# Patient Record
Sex: MALE | Race: BLACK OR AFRICAN AMERICAN | HISPANIC OR LATINO | URBAN - METROPOLITAN AREA
[De-identification: names, ages, dates, MRNs, and addresses within clinical notes are randomized per-mention and may not be internally consistent; named-entity substitution may affect disease eponyms.]

---

## 2017-01-17 ENCOUNTER — OUTPATIENT (OUTPATIENT)
Dept: OUTPATIENT SERVICES | Facility: HOSPITAL | Age: 62
LOS: 1 days | Discharge: HOME | End: 2017-01-17

## 2017-03-03 ENCOUNTER — RECORD ABSTRACTING (OUTPATIENT)
Age: 62
End: 2017-03-03

## 2017-03-03 DIAGNOSIS — Z78.9 OTHER SPECIFIED HEALTH STATUS: ICD-10-CM

## 2017-03-03 DIAGNOSIS — K57.90 DIVERTICULOSIS OF INTESTINE, PART UNSPECIFIED, W/OUT PERFORATION OR ABSCESS W/OUT BLEEDING: ICD-10-CM

## 2017-03-03 DIAGNOSIS — B19.20 UNSPECIFIED VIRAL HEPATITIS C W/OUT HEPATIC COMA: ICD-10-CM

## 2017-03-03 DIAGNOSIS — K64.4 RESIDUAL HEMORRHOIDAL SKIN TAGS: ICD-10-CM

## 2017-03-03 DIAGNOSIS — F19.10 OTHER PSYCHOACTIVE SUBSTANCE ABUSE, UNCOMPLICATED: ICD-10-CM

## 2017-03-03 DIAGNOSIS — K64.8 OTHER HEMORRHOIDS: ICD-10-CM

## 2017-04-20 ENCOUNTER — APPOINTMENT (OUTPATIENT)
Dept: PODIATRY | Facility: CLINIC | Age: 62
End: 2017-04-20

## 2017-04-20 VITALS — HEIGHT: 72 IN | BODY MASS INDEX: 26.68 KG/M2 | WEIGHT: 197 LBS

## 2017-04-20 DIAGNOSIS — M71.571 OTHER BURSITIS, NOT ELSEWHERE CLASSIFIED, RIGHT ANKLE AND FOOT: ICD-10-CM

## 2017-04-20 DIAGNOSIS — M79.671 PAIN IN RIGHT FOOT: ICD-10-CM

## 2017-04-20 DIAGNOSIS — M20.5X9 OTHER DEFORMITIES OF TOE(S) (ACQUIRED), UNSPECIFIED FOOT: ICD-10-CM

## 2017-04-20 DIAGNOSIS — G89.29 PAIN IN RIGHT FOOT: ICD-10-CM

## 2017-04-24 ENCOUNTER — APPOINTMENT (OUTPATIENT)
Dept: PODIATRY | Facility: CLINIC | Age: 62
End: 2017-04-24

## 2017-05-08 ENCOUNTER — OUTPATIENT (OUTPATIENT)
Dept: OUTPATIENT SERVICES | Facility: HOSPITAL | Age: 62
LOS: 1 days | Discharge: HOME | End: 2017-05-08

## 2017-05-09 ENCOUNTER — OUTPATIENT (OUTPATIENT)
Dept: OUTPATIENT SERVICES | Facility: HOSPITAL | Age: 62
LOS: 1 days | Discharge: HOME | End: 2017-05-09

## 2017-06-05 ENCOUNTER — OUTPATIENT (OUTPATIENT)
Dept: OUTPATIENT SERVICES | Facility: HOSPITAL | Age: 62
LOS: 1 days | Discharge: HOME | End: 2017-06-05

## 2017-06-06 ENCOUNTER — OUTPATIENT (OUTPATIENT)
Dept: OUTPATIENT SERVICES | Facility: HOSPITAL | Age: 62
LOS: 1 days | Discharge: HOME | End: 2017-06-06

## 2017-06-26 ENCOUNTER — OUTPATIENT (OUTPATIENT)
Dept: OUTPATIENT SERVICES | Facility: HOSPITAL | Age: 62
LOS: 1 days | Discharge: HOME | End: 2017-06-26

## 2017-06-27 ENCOUNTER — OUTPATIENT (OUTPATIENT)
Dept: OUTPATIENT SERVICES | Facility: HOSPITAL | Age: 62
LOS: 1 days | Discharge: HOME | End: 2017-06-27

## 2017-06-28 DIAGNOSIS — B20 HUMAN IMMUNODEFICIENCY VIRUS [HIV] DISEASE: ICD-10-CM

## 2017-06-28 DIAGNOSIS — G99.0 AUTONOMIC NEUROPATHY IN DISEASES CLASSIFIED ELSEWHERE: ICD-10-CM

## 2017-06-28 DIAGNOSIS — M87.851 OTHER OSTEONECROSIS, RIGHT FEMUR: ICD-10-CM

## 2017-06-28 DIAGNOSIS — E78.5 HYPERLIPIDEMIA, UNSPECIFIED: ICD-10-CM

## 2017-06-28 DIAGNOSIS — I10 ESSENTIAL (PRIMARY) HYPERTENSION: ICD-10-CM

## 2017-06-28 DIAGNOSIS — E23.6 OTHER DISORDERS OF PITUITARY GLAND: ICD-10-CM

## 2017-07-11 DIAGNOSIS — B20 HUMAN IMMUNODEFICIENCY VIRUS [HIV] DISEASE: ICD-10-CM

## 2017-07-14 DIAGNOSIS — B20 HUMAN IMMUNODEFICIENCY VIRUS [HIV] DISEASE: ICD-10-CM

## 2017-07-17 DIAGNOSIS — B20 HUMAN IMMUNODEFICIENCY VIRUS [HIV] DISEASE: ICD-10-CM

## 2017-07-31 ENCOUNTER — OUTPATIENT (OUTPATIENT)
Dept: OUTPATIENT SERVICES | Facility: HOSPITAL | Age: 62
LOS: 1 days | Discharge: HOME | End: 2017-07-31

## 2017-08-01 ENCOUNTER — OUTPATIENT (OUTPATIENT)
Dept: OUTPATIENT SERVICES | Facility: HOSPITAL | Age: 62
LOS: 1 days | Discharge: HOME | End: 2017-08-01

## 2017-08-01 DIAGNOSIS — B20 HUMAN IMMUNODEFICIENCY VIRUS [HIV] DISEASE: ICD-10-CM

## 2017-08-01 DIAGNOSIS — K21.9 GASTRO-ESOPHAGEAL REFLUX DISEASE WITHOUT ESOPHAGITIS: ICD-10-CM

## 2017-08-01 DIAGNOSIS — E78.5 HYPERLIPIDEMIA, UNSPECIFIED: ICD-10-CM

## 2017-08-01 DIAGNOSIS — G99.0 AUTONOMIC NEUROPATHY IN DISEASES CLASSIFIED ELSEWHERE: ICD-10-CM

## 2017-08-01 DIAGNOSIS — I10 ESSENTIAL (PRIMARY) HYPERTENSION: ICD-10-CM

## 2017-08-01 DIAGNOSIS — M87.851 OTHER OSTEONECROSIS, RIGHT FEMUR: ICD-10-CM

## 2017-08-08 ENCOUNTER — OUTPATIENT (OUTPATIENT)
Dept: OUTPATIENT SERVICES | Facility: HOSPITAL | Age: 62
LOS: 1 days | Discharge: HOME | End: 2017-08-08

## 2017-08-08 DIAGNOSIS — B20 HUMAN IMMUNODEFICIENCY VIRUS [HIV] DISEASE: ICD-10-CM

## 2017-08-11 DIAGNOSIS — H25.13 AGE-RELATED NUCLEAR CATARACT, BILATERAL: ICD-10-CM

## 2017-08-11 DIAGNOSIS — H35.033 HYPERTENSIVE RETINOPATHY, BILATERAL: ICD-10-CM

## 2017-08-11 DIAGNOSIS — B20 HUMAN IMMUNODEFICIENCY VIRUS [HIV] DISEASE: ICD-10-CM

## 2017-08-28 ENCOUNTER — OUTPATIENT (OUTPATIENT)
Dept: OUTPATIENT SERVICES | Facility: HOSPITAL | Age: 62
LOS: 1 days | Discharge: HOME | End: 2017-08-28

## 2017-08-29 ENCOUNTER — OUTPATIENT (OUTPATIENT)
Dept: OUTPATIENT SERVICES | Facility: HOSPITAL | Age: 62
LOS: 1 days | Discharge: HOME | End: 2017-08-29

## 2017-08-29 DIAGNOSIS — B20 HUMAN IMMUNODEFICIENCY VIRUS [HIV] DISEASE: ICD-10-CM

## 2017-08-29 DIAGNOSIS — I10 ESSENTIAL (PRIMARY) HYPERTENSION: ICD-10-CM

## 2017-08-29 DIAGNOSIS — G99.0 AUTONOMIC NEUROPATHY IN DISEASES CLASSIFIED ELSEWHERE: ICD-10-CM

## 2017-08-29 DIAGNOSIS — E03.9 HYPOTHYROIDISM, UNSPECIFIED: ICD-10-CM

## 2017-08-29 DIAGNOSIS — G56.00 CARPAL TUNNEL SYNDROME, UNSPECIFIED UPPER LIMB: ICD-10-CM

## 2017-08-29 DIAGNOSIS — Z87.891 PERSONAL HISTORY OF NICOTINE DEPENDENCE: ICD-10-CM

## 2017-08-29 DIAGNOSIS — E78.5 HYPERLIPIDEMIA, UNSPECIFIED: ICD-10-CM

## 2017-08-29 DIAGNOSIS — M87.851 OTHER OSTEONECROSIS, RIGHT FEMUR: ICD-10-CM

## 2017-09-01 DIAGNOSIS — B20 HUMAN IMMUNODEFICIENCY VIRUS [HIV] DISEASE: ICD-10-CM

## 2017-09-11 DIAGNOSIS — B20 HUMAN IMMUNODEFICIENCY VIRUS [HIV] DISEASE: ICD-10-CM

## 2017-09-26 ENCOUNTER — OUTPATIENT (OUTPATIENT)
Dept: OUTPATIENT SERVICES | Facility: HOSPITAL | Age: 62
LOS: 1 days | Discharge: HOME | End: 2017-09-26

## 2017-09-26 DIAGNOSIS — B20 HUMAN IMMUNODEFICIENCY VIRUS [HIV] DISEASE: ICD-10-CM

## 2017-09-26 DIAGNOSIS — I10 ESSENTIAL (PRIMARY) HYPERTENSION: ICD-10-CM

## 2017-09-26 DIAGNOSIS — G99.0 AUTONOMIC NEUROPATHY IN DISEASES CLASSIFIED ELSEWHERE: ICD-10-CM

## 2017-09-26 DIAGNOSIS — E23.6 OTHER DISORDERS OF PITUITARY GLAND: ICD-10-CM

## 2017-09-26 DIAGNOSIS — E78.5 HYPERLIPIDEMIA, UNSPECIFIED: ICD-10-CM

## 2017-10-24 ENCOUNTER — OUTPATIENT (OUTPATIENT)
Dept: OUTPATIENT SERVICES | Facility: HOSPITAL | Age: 62
LOS: 1 days | Discharge: HOME | End: 2017-10-24

## 2017-10-24 DIAGNOSIS — I10 ESSENTIAL (PRIMARY) HYPERTENSION: ICD-10-CM

## 2017-10-24 DIAGNOSIS — E23.6 OTHER DISORDERS OF PITUITARY GLAND: ICD-10-CM

## 2017-10-24 DIAGNOSIS — G99.0 AUTONOMIC NEUROPATHY IN DISEASES CLASSIFIED ELSEWHERE: ICD-10-CM

## 2017-10-24 DIAGNOSIS — E78.5 HYPERLIPIDEMIA, UNSPECIFIED: ICD-10-CM

## 2017-10-24 DIAGNOSIS — B20 HUMAN IMMUNODEFICIENCY VIRUS [HIV] DISEASE: ICD-10-CM

## 2017-11-07 ENCOUNTER — APPOINTMENT (OUTPATIENT)
Dept: PLASTIC SURGERY | Facility: CLINIC | Age: 62
End: 2017-11-07
Payer: COMMERCIAL

## 2017-11-07 PROCEDURE — 99203 OFFICE O/P NEW LOW 30 MIN: CPT

## 2017-11-13 ENCOUNTER — OUTPATIENT (OUTPATIENT)
Dept: OUTPATIENT SERVICES | Facility: HOSPITAL | Age: 62
LOS: 1 days | Discharge: HOME | End: 2017-11-13

## 2017-11-13 DIAGNOSIS — Z01.818 ENCOUNTER FOR OTHER PREPROCEDURAL EXAMINATION: ICD-10-CM

## 2017-11-13 DIAGNOSIS — G56.01 CARPAL TUNNEL SYNDROME, RIGHT UPPER LIMB: ICD-10-CM

## 2017-11-13 DIAGNOSIS — K76.9 LIVER DISEASE, UNSPECIFIED: ICD-10-CM

## 2017-11-13 DIAGNOSIS — I10 ESSENTIAL (PRIMARY) HYPERTENSION: ICD-10-CM

## 2017-11-13 DIAGNOSIS — E78.00 PURE HYPERCHOLESTEROLEMIA, UNSPECIFIED: ICD-10-CM

## 2017-11-13 DIAGNOSIS — Z21 ASYMPTOMATIC HUMAN IMMUNODEFICIENCY VIRUS [HIV] INFECTION STATUS: ICD-10-CM

## 2017-11-20 ENCOUNTER — OUTPATIENT (OUTPATIENT)
Dept: OUTPATIENT SERVICES | Facility: HOSPITAL | Age: 62
LOS: 1 days | Discharge: HOME | End: 2017-11-20

## 2017-11-21 ENCOUNTER — OUTPATIENT (OUTPATIENT)
Dept: OUTPATIENT SERVICES | Facility: HOSPITAL | Age: 62
LOS: 1 days | Discharge: HOME | End: 2017-11-21

## 2017-11-21 DIAGNOSIS — M19.90 UNSPECIFIED OSTEOARTHRITIS, UNSPECIFIED SITE: ICD-10-CM

## 2017-11-21 DIAGNOSIS — B18.2 CHRONIC VIRAL HEPATITIS C: ICD-10-CM

## 2017-11-21 DIAGNOSIS — B20 HUMAN IMMUNODEFICIENCY VIRUS [HIV] DISEASE: ICD-10-CM

## 2017-11-21 DIAGNOSIS — M25.552 PAIN IN LEFT HIP: ICD-10-CM

## 2017-11-21 DIAGNOSIS — M25.551 PAIN IN RIGHT HIP: ICD-10-CM

## 2017-11-21 DIAGNOSIS — E78.2 MIXED HYPERLIPIDEMIA: ICD-10-CM

## 2017-11-27 ENCOUNTER — OUTPATIENT (OUTPATIENT)
Dept: OUTPATIENT SERVICES | Facility: HOSPITAL | Age: 62
LOS: 1 days | Discharge: HOME | End: 2017-11-27

## 2017-11-27 ENCOUNTER — APPOINTMENT (OUTPATIENT)
Dept: PLASTIC SURGERY | Facility: AMBULATORY SURGERY CENTER | Age: 62
End: 2017-11-27
Payer: COMMERCIAL

## 2017-11-27 PROCEDURE — 64721 CARPAL TUNNEL SURGERY: CPT

## 2017-11-30 DIAGNOSIS — G56.01 CARPAL TUNNEL SYNDROME, RIGHT UPPER LIMB: ICD-10-CM

## 2017-11-30 DIAGNOSIS — B19.20 UNSPECIFIED VIRAL HEPATITIS C WITHOUT HEPATIC COMA: ICD-10-CM

## 2017-11-30 DIAGNOSIS — B19.10 UNSPECIFIED VIRAL HEPATITIS B WITHOUT HEPATIC COMA: ICD-10-CM

## 2017-11-30 DIAGNOSIS — I10 ESSENTIAL (PRIMARY) HYPERTENSION: ICD-10-CM

## 2017-11-30 DIAGNOSIS — Z21 ASYMPTOMATIC HUMAN IMMUNODEFICIENCY VIRUS [HIV] INFECTION STATUS: ICD-10-CM

## 2017-12-01 DIAGNOSIS — B20 HUMAN IMMUNODEFICIENCY VIRUS [HIV] DISEASE: ICD-10-CM

## 2017-12-06 ENCOUNTER — APPOINTMENT (OUTPATIENT)
Dept: PLASTIC SURGERY | Facility: CLINIC | Age: 62
End: 2017-12-06
Payer: COMMERCIAL

## 2017-12-06 PROCEDURE — 99024 POSTOP FOLLOW-UP VISIT: CPT

## 2017-12-08 ENCOUNTER — APPOINTMENT (OUTPATIENT)
Dept: GASTROENTEROLOGY | Facility: CLINIC | Age: 62
End: 2017-12-08

## 2017-12-08 ENCOUNTER — OUTPATIENT (OUTPATIENT)
Dept: OUTPATIENT SERVICES | Facility: HOSPITAL | Age: 62
LOS: 1 days | Discharge: HOME | End: 2017-12-08

## 2017-12-08 VITALS
DIASTOLIC BLOOD PRESSURE: 72 MMHG | BODY MASS INDEX: 28.31 KG/M2 | WEIGHT: 209 LBS | SYSTOLIC BLOOD PRESSURE: 135 MMHG | HEART RATE: 68 BPM | HEIGHT: 72 IN

## 2017-12-11 ENCOUNTER — APPOINTMENT (OUTPATIENT)
Dept: PLASTIC SURGERY | Facility: CLINIC | Age: 62
End: 2017-12-11

## 2017-12-11 ENCOUNTER — APPOINTMENT (OUTPATIENT)
Dept: PLASTIC SURGERY | Facility: AMBULATORY SURGERY CENTER | Age: 62
End: 2017-12-11
Payer: COMMERCIAL

## 2017-12-11 PROCEDURE — 99024 POSTOP FOLLOW-UP VISIT: CPT

## 2017-12-15 ENCOUNTER — OUTPATIENT (OUTPATIENT)
Dept: OUTPATIENT SERVICES | Facility: HOSPITAL | Age: 62
LOS: 1 days | Discharge: HOME | End: 2017-12-15

## 2017-12-18 ENCOUNTER — OUTPATIENT (OUTPATIENT)
Dept: OUTPATIENT SERVICES | Facility: HOSPITAL | Age: 62
LOS: 1 days | Discharge: HOME | End: 2017-12-18

## 2017-12-19 ENCOUNTER — OUTPATIENT (OUTPATIENT)
Dept: OUTPATIENT SERVICES | Facility: HOSPITAL | Age: 62
LOS: 1 days | Discharge: HOME | End: 2017-12-19

## 2017-12-19 DIAGNOSIS — I10 ESSENTIAL (PRIMARY) HYPERTENSION: ICD-10-CM

## 2017-12-19 DIAGNOSIS — Z86.010 PERSONAL HISTORY OF COLONIC POLYPS: ICD-10-CM

## 2017-12-19 DIAGNOSIS — G99.0 AUTONOMIC NEUROPATHY IN DISEASES CLASSIFIED ELSEWHERE: ICD-10-CM

## 2017-12-19 DIAGNOSIS — Z12.11 ENCOUNTER FOR SCREENING FOR MALIGNANT NEOPLASM OF COLON: ICD-10-CM

## 2017-12-19 DIAGNOSIS — E23.6 OTHER DISORDERS OF PITUITARY GLAND: ICD-10-CM

## 2017-12-19 DIAGNOSIS — M87.851 OTHER OSTEONECROSIS, RIGHT FEMUR: ICD-10-CM

## 2017-12-19 DIAGNOSIS — B20 HUMAN IMMUNODEFICIENCY VIRUS [HIV] DISEASE: ICD-10-CM

## 2017-12-19 DIAGNOSIS — K64.8 OTHER HEMORRHOIDS: ICD-10-CM

## 2017-12-19 DIAGNOSIS — Z21 ASYMPTOMATIC HUMAN IMMUNODEFICIENCY VIRUS [HIV] INFECTION STATUS: ICD-10-CM

## 2017-12-19 DIAGNOSIS — B19.20 UNSPECIFIED VIRAL HEPATITIS C WITHOUT HEPATIC COMA: ICD-10-CM

## 2017-12-19 DIAGNOSIS — K21.9 GASTRO-ESOPHAGEAL REFLUX DISEASE WITHOUT ESOPHAGITIS: ICD-10-CM

## 2017-12-19 DIAGNOSIS — E78.5 HYPERLIPIDEMIA, UNSPECIFIED: ICD-10-CM

## 2017-12-19 DIAGNOSIS — K57.30 DIVERTICULOSIS OF LARGE INTESTINE WITHOUT PERFORATION OR ABSCESS WITHOUT BLEEDING: ICD-10-CM

## 2017-12-22 ENCOUNTER — APPOINTMENT (OUTPATIENT)
Dept: PLASTIC SURGERY | Facility: CLINIC | Age: 62
End: 2017-12-22
Payer: COMMERCIAL

## 2017-12-22 DIAGNOSIS — G56.03 CARPAL TUNNEL SYNDROM,BILATERAL UPPER LIMBS: ICD-10-CM

## 2017-12-22 PROCEDURE — 99024 POSTOP FOLLOW-UP VISIT: CPT

## 2017-12-28 DIAGNOSIS — B20 HUMAN IMMUNODEFICIENCY VIRUS [HIV] DISEASE: ICD-10-CM

## 2018-01-03 ENCOUNTER — OUTPATIENT (OUTPATIENT)
Dept: OUTPATIENT SERVICES | Facility: HOSPITAL | Age: 63
LOS: 1 days | Discharge: HOME | End: 2018-01-03

## 2018-01-03 ENCOUNTER — APPOINTMENT (OUTPATIENT)
Dept: ORTHOPEDIC SURGERY | Facility: CLINIC | Age: 63
End: 2018-01-03

## 2018-01-05 ENCOUNTER — APPOINTMENT (OUTPATIENT)
Dept: GASTROENTEROLOGY | Facility: CLINIC | Age: 63
End: 2018-01-05

## 2018-01-12 ENCOUNTER — OUTPATIENT (OUTPATIENT)
Dept: OUTPATIENT SERVICES | Facility: HOSPITAL | Age: 63
LOS: 1 days | Discharge: HOME | End: 2018-01-12

## 2018-01-16 ENCOUNTER — OUTPATIENT (OUTPATIENT)
Dept: OUTPATIENT SERVICES | Facility: HOSPITAL | Age: 63
LOS: 1 days | Discharge: HOME | End: 2018-01-16

## 2018-01-16 ENCOUNTER — APPOINTMENT (OUTPATIENT)
Dept: PLASTIC SURGERY | Facility: CLINIC | Age: 63
End: 2018-01-16

## 2018-01-16 DIAGNOSIS — B20 HUMAN IMMUNODEFICIENCY VIRUS [HIV] DISEASE: ICD-10-CM

## 2018-01-16 DIAGNOSIS — I10 ESSENTIAL (PRIMARY) HYPERTENSION: ICD-10-CM

## 2018-01-16 DIAGNOSIS — M19.90 UNSPECIFIED OSTEOARTHRITIS, UNSPECIFIED SITE: ICD-10-CM

## 2018-01-16 DIAGNOSIS — E78.2 MIXED HYPERLIPIDEMIA: ICD-10-CM

## 2018-01-16 DIAGNOSIS — B18.2 CHRONIC VIRAL HEPATITIS C: ICD-10-CM

## 2018-01-17 DIAGNOSIS — B20 HUMAN IMMUNODEFICIENCY VIRUS [HIV] DISEASE: ICD-10-CM

## 2018-02-12 ENCOUNTER — OUTPATIENT (OUTPATIENT)
Dept: OUTPATIENT SERVICES | Facility: HOSPITAL | Age: 63
LOS: 1 days | Discharge: HOME | End: 2018-02-12

## 2018-02-13 ENCOUNTER — OUTPATIENT (OUTPATIENT)
Dept: OUTPATIENT SERVICES | Facility: HOSPITAL | Age: 63
LOS: 1 days | Discharge: HOME | End: 2018-02-13

## 2018-02-13 DIAGNOSIS — E78.5 HYPERLIPIDEMIA, UNSPECIFIED: ICD-10-CM

## 2018-02-13 DIAGNOSIS — B20 HUMAN IMMUNODEFICIENCY VIRUS [HIV] DISEASE: ICD-10-CM

## 2018-02-13 DIAGNOSIS — E23.6 OTHER DISORDERS OF PITUITARY GLAND: ICD-10-CM

## 2018-02-13 DIAGNOSIS — I10 ESSENTIAL (PRIMARY) HYPERTENSION: ICD-10-CM

## 2018-02-13 DIAGNOSIS — G99.0 AUTONOMIC NEUROPATHY IN DISEASES CLASSIFIED ELSEWHERE: ICD-10-CM

## 2018-02-13 DIAGNOSIS — K21.9 GASTRO-ESOPHAGEAL REFLUX DISEASE WITHOUT ESOPHAGITIS: ICD-10-CM

## 2018-02-13 DIAGNOSIS — M87.851 OTHER OSTEONECROSIS, RIGHT FEMUR: ICD-10-CM

## 2018-02-14 LAB
AMPHET UR-MCNC: NEGATIVE — SIGNIFICANT CHANGE UP
BARBITURATES UR SCN-MCNC: NEGATIVE — SIGNIFICANT CHANGE UP
BENZODIAZ UR-MCNC: NEGATIVE — SIGNIFICANT CHANGE UP
COCAINE METAB.OTHER UR-MCNC: POSITIVE
DRUG SCREEN 1, URINE RESULT: SIGNIFICANT CHANGE UP
METHADONE UR-MCNC: POSITIVE
OPIATES UR-MCNC: NEGATIVE — SIGNIFICANT CHANGE UP
PCP UR-MCNC: NEGATIVE — SIGNIFICANT CHANGE UP
PROPOXYPHENE QUALITATIVE URINE RESULT: NEGATIVE — SIGNIFICANT CHANGE UP
THC UR QL: NEGATIVE — SIGNIFICANT CHANGE UP

## 2018-02-20 DIAGNOSIS — B20 HUMAN IMMUNODEFICIENCY VIRUS [HIV] DISEASE: ICD-10-CM

## 2018-03-06 ENCOUNTER — OUTPATIENT (OUTPATIENT)
Dept: OUTPATIENT SERVICES | Facility: HOSPITAL | Age: 63
LOS: 1 days | Discharge: HOME | End: 2018-03-06

## 2018-03-09 ENCOUNTER — APPOINTMENT (OUTPATIENT)
Dept: GASTROENTEROLOGY | Facility: CLINIC | Age: 63
End: 2018-03-09

## 2018-03-09 DIAGNOSIS — B20 HUMAN IMMUNODEFICIENCY VIRUS [HIV] DISEASE: ICD-10-CM

## 2018-03-09 DIAGNOSIS — H35.033 HYPERTENSIVE RETINOPATHY, BILATERAL: ICD-10-CM

## 2018-03-09 DIAGNOSIS — H25.13 AGE-RELATED NUCLEAR CATARACT, BILATERAL: ICD-10-CM

## 2018-03-09 DIAGNOSIS — H01.021 SQUAMOUS BLEPHARITIS RIGHT UPPER EYELID: ICD-10-CM

## 2018-03-09 DIAGNOSIS — H01.024 SQUAMOUS BLEPHARITIS LEFT UPPER EYELID: ICD-10-CM

## 2018-03-09 DIAGNOSIS — H53.19 OTHER SUBJECTIVE VISUAL DISTURBANCES: ICD-10-CM

## 2018-03-12 ENCOUNTER — OUTPATIENT (OUTPATIENT)
Dept: OUTPATIENT SERVICES | Facility: HOSPITAL | Age: 63
LOS: 1 days | Discharge: HOME | End: 2018-03-12

## 2018-03-13 ENCOUNTER — OUTPATIENT (OUTPATIENT)
Dept: OUTPATIENT SERVICES | Facility: HOSPITAL | Age: 63
LOS: 1 days | Discharge: HOME | End: 2018-03-13

## 2018-03-13 DIAGNOSIS — E78.5 HYPERLIPIDEMIA, UNSPECIFIED: ICD-10-CM

## 2018-03-13 DIAGNOSIS — E23.6 OTHER DISORDERS OF PITUITARY GLAND: ICD-10-CM

## 2018-03-13 DIAGNOSIS — G99.0 AUTONOMIC NEUROPATHY IN DISEASES CLASSIFIED ELSEWHERE: ICD-10-CM

## 2018-03-13 DIAGNOSIS — B20 HUMAN IMMUNODEFICIENCY VIRUS [HIV] DISEASE: ICD-10-CM

## 2018-03-13 DIAGNOSIS — I10 ESSENTIAL (PRIMARY) HYPERTENSION: ICD-10-CM

## 2018-03-13 LAB
AMPHET UR-MCNC: NEGATIVE — SIGNIFICANT CHANGE UP
BARBITURATES UR SCN-MCNC: NEGATIVE — SIGNIFICANT CHANGE UP
BENZODIAZ UR-MCNC: NEGATIVE — SIGNIFICANT CHANGE UP
COCAINE METAB.OTHER UR-MCNC: NEGATIVE — SIGNIFICANT CHANGE UP
DRUG SCREEN 1, URINE RESULT: SIGNIFICANT CHANGE UP
METHADONE UR-MCNC: POSITIVE
OPIATES UR-MCNC: NEGATIVE — SIGNIFICANT CHANGE UP
PCP UR-MCNC: NEGATIVE — SIGNIFICANT CHANGE UP
PROPOXYPHENE QUALITATIVE URINE RESULT: NEGATIVE — SIGNIFICANT CHANGE UP
THC UR QL: NEGATIVE — SIGNIFICANT CHANGE UP

## 2018-03-14 ENCOUNTER — APPOINTMENT (OUTPATIENT)
Dept: ORTHOPEDIC SURGERY | Facility: CLINIC | Age: 63
End: 2018-03-14

## 2018-03-18 LAB
EDDP UR CFM-MCNC: SIGNIFICANT CHANGE UP CD:170654348
METHADONE UR CFM-MCNC: (no result)
METHADONE UR CFM-MCNC: SIGNIFICANT CHANGE UP CD:170654348

## 2018-03-26 DIAGNOSIS — B20 HUMAN IMMUNODEFICIENCY VIRUS [HIV] DISEASE: ICD-10-CM

## 2018-04-02 ENCOUNTER — OUTPATIENT (OUTPATIENT)
Dept: OUTPATIENT SERVICES | Facility: HOSPITAL | Age: 63
LOS: 1 days | Discharge: HOME | End: 2018-04-02

## 2018-04-03 ENCOUNTER — OUTPATIENT (OUTPATIENT)
Dept: OUTPATIENT SERVICES | Facility: HOSPITAL | Age: 63
LOS: 1 days | Discharge: HOME | End: 2018-04-03

## 2018-04-03 DIAGNOSIS — G99.0 AUTONOMIC NEUROPATHY IN DISEASES CLASSIFIED ELSEWHERE: ICD-10-CM

## 2018-04-03 DIAGNOSIS — E23.6 OTHER DISORDERS OF PITUITARY GLAND: ICD-10-CM

## 2018-04-03 DIAGNOSIS — R94.31 ABNORMAL ELECTROCARDIOGRAM [ECG] [EKG]: ICD-10-CM

## 2018-04-03 DIAGNOSIS — I10 ESSENTIAL (PRIMARY) HYPERTENSION: ICD-10-CM

## 2018-04-03 DIAGNOSIS — K21.9 GASTRO-ESOPHAGEAL REFLUX DISEASE WITHOUT ESOPHAGITIS: ICD-10-CM

## 2018-04-03 DIAGNOSIS — E78.5 HYPERLIPIDEMIA, UNSPECIFIED: ICD-10-CM

## 2018-04-03 DIAGNOSIS — B20 HUMAN IMMUNODEFICIENCY VIRUS [HIV] DISEASE: ICD-10-CM

## 2018-04-03 LAB
AMPHET UR-MCNC: NEGATIVE — SIGNIFICANT CHANGE UP
BARBITURATES UR SCN-MCNC: NEGATIVE — SIGNIFICANT CHANGE UP
BENZODIAZ UR-MCNC: NEGATIVE — SIGNIFICANT CHANGE UP
COCAINE METAB.OTHER UR-MCNC: POSITIVE
DRUG SCREEN 1, URINE RESULT: SIGNIFICANT CHANGE UP
METHADONE UR-MCNC: POSITIVE
OPIATES UR-MCNC: POSITIVE
PCP UR-MCNC: NEGATIVE — SIGNIFICANT CHANGE UP
PROPOXYPHENE QUALITATIVE URINE RESULT: NEGATIVE — SIGNIFICANT CHANGE UP
THC UR QL: NEGATIVE — SIGNIFICANT CHANGE UP

## 2018-04-07 LAB
ALFENTANIL UR QUANT: SIGNIFICANT CHANGE UP CD:170654348
BUPRENORPHINE UR CONFIRMATION: NEGATIVE — SIGNIFICANT CHANGE UP
BUPRENORPHINE UR QUANT: SIGNIFICANT CHANGE UP CD:170654348
CODEINE UR CFM-MCNC: SIGNIFICANT CHANGE UP CD:170654348
DHC UR-MCNC: SIGNIFICANT CHANGE UP CD:170654348
EDDP UR CFM-MCNC: >8475 CD:170654348 — SIGNIFICANT CHANGE UP
FENTANYL UR CFM-MCNC: (no result)
FENTANYL UR CFM-MCNC: 1 CD:170654348 — SIGNIFICANT CHANGE UP
HYDROCODONE UR CFM-MCNC: SIGNIFICANT CHANGE UP CD:170654348
HYDROMORPHONE UR CFM-MCNC: SIGNIFICANT CHANGE UP CD:170654348
METHADONE UR CFM-MCNC: (no result)
METHADONE UR CFM-MCNC: >8475 CD:170654348 — SIGNIFICANT CHANGE UP
MORPHINE UR CFM-MCNC: 1247 CD:170654348 — SIGNIFICANT CHANGE UP
NORBUPRENORPHINE QUANT UR: SIGNIFICANT CHANGE UP CD:170654348
NORCODEINE UR-MCNC: SIGNIFICANT CHANGE UP CD:170654348
NORFENTANYL UR-MCNC: SIGNIFICANT CHANGE UP CD:170654348
NORHYDROCODONE UR CFM-MCNC: SIGNIFICANT CHANGE UP CD:170654348
NOROXYCODONE UR CFM-MCNC: 1815 CD:170654348 — SIGNIFICANT CHANGE UP
NOROXYMORPHONE UR CFM-MCNC: SIGNIFICANT CHANGE UP CD:170654348
OPIATES UR CFM-MCNC: (no result)
OXYCODONE UR-MCNC: (no result)
OXYCODONE UR-MCNC: 3528 CD:170654348 — SIGNIFICANT CHANGE UP
OXYMORPHONE UR CFM-MCNC: 132 CD:170654348 — SIGNIFICANT CHANGE UP
SUFENTANIL UR QUANT: SIGNIFICANT CHANGE UP CD:170654348
TAPENTADOL UR CONFIRMATION: NEGATIVE — SIGNIFICANT CHANGE UP
TAPENTADOL UR QUANT: SIGNIFICANT CHANGE UP CD:170654348
TESTOST FLD-SCNC: 467.1 NG/DL — SIGNIFICANT CHANGE UP (ref 264–916)
TESTOSTERONE.FREE+WB SERPL-MCNC: 6.3 PG/ML — LOW (ref 6.6–18.1)

## 2018-04-09 DIAGNOSIS — B20 HUMAN IMMUNODEFICIENCY VIRUS [HIV] DISEASE: ICD-10-CM

## 2018-05-04 ENCOUNTER — OUTPATIENT (OUTPATIENT)
Dept: OUTPATIENT SERVICES | Facility: HOSPITAL | Age: 63
LOS: 1 days | Discharge: HOME | End: 2018-05-04

## 2018-05-08 ENCOUNTER — OUTPATIENT (OUTPATIENT)
Dept: OUTPATIENT SERVICES | Facility: HOSPITAL | Age: 63
LOS: 1 days | Discharge: HOME | End: 2018-05-08

## 2018-05-08 DIAGNOSIS — I10 ESSENTIAL (PRIMARY) HYPERTENSION: ICD-10-CM

## 2018-05-08 DIAGNOSIS — K21.9 GASTRO-ESOPHAGEAL REFLUX DISEASE WITHOUT ESOPHAGITIS: ICD-10-CM

## 2018-05-08 DIAGNOSIS — G99.0 AUTONOMIC NEUROPATHY IN DISEASES CLASSIFIED ELSEWHERE: ICD-10-CM

## 2018-05-08 DIAGNOSIS — E23.6 OTHER DISORDERS OF PITUITARY GLAND: ICD-10-CM

## 2018-05-08 DIAGNOSIS — B20 HUMAN IMMUNODEFICIENCY VIRUS [HIV] DISEASE: ICD-10-CM

## 2018-05-08 DIAGNOSIS — E78.5 HYPERLIPIDEMIA, UNSPECIFIED: ICD-10-CM

## 2018-05-08 DIAGNOSIS — M87.851 OTHER OSTEONECROSIS, RIGHT FEMUR: ICD-10-CM

## 2018-05-08 LAB
ALBUMIN SERPL ELPH-MCNC: 4.5 G/DL — SIGNIFICANT CHANGE UP (ref 3.5–5.2)
ALP SERPL-CCNC: 72 U/L — SIGNIFICANT CHANGE UP (ref 30–115)
ALT FLD-CCNC: 21 U/L — SIGNIFICANT CHANGE UP (ref 0–41)
AMPHET UR-MCNC: NEGATIVE — SIGNIFICANT CHANGE UP
ANION GAP SERPL CALC-SCNC: 11 MMOL/L — SIGNIFICANT CHANGE UP (ref 7–14)
AST SERPL-CCNC: 27 U/L — SIGNIFICANT CHANGE UP (ref 0–41)
BARBITURATES UR SCN-MCNC: NEGATIVE — SIGNIFICANT CHANGE UP
BENZODIAZ UR-MCNC: NEGATIVE — SIGNIFICANT CHANGE UP
BILIRUB SERPL-MCNC: 0.3 MG/DL — SIGNIFICANT CHANGE UP (ref 0.2–1.2)
BUN SERPL-MCNC: 13 MG/DL — SIGNIFICANT CHANGE UP (ref 10–20)
CALCIUM SERPL-MCNC: 9.7 MG/DL — SIGNIFICANT CHANGE UP (ref 8.5–10.1)
CHLORIDE SERPL-SCNC: 101 MMOL/L — SIGNIFICANT CHANGE UP (ref 98–110)
CO2 SERPL-SCNC: 27 MMOL/L — SIGNIFICANT CHANGE UP (ref 17–32)
COCAINE METAB.OTHER UR-MCNC: NEGATIVE — SIGNIFICANT CHANGE UP
CREAT SERPL-MCNC: 0.8 MG/DL — SIGNIFICANT CHANGE UP (ref 0.7–1.5)
DRUG SCREEN 1, URINE RESULT: SIGNIFICANT CHANGE UP
GGT SERPL-CCNC: 44 U/L — HIGH (ref 1–40)
GLUCOSE SERPL-MCNC: 103 MG/DL — HIGH (ref 70–99)
HCT VFR BLD CALC: 38.3 % — LOW (ref 42–52)
HGB BLD-MCNC: 13.4 G/DL — LOW (ref 14–18)
LDH SERPL L TO P-CCNC: 177 U/L — SIGNIFICANT CHANGE UP (ref 50–242)
MCHC RBC-ENTMCNC: 32.6 PG — HIGH (ref 27–31)
MCHC RBC-ENTMCNC: 35 G/DL — SIGNIFICANT CHANGE UP (ref 32–37)
MCV RBC AUTO: 93.2 FL — SIGNIFICANT CHANGE UP (ref 80–94)
METHADONE UR-MCNC: NEGATIVE — SIGNIFICANT CHANGE UP
NRBC # BLD: 0 /100 WBCS — SIGNIFICANT CHANGE UP (ref 0–0)
OPIATES UR-MCNC: NEGATIVE — SIGNIFICANT CHANGE UP
PCP UR-MCNC: NEGATIVE — SIGNIFICANT CHANGE UP
PLATELET # BLD AUTO: 250 K/UL — SIGNIFICANT CHANGE UP (ref 130–400)
POTASSIUM SERPL-MCNC: 4.3 MMOL/L — SIGNIFICANT CHANGE UP (ref 3.5–5)
POTASSIUM SERPL-SCNC: 4.3 MMOL/L — SIGNIFICANT CHANGE UP (ref 3.5–5)
PROPOXYPHENE QUALITATIVE URINE RESULT: NEGATIVE — SIGNIFICANT CHANGE UP
PROT SERPL-MCNC: 7.1 G/DL — SIGNIFICANT CHANGE UP (ref 6–8)
RBC # BLD: 4.11 M/UL — LOW (ref 4.7–6.1)
RBC # FLD: 14.2 % — SIGNIFICANT CHANGE UP (ref 11.5–14.5)
SODIUM SERPL-SCNC: 139 MMOL/L — SIGNIFICANT CHANGE UP (ref 135–146)
THC UR QL: NEGATIVE — SIGNIFICANT CHANGE UP
WBC # BLD: 3.91 K/UL — LOW (ref 4.8–10.8)
WBC # FLD AUTO: 3.91 K/UL — LOW (ref 4.8–10.8)

## 2018-05-09 LAB
4/8 RATIO: 0.3 RATIO — LOW (ref 1.2–3.4)
ABS CD8: 1026 /UL — HIGH (ref 206–494)
AFP-TM SERPL-MCNC: 1.5 NG/ML — SIGNIFICANT CHANGE UP
CD16+CD56+ CELLS NFR BLD: 8 % — SIGNIFICANT CHANGE UP (ref 4–20)
CD16+CD56+ CELLS NFR SPEC: 118 /UL — SIGNIFICANT CHANGE UP (ref 89–385)
CD19 BLASTS SPEC-ACNC: 3 % — LOW (ref 10–28)
CD19 BLASTS SPEC-ACNC: 51 /UL — SIGNIFICANT CHANGE UP (ref 72–502)
CD3 BLASTS SPEC-ACNC: 1358 /UL — SIGNIFICANT CHANGE UP (ref 800–2012)
CD3 BLASTS SPEC-ACNC: 88 % — HIGH (ref 59–81)
CD4 %: 22 % — SIGNIFICANT CHANGE UP (ref 42–58)
CD8 %: 67 % — SIGNIFICANT CHANGE UP (ref 14–30)
T-CELL CD4 SUBSET PNL BLD: 331 /UL — LOW (ref 495–1312)

## 2018-05-11 LAB
HIV-1 VIRAL LOAD RESULT: SIGNIFICANT CHANGE UP
HIV1 RNA # SERPL NAA+PROBE: SIGNIFICANT CHANGE UP
HIV1 RNA SERPL NAA+PROBE-ACNC: SIGNIFICANT CHANGE UP
HIV1 RNA SERPL NAA+PROBE-LOG#: SIGNIFICANT CHANGE UP LG COP/ML

## 2018-05-21 DIAGNOSIS — B20 HUMAN IMMUNODEFICIENCY VIRUS [HIV] DISEASE: ICD-10-CM

## 2018-06-01 ENCOUNTER — OUTPATIENT (OUTPATIENT)
Dept: OUTPATIENT SERVICES | Facility: HOSPITAL | Age: 63
LOS: 1 days | Discharge: HOME | End: 2018-06-01

## 2018-06-05 ENCOUNTER — OUTPATIENT (OUTPATIENT)
Dept: OUTPATIENT SERVICES | Facility: HOSPITAL | Age: 63
LOS: 1 days | Discharge: HOME | End: 2018-06-05

## 2018-06-05 DIAGNOSIS — I10 ESSENTIAL (PRIMARY) HYPERTENSION: ICD-10-CM

## 2018-06-05 DIAGNOSIS — B20 HUMAN IMMUNODEFICIENCY VIRUS [HIV] DISEASE: ICD-10-CM

## 2018-06-05 DIAGNOSIS — E78.5 HYPERLIPIDEMIA, UNSPECIFIED: ICD-10-CM

## 2018-06-05 DIAGNOSIS — E23.6 OTHER DISORDERS OF PITUITARY GLAND: ICD-10-CM

## 2018-06-09 LAB
EDDP UR CFM-MCNC: SIGNIFICANT CHANGE UP NG/MG CREAT
METHADONE UR CFM-MCNC: (no result)
METHADONE UR CFM-MCNC: 8916 NG/MG CREAT — SIGNIFICANT CHANGE UP

## 2018-06-18 DIAGNOSIS — B20 HUMAN IMMUNODEFICIENCY VIRUS [HIV] DISEASE: ICD-10-CM

## 2018-06-22 ENCOUNTER — OUTPATIENT (OUTPATIENT)
Dept: OUTPATIENT SERVICES | Facility: HOSPITAL | Age: 63
LOS: 1 days | Discharge: HOME | End: 2018-06-22

## 2018-06-26 ENCOUNTER — OUTPATIENT (OUTPATIENT)
Dept: OUTPATIENT SERVICES | Facility: HOSPITAL | Age: 63
LOS: 1 days | Discharge: HOME | End: 2018-06-26

## 2018-06-26 DIAGNOSIS — E23.6 OTHER DISORDERS OF PITUITARY GLAND: ICD-10-CM

## 2018-06-26 DIAGNOSIS — R94.31 ABNORMAL ELECTROCARDIOGRAM [ECG] [EKG]: ICD-10-CM

## 2018-06-26 DIAGNOSIS — G99.0 AUTONOMIC NEUROPATHY IN DISEASES CLASSIFIED ELSEWHERE: ICD-10-CM

## 2018-06-26 DIAGNOSIS — B20 HUMAN IMMUNODEFICIENCY VIRUS [HIV] DISEASE: ICD-10-CM

## 2018-06-26 DIAGNOSIS — E78.5 HYPERLIPIDEMIA, UNSPECIFIED: ICD-10-CM

## 2018-06-26 DIAGNOSIS — I10 ESSENTIAL (PRIMARY) HYPERTENSION: ICD-10-CM

## 2018-06-27 LAB — PSA FREE MFR FLD: 0.87 NG/ML — SIGNIFICANT CHANGE UP (ref 0–4)

## 2018-07-01 LAB
EDDP UR CFM-MCNC: >6098 NG/MG CREAT — SIGNIFICANT CHANGE UP
METHADONE UR CFM-MCNC: (no result)
METHADONE UR CFM-MCNC: >6098 NG/MG CREAT — SIGNIFICANT CHANGE UP

## 2018-07-05 DIAGNOSIS — B20 HUMAN IMMUNODEFICIENCY VIRUS [HIV] DISEASE: ICD-10-CM

## 2018-07-23 PROBLEM — Z78.9 ALCOHOL USE: Status: ACTIVE | Noted: 2017-03-03

## 2018-07-27 ENCOUNTER — OUTPATIENT (OUTPATIENT)
Dept: OUTPATIENT SERVICES | Facility: HOSPITAL | Age: 63
LOS: 1 days | Discharge: HOME | End: 2018-07-27

## 2018-07-30 DIAGNOSIS — B20 HUMAN IMMUNODEFICIENCY VIRUS [HIV] DISEASE: ICD-10-CM

## 2018-07-31 ENCOUNTER — OUTPATIENT (OUTPATIENT)
Dept: OUTPATIENT SERVICES | Facility: HOSPITAL | Age: 63
LOS: 1 days | Discharge: HOME | End: 2018-07-31

## 2018-07-31 DIAGNOSIS — G99.0 AUTONOMIC NEUROPATHY IN DISEASES CLASSIFIED ELSEWHERE: ICD-10-CM

## 2018-07-31 DIAGNOSIS — B20 HUMAN IMMUNODEFICIENCY VIRUS [HIV] DISEASE: ICD-10-CM

## 2018-07-31 DIAGNOSIS — E78.5 HYPERLIPIDEMIA, UNSPECIFIED: ICD-10-CM

## 2018-07-31 DIAGNOSIS — E23.6 OTHER DISORDERS OF PITUITARY GLAND: ICD-10-CM

## 2018-07-31 DIAGNOSIS — M87.851 OTHER OSTEONECROSIS, RIGHT FEMUR: ICD-10-CM

## 2018-07-31 DIAGNOSIS — I10 ESSENTIAL (PRIMARY) HYPERTENSION: ICD-10-CM

## 2018-08-01 ENCOUNTER — OUTPATIENT (OUTPATIENT)
Dept: OUTPATIENT SERVICES | Facility: HOSPITAL | Age: 63
LOS: 1 days | Discharge: HOME | End: 2018-08-01

## 2018-08-01 DIAGNOSIS — R94.31 ABNORMAL ELECTROCARDIOGRAM [ECG] [EKG]: ICD-10-CM

## 2018-08-01 LAB — PSA FLD-MCNC: 1.66 NG/ML — SIGNIFICANT CHANGE UP (ref 0–4)

## 2018-08-04 LAB
EDDP UR CFM-MCNC: 5557 NG/MG CREAT — SIGNIFICANT CHANGE UP
METHADONE UR CFM-MCNC: 8505 NG/MG CREAT — SIGNIFICANT CHANGE UP
METHADONE UR CFM-MCNC: ABNORMAL

## 2018-08-06 DIAGNOSIS — B20 HUMAN IMMUNODEFICIENCY VIRUS [HIV] DISEASE: ICD-10-CM

## 2018-08-27 ENCOUNTER — OUTPATIENT (OUTPATIENT)
Dept: OUTPATIENT SERVICES | Facility: HOSPITAL | Age: 63
LOS: 1 days | Discharge: HOME | End: 2018-08-27

## 2018-08-28 ENCOUNTER — OUTPATIENT (OUTPATIENT)
Dept: OUTPATIENT SERVICES | Facility: HOSPITAL | Age: 63
LOS: 1 days | Discharge: HOME | End: 2018-08-28

## 2018-08-28 DIAGNOSIS — E78.5 HYPERLIPIDEMIA, UNSPECIFIED: ICD-10-CM

## 2018-08-28 DIAGNOSIS — E23.6 OTHER DISORDERS OF PITUITARY GLAND: ICD-10-CM

## 2018-08-28 DIAGNOSIS — M87.851 OTHER OSTEONECROSIS, RIGHT FEMUR: ICD-10-CM

## 2018-08-28 DIAGNOSIS — B20 HUMAN IMMUNODEFICIENCY VIRUS [HIV] DISEASE: ICD-10-CM

## 2018-08-28 DIAGNOSIS — G99.0 AUTONOMIC NEUROPATHY IN DISEASES CLASSIFIED ELSEWHERE: ICD-10-CM

## 2018-08-28 DIAGNOSIS — I10 ESSENTIAL (PRIMARY) HYPERTENSION: ICD-10-CM

## 2018-09-03 LAB
EDDP UR CFM-MCNC: >9901 NG/MG CREAT — SIGNIFICANT CHANGE UP
METHADONE UR CFM-MCNC: >9901 NG/MG CREAT — SIGNIFICANT CHANGE UP
METHADONE UR CFM-MCNC: ABNORMAL

## 2018-09-07 DIAGNOSIS — B20 HUMAN IMMUNODEFICIENCY VIRUS [HIV] DISEASE: ICD-10-CM

## 2018-09-24 ENCOUNTER — OUTPATIENT (OUTPATIENT)
Dept: OUTPATIENT SERVICES | Facility: HOSPITAL | Age: 63
LOS: 1 days | Discharge: HOME | End: 2018-09-24

## 2018-09-25 ENCOUNTER — OUTPATIENT (OUTPATIENT)
Dept: OUTPATIENT SERVICES | Facility: HOSPITAL | Age: 63
LOS: 1 days | Discharge: HOME | End: 2018-09-25

## 2018-09-25 DIAGNOSIS — M87.851 OTHER OSTEONECROSIS, RIGHT FEMUR: ICD-10-CM

## 2018-09-25 DIAGNOSIS — E78.2 MIXED HYPERLIPIDEMIA: ICD-10-CM

## 2018-09-25 DIAGNOSIS — E23.6 OTHER DISORDERS OF PITUITARY GLAND: ICD-10-CM

## 2018-09-25 DIAGNOSIS — I10 ESSENTIAL (PRIMARY) HYPERTENSION: ICD-10-CM

## 2018-09-25 DIAGNOSIS — G99.0 AUTONOMIC NEUROPATHY IN DISEASES CLASSIFIED ELSEWHERE: ICD-10-CM

## 2018-09-25 DIAGNOSIS — B20 HUMAN IMMUNODEFICIENCY VIRUS [HIV] DISEASE: ICD-10-CM

## 2018-09-25 LAB
ALBUMIN SERPL ELPH-MCNC: 5 G/DL — SIGNIFICANT CHANGE UP (ref 3.5–5.2)
ALP SERPL-CCNC: 81 U/L — SIGNIFICANT CHANGE UP (ref 30–115)
ALT FLD-CCNC: 25 U/L — SIGNIFICANT CHANGE UP (ref 0–41)
AMPHET UR-MCNC: NEGATIVE — SIGNIFICANT CHANGE UP
ANION GAP SERPL CALC-SCNC: 19 MMOL/L — HIGH (ref 7–14)
AST SERPL-CCNC: 32 U/L — SIGNIFICANT CHANGE UP (ref 0–41)
BARBITURATES UR SCN-MCNC: NEGATIVE — SIGNIFICANT CHANGE UP
BENZODIAZ UR-MCNC: NEGATIVE — SIGNIFICANT CHANGE UP
BILIRUB SERPL-MCNC: 0.4 MG/DL — SIGNIFICANT CHANGE UP (ref 0.2–1.2)
BUN SERPL-MCNC: 17 MG/DL — SIGNIFICANT CHANGE UP (ref 10–20)
CALCIUM SERPL-MCNC: 9.8 MG/DL — SIGNIFICANT CHANGE UP (ref 8.5–10.1)
CHLORIDE SERPL-SCNC: 98 MMOL/L — SIGNIFICANT CHANGE UP (ref 98–110)
CO2 SERPL-SCNC: 25 MMOL/L — SIGNIFICANT CHANGE UP (ref 17–32)
COCAINE METAB.OTHER UR-MCNC: NEGATIVE — SIGNIFICANT CHANGE UP
CREAT SERPL-MCNC: 1.3 MG/DL — SIGNIFICANT CHANGE UP (ref 0.7–1.5)
DRUG SCREEN 1, URINE RESULT: SIGNIFICANT CHANGE UP
ESTIMATED AVERAGE GLUCOSE: 114 MG/DL — SIGNIFICANT CHANGE UP (ref 68–114)
GGT SERPL-CCNC: 49 U/L — HIGH (ref 1–40)
GLUCOSE SERPL-MCNC: 109 MG/DL — HIGH (ref 70–99)
HBA1C BLD-MCNC: 5.6 % — SIGNIFICANT CHANGE UP (ref 4–5.6)
HCT VFR BLD CALC: 43.3 % — SIGNIFICANT CHANGE UP (ref 42–52)
HGB BLD-MCNC: 15.1 G/DL — SIGNIFICANT CHANGE UP (ref 14–18)
LDH SERPL L TO P-CCNC: 203 U/L — SIGNIFICANT CHANGE UP (ref 50–242)
MCHC RBC-ENTMCNC: 33.6 PG — HIGH (ref 27–31)
MCHC RBC-ENTMCNC: 34.9 G/DL — SIGNIFICANT CHANGE UP (ref 32–37)
MCV RBC AUTO: 96.4 FL — HIGH (ref 80–94)
METHADONE UR-MCNC: POSITIVE
NRBC # BLD: 0 /100 WBCS — SIGNIFICANT CHANGE UP (ref 0–0)
OPIATES UR-MCNC: POSITIVE
PCP UR-MCNC: POSITIVE
PLATELET # BLD AUTO: 234 K/UL — SIGNIFICANT CHANGE UP (ref 130–400)
POTASSIUM SERPL-MCNC: 4.3 MMOL/L — SIGNIFICANT CHANGE UP (ref 3.5–5)
POTASSIUM SERPL-SCNC: 4.3 MMOL/L — SIGNIFICANT CHANGE UP (ref 3.5–5)
PROPOXYPHENE QUALITATIVE URINE RESULT: NEGATIVE — SIGNIFICANT CHANGE UP
PROT SERPL-MCNC: 8.1 G/DL — HIGH (ref 6–8)
RBC # BLD: 4.49 M/UL — LOW (ref 4.7–6.1)
RBC # FLD: 12.6 % — SIGNIFICANT CHANGE UP (ref 11.5–14.5)
SODIUM SERPL-SCNC: 142 MMOL/L — SIGNIFICANT CHANGE UP (ref 135–146)
THC UR QL: NEGATIVE — SIGNIFICANT CHANGE UP
WBC # BLD: 4.4 K/UL — LOW (ref 4.8–10.8)
WBC # FLD AUTO: 4.4 K/UL — LOW (ref 4.8–10.8)

## 2018-09-26 LAB
4/8 RATIO: 0.2 RATIO — LOW (ref 1.2–3.4)
ABS CD8: 1042 /UL — HIGH (ref 206–494)
CD16+CD56+ CELLS NFR BLD: 9 % — SIGNIFICANT CHANGE UP (ref 4–20)
CD16+CD56+ CELLS NFR SPEC: 133 /UL — SIGNIFICANT CHANGE UP (ref 89–385)
CD19 BLASTS SPEC-ACNC: 2 % — LOW (ref 10–28)
CD19 BLASTS SPEC-ACNC: 28 /UL — LOW (ref 72–502)
CD3 BLASTS SPEC-ACNC: 1290 /UL — SIGNIFICANT CHANGE UP (ref 800–2012)
CD3 BLASTS SPEC-ACNC: 88 % — HIGH (ref 59–81)
CD4 %: 17 % — LOW (ref 42–58)
CD8 %: 71 % — HIGH (ref 14–30)
T-CELL CD4 SUBSET PNL BLD: 253 /UL — LOW (ref 495–1312)

## 2018-10-08 DIAGNOSIS — B20 HUMAN IMMUNODEFICIENCY VIRUS [HIV] DISEASE: ICD-10-CM

## 2018-10-23 ENCOUNTER — OUTPATIENT (OUTPATIENT)
Dept: OUTPATIENT SERVICES | Facility: HOSPITAL | Age: 63
LOS: 1 days | Discharge: HOME | End: 2018-10-23

## 2018-10-23 DIAGNOSIS — I10 ESSENTIAL (PRIMARY) HYPERTENSION: ICD-10-CM

## 2018-10-23 DIAGNOSIS — E23.6 OTHER DISORDERS OF PITUITARY GLAND: ICD-10-CM

## 2018-10-23 DIAGNOSIS — M87.851 OTHER OSTEONECROSIS, RIGHT FEMUR: ICD-10-CM

## 2018-10-23 DIAGNOSIS — G99.0 AUTONOMIC NEUROPATHY IN DISEASES CLASSIFIED ELSEWHERE: ICD-10-CM

## 2018-10-23 DIAGNOSIS — E78.5 HYPERLIPIDEMIA, UNSPECIFIED: ICD-10-CM

## 2018-10-23 DIAGNOSIS — B20 HUMAN IMMUNODEFICIENCY VIRUS [HIV] DISEASE: ICD-10-CM

## 2018-10-24 ENCOUNTER — OUTPATIENT (OUTPATIENT)
Dept: OUTPATIENT SERVICES | Facility: HOSPITAL | Age: 63
LOS: 1 days | Discharge: HOME | End: 2018-10-24

## 2018-10-24 LAB
ALFENTANIL UR QUANT: SIGNIFICANT CHANGE UP
BUPRENORPHINE UR CONFIRMATION: NEGATIVE — SIGNIFICANT CHANGE UP
BUPRENORPHINE UR QUANT: SIGNIFICANT CHANGE UP
CODEINE UR CFM-MCNC: SIGNIFICANT CHANGE UP
DHC UR-MCNC: SIGNIFICANT CHANGE UP
EDDP UR CFM-MCNC: >2358 — SIGNIFICANT CHANGE UP
FENTANYL UR CFM-MCNC: NEGATIVE — SIGNIFICANT CHANGE UP
FENTANYL UR CFM-MCNC: SIGNIFICANT CHANGE UP
HYDROCODONE UR CFM-MCNC: SIGNIFICANT CHANGE UP
HYDROMORPHONE UR CFM-MCNC: SIGNIFICANT CHANGE UP
METHADONE UR CFM-MCNC: >2358 — SIGNIFICANT CHANGE UP
METHADONE UR CFM-MCNC: POSITIVE — SIGNIFICANT CHANGE UP
MORPHINE UR CFM-MCNC: SIGNIFICANT CHANGE UP
NORBUPRENORPHINE QUANT UR: SIGNIFICANT CHANGE UP
NORCODEINE UR-MCNC: SIGNIFICANT CHANGE UP
NORFENTANYL UR-MCNC: SIGNIFICANT CHANGE UP
NORHYDROCODONE UR CFM-MCNC: SIGNIFICANT CHANGE UP
NOROXYCODONE UR CFM-MCNC: >2358 — SIGNIFICANT CHANGE UP
NOROXYMORPHONE UR CFM-MCNC: 183 — SIGNIFICANT CHANGE UP
OPIATES UR CFM-MCNC: NEGATIVE — SIGNIFICANT CHANGE UP
OXYCODONE UR-MCNC: >2358 — SIGNIFICANT CHANGE UP
OXYCODONE UR-MCNC: POSITIVE — SIGNIFICANT CHANGE UP
OXYMORPHONE UR CFM-MCNC: 398 — SIGNIFICANT CHANGE UP
SUFENTANIL UR QUANT: SIGNIFICANT CHANGE UP
TAPENTADOL UR CONFIRMATION: NEGATIVE — SIGNIFICANT CHANGE UP
TAPENTADOL UR QUANT: SIGNIFICANT CHANGE UP

## 2018-11-01 LAB
ALFENTANIL UR QUANT: SIGNIFICANT CHANGE UP NG/MG CREAT
BUPRENORPHINE UR CONFIRMATION: NEGATIVE — SIGNIFICANT CHANGE UP
BUPRENORPHINE UR QUANT: SIGNIFICANT CHANGE UP NG/MG CREAT
CODEINE UR CFM-MCNC: SIGNIFICANT CHANGE UP NG/MG CREAT
DHC UR-MCNC: SIGNIFICANT CHANGE UP NG/MG CREAT
EDDP UR CFM-MCNC: >5051 NG/MG CREAT — SIGNIFICANT CHANGE UP
FENTANYL UR CFM-MCNC: 33 NG/MG CREAT — SIGNIFICANT CHANGE UP
FENTANYL UR CFM-MCNC: ABNORMAL
HYDROCODONE UR CFM-MCNC: SIGNIFICANT CHANGE UP NG/MG CREAT
HYDROMORPHONE UR CFM-MCNC: SIGNIFICANT CHANGE UP NG/MG CREAT
METHADONE UR CFM-MCNC: >5051 NG/MG CREAT — SIGNIFICANT CHANGE UP
METHADONE UR CFM-MCNC: ABNORMAL
MORPHINE UR CFM-MCNC: 169 NG/MG CREAT — SIGNIFICANT CHANGE UP
NORBUPRENORPHINE QUANT UR: SIGNIFICANT CHANGE UP NG/MG CREAT
NORCODEINE UR-MCNC: SIGNIFICANT CHANGE UP NG/MG CREAT
NORFENTANYL UR-MCNC: 176 NG/MG CREAT — SIGNIFICANT CHANGE UP
NORHYDROCODONE UR CFM-MCNC: SIGNIFICANT CHANGE UP NG/MG CREAT
NOROXYCODONE UR CFM-MCNC: SIGNIFICANT CHANGE UP NG/MG CREAT
NOROXYMORPHONE UR CFM-MCNC: SIGNIFICANT CHANGE UP NG/MG CREAT
OPIATES UR CFM-MCNC: ABNORMAL
OXYCODONE UR-MCNC: NEGATIVE — SIGNIFICANT CHANGE UP
OXYCODONE UR-MCNC: SIGNIFICANT CHANGE UP NG/MG CREAT
OXYMORPHONE UR CFM-MCNC: SIGNIFICANT CHANGE UP NG/MG CREAT
SUFENTANIL UR QUANT: SIGNIFICANT CHANGE UP NG/MG CREAT
TAPENTADOL UR CONFIRMATION: NEGATIVE — SIGNIFICANT CHANGE UP
TAPENTADOL UR QUANT: SIGNIFICANT CHANGE UP NG/MG CREAT

## 2018-11-07 DIAGNOSIS — B20 HUMAN IMMUNODEFICIENCY VIRUS [HIV] DISEASE: ICD-10-CM

## 2018-11-19 ENCOUNTER — OUTPATIENT (OUTPATIENT)
Dept: OUTPATIENT SERVICES | Facility: HOSPITAL | Age: 63
LOS: 1 days | Discharge: HOME | End: 2018-11-19

## 2018-11-20 ENCOUNTER — OUTPATIENT (OUTPATIENT)
Dept: OUTPATIENT SERVICES | Facility: HOSPITAL | Age: 63
LOS: 1 days | Discharge: HOME | End: 2018-11-20

## 2018-11-20 DIAGNOSIS — G99.0 AUTONOMIC NEUROPATHY IN DISEASES CLASSIFIED ELSEWHERE: ICD-10-CM

## 2018-11-20 DIAGNOSIS — E23.6 OTHER DISORDERS OF PITUITARY GLAND: ICD-10-CM

## 2018-11-20 DIAGNOSIS — E75.5 OTHER LIPID STORAGE DISORDERS: ICD-10-CM

## 2018-11-20 DIAGNOSIS — I10 ESSENTIAL (PRIMARY) HYPERTENSION: ICD-10-CM

## 2018-11-20 DIAGNOSIS — B20 HUMAN IMMUNODEFICIENCY VIRUS [HIV] DISEASE: ICD-10-CM

## 2018-11-23 ENCOUNTER — OUTPATIENT (OUTPATIENT)
Dept: OUTPATIENT SERVICES | Facility: HOSPITAL | Age: 63
LOS: 1 days | Discharge: HOME | End: 2018-11-23

## 2018-11-23 DIAGNOSIS — R94.31 ABNORMAL ELECTROCARDIOGRAM [ECG] [EKG]: ICD-10-CM

## 2018-11-23 DIAGNOSIS — I10 ESSENTIAL (PRIMARY) HYPERTENSION: ICD-10-CM

## 2018-11-23 DIAGNOSIS — R07.9 CHEST PAIN, UNSPECIFIED: ICD-10-CM

## 2018-11-23 RX ORDER — DOBUTAMINE HCL 250MG/20ML
5 VIAL (ML) INTRAVENOUS
Qty: 1000 | Refills: 0 | Status: DISCONTINUED | OUTPATIENT
Start: 2018-11-23 | End: 2018-12-08

## 2018-11-26 LAB
EDDP UR CFM-MCNC: >6494 NG/MG CREAT — SIGNIFICANT CHANGE UP
METHADONE UR CFM-MCNC: >6494 NG/MG CREAT — SIGNIFICANT CHANGE UP
METHADONE UR CFM-MCNC: ABNORMAL

## 2018-12-04 DIAGNOSIS — B20 HUMAN IMMUNODEFICIENCY VIRUS [HIV] DISEASE: ICD-10-CM

## 2018-12-17 ENCOUNTER — OUTPATIENT (OUTPATIENT)
Dept: OUTPATIENT SERVICES | Facility: HOSPITAL | Age: 63
LOS: 1 days | Discharge: HOME | End: 2018-12-17

## 2018-12-18 ENCOUNTER — OUTPATIENT (OUTPATIENT)
Dept: OUTPATIENT SERVICES | Facility: HOSPITAL | Age: 63
LOS: 1 days | Discharge: HOME | End: 2018-12-18

## 2018-12-18 DIAGNOSIS — I10 ESSENTIAL (PRIMARY) HYPERTENSION: ICD-10-CM

## 2018-12-18 DIAGNOSIS — J44.9 CHRONIC OBSTRUCTIVE PULMONARY DISEASE, UNSPECIFIED: ICD-10-CM

## 2018-12-18 DIAGNOSIS — E66.3 OVERWEIGHT: ICD-10-CM

## 2018-12-18 DIAGNOSIS — M19.90 UNSPECIFIED OSTEOARTHRITIS, UNSPECIFIED SITE: ICD-10-CM

## 2018-12-18 DIAGNOSIS — B18.2 CHRONIC VIRAL HEPATITIS C: ICD-10-CM

## 2018-12-18 DIAGNOSIS — E78.2 MIXED HYPERLIPIDEMIA: ICD-10-CM

## 2018-12-18 DIAGNOSIS — B20 HUMAN IMMUNODEFICIENCY VIRUS [HIV] DISEASE: ICD-10-CM

## 2019-01-02 DIAGNOSIS — B20 HUMAN IMMUNODEFICIENCY VIRUS [HIV] DISEASE: ICD-10-CM

## 2019-01-14 ENCOUNTER — OUTPATIENT (OUTPATIENT)
Dept: OUTPATIENT SERVICES | Facility: HOSPITAL | Age: 64
LOS: 1 days | Discharge: HOME | End: 2019-01-14

## 2019-01-15 ENCOUNTER — OUTPATIENT (OUTPATIENT)
Dept: OUTPATIENT SERVICES | Facility: HOSPITAL | Age: 64
LOS: 1 days | Discharge: HOME | End: 2019-01-15

## 2019-01-15 DIAGNOSIS — E23.6 OTHER DISORDERS OF PITUITARY GLAND: ICD-10-CM

## 2019-01-15 DIAGNOSIS — B20 HUMAN IMMUNODEFICIENCY VIRUS [HIV] DISEASE: ICD-10-CM

## 2019-01-15 DIAGNOSIS — M87.851 OTHER OSTEONECROSIS, RIGHT FEMUR: ICD-10-CM

## 2019-01-15 DIAGNOSIS — G99.0 AUTONOMIC NEUROPATHY IN DISEASES CLASSIFIED ELSEWHERE: ICD-10-CM

## 2019-01-15 DIAGNOSIS — I10 ESSENTIAL (PRIMARY) HYPERTENSION: ICD-10-CM

## 2019-01-15 DIAGNOSIS — K21.9 GASTRO-ESOPHAGEAL REFLUX DISEASE WITHOUT ESOPHAGITIS: ICD-10-CM

## 2019-01-15 DIAGNOSIS — E78.5 HYPERLIPIDEMIA, UNSPECIFIED: ICD-10-CM

## 2019-01-15 LAB
24R-OH-CALCIDIOL SERPL-MCNC: 36 NG/ML — SIGNIFICANT CHANGE UP (ref 30–80)
ALBUMIN SERPL ELPH-MCNC: 5.1 G/DL — SIGNIFICANT CHANGE UP (ref 3.5–5.2)
ALP SERPL-CCNC: 80 U/L — SIGNIFICANT CHANGE UP (ref 30–115)
ALT FLD-CCNC: 28 U/L — SIGNIFICANT CHANGE UP (ref 0–41)
AMPHET UR-MCNC: NEGATIVE — SIGNIFICANT CHANGE UP
ANION GAP SERPL CALC-SCNC: 15 MMOL/L — HIGH (ref 7–14)
APPEARANCE UR: CLEAR — SIGNIFICANT CHANGE UP
AST SERPL-CCNC: 31 U/L — SIGNIFICANT CHANGE UP (ref 0–41)
BACTERIA # UR AUTO: ABNORMAL
BARBITURATES UR SCN-MCNC: NEGATIVE — SIGNIFICANT CHANGE UP
BENZODIAZ UR-MCNC: NEGATIVE — SIGNIFICANT CHANGE UP
BILIRUB SERPL-MCNC: 0.6 MG/DL — SIGNIFICANT CHANGE UP (ref 0.2–1.2)
BILIRUB UR-MCNC: NEGATIVE — SIGNIFICANT CHANGE UP
BUN SERPL-MCNC: 21 MG/DL — HIGH (ref 10–20)
BUPRENORPHINE SCREEN, URINE RESULT: NEGATIVE — SIGNIFICANT CHANGE UP
CALCIUM SERPL-MCNC: 9.6 MG/DL — SIGNIFICANT CHANGE UP (ref 8.5–10.1)
CHLORIDE SERPL-SCNC: 101 MMOL/L — SIGNIFICANT CHANGE UP (ref 98–110)
CHOLEST SERPL-MCNC: 171 MG/DL — SIGNIFICANT CHANGE UP (ref 100–200)
CO2 SERPL-SCNC: 24 MMOL/L — SIGNIFICANT CHANGE UP (ref 17–32)
COCAINE METAB.OTHER UR-MCNC: NEGATIVE — SIGNIFICANT CHANGE UP
COLOR SPEC: YELLOW — SIGNIFICANT CHANGE UP
CREAT SERPL-MCNC: 1.2 MG/DL — SIGNIFICANT CHANGE UP (ref 0.7–1.5)
DIFF PNL FLD: NEGATIVE — SIGNIFICANT CHANGE UP
DRUG SCREEN 1, URINE RESULT: SIGNIFICANT CHANGE UP
ESTIMATED AVERAGE GLUCOSE: 111 MG/DL — SIGNIFICANT CHANGE UP (ref 68–114)
GGT SERPL-CCNC: 69 U/L — HIGH (ref 1–40)
GLUCOSE SERPL-MCNC: 70 MG/DL — SIGNIFICANT CHANGE UP (ref 70–99)
GLUCOSE UR QL: NEGATIVE MG/DL — SIGNIFICANT CHANGE UP
HBA1C BLD-MCNC: 5.5 % — SIGNIFICANT CHANGE UP (ref 4–5.6)
HCT VFR BLD CALC: 41.6 % — LOW (ref 42–52)
HDLC SERPL-MCNC: 60 MG/DL — SIGNIFICANT CHANGE UP
HGB BLD-MCNC: 14.2 G/DL — SIGNIFICANT CHANGE UP (ref 14–18)
KETONES UR-MCNC: NEGATIVE — SIGNIFICANT CHANGE UP
LDH SERPL L TO P-CCNC: 180 U/L — SIGNIFICANT CHANGE UP (ref 50–242)
LEUKOCYTE ESTERASE UR-ACNC: NEGATIVE — SIGNIFICANT CHANGE UP
LIPID PNL WITH DIRECT LDL SERPL: 92 MG/DL — SIGNIFICANT CHANGE UP (ref 4–129)
MCHC RBC-ENTMCNC: 34.1 G/DL — SIGNIFICANT CHANGE UP (ref 32–37)
MCHC RBC-ENTMCNC: 34.1 PG — HIGH (ref 27–31)
MCV RBC AUTO: 100 FL — HIGH (ref 80–94)
METHADONE UR-MCNC: POSITIVE
NITRITE UR-MCNC: NEGATIVE — SIGNIFICANT CHANGE UP
NRBC # BLD: 0 /100 WBCS — SIGNIFICANT CHANGE UP (ref 0–0)
OPIATES UR-MCNC: NEGATIVE — SIGNIFICANT CHANGE UP
PCP UR-MCNC: NEGATIVE — SIGNIFICANT CHANGE UP
PH UR: 5.5 — SIGNIFICANT CHANGE UP (ref 5–8)
PLATELET # BLD AUTO: 265 K/UL — SIGNIFICANT CHANGE UP (ref 130–400)
POTASSIUM SERPL-MCNC: 4.4 MMOL/L — SIGNIFICANT CHANGE UP (ref 3.5–5)
POTASSIUM SERPL-SCNC: 4.4 MMOL/L — SIGNIFICANT CHANGE UP (ref 3.5–5)
PROPOXYPHENE QUALITATIVE URINE RESULT: NEGATIVE — SIGNIFICANT CHANGE UP
PROT SERPL-MCNC: 7.9 G/DL — SIGNIFICANT CHANGE UP (ref 6–8)
PROT UR-MCNC: 30 MG/DL
RBC # BLD: 4.16 M/UL — LOW (ref 4.7–6.1)
RBC # FLD: 16 % — HIGH (ref 11.5–14.5)
SODIUM SERPL-SCNC: 140 MMOL/L — SIGNIFICANT CHANGE UP (ref 135–146)
SP GR SPEC: 1.02 — SIGNIFICANT CHANGE UP (ref 1.01–1.03)
THC UR QL: NEGATIVE — SIGNIFICANT CHANGE UP
TOTAL CHOLESTEROL/HDL RATIO MEASUREMENT: 2.8 RATIO — LOW (ref 4–5.5)
TRIGL SERPL-MCNC: 125 MG/DL — SIGNIFICANT CHANGE UP (ref 10–149)
UROBILINOGEN FLD QL: 0.2 MG/DL — SIGNIFICANT CHANGE UP (ref 0.2–0.2)
WBC # BLD: 5.42 K/UL — SIGNIFICANT CHANGE UP (ref 4.8–10.8)
WBC # FLD AUTO: 5.42 K/UL — SIGNIFICANT CHANGE UP (ref 4.8–10.8)
WBC UR QL: SIGNIFICANT CHANGE UP /HPF

## 2019-01-16 LAB
4/8 RATIO: 0.2 RATIO — LOW (ref 1.2–3.4)
ABS CD8: 1636 /UL — HIGH (ref 206–494)
AFP-TM SERPL-MCNC: 3.4 NG/ML — SIGNIFICANT CHANGE UP
C TRACH RRNA SPEC QL NAA+PROBE: SIGNIFICANT CHANGE UP
CD16+CD56+ CELLS NFR BLD: 12 % — SIGNIFICANT CHANGE UP (ref 4–20)
CD16+CD56+ CELLS NFR SPEC: 269 /UL — SIGNIFICANT CHANGE UP (ref 89–385)
CD19 BLASTS SPEC-ACNC: 1 % — LOW (ref 10–28)
CD19 BLASTS SPEC-ACNC: 31 /UL — LOW (ref 72–502)
CD3 BLASTS SPEC-ACNC: 1983 /UL — SIGNIFICANT CHANGE UP (ref 800–2012)
CD3 BLASTS SPEC-ACNC: 86 % — HIGH (ref 59–81)
CD4 %: 14 % — LOW (ref 42–58)
CD8 %: 71 % — HIGH (ref 14–30)
N GONORRHOEA RRNA SPEC QL NAA+PROBE: SIGNIFICANT CHANGE UP
PSA FREE MFR FLD: 1.04 NG/ML — SIGNIFICANT CHANGE UP (ref 0–4)
SPECIMEN SOURCE: SIGNIFICANT CHANGE UP
T PALLIDUM AB TITR SER: NEGATIVE — SIGNIFICANT CHANGE UP
T-CELL CD4 SUBSET PNL BLD: 333 /UL — LOW (ref 495–1312)
TSH SERPL-MCNC: 1.32 UIU/ML — SIGNIFICANT CHANGE UP (ref 0.27–4.2)

## 2019-01-17 LAB
GAMMA INTERFERON BACKGROUND BLD IA-ACNC: 0.02 IU/ML — SIGNIFICANT CHANGE UP
HCV RNA SERPL NAA DL=5-ACNC: SIGNIFICANT CHANGE UP IU/ML
HCV RNA SPEC NAA+PROBE-LOG IU: SIGNIFICANT CHANGE UP LOGIU/ML
M TB IFN-G BLD-IMP: NEGATIVE — SIGNIFICANT CHANGE UP
M TB IFN-G CD4+ BCKGRND COR BLD-ACNC: 0.03 IU/ML — SIGNIFICANT CHANGE UP
M TB IFN-G CD4+CD8+ BCKGRND COR BLD-ACNC: 0.01 IU/ML — SIGNIFICANT CHANGE UP
QUANT TB PLUS MITOGEN MINUS NIL: 5.99 IU/ML — SIGNIFICANT CHANGE UP

## 2019-01-21 LAB
EDDP UR CFM-MCNC: >5988 NG/MG CREAT — SIGNIFICANT CHANGE UP
METHADONE UR CFM-MCNC: >5988 NG/MG CREAT — SIGNIFICANT CHANGE UP
METHADONE UR CFM-MCNC: ABNORMAL

## 2019-02-06 ENCOUNTER — OUTPATIENT (OUTPATIENT)
Dept: OUTPATIENT SERVICES | Facility: HOSPITAL | Age: 64
LOS: 1 days | Discharge: HOME | End: 2019-02-06

## 2019-02-11 ENCOUNTER — OUTPATIENT (OUTPATIENT)
Dept: OUTPATIENT SERVICES | Facility: HOSPITAL | Age: 64
LOS: 1 days | Discharge: HOME | End: 2019-02-11

## 2019-02-12 ENCOUNTER — OUTPATIENT (OUTPATIENT)
Dept: OUTPATIENT SERVICES | Facility: HOSPITAL | Age: 64
LOS: 1 days | Discharge: HOME | End: 2019-02-12

## 2019-02-12 DIAGNOSIS — E23.6 OTHER DISORDERS OF PITUITARY GLAND: ICD-10-CM

## 2019-02-12 DIAGNOSIS — M87.851 OTHER OSTEONECROSIS, RIGHT FEMUR: ICD-10-CM

## 2019-02-12 DIAGNOSIS — I10 ESSENTIAL (PRIMARY) HYPERTENSION: ICD-10-CM

## 2019-02-12 DIAGNOSIS — K21.9 GASTRO-ESOPHAGEAL REFLUX DISEASE WITHOUT ESOPHAGITIS: ICD-10-CM

## 2019-02-12 DIAGNOSIS — B20 HUMAN IMMUNODEFICIENCY VIRUS [HIV] DISEASE: ICD-10-CM

## 2019-02-12 DIAGNOSIS — G99.0 AUTONOMIC NEUROPATHY IN DISEASES CLASSIFIED ELSEWHERE: ICD-10-CM

## 2019-02-12 DIAGNOSIS — E78.5 HYPERLIPIDEMIA, UNSPECIFIED: ICD-10-CM

## 2019-02-12 LAB
AMPHET UR-MCNC: NEGATIVE — SIGNIFICANT CHANGE UP
BARBITURATES UR SCN-MCNC: NEGATIVE — SIGNIFICANT CHANGE UP
BENZODIAZ UR-MCNC: NEGATIVE — SIGNIFICANT CHANGE UP
BUPRENORPHINE SCREEN, URINE RESULT: NEGATIVE — SIGNIFICANT CHANGE UP
COCAINE METAB.OTHER UR-MCNC: POSITIVE
DRUG SCREEN 1, URINE RESULT: SIGNIFICANT CHANGE UP
METHADONE UR-MCNC: POSITIVE
OPIATES UR-MCNC: POSITIVE
PCP UR-MCNC: NEGATIVE — SIGNIFICANT CHANGE UP
PROPOXYPHENE QUALITATIVE URINE RESULT: NEGATIVE — SIGNIFICANT CHANGE UP
THC UR QL: NEGATIVE — SIGNIFICANT CHANGE UP

## 2019-02-16 LAB
ALFENTANIL UR QUANT: SIGNIFICANT CHANGE UP NG/MG CREAT
BUPRENORPHINE UR CONFIRMATION: NEGATIVE — SIGNIFICANT CHANGE UP
BUPRENORPHINE UR QUANT: SIGNIFICANT CHANGE UP NG/MG CREAT
CODEINE UR CFM-MCNC: SIGNIFICANT CHANGE UP NG/MG CREAT
DHC UR-MCNC: SIGNIFICANT CHANGE UP NG/MG CREAT
EDDP UR CFM-MCNC: >4065 NG/MG CREAT — SIGNIFICANT CHANGE UP
FENTANYL UR CFM-MCNC: 11 NG/MG CREAT — SIGNIFICANT CHANGE UP
FENTANYL UR CFM-MCNC: ABNORMAL
HYDROCODONE UR CFM-MCNC: SIGNIFICANT CHANGE UP NG/MG CREAT
HYDROMORPHONE UR CFM-MCNC: SIGNIFICANT CHANGE UP NG/MG CREAT
METHADONE UR CFM-MCNC: >4065 NG/MG CREAT — SIGNIFICANT CHANGE UP
METHADONE UR CFM-MCNC: ABNORMAL
MORPHINE UR CFM-MCNC: 87 NG/MG CREAT — SIGNIFICANT CHANGE UP
NORBUPRENORPHINE QUANT UR: SIGNIFICANT CHANGE UP NG/MG CREAT
NORCODEINE UR-MCNC: SIGNIFICANT CHANGE UP NG/MG CREAT
NORFENTANYL UR-MCNC: 91 NG/MG CREAT — SIGNIFICANT CHANGE UP
NORHYDROCODONE UR CFM-MCNC: SIGNIFICANT CHANGE UP NG/MG CREAT
NORMORPHINE UR QUANT: SIGNIFICANT CHANGE UP NG/MG CREAT
NORMORPHINE UR-MCNC: SIGNIFICANT CHANGE UP NG/MG CREAT
NOROXYCODONE UR CFM-MCNC: 1102 NG/MG CREAT — SIGNIFICANT CHANGE UP
NOROXYMORPHONE UR CFM-MCNC: 100 NG/MG CREAT — SIGNIFICANT CHANGE UP
OPIATES UR CFM-MCNC: ABNORMAL
OXYCODONE UR-MCNC: 1272 NG/MG CREAT — SIGNIFICANT CHANGE UP
OXYCODONE UR-MCNC: ABNORMAL
OXYMORPHONE UR CFM-MCNC: 371 NG/MG CREAT — SIGNIFICANT CHANGE UP
SUFENTANIL UR QUANT: SIGNIFICANT CHANGE UP NG/MG CREAT
TAPENTADOL UR CONFIRMATION: NEGATIVE — SIGNIFICANT CHANGE UP
TAPENTADOL UR QUANT: SIGNIFICANT CHANGE UP NG/MG CREAT

## 2019-02-25 DIAGNOSIS — B20 HUMAN IMMUNODEFICIENCY VIRUS [HIV] DISEASE: ICD-10-CM

## 2019-02-26 DIAGNOSIS — B20 HUMAN IMMUNODEFICIENCY VIRUS [HIV] DISEASE: ICD-10-CM

## 2019-02-27 DIAGNOSIS — B20 HUMAN IMMUNODEFICIENCY VIRUS [HIV] DISEASE: ICD-10-CM

## 2019-03-08 DIAGNOSIS — B20 HUMAN IMMUNODEFICIENCY VIRUS [HIV] DISEASE: ICD-10-CM

## 2019-03-11 ENCOUNTER — OUTPATIENT (OUTPATIENT)
Dept: OUTPATIENT SERVICES | Facility: HOSPITAL | Age: 64
LOS: 1 days | Discharge: HOME | End: 2019-03-11

## 2019-03-12 ENCOUNTER — OUTPATIENT (OUTPATIENT)
Dept: OUTPATIENT SERVICES | Facility: HOSPITAL | Age: 64
LOS: 1 days | Discharge: HOME | End: 2019-03-12

## 2019-03-12 DIAGNOSIS — B20 HUMAN IMMUNODEFICIENCY VIRUS [HIV] DISEASE: ICD-10-CM

## 2019-03-12 DIAGNOSIS — G99.0 AUTONOMIC NEUROPATHY IN DISEASES CLASSIFIED ELSEWHERE: ICD-10-CM

## 2019-03-12 DIAGNOSIS — I10 ESSENTIAL (PRIMARY) HYPERTENSION: ICD-10-CM

## 2019-03-12 DIAGNOSIS — E78.5 HYPERLIPIDEMIA, UNSPECIFIED: ICD-10-CM

## 2019-03-21 LAB
EDDP UR CFM-MCNC: 5170 NG/MG CREAT — SIGNIFICANT CHANGE UP
METHADONE UR CFM-MCNC: 1776 NG/MG CREAT — SIGNIFICANT CHANGE UP
METHADONE UR CFM-MCNC: ABNORMAL

## 2019-03-25 DIAGNOSIS — B20 HUMAN IMMUNODEFICIENCY VIRUS [HIV] DISEASE: ICD-10-CM

## 2019-04-08 ENCOUNTER — OUTPATIENT (OUTPATIENT)
Dept: OUTPATIENT SERVICES | Facility: HOSPITAL | Age: 64
LOS: 1 days | Discharge: HOME | End: 2019-04-08

## 2019-04-09 ENCOUNTER — OUTPATIENT (OUTPATIENT)
Dept: OUTPATIENT SERVICES | Facility: HOSPITAL | Age: 64
LOS: 1 days | Discharge: HOME | End: 2019-04-09

## 2019-04-09 DIAGNOSIS — E23.6 OTHER DISORDERS OF PITUITARY GLAND: ICD-10-CM

## 2019-04-09 DIAGNOSIS — B20 HUMAN IMMUNODEFICIENCY VIRUS [HIV] DISEASE: ICD-10-CM

## 2019-04-09 DIAGNOSIS — M87.851 OTHER OSTEONECROSIS, RIGHT FEMUR: ICD-10-CM

## 2019-04-09 DIAGNOSIS — I10 ESSENTIAL (PRIMARY) HYPERTENSION: ICD-10-CM

## 2019-04-09 DIAGNOSIS — E78.5 HYPERLIPIDEMIA, UNSPECIFIED: ICD-10-CM

## 2019-04-09 DIAGNOSIS — G99.0 AUTONOMIC NEUROPATHY IN DISEASES CLASSIFIED ELSEWHERE: ICD-10-CM

## 2019-04-23 DIAGNOSIS — B20 HUMAN IMMUNODEFICIENCY VIRUS [HIV] DISEASE: ICD-10-CM

## 2019-05-06 ENCOUNTER — OUTPATIENT (OUTPATIENT)
Dept: OUTPATIENT SERVICES | Facility: HOSPITAL | Age: 64
LOS: 1 days | Discharge: HOME | End: 2019-05-06

## 2019-05-07 ENCOUNTER — OUTPATIENT (OUTPATIENT)
Dept: OUTPATIENT SERVICES | Facility: HOSPITAL | Age: 64
LOS: 1 days | Discharge: HOME | End: 2019-05-07

## 2019-05-07 DIAGNOSIS — G99.0 AUTONOMIC NEUROPATHY IN DISEASES CLASSIFIED ELSEWHERE: ICD-10-CM

## 2019-05-07 DIAGNOSIS — E23.6 OTHER DISORDERS OF PITUITARY GLAND: ICD-10-CM

## 2019-05-07 DIAGNOSIS — I10 ESSENTIAL (PRIMARY) HYPERTENSION: ICD-10-CM

## 2019-05-07 DIAGNOSIS — B20 HUMAN IMMUNODEFICIENCY VIRUS [HIV] DISEASE: ICD-10-CM

## 2019-05-07 DIAGNOSIS — E78.5 HYPERLIPIDEMIA, UNSPECIFIED: ICD-10-CM

## 2019-05-07 LAB
ALBUMIN SERPL ELPH-MCNC: 4.7 G/DL — SIGNIFICANT CHANGE UP (ref 3.5–5.2)
ALP SERPL-CCNC: 95 U/L — SIGNIFICANT CHANGE UP (ref 30–115)
ALT FLD-CCNC: 45 U/L — HIGH (ref 0–41)
AMPHET UR-MCNC: NEGATIVE — SIGNIFICANT CHANGE UP
ANION GAP SERPL CALC-SCNC: 12 MMOL/L — SIGNIFICANT CHANGE UP (ref 7–14)
AST SERPL-CCNC: 47 U/L — HIGH (ref 0–41)
BARBITURATES UR SCN-MCNC: NEGATIVE — SIGNIFICANT CHANGE UP
BENZODIAZ UR-MCNC: NEGATIVE — SIGNIFICANT CHANGE UP
BILIRUB SERPL-MCNC: 0.2 MG/DL — SIGNIFICANT CHANGE UP (ref 0.2–1.2)
BUN SERPL-MCNC: 20 MG/DL — SIGNIFICANT CHANGE UP (ref 10–20)
CALCIUM SERPL-MCNC: 10.2 MG/DL — HIGH (ref 8.5–10.1)
CHLORIDE SERPL-SCNC: 103 MMOL/L — SIGNIFICANT CHANGE UP (ref 98–110)
CHOLEST SERPL-MCNC: 185 MG/DL — SIGNIFICANT CHANGE UP (ref 100–200)
CO2 SERPL-SCNC: 27 MMOL/L — SIGNIFICANT CHANGE UP (ref 17–32)
COCAINE METAB.OTHER UR-MCNC: POSITIVE
CREAT SERPL-MCNC: 1.3 MG/DL — SIGNIFICANT CHANGE UP (ref 0.7–1.5)
DRUG SCREEN 1, URINE RESULT: SIGNIFICANT CHANGE UP
ESTIMATED AVERAGE GLUCOSE: 128 MG/DL — HIGH (ref 68–114)
GGT SERPL-CCNC: 119 U/L — HIGH (ref 1–40)
GLUCOSE SERPL-MCNC: 114 MG/DL — HIGH (ref 70–99)
HBA1C BLD-MCNC: 6.1 % — HIGH (ref 4–5.6)
HCT VFR BLD CALC: 42.6 % — SIGNIFICANT CHANGE UP (ref 42–52)
HDLC SERPL-MCNC: 65 MG/DL — SIGNIFICANT CHANGE UP
HGB BLD-MCNC: 14.6 G/DL — SIGNIFICANT CHANGE UP (ref 14–18)
LDH SERPL L TO P-CCNC: 214 U/L — SIGNIFICANT CHANGE UP (ref 50–242)
LIPID PNL WITH DIRECT LDL SERPL: 101 MG/DL — SIGNIFICANT CHANGE UP (ref 4–129)
MCHC RBC-ENTMCNC: 32.4 PG — HIGH (ref 27–31)
MCHC RBC-ENTMCNC: 34.3 G/DL — SIGNIFICANT CHANGE UP (ref 32–37)
MCV RBC AUTO: 94.7 FL — HIGH (ref 80–94)
METHADONE UR-MCNC: POSITIVE
NRBC # BLD: 0 /100 WBCS — SIGNIFICANT CHANGE UP (ref 0–0)
OPIATES UR-MCNC: NEGATIVE — SIGNIFICANT CHANGE UP
PCP UR-MCNC: NEGATIVE — SIGNIFICANT CHANGE UP
PLATELET # BLD AUTO: 262 K/UL — SIGNIFICANT CHANGE UP (ref 130–400)
POTASSIUM SERPL-MCNC: 5 MMOL/L — SIGNIFICANT CHANGE UP (ref 3.5–5)
POTASSIUM SERPL-SCNC: 5 MMOL/L — SIGNIFICANT CHANGE UP (ref 3.5–5)
PROPOXYPHENE QUALITATIVE URINE RESULT: NEGATIVE — SIGNIFICANT CHANGE UP
PROT SERPL-MCNC: 7.7 G/DL — SIGNIFICANT CHANGE UP (ref 6–8)
RBC # BLD: 4.5 M/UL — LOW (ref 4.7–6.1)
RBC # FLD: 13.2 % — SIGNIFICANT CHANGE UP (ref 11.5–14.5)
SODIUM SERPL-SCNC: 142 MMOL/L — SIGNIFICANT CHANGE UP (ref 135–146)
THC UR QL: NEGATIVE — SIGNIFICANT CHANGE UP
TOTAL CHOLESTEROL/HDL RATIO MEASUREMENT: 2.8 RATIO — LOW (ref 4–5.5)
TRIGL SERPL-MCNC: 127 MG/DL — SIGNIFICANT CHANGE UP (ref 10–149)
WBC # BLD: 3.43 K/UL — LOW (ref 4.8–10.8)
WBC # FLD AUTO: 3.43 K/UL — LOW (ref 4.8–10.8)

## 2019-05-08 LAB
4/8 RATIO: 0.3 RATIO — LOW (ref 1.2–3.4)
ABS CD8: 950 /UL — HIGH (ref 206–494)
AFP-TM SERPL-MCNC: 2.5 NG/ML — SIGNIFICANT CHANGE UP
CD16+CD56+ CELLS NFR BLD: 14 % — SIGNIFICANT CHANGE UP (ref 4–20)
CD16+CD56+ CELLS NFR SPEC: 200 /UL — SIGNIFICANT CHANGE UP (ref 89–385)
CD19 BLASTS SPEC-ACNC: 2 % — LOW (ref 10–28)
CD19 BLASTS SPEC-ACNC: 23 /UL — LOW (ref 72–502)
CD3 BLASTS SPEC-ACNC: 1221 /UL — SIGNIFICANT CHANGE UP (ref 800–2012)
CD3 BLASTS SPEC-ACNC: 84 % — HIGH (ref 59–81)
CD4 %: 19 % — LOW (ref 42–58)
CD8 %: 65 % — HIGH (ref 14–30)
PSA SERPL-MCNC: 0.53 NG/ML — SIGNIFICANT CHANGE UP (ref 0–4)
T-CELL CD4 SUBSET PNL BLD: 274 /UL — LOW (ref 495–1312)
TSH SERPL-MCNC: 1.65 UIU/ML — SIGNIFICANT CHANGE UP (ref 0.27–4.2)

## 2019-05-13 DIAGNOSIS — B20 HUMAN IMMUNODEFICIENCY VIRUS [HIV] DISEASE: ICD-10-CM

## 2019-05-24 ENCOUNTER — OUTPATIENT (OUTPATIENT)
Dept: OUTPATIENT SERVICES | Facility: HOSPITAL | Age: 64
LOS: 1 days | Discharge: HOME | End: 2019-05-24

## 2019-06-03 ENCOUNTER — OUTPATIENT (OUTPATIENT)
Dept: OUTPATIENT SERVICES | Facility: HOSPITAL | Age: 64
LOS: 1 days | Discharge: HOME | End: 2019-06-03

## 2019-06-03 DIAGNOSIS — B20 HUMAN IMMUNODEFICIENCY VIRUS [HIV] DISEASE: ICD-10-CM

## 2019-06-04 ENCOUNTER — OUTPATIENT (OUTPATIENT)
Dept: OUTPATIENT SERVICES | Facility: HOSPITAL | Age: 64
LOS: 1 days | Discharge: HOME | End: 2019-06-04

## 2019-06-04 DIAGNOSIS — I10 ESSENTIAL (PRIMARY) HYPERTENSION: ICD-10-CM

## 2019-06-04 DIAGNOSIS — E23.6 OTHER DISORDERS OF PITUITARY GLAND: ICD-10-CM

## 2019-06-04 DIAGNOSIS — E78.5 HYPERLIPIDEMIA, UNSPECIFIED: ICD-10-CM

## 2019-06-04 DIAGNOSIS — Z23 ENCOUNTER FOR IMMUNIZATION: ICD-10-CM

## 2019-06-04 DIAGNOSIS — B20 HUMAN IMMUNODEFICIENCY VIRUS [HIV] DISEASE: ICD-10-CM

## 2019-06-04 DIAGNOSIS — M87.851 OTHER OSTEONECROSIS, RIGHT FEMUR: ICD-10-CM

## 2019-06-04 DIAGNOSIS — G99.0 AUTONOMIC NEUROPATHY IN DISEASES CLASSIFIED ELSEWHERE: ICD-10-CM

## 2019-06-06 DIAGNOSIS — B20 HUMAN IMMUNODEFICIENCY VIRUS [HIV] DISEASE: ICD-10-CM

## 2019-06-07 ENCOUNTER — OUTPATIENT (OUTPATIENT)
Dept: OUTPATIENT SERVICES | Facility: HOSPITAL | Age: 64
LOS: 1 days | Discharge: HOME | End: 2019-06-07

## 2019-06-09 LAB
EDDP UR CFM-MCNC: SIGNIFICANT CHANGE UP NG/MG CREAT
METHADONE UR CFM-MCNC: 8347 NG/MG CREAT — SIGNIFICANT CHANGE UP
METHADONE UR CFM-MCNC: ABNORMAL

## 2019-06-10 ENCOUNTER — OUTPATIENT (OUTPATIENT)
Dept: OUTPATIENT SERVICES | Facility: HOSPITAL | Age: 64
LOS: 1 days | Discharge: HOME | End: 2019-06-10

## 2019-06-14 ENCOUNTER — OUTPATIENT (OUTPATIENT)
Dept: OUTPATIENT SERVICES | Facility: HOSPITAL | Age: 64
LOS: 1 days | Discharge: HOME | End: 2019-06-14

## 2019-06-28 ENCOUNTER — OUTPATIENT (OUTPATIENT)
Dept: OUTPATIENT SERVICES | Facility: HOSPITAL | Age: 64
LOS: 1 days | Discharge: HOME | End: 2019-06-28

## 2019-07-02 ENCOUNTER — OUTPATIENT (OUTPATIENT)
Dept: OUTPATIENT SERVICES | Facility: HOSPITAL | Age: 64
LOS: 1 days | Discharge: HOME | End: 2019-07-02

## 2019-07-02 ENCOUNTER — OUTPATIENT (OUTPATIENT)
Dept: OUTPATIENT SERVICES | Facility: HOSPITAL | Age: 64
LOS: 1 days | Discharge: HOME | End: 2019-07-02
Payer: MEDICARE

## 2019-07-02 DIAGNOSIS — M87.851 OTHER OSTEONECROSIS, RIGHT FEMUR: ICD-10-CM

## 2019-07-02 DIAGNOSIS — G99.0 AUTONOMIC NEUROPATHY IN DISEASES CLASSIFIED ELSEWHERE: ICD-10-CM

## 2019-07-02 DIAGNOSIS — B20 HUMAN IMMUNODEFICIENCY VIRUS [HIV] DISEASE: ICD-10-CM

## 2019-07-02 DIAGNOSIS — I10 ESSENTIAL (PRIMARY) HYPERTENSION: ICD-10-CM

## 2019-07-02 DIAGNOSIS — E23.6 OTHER DISORDERS OF PITUITARY GLAND: ICD-10-CM

## 2019-07-02 DIAGNOSIS — E78.5 HYPERLIPIDEMIA, UNSPECIFIED: ICD-10-CM

## 2019-07-02 PROCEDURE — 73502 X-RAY EXAM HIP UNI 2-3 VIEWS: CPT | Mod: 26,LT

## 2019-07-08 LAB
EDDP UR CFM-MCNC: >6329 NG/MG CREAT — SIGNIFICANT CHANGE UP
METHADONE UR CFM-MCNC: >6329 NG/MG CREAT — SIGNIFICANT CHANGE UP
METHADONE UR CFM-MCNC: ABNORMAL

## 2019-07-09 LAB
ALFENTANIL UR QUANT: SIGNIFICANT CHANGE UP NG/MG CREAT
BUPRENORPHINE UR CONFIRMATION: NEGATIVE — SIGNIFICANT CHANGE UP
BUPRENORPHINE UR QUANT: SIGNIFICANT CHANGE UP NG/MG CREAT
CODEINE UR CFM-MCNC: SIGNIFICANT CHANGE UP NG/MG CREAT
DHC UR-MCNC: SIGNIFICANT CHANGE UP NG/MG CREAT
EDDP UR CFM-MCNC: 5962 NG/MG CREAT — SIGNIFICANT CHANGE UP
FENTANYL UR CFM-MCNC: 3 NG/MG CREAT — SIGNIFICANT CHANGE UP
FENTANYL UR CFM-MCNC: ABNORMAL
HYDROCODONE UR CFM-MCNC: SIGNIFICANT CHANGE UP NG/MG CREAT
HYDROMORPHONE UR CFM-MCNC: SIGNIFICANT CHANGE UP NG/MG CREAT
METHADONE UR CFM-MCNC: 4192 NG/MG CREAT — SIGNIFICANT CHANGE UP
METHADONE UR CFM-MCNC: ABNORMAL
MORPHINE UR CFM-MCNC: 67 NG/MG CREAT — SIGNIFICANT CHANGE UP
NORBUPRENORPHINE QUANT UR: SIGNIFICANT CHANGE UP NG/MG CREAT
NORCODEINE UR-MCNC: SIGNIFICANT CHANGE UP NG/MG CREAT
NORFENTANYL UR-MCNC: 22 NG/MG CREAT — SIGNIFICANT CHANGE UP
NORHYDROCODONE UR CFM-MCNC: SIGNIFICANT CHANGE UP NG/MG CREAT
NORMORPHINE UR QUANT: SIGNIFICANT CHANGE UP NG/MG CREAT
NORMORPHINE UR-MCNC: SIGNIFICANT CHANGE UP NG/MG CREAT
NOROXYCODONE UR CFM-MCNC: 1554 NG/MG CREAT — SIGNIFICANT CHANGE UP
NOROXYMORPHONE UR CFM-MCNC: 68 NG/MG CREAT — SIGNIFICANT CHANGE UP
OPIATES UR CFM-MCNC: ABNORMAL
OXYCODONE UR-MCNC: 339 NG/MG CREAT — SIGNIFICANT CHANGE UP
OXYCODONE UR-MCNC: ABNORMAL
OXYMORPHONE UR CFM-MCNC: 117 NG/MG CREAT — SIGNIFICANT CHANGE UP
SUFENTANIL UR QUANT: SIGNIFICANT CHANGE UP NG/MG CREAT
TAPENTADOL UR CONFIRMATION: NEGATIVE — SIGNIFICANT CHANGE UP
TAPENTADOL UR QUANT: SIGNIFICANT CHANGE UP NG/MG CREAT

## 2019-07-23 ENCOUNTER — OUTPATIENT (OUTPATIENT)
Dept: OUTPATIENT SERVICES | Facility: HOSPITAL | Age: 64
LOS: 1 days | Discharge: HOME | End: 2019-07-23

## 2019-07-26 ENCOUNTER — OUTPATIENT (OUTPATIENT)
Dept: OUTPATIENT SERVICES | Facility: HOSPITAL | Age: 64
LOS: 1 days | Discharge: HOME | End: 2019-07-26

## 2019-07-30 ENCOUNTER — OUTPATIENT (OUTPATIENT)
Dept: OUTPATIENT SERVICES | Facility: HOSPITAL | Age: 64
LOS: 1 days | Discharge: HOME | End: 2019-07-30

## 2019-07-30 DIAGNOSIS — B20 HUMAN IMMUNODEFICIENCY VIRUS [HIV] DISEASE: ICD-10-CM

## 2019-07-30 DIAGNOSIS — G99.0 AUTONOMIC NEUROPATHY IN DISEASES CLASSIFIED ELSEWHERE: ICD-10-CM

## 2019-07-30 DIAGNOSIS — E78.5 HYPERLIPIDEMIA, UNSPECIFIED: ICD-10-CM

## 2019-07-30 DIAGNOSIS — I10 ESSENTIAL (PRIMARY) HYPERTENSION: ICD-10-CM

## 2019-07-30 DIAGNOSIS — E23.6 OTHER DISORDERS OF PITUITARY GLAND: ICD-10-CM

## 2019-08-03 LAB
ALFENTANIL UR QUANT: SIGNIFICANT CHANGE UP NG/MG CREAT
BUPRENORPHINE UR CONFIRMATION: NEGATIVE — SIGNIFICANT CHANGE UP
BUPRENORPHINE UR QUANT: SIGNIFICANT CHANGE UP NG/MG CREAT
CODEINE UR CFM-MCNC: SIGNIFICANT CHANGE UP NG/MG CREAT
DHC UR-MCNC: SIGNIFICANT CHANGE UP NG/MG CREAT
EDDP UR CFM-MCNC: >4587 NG/MG CREAT — SIGNIFICANT CHANGE UP
FENTANYL UR CFM-MCNC: ABNORMAL
FENTANYL UR CFM-MCNC: SIGNIFICANT CHANGE UP NG/MG CREAT
HYDROCODONE UR CFM-MCNC: SIGNIFICANT CHANGE UP NG/MG CREAT
HYDROMORPHONE UR CFM-MCNC: SIGNIFICANT CHANGE UP NG/MG CREAT
METHADONE UR CFM-MCNC: 3002 NG/MG CREAT — SIGNIFICANT CHANGE UP
METHADONE UR CFM-MCNC: ABNORMAL
MORPHINE UR CFM-MCNC: 36 NG/MG CREAT — SIGNIFICANT CHANGE UP
NORBUPRENORPHINE QUANT UR: SIGNIFICANT CHANGE UP NG/MG CREAT
NORCODEINE UR-MCNC: SIGNIFICANT CHANGE UP NG/MG CREAT
NORFENTANYL UR-MCNC: 14 NG/MG CREAT — SIGNIFICANT CHANGE UP
NORHYDROCODONE UR CFM-MCNC: SIGNIFICANT CHANGE UP NG/MG CREAT
NORMORPHINE UR QUANT: SIGNIFICANT CHANGE UP NG/MG CREAT
NORMORPHINE UR-MCNC: SIGNIFICANT CHANGE UP NG/MG CREAT
NOROXYCODONE UR CFM-MCNC: 2053 NG/MG CREAT — SIGNIFICANT CHANGE UP
NOROXYMORPHONE UR CFM-MCNC: 136 NG/MG CREAT — SIGNIFICANT CHANGE UP
OPIATES UR CFM-MCNC: ABNORMAL
OXYCODONE UR-MCNC: 663 NG/MG CREAT — SIGNIFICANT CHANGE UP
OXYCODONE UR-MCNC: ABNORMAL
OXYMORPHONE UR CFM-MCNC: 172 NG/MG CREAT — SIGNIFICANT CHANGE UP
SUFENTANIL UR QUANT: SIGNIFICANT CHANGE UP NG/MG CREAT
TAPENTADOL UR CONFIRMATION: NEGATIVE — SIGNIFICANT CHANGE UP
TAPENTADOL UR QUANT: SIGNIFICANT CHANGE UP NG/MG CREAT

## 2019-08-04 LAB
EDDP UR CFM-MCNC: >4525 NG/MG CREAT — SIGNIFICANT CHANGE UP
METHADONE UR CFM-MCNC: 3437 NG/MG CREAT — SIGNIFICANT CHANGE UP
METHADONE UR CFM-MCNC: ABNORMAL

## 2019-08-22 ENCOUNTER — OUTPATIENT (OUTPATIENT)
Dept: OUTPATIENT SERVICES | Facility: HOSPITAL | Age: 64
LOS: 1 days | Discharge: HOME | End: 2019-08-22

## 2019-08-27 ENCOUNTER — OUTPATIENT (OUTPATIENT)
Dept: OUTPATIENT SERVICES | Facility: HOSPITAL | Age: 64
LOS: 1 days | Discharge: HOME | End: 2019-08-27

## 2019-08-27 DIAGNOSIS — I10 ESSENTIAL (PRIMARY) HYPERTENSION: ICD-10-CM

## 2019-08-27 DIAGNOSIS — E78.5 HYPERLIPIDEMIA, UNSPECIFIED: ICD-10-CM

## 2019-08-27 DIAGNOSIS — K21.9 GASTRO-ESOPHAGEAL REFLUX DISEASE WITHOUT ESOPHAGITIS: ICD-10-CM

## 2019-08-27 DIAGNOSIS — G99.0 AUTONOMIC NEUROPATHY IN DISEASES CLASSIFIED ELSEWHERE: ICD-10-CM

## 2019-08-27 DIAGNOSIS — M87.851 OTHER OSTEONECROSIS, RIGHT FEMUR: ICD-10-CM

## 2019-08-27 DIAGNOSIS — E23.6 OTHER DISORDERS OF PITUITARY GLAND: ICD-10-CM

## 2019-08-27 DIAGNOSIS — B20 HUMAN IMMUNODEFICIENCY VIRUS [HIV] DISEASE: ICD-10-CM

## 2019-08-27 LAB
AMPHET UR-MCNC: NEGATIVE — SIGNIFICANT CHANGE UP
BARBITURATES UR SCN-MCNC: NEGATIVE — SIGNIFICANT CHANGE UP
BENZODIAZ UR-MCNC: NEGATIVE — SIGNIFICANT CHANGE UP
BUPRENORPHINE SCREEN, URINE RESULT: NEGATIVE — SIGNIFICANT CHANGE UP
COCAINE METAB.OTHER UR-MCNC: NEGATIVE — SIGNIFICANT CHANGE UP
DRUG SCREEN 1, URINE RESULT: SIGNIFICANT CHANGE UP
METHADONE UR-MCNC: POSITIVE
OPIATES UR-MCNC: NEGATIVE — SIGNIFICANT CHANGE UP
PCP UR-MCNC: NEGATIVE — SIGNIFICANT CHANGE UP
PROPOXYPHENE QUALITATIVE URINE RESULT: NEGATIVE — SIGNIFICANT CHANGE UP
THC UR QL: NEGATIVE — SIGNIFICANT CHANGE UP

## 2019-08-29 ENCOUNTER — APPOINTMENT (OUTPATIENT)
Dept: UROLOGY | Facility: CLINIC | Age: 64
End: 2019-08-29

## 2019-09-01 LAB
EDDP UR CFM-MCNC: >4098 NG/MG CREAT — SIGNIFICANT CHANGE UP
METHADONE UR CFM-MCNC: 2717 NG/MG CREAT — SIGNIFICANT CHANGE UP
METHADONE UR CFM-MCNC: ABNORMAL

## 2019-09-04 LAB
ALFENTANIL UR QUANT: SIGNIFICANT CHANGE UP NG/MG CREAT
BUPRENORPHINE UR CONFIRMATION: NEGATIVE — SIGNIFICANT CHANGE UP
BUPRENORPHINE UR QUANT: SIGNIFICANT CHANGE UP NG/MG CREAT
CODEINE UR CFM-MCNC: SIGNIFICANT CHANGE UP NG/MG CREAT
DHC UR-MCNC: SIGNIFICANT CHANGE UP NG/MG CREAT
EDDP UR CFM-MCNC: >4184 NG/MG CREAT — SIGNIFICANT CHANGE UP
FENTANYL UR CFM-MCNC: ABNORMAL
FENTANYL UR CFM-MCNC: SIGNIFICANT CHANGE UP NG/MG CREAT
HYDROCODONE UR CFM-MCNC: SIGNIFICANT CHANGE UP NG/MG CREAT
HYDROMORPHONE UR CFM-MCNC: SIGNIFICANT CHANGE UP NG/MG CREAT
METHADONE UR CFM-MCNC: 2793 NG/MG CREAT — SIGNIFICANT CHANGE UP
METHADONE UR CFM-MCNC: ABNORMAL
MORPHINE UR CFM-MCNC: 26 NG/MG CREAT — SIGNIFICANT CHANGE UP
NORBUPRENORPHINE QUANT UR: SIGNIFICANT CHANGE UP NG/MG CREAT
NORCODEINE UR-MCNC: SIGNIFICANT CHANGE UP NG/MG CREAT
NORFENTANYL UR-MCNC: 6 NG/MG CREAT — SIGNIFICANT CHANGE UP
NORHYDROCODONE UR CFM-MCNC: SIGNIFICANT CHANGE UP NG/MG CREAT
NORMORPHINE UR QUANT: SIGNIFICANT CHANGE UP NG/MG CREAT
NORMORPHINE UR-MCNC: SIGNIFICANT CHANGE UP NG/MG CREAT
NOROXYCODONE UR CFM-MCNC: 1771 NG/MG CREAT — SIGNIFICANT CHANGE UP
NOROXYMORPHONE UR CFM-MCNC: 210 NG/MG CREAT — SIGNIFICANT CHANGE UP
OPIATES UR CFM-MCNC: ABNORMAL
OXYCODONE UR-MCNC: 490 NG/MG CREAT — SIGNIFICANT CHANGE UP
OXYCODONE UR-MCNC: ABNORMAL
OXYMORPHONE UR CFM-MCNC: 171 NG/MG CREAT — SIGNIFICANT CHANGE UP
SUFENTANIL UR QUANT: SIGNIFICANT CHANGE UP NG/MG CREAT
TAPENTADOL UR CONFIRMATION: NEGATIVE — SIGNIFICANT CHANGE UP
TAPENTADOL UR QUANT: SIGNIFICANT CHANGE UP NG/MG CREAT

## 2019-09-23 ENCOUNTER — OUTPATIENT (OUTPATIENT)
Dept: OUTPATIENT SERVICES | Facility: HOSPITAL | Age: 64
LOS: 1 days | Discharge: HOME | End: 2019-09-23

## 2019-09-24 ENCOUNTER — OUTPATIENT (OUTPATIENT)
Dept: OUTPATIENT SERVICES | Facility: HOSPITAL | Age: 64
LOS: 1 days | Discharge: HOME | End: 2019-09-24

## 2019-09-24 ENCOUNTER — OUTPATIENT (OUTPATIENT)
Dept: OUTPATIENT SERVICES | Facility: HOSPITAL | Age: 64
LOS: 1 days | Discharge: HOME | End: 2019-09-24
Payer: COMMERCIAL

## 2019-09-24 DIAGNOSIS — M87.851 OTHER OSTEONECROSIS, RIGHT FEMUR: ICD-10-CM

## 2019-09-24 DIAGNOSIS — B20 HUMAN IMMUNODEFICIENCY VIRUS [HIV] DISEASE: ICD-10-CM

## 2019-09-24 DIAGNOSIS — G99.0 AUTONOMIC NEUROPATHY IN DISEASES CLASSIFIED ELSEWHERE: ICD-10-CM

## 2019-09-24 DIAGNOSIS — E23.6 OTHER DISORDERS OF PITUITARY GLAND: ICD-10-CM

## 2019-09-24 DIAGNOSIS — I10 ESSENTIAL (PRIMARY) HYPERTENSION: ICD-10-CM

## 2019-09-24 DIAGNOSIS — E78.5 HYPERLIPIDEMIA, UNSPECIFIED: ICD-10-CM

## 2019-09-24 LAB
ALBUMIN SERPL ELPH-MCNC: 5.2 G/DL — SIGNIFICANT CHANGE UP (ref 3.5–5.2)
ALP SERPL-CCNC: 92 U/L — SIGNIFICANT CHANGE UP (ref 30–115)
ALT FLD-CCNC: 33 U/L — SIGNIFICANT CHANGE UP (ref 0–41)
AMPHET UR-MCNC: NEGATIVE — SIGNIFICANT CHANGE UP
ANION GAP SERPL CALC-SCNC: 11 MMOL/L — SIGNIFICANT CHANGE UP (ref 7–14)
AST SERPL-CCNC: 38 U/L — SIGNIFICANT CHANGE UP (ref 0–41)
BARBITURATES UR SCN-MCNC: NEGATIVE — SIGNIFICANT CHANGE UP
BENZODIAZ UR-MCNC: NEGATIVE — SIGNIFICANT CHANGE UP
BILIRUB SERPL-MCNC: 0.3 MG/DL — SIGNIFICANT CHANGE UP (ref 0.2–1.2)
BUN SERPL-MCNC: 24 MG/DL — HIGH (ref 10–20)
CALCIUM SERPL-MCNC: 10.3 MG/DL — HIGH (ref 8.5–10.1)
CHLORIDE SERPL-SCNC: 101 MMOL/L — SIGNIFICANT CHANGE UP (ref 98–110)
CHOLEST SERPL-MCNC: 201 MG/DL — HIGH (ref 100–200)
CO2 SERPL-SCNC: 30 MMOL/L — SIGNIFICANT CHANGE UP (ref 17–32)
COCAINE METAB.OTHER UR-MCNC: NEGATIVE — SIGNIFICANT CHANGE UP
CREAT SERPL-MCNC: 1 MG/DL — SIGNIFICANT CHANGE UP (ref 0.7–1.5)
DRUG SCREEN 1, URINE RESULT: SIGNIFICANT CHANGE UP
ESTIMATED AVERAGE GLUCOSE: 120 MG/DL — HIGH (ref 68–114)
GGT SERPL-CCNC: 96 U/L — HIGH (ref 1–40)
GLUCOSE SERPL-MCNC: 90 MG/DL — SIGNIFICANT CHANGE UP (ref 70–99)
HBA1C BLD-MCNC: 5.8 % — HIGH (ref 4–5.6)
HCT VFR BLD CALC: 44.8 % — SIGNIFICANT CHANGE UP (ref 42–52)
HDLC SERPL-MCNC: 57 MG/DL — SIGNIFICANT CHANGE UP
HGB BLD-MCNC: 15.2 G/DL — SIGNIFICANT CHANGE UP (ref 14–18)
LDH SERPL L TO P-CCNC: 208 U/L — SIGNIFICANT CHANGE UP (ref 50–242)
LIPID PNL WITH DIRECT LDL SERPL: 118 MG/DL — SIGNIFICANT CHANGE UP (ref 4–129)
MCHC RBC-ENTMCNC: 33 PG — HIGH (ref 27–31)
MCHC RBC-ENTMCNC: 33.9 G/DL — SIGNIFICANT CHANGE UP (ref 32–37)
MCV RBC AUTO: 97.4 FL — HIGH (ref 80–94)
METHADONE UR-MCNC: POSITIVE
NRBC # BLD: 0 /100 WBCS — SIGNIFICANT CHANGE UP (ref 0–0)
OPIATES UR-MCNC: NEGATIVE — SIGNIFICANT CHANGE UP
PCP UR-MCNC: NEGATIVE — SIGNIFICANT CHANGE UP
PLATELET # BLD AUTO: 255 K/UL — SIGNIFICANT CHANGE UP (ref 130–400)
POTASSIUM SERPL-MCNC: 4.9 MMOL/L — SIGNIFICANT CHANGE UP (ref 3.5–5)
POTASSIUM SERPL-SCNC: 4.9 MMOL/L — SIGNIFICANT CHANGE UP (ref 3.5–5)
PROPOXYPHENE QUALITATIVE URINE RESULT: NEGATIVE — SIGNIFICANT CHANGE UP
PROT SERPL-MCNC: 8.4 G/DL — HIGH (ref 6–8)
RBC # BLD: 4.6 M/UL — LOW (ref 4.7–6.1)
RBC # FLD: 12.8 % — SIGNIFICANT CHANGE UP (ref 11.5–14.5)
SODIUM SERPL-SCNC: 142 MMOL/L — SIGNIFICANT CHANGE UP (ref 135–146)
THC UR QL: NEGATIVE — SIGNIFICANT CHANGE UP
TOTAL CHOLESTEROL/HDL RATIO MEASUREMENT: 3.5 RATIO — LOW (ref 4–5.5)
TRIGL SERPL-MCNC: 157 MG/DL — HIGH (ref 10–149)
WBC # BLD: 3.27 K/UL — LOW (ref 4.8–10.8)
WBC # FLD AUTO: 3.27 K/UL — LOW (ref 4.8–10.8)

## 2019-09-24 PROCEDURE — 71046 X-RAY EXAM CHEST 2 VIEWS: CPT | Mod: 26

## 2019-09-25 LAB
4/8 RATIO: 0.2 RATIO — LOW (ref 1.2–3.4)
ABS CD8: 1211 /UL — HIGH (ref 206–494)
CD16+CD56+ CELLS NFR BLD: 13 % — SIGNIFICANT CHANGE UP (ref 4–20)
CD16+CD56+ CELLS NFR SPEC: 216 /UL — SIGNIFICANT CHANGE UP (ref 89–385)
CD19 BLASTS SPEC-ACNC: 2 % — LOW (ref 10–28)
CD19 BLASTS SPEC-ACNC: 28 /UL — LOW (ref 72–502)
CD3 BLASTS SPEC-ACNC: 1446 /UL — SIGNIFICANT CHANGE UP (ref 800–2012)
CD3 BLASTS SPEC-ACNC: 85 % — HIGH (ref 59–81)
CD4 %: 13 % — LOW (ref 42–58)
CD8 %: 71 % — HIGH (ref 14–30)
T-CELL CD4 SUBSET PNL BLD: 226 /UL — LOW (ref 495–1312)

## 2019-09-29 LAB
ALFENTANIL UR QUANT: SIGNIFICANT CHANGE UP NG/MG CREAT
BUPRENORPHINE UR CONFIRMATION: NEGATIVE — SIGNIFICANT CHANGE UP
BUPRENORPHINE UR QUANT: SIGNIFICANT CHANGE UP NG/MG CREAT
CODEINE UR CFM-MCNC: SIGNIFICANT CHANGE UP NG/MG CREAT
DHC UR-MCNC: SIGNIFICANT CHANGE UP NG/MG CREAT
EDDP UR CFM-MCNC: SIGNIFICANT CHANGE UP NG/MG CREAT
FENTANYL UR CFM-MCNC: ABNORMAL
FENTANYL UR CFM-MCNC: SIGNIFICANT CHANGE UP NG/MG CREAT
HYDROCODONE UR CFM-MCNC: SIGNIFICANT CHANGE UP NG/MG CREAT
HYDROMORPHONE UR CFM-MCNC: SIGNIFICANT CHANGE UP NG/MG CREAT
METHADONE UR CFM-MCNC: 3497 NG/MG CREAT — SIGNIFICANT CHANGE UP
METHADONE UR CFM-MCNC: ABNORMAL
MORPHINE UR CFM-MCNC: SIGNIFICANT CHANGE UP NG/MG CREAT
NORBUPRENORPHINE QUANT UR: SIGNIFICANT CHANGE UP NG/MG CREAT
NORCODEINE UR-MCNC: SIGNIFICANT CHANGE UP NG/MG CREAT
NORFENTANYL UR-MCNC: 5 NG/MG CREAT — SIGNIFICANT CHANGE UP
NORHYDROCODONE UR CFM-MCNC: SIGNIFICANT CHANGE UP NG/MG CREAT
NORMORPHINE UR QUANT: SIGNIFICANT CHANGE UP NG/MG CREAT
NORMORPHINE UR-MCNC: SIGNIFICANT CHANGE UP NG/MG CREAT
NOROXYCODONE UR CFM-MCNC: 3933 NG/MG CREAT — SIGNIFICANT CHANGE UP
NOROXYMORPHONE UR CFM-MCNC: 179 NG/MG CREAT — SIGNIFICANT CHANGE UP
OPIATES UR CFM-MCNC: NEGATIVE — SIGNIFICANT CHANGE UP
OXYCODONE UR-MCNC: 928 NG/MG CREAT — SIGNIFICANT CHANGE UP
OXYCODONE UR-MCNC: ABNORMAL
OXYMORPHONE UR CFM-MCNC: 328 NG/MG CREAT — SIGNIFICANT CHANGE UP
SUFENTANIL UR QUANT: SIGNIFICANT CHANGE UP NG/MG CREAT
TAPENTADOL UR CONFIRMATION: NEGATIVE — SIGNIFICANT CHANGE UP
TAPENTADOL UR QUANT: SIGNIFICANT CHANGE UP NG/MG CREAT

## 2019-10-09 DIAGNOSIS — B20 HUMAN IMMUNODEFICIENCY VIRUS [HIV] DISEASE: ICD-10-CM

## 2019-10-10 DIAGNOSIS — B20 HUMAN IMMUNODEFICIENCY VIRUS [HIV] DISEASE: ICD-10-CM

## 2019-10-15 DIAGNOSIS — B20 HUMAN IMMUNODEFICIENCY VIRUS [HIV] DISEASE: ICD-10-CM

## 2019-10-16 DIAGNOSIS — B20 HUMAN IMMUNODEFICIENCY VIRUS [HIV] DISEASE: ICD-10-CM

## 2019-10-17 DIAGNOSIS — B20 HUMAN IMMUNODEFICIENCY VIRUS [HIV] DISEASE: ICD-10-CM

## 2019-10-18 DIAGNOSIS — B20 HUMAN IMMUNODEFICIENCY VIRUS [HIV] DISEASE: ICD-10-CM

## 2019-10-21 ENCOUNTER — OUTPATIENT (OUTPATIENT)
Dept: OUTPATIENT SERVICES | Facility: HOSPITAL | Age: 64
LOS: 1 days | Discharge: HOME | End: 2019-10-21

## 2019-10-22 ENCOUNTER — OUTPATIENT (OUTPATIENT)
Dept: OUTPATIENT SERVICES | Facility: HOSPITAL | Age: 64
LOS: 1 days | Discharge: HOME | End: 2019-10-22

## 2019-10-22 DIAGNOSIS — I10 ESSENTIAL (PRIMARY) HYPERTENSION: ICD-10-CM

## 2019-10-22 DIAGNOSIS — M87.851 OTHER OSTEONECROSIS, RIGHT FEMUR: ICD-10-CM

## 2019-10-22 DIAGNOSIS — K21.9 GASTRO-ESOPHAGEAL REFLUX DISEASE WITHOUT ESOPHAGITIS: ICD-10-CM

## 2019-10-22 DIAGNOSIS — G99.0 AUTONOMIC NEUROPATHY IN DISEASES CLASSIFIED ELSEWHERE: ICD-10-CM

## 2019-10-22 DIAGNOSIS — B20 HUMAN IMMUNODEFICIENCY VIRUS [HIV] DISEASE: ICD-10-CM

## 2019-10-22 DIAGNOSIS — E23.6 OTHER DISORDERS OF PITUITARY GLAND: ICD-10-CM

## 2019-10-22 DIAGNOSIS — E78.5 HYPERLIPIDEMIA, UNSPECIFIED: ICD-10-CM

## 2019-10-24 LAB
EDDP UR QL CFM: SIGNIFICANT CHANGE UP NG/ML
EDDP, UR RESULT: SIGNIFICANT CHANGE UP NG/ML
METHADONE IN-HOUSE INTERPRETATION: POSITIVE
METHADONE UR CFM-MCNC: POSITIVE

## 2019-10-26 LAB
ALFENTANIL UR QUANT: SIGNIFICANT CHANGE UP NG/MG CREAT
BUPRENORPHINE UR CONFIRMATION: NEGATIVE — SIGNIFICANT CHANGE UP
BUPRENORPHINE UR QUANT: SIGNIFICANT CHANGE UP NG/MG CREAT
CODEINE UR CFM-MCNC: SIGNIFICANT CHANGE UP NG/MG CREAT
DHC UR-MCNC: SIGNIFICANT CHANGE UP NG/MG CREAT
EDDP UR CFM-MCNC: >3236 NG/MG CREAT — SIGNIFICANT CHANGE UP
FENTANYL UR CFM-MCNC: ABNORMAL
FENTANYL UR CFM-MCNC: SIGNIFICANT CHANGE UP NG/MG CREAT
HYDROCODONE UR CFM-MCNC: SIGNIFICANT CHANGE UP NG/MG CREAT
HYDROMORPHONE UR CFM-MCNC: SIGNIFICANT CHANGE UP NG/MG CREAT
METHADONE UR CFM-MCNC: >3236 NG/MG CREAT — SIGNIFICANT CHANGE UP
METHADONE UR CFM-MCNC: ABNORMAL
MORPHINE UR CFM-MCNC: 39 NG/MG CREAT — SIGNIFICANT CHANGE UP
NORBUPRENORPHINE QUANT UR: SIGNIFICANT CHANGE UP NG/MG CREAT
NORCODEINE UR-MCNC: SIGNIFICANT CHANGE UP NG/MG CREAT
NORFENTANYL UR-MCNC: 6 NG/MG CREAT — SIGNIFICANT CHANGE UP
NORHYDROCODONE UR CFM-MCNC: SIGNIFICANT CHANGE UP NG/MG CREAT
NORMORPHINE UR QUANT: SIGNIFICANT CHANGE UP NG/MG CREAT
NORMORPHINE UR-MCNC: SIGNIFICANT CHANGE UP NG/MG CREAT
NOROXYCODONE UR CFM-MCNC: >3236 NG/MG CREAT — SIGNIFICANT CHANGE UP
NOROXYMORPHONE UR CFM-MCNC: 268 NG/MG CREAT — SIGNIFICANT CHANGE UP
OPIATES UR CFM-MCNC: ABNORMAL
OXYCODONE UR-MCNC: 639 NG/MG CREAT — SIGNIFICANT CHANGE UP
OXYCODONE UR-MCNC: ABNORMAL
OXYMORPHONE UR CFM-MCNC: 414 NG/MG CREAT — SIGNIFICANT CHANGE UP
SUFENTANIL UR QUANT: SIGNIFICANT CHANGE UP NG/MG CREAT
TAPENTADOL UR CONFIRMATION: NEGATIVE — SIGNIFICANT CHANGE UP
TAPENTADOL UR QUANT: SIGNIFICANT CHANGE UP NG/MG CREAT

## 2019-10-28 DIAGNOSIS — B20 HUMAN IMMUNODEFICIENCY VIRUS [HIV] DISEASE: ICD-10-CM

## 2019-11-04 DIAGNOSIS — B20 HUMAN IMMUNODEFICIENCY VIRUS [HIV] DISEASE: ICD-10-CM

## 2019-11-08 DIAGNOSIS — B20 HUMAN IMMUNODEFICIENCY VIRUS [HIV] DISEASE: ICD-10-CM

## 2019-11-13 DIAGNOSIS — B20 HUMAN IMMUNODEFICIENCY VIRUS [HIV] DISEASE: ICD-10-CM

## 2019-11-15 DIAGNOSIS — B20 HUMAN IMMUNODEFICIENCY VIRUS [HIV] DISEASE: ICD-10-CM

## 2019-11-18 ENCOUNTER — OUTPATIENT (OUTPATIENT)
Dept: OUTPATIENT SERVICES | Facility: HOSPITAL | Age: 64
LOS: 1 days | Discharge: HOME | End: 2019-11-18

## 2019-11-19 ENCOUNTER — OUTPATIENT (OUTPATIENT)
Dept: OUTPATIENT SERVICES | Facility: HOSPITAL | Age: 64
LOS: 1 days | Discharge: HOME | End: 2019-11-19

## 2019-11-19 DIAGNOSIS — E23.6 OTHER DISORDERS OF PITUITARY GLAND: ICD-10-CM

## 2019-11-19 DIAGNOSIS — K21.9 GASTRO-ESOPHAGEAL REFLUX DISEASE WITHOUT ESOPHAGITIS: ICD-10-CM

## 2019-11-19 DIAGNOSIS — E78.5 HYPERLIPIDEMIA, UNSPECIFIED: ICD-10-CM

## 2019-11-19 DIAGNOSIS — I10 ESSENTIAL (PRIMARY) HYPERTENSION: ICD-10-CM

## 2019-11-19 DIAGNOSIS — B20 HUMAN IMMUNODEFICIENCY VIRUS [HIV] DISEASE: ICD-10-CM

## 2019-11-19 DIAGNOSIS — M87.851 OTHER OSTEONECROSIS, RIGHT FEMUR: ICD-10-CM

## 2019-11-19 DIAGNOSIS — G99.0 AUTONOMIC NEUROPATHY IN DISEASES CLASSIFIED ELSEWHERE: ICD-10-CM

## 2019-11-20 ENCOUNTER — OUTPATIENT (OUTPATIENT)
Dept: OUTPATIENT SERVICES | Facility: HOSPITAL | Age: 64
LOS: 1 days | Discharge: HOME | End: 2019-11-20

## 2019-11-21 ENCOUNTER — OUTPATIENT (OUTPATIENT)
Dept: OUTPATIENT SERVICES | Facility: HOSPITAL | Age: 64
LOS: 1 days | Discharge: HOME | End: 2019-11-21

## 2019-11-22 LAB
BENZOYLEGONINE, UR RESULT: 277 NG/ML — SIGNIFICANT CHANGE UP
BZE UR QL SCN: 277 NG/ML — SIGNIFICANT CHANGE UP
COCAINE IN-HOUSE INTERPRETATION: POSITIVE
COCAINE UR QL SCN: POSITIVE
EDDP UR QL CFM: SIGNIFICANT CHANGE UP NG/ML
EDDP, UR RESULT: SIGNIFICANT CHANGE UP NG/ML
METHADONE IN-HOUSE INTERPRETATION: POSITIVE
METHADONE UR CFM-MCNC: POSITIVE

## 2019-11-27 LAB
ALFENTANIL UR QUANT: SIGNIFICANT CHANGE UP NG/MG CREAT
BUPRENORPHINE UR CONFIRMATION: NEGATIVE — SIGNIFICANT CHANGE UP
BUPRENORPHINE UR QUANT: SIGNIFICANT CHANGE UP NG/MG CREAT
CODEINE UR CFM-MCNC: SIGNIFICANT CHANGE UP NG/MG CREAT
DHC UR-MCNC: SIGNIFICANT CHANGE UP NG/MG CREAT
EDDP UR CFM-MCNC: >5155 NG/MG CREAT — SIGNIFICANT CHANGE UP
FENTANYL UR CFM-MCNC: ABNORMAL
FENTANYL UR CFM-MCNC: SIGNIFICANT CHANGE UP NG/MG CREAT
HYDROCODONE UR CFM-MCNC: SIGNIFICANT CHANGE UP NG/MG CREAT
HYDROMORPHONE UR CFM-MCNC: SIGNIFICANT CHANGE UP NG/MG CREAT
METHADONE UR CFM-MCNC: 3807 NG/MG CREAT — SIGNIFICANT CHANGE UP
METHADONE UR CFM-MCNC: ABNORMAL
MORPHINE UR CFM-MCNC: 79 NG/MG CREAT — SIGNIFICANT CHANGE UP
NORBUPRENORPHINE QUANT UR: SIGNIFICANT CHANGE UP NG/MG CREAT
NORCODEINE UR-MCNC: SIGNIFICANT CHANGE UP NG/MG CREAT
NORFENTANYL UR-MCNC: 10 NG/MG CREAT — SIGNIFICANT CHANGE UP
NORHYDROCODONE UR CFM-MCNC: SIGNIFICANT CHANGE UP NG/MG CREAT
NORMORPHINE UR QUANT: SIGNIFICANT CHANGE UP NG/MG CREAT
NORMORPHINE UR-MCNC: SIGNIFICANT CHANGE UP NG/MG CREAT
NOROXYCODONE UR CFM-MCNC: 4839 NG/MG CREAT — SIGNIFICANT CHANGE UP
NOROXYMORPHONE UR CFM-MCNC: 121 NG/MG CREAT — SIGNIFICANT CHANGE UP
OPIATES UR CFM-MCNC: ABNORMAL
OXYCODONE UR-MCNC: 1439 NG/MG CREAT — SIGNIFICANT CHANGE UP
OXYCODONE UR-MCNC: ABNORMAL
OXYMORPHONE UR CFM-MCNC: 412 NG/MG CREAT — SIGNIFICANT CHANGE UP
SUFENTANIL UR QUANT: SIGNIFICANT CHANGE UP NG/MG CREAT
TAPENTADOL UR CONFIRMATION: NEGATIVE — SIGNIFICANT CHANGE UP
TAPENTADOL UR QUANT: SIGNIFICANT CHANGE UP NG/MG CREAT

## 2019-12-09 DIAGNOSIS — B20 HUMAN IMMUNODEFICIENCY VIRUS [HIV] DISEASE: ICD-10-CM

## 2019-12-11 DIAGNOSIS — B20 HUMAN IMMUNODEFICIENCY VIRUS [HIV] DISEASE: ICD-10-CM

## 2019-12-16 ENCOUNTER — OUTPATIENT (OUTPATIENT)
Dept: OUTPATIENT SERVICES | Facility: HOSPITAL | Age: 64
LOS: 1 days | Discharge: HOME | End: 2019-12-16

## 2019-12-17 ENCOUNTER — OUTPATIENT (OUTPATIENT)
Dept: OUTPATIENT SERVICES | Facility: HOSPITAL | Age: 64
LOS: 1 days | Discharge: HOME | End: 2019-12-17

## 2019-12-17 DIAGNOSIS — E23.6 OTHER DISORDERS OF PITUITARY GLAND: ICD-10-CM

## 2019-12-17 DIAGNOSIS — B20 HUMAN IMMUNODEFICIENCY VIRUS [HIV] DISEASE: ICD-10-CM

## 2019-12-17 DIAGNOSIS — G99.0 AUTONOMIC NEUROPATHY IN DISEASES CLASSIFIED ELSEWHERE: ICD-10-CM

## 2019-12-17 DIAGNOSIS — M87.851 OTHER OSTEONECROSIS, RIGHT FEMUR: ICD-10-CM

## 2019-12-17 DIAGNOSIS — I10 ESSENTIAL (PRIMARY) HYPERTENSION: ICD-10-CM

## 2019-12-17 DIAGNOSIS — E78.5 HYPERLIPIDEMIA, UNSPECIFIED: ICD-10-CM

## 2019-12-17 LAB
AMPHET UR-MCNC: NEGATIVE — SIGNIFICANT CHANGE UP
BARBITURATES UR SCN-MCNC: NEGATIVE — SIGNIFICANT CHANGE UP
BENZODIAZ UR-MCNC: NEGATIVE — SIGNIFICANT CHANGE UP
BUPRENORPHINE SCREEN, URINE RESULT: NEGATIVE — SIGNIFICANT CHANGE UP
COCAINE METAB.OTHER UR-MCNC: POSITIVE
DRUG SCREEN 1, URINE RESULT: SIGNIFICANT CHANGE UP
METHADONE UR-MCNC: POSITIVE
OPIATES UR-MCNC: NEGATIVE — SIGNIFICANT CHANGE UP
PCP UR-MCNC: NEGATIVE — SIGNIFICANT CHANGE UP
PROPOXYPHENE QUALITATIVE URINE RESULT: NEGATIVE — SIGNIFICANT CHANGE UP
THC UR QL: NEGATIVE — SIGNIFICANT CHANGE UP

## 2019-12-19 LAB
BENZOYLEGONINE, UR RESULT: 267 NG/ML — SIGNIFICANT CHANGE UP
BZE UR QL SCN: 267 NG/ML — SIGNIFICANT CHANGE UP
COCAINE IN-HOUSE INTERPRETATION: POSITIVE
COCAINE UR QL SCN: POSITIVE
EDDP UR QL CFM: SIGNIFICANT CHANGE UP NG/ML
EDDP, UR RESULT: SIGNIFICANT CHANGE UP NG/ML
METHADONE IN-HOUSE INTERPRETATION: POSITIVE
METHADONE UR CFM-MCNC: POSITIVE

## 2019-12-23 LAB
ALFENTANIL UR QUANT: SIGNIFICANT CHANGE UP NG/MG CREAT
BUPRENORPHINE UR CONFIRMATION: NEGATIVE — SIGNIFICANT CHANGE UP
BUPRENORPHINE UR QUANT: SIGNIFICANT CHANGE UP NG/MG CREAT
CODEINE UR CFM-MCNC: SIGNIFICANT CHANGE UP NG/MG CREAT
DHC UR-MCNC: SIGNIFICANT CHANGE UP NG/MG CREAT
EDDP UR CFM-MCNC: >5405 NG/MG CREAT — SIGNIFICANT CHANGE UP
FENTANYL UR CFM-MCNC: ABNORMAL
FENTANYL UR CFM-MCNC: SIGNIFICANT CHANGE UP NG/MG CREAT
HYDROCODONE UR CFM-MCNC: SIGNIFICANT CHANGE UP NG/MG CREAT
HYDROMORPHONE UR CFM-MCNC: SIGNIFICANT CHANGE UP NG/MG CREAT
METHADONE UR CFM-MCNC: 3899 NG/MG CREAT — SIGNIFICANT CHANGE UP
METHADONE UR CFM-MCNC: ABNORMAL
MORPHINE UR CFM-MCNC: 70 NG/MG CREAT — SIGNIFICANT CHANGE UP
NORBUPRENORPHINE QUANT UR: SIGNIFICANT CHANGE UP NG/MG CREAT
NORCODEINE UR-MCNC: SIGNIFICANT CHANGE UP NG/MG CREAT
NORFENTANYL UR-MCNC: 9 NG/MG CREAT — SIGNIFICANT CHANGE UP
NORHYDROCODONE UR CFM-MCNC: SIGNIFICANT CHANGE UP NG/MG CREAT
NORMORPHINE UR QUANT: SIGNIFICANT CHANGE UP NG/MG CREAT
NORMORPHINE UR-MCNC: SIGNIFICANT CHANGE UP NG/MG CREAT
NOROXYCODONE UR CFM-MCNC: 4583 NG/MG CREAT — SIGNIFICANT CHANGE UP
NOROXYMORPHONE UR CFM-MCNC: 115 NG/MG CREAT — SIGNIFICANT CHANGE UP
OPIATES UR CFM-MCNC: ABNORMAL
OXYCODONE UR-MCNC: 1332 NG/MG CREAT — SIGNIFICANT CHANGE UP
OXYCODONE UR-MCNC: ABNORMAL
OXYMORPHONE UR CFM-MCNC: 517 NG/MG CREAT — SIGNIFICANT CHANGE UP
SUFENTANIL UR QUANT: SIGNIFICANT CHANGE UP NG/MG CREAT
TAPENTADOL UR CONFIRMATION: NEGATIVE — SIGNIFICANT CHANGE UP
TAPENTADOL UR QUANT: SIGNIFICANT CHANGE UP NG/MG CREAT

## 2019-12-30 DIAGNOSIS — B20 HUMAN IMMUNODEFICIENCY VIRUS [HIV] DISEASE: ICD-10-CM

## 2020-01-02 ENCOUNTER — OUTPATIENT (OUTPATIENT)
Dept: OUTPATIENT SERVICES | Facility: HOSPITAL | Age: 65
LOS: 1 days | Discharge: HOME | End: 2020-01-02

## 2020-01-13 ENCOUNTER — OUTPATIENT (OUTPATIENT)
Dept: OUTPATIENT SERVICES | Facility: HOSPITAL | Age: 65
LOS: 1 days | Discharge: HOME | End: 2020-01-13

## 2020-01-14 ENCOUNTER — OUTPATIENT (OUTPATIENT)
Dept: OUTPATIENT SERVICES | Facility: HOSPITAL | Age: 65
LOS: 1 days | Discharge: HOME | End: 2020-01-14

## 2020-01-14 DIAGNOSIS — E23.6 OTHER DISORDERS OF PITUITARY GLAND: ICD-10-CM

## 2020-01-14 DIAGNOSIS — G99.0 AUTONOMIC NEUROPATHY IN DISEASES CLASSIFIED ELSEWHERE: ICD-10-CM

## 2020-01-14 DIAGNOSIS — E78.5 HYPERLIPIDEMIA, UNSPECIFIED: ICD-10-CM

## 2020-01-14 DIAGNOSIS — B20 HUMAN IMMUNODEFICIENCY VIRUS [HIV] DISEASE: ICD-10-CM

## 2020-01-14 DIAGNOSIS — M87.851 OTHER OSTEONECROSIS, RIGHT FEMUR: ICD-10-CM

## 2020-01-14 DIAGNOSIS — I10 ESSENTIAL (PRIMARY) HYPERTENSION: ICD-10-CM

## 2020-01-14 DIAGNOSIS — K21.9 GASTRO-ESOPHAGEAL REFLUX DISEASE WITHOUT ESOPHAGITIS: ICD-10-CM

## 2020-01-14 LAB
24R-OH-CALCIDIOL SERPL-MCNC: 49 NG/ML — SIGNIFICANT CHANGE UP (ref 30–80)
ALBUMIN SERPL ELPH-MCNC: 5.3 G/DL — HIGH (ref 3.5–5.2)
ALP SERPL-CCNC: 89 U/L — SIGNIFICANT CHANGE UP (ref 30–115)
ALT FLD-CCNC: 33 U/L — SIGNIFICANT CHANGE UP (ref 0–41)
AMPHET UR-MCNC: NEGATIVE — SIGNIFICANT CHANGE UP
ANION GAP SERPL CALC-SCNC: 15 MMOL/L — HIGH (ref 7–14)
APPEARANCE UR: CLEAR — SIGNIFICANT CHANGE UP
AST SERPL-CCNC: 40 U/L — SIGNIFICANT CHANGE UP (ref 0–41)
BACTERIA # UR AUTO: ABNORMAL
BARBITURATES UR SCN-MCNC: NEGATIVE — SIGNIFICANT CHANGE UP
BENZODIAZ UR-MCNC: NEGATIVE — SIGNIFICANT CHANGE UP
BILIRUB SERPL-MCNC: 0.4 MG/DL — SIGNIFICANT CHANGE UP (ref 0.2–1.2)
BILIRUB UR-MCNC: NEGATIVE — SIGNIFICANT CHANGE UP
BUN SERPL-MCNC: 24 MG/DL — HIGH (ref 10–20)
CALCIUM SERPL-MCNC: 10.3 MG/DL — HIGH (ref 8.5–10.1)
CHLORIDE SERPL-SCNC: 102 MMOL/L — SIGNIFICANT CHANGE UP (ref 98–110)
CHOLEST SERPL-MCNC: 190 MG/DL — SIGNIFICANT CHANGE UP (ref 100–200)
CO2 SERPL-SCNC: 24 MMOL/L — SIGNIFICANT CHANGE UP (ref 17–32)
COCAINE METAB.OTHER UR-MCNC: POSITIVE
COLOR SPEC: YELLOW — SIGNIFICANT CHANGE UP
CREAT SERPL-MCNC: 1.1 MG/DL — SIGNIFICANT CHANGE UP (ref 0.7–1.5)
DIFF PNL FLD: NEGATIVE — SIGNIFICANT CHANGE UP
DRUG SCREEN 1, URINE RESULT: SIGNIFICANT CHANGE UP
ESTIMATED AVERAGE GLUCOSE: 105 MG/DL — SIGNIFICANT CHANGE UP (ref 68–114)
GGT SERPL-CCNC: 87 U/L — HIGH (ref 1–40)
GLUCOSE SERPL-MCNC: 95 MG/DL — SIGNIFICANT CHANGE UP (ref 70–99)
GLUCOSE UR QL: NEGATIVE MG/DL — SIGNIFICANT CHANGE UP
HBA1C BLD-MCNC: 5.3 % — SIGNIFICANT CHANGE UP (ref 4–5.6)
HDLC SERPL-MCNC: 62 MG/DL — SIGNIFICANT CHANGE UP
HYALINE CASTS # UR AUTO: ABNORMAL /LPF
KETONES UR-MCNC: NEGATIVE — SIGNIFICANT CHANGE UP
LDH SERPL L TO P-CCNC: 195 U/L — SIGNIFICANT CHANGE UP (ref 50–242)
LEUKOCYTE ESTERASE UR-ACNC: NEGATIVE — SIGNIFICANT CHANGE UP
LIPID PNL WITH DIRECT LDL SERPL: 109 MG/DL — SIGNIFICANT CHANGE UP (ref 4–129)
METHADONE UR-MCNC: POSITIVE
NITRITE UR-MCNC: NEGATIVE — SIGNIFICANT CHANGE UP
OPIATES UR-MCNC: POSITIVE
OXYCODONE UR-MCNC: POSITIVE
PCP UR-MCNC: NEGATIVE — SIGNIFICANT CHANGE UP
PH UR: 5.5 — SIGNIFICANT CHANGE UP (ref 5–8)
POTASSIUM SERPL-MCNC: 4.6 MMOL/L — SIGNIFICANT CHANGE UP (ref 3.5–5)
POTASSIUM SERPL-SCNC: 4.6 MMOL/L — SIGNIFICANT CHANGE UP (ref 3.5–5)
PROPOXYPHENE QUALITATIVE URINE RESULT: NEGATIVE — SIGNIFICANT CHANGE UP
PROT SERPL-MCNC: 8.4 G/DL — HIGH (ref 6–8)
PROT UR-MCNC: 30 MG/DL
SODIUM SERPL-SCNC: 141 MMOL/L — SIGNIFICANT CHANGE UP (ref 135–146)
SP GR SPEC: 1.02 — SIGNIFICANT CHANGE UP (ref 1.01–1.03)
THC UR QL: NEGATIVE — SIGNIFICANT CHANGE UP
TOTAL CHOLESTEROL/HDL RATIO MEASUREMENT: 3.1 RATIO — LOW (ref 4–5.5)
TRIGL SERPL-MCNC: 119 MG/DL — SIGNIFICANT CHANGE UP (ref 10–149)
UROBILINOGEN FLD QL: 0.2 MG/DL — SIGNIFICANT CHANGE UP (ref 0.2–0.2)
WBC UR QL: SIGNIFICANT CHANGE UP /HPF

## 2020-01-15 ENCOUNTER — OUTPATIENT (OUTPATIENT)
Dept: OUTPATIENT SERVICES | Facility: HOSPITAL | Age: 65
LOS: 1 days | Discharge: HOME | End: 2020-01-15

## 2020-01-15 LAB
4/8 RATIO: 0.3 RATIO — LOW (ref 1.2–3.4)
6-ACETYLMORPHINE, UR RESULT: NEGATIVE NG/ML — SIGNIFICANT CHANGE UP
6MAM UR CFM-MCNC: NEGATIVE NG/ML — SIGNIFICANT CHANGE UP
ABS CD8: 887 /UL — HIGH (ref 206–494)
AFP-TM SERPL-MCNC: 2.6 NG/ML — SIGNIFICANT CHANGE UP
C TRACH RRNA SPEC QL NAA+PROBE: SIGNIFICANT CHANGE UP
CD16+CD56+ CELLS NFR BLD: 13 % — SIGNIFICANT CHANGE UP (ref 4–20)
CD16+CD56+ CELLS NFR SPEC: 175 /UL — SIGNIFICANT CHANGE UP (ref 89–385)
CD19 BLASTS SPEC-ACNC: 1 % — LOW (ref 10–28)
CD19 BLASTS SPEC-ACNC: 16 /UL — LOW (ref 72–502)
CD3 BLASTS SPEC-ACNC: 1118 /UL — SIGNIFICANT CHANGE UP (ref 800–2012)
CD3 BLASTS SPEC-ACNC: 85 % — HIGH (ref 59–81)
CD4 %: 19 % — LOW (ref 42–58)
CD8 %: 67 % — HIGH (ref 14–30)
CODEINE UR CFM-MCNC: NEGATIVE NG/ML — SIGNIFICANT CHANGE UP
CODEINE, UR RESULT: NEGATIVE NG/ML — SIGNIFICANT CHANGE UP
HAV IGG SER QL IA: REACTIVE
HAV IGM SER-ACNC: SIGNIFICANT CHANGE UP
HBV CORE AB SER-ACNC: REACTIVE
HBV SURFACE AB SER-ACNC: REACTIVE
HBV SURFACE AG SER-ACNC: SIGNIFICANT CHANGE UP
HCV AB S/CO SERPL IA: 14.67 S/CO — HIGH (ref 0–0.99)
HCV AB SERPL-IMP: REACTIVE
HYDROCODONE UR QL CFM: NEGATIVE NG/ML — SIGNIFICANT CHANGE UP
HYDROCODONE, UR RESULT: NEGATIVE NG/ML — SIGNIFICANT CHANGE UP
HYDROMORPHONE UR QL CFM: NEGATIVE NG/ML — SIGNIFICANT CHANGE UP
HYDROMORPHONE, UR RESULT: NEGATIVE NG/ML — SIGNIFICANT CHANGE UP
N GONORRHOEA RRNA SPEC QL NAA+PROBE: SIGNIFICANT CHANGE UP
OXYMORPHONE (OPIATES), UR RESULT: NEGATIVE NG/ML — SIGNIFICANT CHANGE UP
OXYMORPHONE UR CFM-MCNC: NEGATIVE NG/ML — SIGNIFICANT CHANGE UP
OXYMORPHONE UR QL CFM: NEGATIVE NG/ML — SIGNIFICANT CHANGE UP
OXYMORPHONE, UR RESULT: NEGATIVE NG/ML — SIGNIFICANT CHANGE UP
PSA SERPL-MCNC: 0.84 NG/ML — SIGNIFICANT CHANGE UP (ref 0–4)
SPECIMEN SOURCE: SIGNIFICANT CHANGE UP
T PALLIDUM AB TITR SER: NEGATIVE — SIGNIFICANT CHANGE UP
T-CELL CD4 SUBSET PNL BLD: 244 /UL — LOW (ref 495–1312)
TSH SERPL-MCNC: 1.39 UIU/ML — SIGNIFICANT CHANGE UP (ref 0.27–4.2)

## 2020-01-16 LAB
MORPHINE UR QL CFM: 809 NG/ML — SIGNIFICANT CHANGE UP
MORPHINE, UR RESULT: 809 NG/ML — SIGNIFICANT CHANGE UP
NOROXYCODONE (OPIATES), UR RESULT: SIGNIFICANT CHANGE UP NG/ML
NOROXYCODONE UR CFM-MCNC: SIGNIFICANT CHANGE UP NG/ML
NOROXYCODONE UR QL CFM: SIGNIFICANT CHANGE UP NG/ML
NOROXYCODONE, UR RESULT: SIGNIFICANT CHANGE UP NG/ML
OPIATES IN-HOUSE INTERPRETATION: POSITIVE
OPIATES UR QL CFM: POSITIVE
OXYCODONE (OPIATES), UR RESULT: 7162 NG/ML — SIGNIFICANT CHANGE UP
OXYCODONE IN-HOUSE INTERPRETATION: POSITIVE
OXYCODONE UR QL CFM: 7162 NG/ML — SIGNIFICANT CHANGE UP
OXYCODONE UR QL CFM: POSITIVE
OXYCODONE UR-MCNC: 7162 NG/ML — SIGNIFICANT CHANGE UP
OXYCODONE, UR RESULT: 7162 NG/ML — SIGNIFICANT CHANGE UP

## 2020-01-17 LAB
BENZOYLEGONINE, UR RESULT: 9895 NG/ML — SIGNIFICANT CHANGE UP
BZE UR QL SCN: 9895 NG/ML — SIGNIFICANT CHANGE UP
COCAINE IN-HOUSE INTERPRETATION: POSITIVE
COCAINE UR QL SCN: POSITIVE
EDDP UR QL CFM: 8250 NG/ML — SIGNIFICANT CHANGE UP
EDDP, UR RESULT: 8250 NG/ML — SIGNIFICANT CHANGE UP
GAMMA INTERFERON BACKGROUND BLD IA-ACNC: 0.13 IU/ML — SIGNIFICANT CHANGE UP
HCV RNA FLD QL NAA+PROBE: SIGNIFICANT CHANGE UP
HCV RNA SPEC QL PROBE+SIG AMP: SIGNIFICANT CHANGE UP
HIV-1 VIRAL LOAD RESULT: SIGNIFICANT CHANGE UP
HIV1 RNA # SERPL NAA+PROBE: SIGNIFICANT CHANGE UP
HIV1 RNA SERPL NAA+PROBE-ACNC: SIGNIFICANT CHANGE UP
HIV1 RNA SERPL NAA+PROBE-LOG#: SIGNIFICANT CHANGE UP LG COP/ML
M TB IFN-G BLD-IMP: NEGATIVE — SIGNIFICANT CHANGE UP
M TB IFN-G CD4+ BCKGRND COR BLD-ACNC: 0.04 IU/ML — SIGNIFICANT CHANGE UP
M TB IFN-G CD4+CD8+ BCKGRND COR BLD-ACNC: 0.01 IU/ML — SIGNIFICANT CHANGE UP
METHADONE IN-HOUSE INTERPRETATION: POSITIVE
METHADONE UR CFM-MCNC: POSITIVE
QUANT TB PLUS MITOGEN MINUS NIL: 9.07 IU/ML — SIGNIFICANT CHANGE UP

## 2020-01-21 ENCOUNTER — OUTPATIENT (OUTPATIENT)
Dept: OUTPATIENT SERVICES | Facility: HOSPITAL | Age: 65
LOS: 1 days | Discharge: HOME | End: 2020-01-21

## 2020-01-27 DIAGNOSIS — B20 HUMAN IMMUNODEFICIENCY VIRUS [HIV] DISEASE: ICD-10-CM

## 2020-02-10 ENCOUNTER — OUTPATIENT (OUTPATIENT)
Dept: OUTPATIENT SERVICES | Facility: HOSPITAL | Age: 65
LOS: 1 days | Discharge: HOME | End: 2020-02-10

## 2020-02-11 ENCOUNTER — OUTPATIENT (OUTPATIENT)
Dept: OUTPATIENT SERVICES | Facility: HOSPITAL | Age: 65
LOS: 1 days | Discharge: HOME | End: 2020-02-11

## 2020-02-11 DIAGNOSIS — G99.0 AUTONOMIC NEUROPATHY IN DISEASES CLASSIFIED ELSEWHERE: ICD-10-CM

## 2020-02-11 DIAGNOSIS — E78.5 HYPERLIPIDEMIA, UNSPECIFIED: ICD-10-CM

## 2020-02-11 DIAGNOSIS — I10 ESSENTIAL (PRIMARY) HYPERTENSION: ICD-10-CM

## 2020-02-11 DIAGNOSIS — M87.851 OTHER OSTEONECROSIS, RIGHT FEMUR: ICD-10-CM

## 2020-02-11 DIAGNOSIS — E23.6 OTHER DISORDERS OF PITUITARY GLAND: ICD-10-CM

## 2020-02-11 DIAGNOSIS — B20 HUMAN IMMUNODEFICIENCY VIRUS [HIV] DISEASE: ICD-10-CM

## 2020-02-11 LAB
AMPHET UR-MCNC: NEGATIVE — SIGNIFICANT CHANGE UP
BARBITURATES UR SCN-MCNC: NEGATIVE — SIGNIFICANT CHANGE UP
BENZODIAZ UR-MCNC: NEGATIVE — SIGNIFICANT CHANGE UP
COCAINE METAB.OTHER UR-MCNC: NEGATIVE — SIGNIFICANT CHANGE UP
DRUG SCREEN 1, URINE RESULT: SIGNIFICANT CHANGE UP
METHADONE UR-MCNC: POSITIVE
OPIATES UR-MCNC: NEGATIVE — SIGNIFICANT CHANGE UP
OXYCODONE UR-MCNC: POSITIVE
PCP UR-MCNC: NEGATIVE — SIGNIFICANT CHANGE UP
PROPOXYPHENE QUALITATIVE URINE RESULT: NEGATIVE — SIGNIFICANT CHANGE UP
THC UR QL: NEGATIVE — SIGNIFICANT CHANGE UP

## 2020-02-18 LAB
OXYMORPHONE UR QL CFM: NEGATIVE NG/ML — SIGNIFICANT CHANGE UP
OXYMORPHONE, UR RESULT: NEGATIVE NG/ML — SIGNIFICANT CHANGE UP

## 2020-02-20 LAB
NOROXYCODONE UR QL CFM: 8482 NG/ML — SIGNIFICANT CHANGE UP
NOROXYCODONE, UR RESULT: 8482 NG/ML — SIGNIFICANT CHANGE UP
OXYCODONE IN-HOUSE INTERPRETATION: POSITIVE
OXYCODONE UR QL CFM: 2455 NG/ML — SIGNIFICANT CHANGE UP
OXYCODONE UR QL CFM: POSITIVE
OXYCODONE, UR RESULT: 2455 NG/ML — SIGNIFICANT CHANGE UP

## 2020-03-09 DIAGNOSIS — B20 HUMAN IMMUNODEFICIENCY VIRUS [HIV] DISEASE: ICD-10-CM

## 2020-03-10 ENCOUNTER — OUTPATIENT (OUTPATIENT)
Dept: OUTPATIENT SERVICES | Facility: HOSPITAL | Age: 65
LOS: 1 days | Discharge: HOME | End: 2020-03-10

## 2020-03-10 DIAGNOSIS — I10 ESSENTIAL (PRIMARY) HYPERTENSION: ICD-10-CM

## 2020-03-10 DIAGNOSIS — G99.0 AUTONOMIC NEUROPATHY IN DISEASES CLASSIFIED ELSEWHERE: ICD-10-CM

## 2020-03-10 DIAGNOSIS — M87.851 OTHER OSTEONECROSIS, RIGHT FEMUR: ICD-10-CM

## 2020-03-10 DIAGNOSIS — E78.5 HYPERLIPIDEMIA, UNSPECIFIED: ICD-10-CM

## 2020-03-10 DIAGNOSIS — E23.6 OTHER DISORDERS OF PITUITARY GLAND: ICD-10-CM

## 2020-03-10 DIAGNOSIS — B20 HUMAN IMMUNODEFICIENCY VIRUS [HIV] DISEASE: ICD-10-CM

## 2020-03-11 ENCOUNTER — OUTPATIENT (OUTPATIENT)
Dept: OUTPATIENT SERVICES | Facility: HOSPITAL | Age: 65
LOS: 1 days | Discharge: HOME | End: 2020-03-11

## 2020-03-12 LAB
NOROXYCODONE UR QL CFM: >5000 NG/ML — SIGNIFICANT CHANGE UP
NOROXYCODONE, UR RESULT: >5000 NG/ML — SIGNIFICANT CHANGE UP
OXYCODONE IN-HOUSE INTERPRETATION: POSITIVE
OXYCODONE UR QL CFM: 2505 NG/ML — SIGNIFICANT CHANGE UP
OXYCODONE UR QL CFM: POSITIVE
OXYCODONE, UR RESULT: 2505 NG/ML — SIGNIFICANT CHANGE UP
OXYMORPHONE UR QL CFM: NEGATIVE NG/ML — SIGNIFICANT CHANGE UP
OXYMORPHONE, UR RESULT: NEGATIVE NG/ML — SIGNIFICANT CHANGE UP

## 2020-03-16 DIAGNOSIS — B20 HUMAN IMMUNODEFICIENCY VIRUS [HIV] DISEASE: ICD-10-CM

## 2020-03-23 DIAGNOSIS — B20 HUMAN IMMUNODEFICIENCY VIRUS [HIV] DISEASE: ICD-10-CM

## 2020-03-27 ENCOUNTER — OUTPATIENT (OUTPATIENT)
Dept: OUTPATIENT SERVICES | Facility: HOSPITAL | Age: 65
LOS: 1 days | Discharge: HOME | End: 2020-03-27

## 2020-04-02 ENCOUNTER — OUTPATIENT (OUTPATIENT)
Dept: OUTPATIENT SERVICES | Facility: HOSPITAL | Age: 65
LOS: 1 days | Discharge: HOME | End: 2020-04-02

## 2020-04-07 DIAGNOSIS — G99.0 AUTONOMIC NEUROPATHY IN DISEASES CLASSIFIED ELSEWHERE: ICD-10-CM

## 2020-04-07 DIAGNOSIS — B20 HUMAN IMMUNODEFICIENCY VIRUS [HIV] DISEASE: ICD-10-CM

## 2020-04-07 DIAGNOSIS — I10 ESSENTIAL (PRIMARY) HYPERTENSION: ICD-10-CM

## 2020-04-17 DIAGNOSIS — B20 HUMAN IMMUNODEFICIENCY VIRUS [HIV] DISEASE: ICD-10-CM

## 2020-05-04 ENCOUNTER — OUTPATIENT (OUTPATIENT)
Dept: OUTPATIENT SERVICES | Facility: HOSPITAL | Age: 65
LOS: 1 days | Discharge: HOME | End: 2020-05-04

## 2020-05-06 ENCOUNTER — OUTPATIENT (OUTPATIENT)
Dept: OUTPATIENT SERVICES | Facility: HOSPITAL | Age: 65
LOS: 1 days | Discharge: HOME | End: 2020-05-06

## 2020-05-07 DIAGNOSIS — M87.851 OTHER OSTEONECROSIS, RIGHT FEMUR: ICD-10-CM

## 2020-05-07 DIAGNOSIS — I10 ESSENTIAL (PRIMARY) HYPERTENSION: ICD-10-CM

## 2020-05-07 DIAGNOSIS — G99.0 AUTONOMIC NEUROPATHY IN DISEASES CLASSIFIED ELSEWHERE: ICD-10-CM

## 2020-05-07 DIAGNOSIS — B20 HUMAN IMMUNODEFICIENCY VIRUS [HIV] DISEASE: ICD-10-CM

## 2020-05-07 DIAGNOSIS — E23.6 OTHER DISORDERS OF PITUITARY GLAND: ICD-10-CM

## 2020-05-07 DIAGNOSIS — E78.5 HYPERLIPIDEMIA, UNSPECIFIED: ICD-10-CM

## 2020-06-01 ENCOUNTER — OUTPATIENT (OUTPATIENT)
Dept: OUTPATIENT SERVICES | Facility: HOSPITAL | Age: 65
LOS: 1 days | Discharge: HOME | End: 2020-06-01

## 2020-06-01 DIAGNOSIS — G99.0 AUTONOMIC NEUROPATHY IN DISEASES CLASSIFIED ELSEWHERE: ICD-10-CM

## 2020-06-01 DIAGNOSIS — M87.851 OTHER OSTEONECROSIS, RIGHT FEMUR: ICD-10-CM

## 2020-06-01 DIAGNOSIS — E23.6 OTHER DISORDERS OF PITUITARY GLAND: ICD-10-CM

## 2020-06-01 DIAGNOSIS — B20 HUMAN IMMUNODEFICIENCY VIRUS [HIV] DISEASE: ICD-10-CM

## 2020-06-01 DIAGNOSIS — K21.9 GASTRO-ESOPHAGEAL REFLUX DISEASE WITHOUT ESOPHAGITIS: ICD-10-CM

## 2020-06-01 DIAGNOSIS — E78.5 HYPERLIPIDEMIA, UNSPECIFIED: ICD-10-CM

## 2020-06-01 DIAGNOSIS — I10 ESSENTIAL (PRIMARY) HYPERTENSION: ICD-10-CM

## 2020-06-04 DIAGNOSIS — B20 HUMAN IMMUNODEFICIENCY VIRUS [HIV] DISEASE: ICD-10-CM

## 2020-06-23 LAB
4/8 RATIO: 0.21 RATIO — LOW (ref 1.2–3.4)
A1C WITH ESTIMATED AVERAGE GLUCOSE RESULT: 5.3 % — SIGNIFICANT CHANGE UP (ref 4–5.6)
ABS CD8: 1048 /UL — HIGH (ref 206–494)
ALBUMIN SERPL ELPH-MCNC: 4.9 G/DL — SIGNIFICANT CHANGE UP (ref 3.5–5.2)
ALP SERPL-CCNC: 88 U/L — SIGNIFICANT CHANGE UP (ref 30–115)
ALT FLD-CCNC: 42 U/L — HIGH (ref 0–41)
AMPHET UR-MCNC: NEGATIVE — SIGNIFICANT CHANGE UP
ANION GAP SERPL CALC-SCNC: 11 MMOL/L — SIGNIFICANT CHANGE UP (ref 7–14)
AST SERPL-CCNC: 53 U/L — HIGH (ref 0–41)
BARBITURATES UR SCN-MCNC: NEGATIVE — SIGNIFICANT CHANGE UP
BENZODIAZ UR-MCNC: NEGATIVE — SIGNIFICANT CHANGE UP
BILIRUB SERPL-MCNC: 0.3 MG/DL — SIGNIFICANT CHANGE UP (ref 0.2–1.2)
BUN SERPL-MCNC: 24 MG/DL — HIGH (ref 10–20)
CALCIUM SERPL-MCNC: 9.8 MG/DL — SIGNIFICANT CHANGE UP (ref 8.5–10.1)
CD16+CD56+ CELLS NFR BLD: 18 % — SIGNIFICANT CHANGE UP (ref 4–20)
CD16+CD56+ CELLS NFR SPEC: 285 /UL — SIGNIFICANT CHANGE UP (ref 89–385)
CD19 BLASTS SPEC-ACNC: 2 % — LOW (ref 10–28)
CD19 BLASTS SPEC-ACNC: 30 /UL — LOW (ref 72–502)
CD3 BLASTS SPEC-ACNC: 1286 /UL — SIGNIFICANT CHANGE UP (ref 800–2012)
CD3 BLASTS SPEC-ACNC: 80 % — SIGNIFICANT CHANGE UP (ref 59–81)
CD4 %: 14 % — LOW (ref 42–58)
CD8 %: 65 % — HIGH (ref 14–30)
CHLORIDE SERPL-SCNC: 100 MMOL/L — SIGNIFICANT CHANGE UP (ref 98–110)
CHOLEST SERPL-MCNC: 157 MG/DL — SIGNIFICANT CHANGE UP (ref 100–200)
CO2 SERPL-SCNC: 26 MMOL/L — SIGNIFICANT CHANGE UP (ref 17–32)
COCAINE METAB.OTHER UR-MCNC: NEGATIVE — SIGNIFICANT CHANGE UP
CREAT SERPL-MCNC: 1.2 MG/DL — SIGNIFICANT CHANGE UP (ref 0.7–1.5)
DRUG SCREEN 1, URINE RESULT: SIGNIFICANT CHANGE UP
ESTIMATED AVERAGE GLUCOSE: 105 MG/DL — SIGNIFICANT CHANGE UP (ref 68–114)
GGT SERPL-CCNC: 105 U/L — HIGH (ref 1–40)
GLUCOSE SERPL-MCNC: 82 MG/DL — SIGNIFICANT CHANGE UP (ref 70–99)
HCT VFR BLD CALC: 46.4 % — SIGNIFICANT CHANGE UP (ref 42–52)
HDLC SERPL-MCNC: 54 MG/DL — SIGNIFICANT CHANGE UP
HGB BLD-MCNC: 14.9 G/DL — SIGNIFICANT CHANGE UP (ref 14–18)
LDH SERPL L TO P-CCNC: 245 U/L — HIGH (ref 50–242)
LIPID PNL WITH DIRECT LDL SERPL: 79 MG/DL — SIGNIFICANT CHANGE UP (ref 4–129)
MCHC RBC-ENTMCNC: 32 PG — HIGH (ref 27–31)
MCHC RBC-ENTMCNC: 32.1 G/DL — SIGNIFICANT CHANGE UP (ref 32–37)
MCV RBC AUTO: 99.8 FL — HIGH (ref 80–94)
METHADONE UR-MCNC: POSITIVE
NRBC # BLD: 0 /100 WBCS — SIGNIFICANT CHANGE UP (ref 0–0)
OPIATES UR-MCNC: NEGATIVE — SIGNIFICANT CHANGE UP
OXYCODONE UR-MCNC: POSITIVE
PCP UR-MCNC: NEGATIVE — SIGNIFICANT CHANGE UP
PLATELET # BLD AUTO: 211 K/UL — SIGNIFICANT CHANGE UP (ref 130–400)
POTASSIUM SERPL-MCNC: 4.5 MMOL/L — SIGNIFICANT CHANGE UP (ref 3.5–5)
POTASSIUM SERPL-SCNC: 4.5 MMOL/L — SIGNIFICANT CHANGE UP (ref 3.5–5)
PROPOXYPHENE QUALITATIVE URINE RESULT: NEGATIVE — SIGNIFICANT CHANGE UP
PROT SERPL-MCNC: 7.8 G/DL — SIGNIFICANT CHANGE UP (ref 6–8)
RBC # BLD: 4.65 M/UL — LOW (ref 4.7–6.1)
RBC # FLD: 13.3 % — SIGNIFICANT CHANGE UP (ref 11.5–14.5)
SODIUM SERPL-SCNC: 137 MMOL/L — SIGNIFICANT CHANGE UP (ref 135–146)
T-CELL CD4 SUBSET PNL BLD: 221 /UL — LOW (ref 495–1312)
THC UR QL: NEGATIVE — SIGNIFICANT CHANGE UP
TOTAL CHOLESTEROL/HDL RATIO MEASUREMENT: 2.9 RATIO — LOW (ref 4–5.5)
TRIGL SERPL-MCNC: 88 MG/DL — SIGNIFICANT CHANGE UP (ref 10–149)
WBC # BLD: 3.11 K/UL — LOW (ref 4.8–10.8)
WBC # FLD AUTO: 3.11 K/UL — LOW (ref 4.8–10.8)

## 2020-06-24 LAB — AFP-TM SERPL-MCNC: 2.7 NG/ML — SIGNIFICANT CHANGE UP

## 2020-06-26 ENCOUNTER — OUTPATIENT (OUTPATIENT)
Dept: OUTPATIENT SERVICES | Facility: HOSPITAL | Age: 65
LOS: 1 days | Discharge: HOME | End: 2020-06-26

## 2020-06-26 LAB
HIV-1 VIRAL LOAD RESULT: SIGNIFICANT CHANGE UP
HIV1 RNA # SERPL NAA+PROBE: SIGNIFICANT CHANGE UP
HIV1 RNA SERPL NAA+PROBE-ACNC: SIGNIFICANT CHANGE UP
HIV1 RNA SERPL NAA+PROBE-LOG#: SIGNIFICANT CHANGE UP LG COP/ML
NOROXYCODONE UR QL CFM: 2300 NG/ML — SIGNIFICANT CHANGE UP
NOROXYCODONE, UR RESULT: 2300 NG/ML — SIGNIFICANT CHANGE UP
OXYCODONE IN-HOUSE INTERPRETATION: POSITIVE
OXYCODONE UR QL CFM: 447 NG/ML — SIGNIFICANT CHANGE UP
OXYCODONE UR QL CFM: POSITIVE
OXYCODONE, UR RESULT: 447 NG/ML — SIGNIFICANT CHANGE UP
OXYMORPHONE UR QL CFM: NEGATIVE NG/ML — SIGNIFICANT CHANGE UP
OXYMORPHONE, UR RESULT: NEGATIVE NG/ML — SIGNIFICANT CHANGE UP

## 2020-06-30 ENCOUNTER — OUTPATIENT (OUTPATIENT)
Dept: OUTPATIENT SERVICES | Facility: HOSPITAL | Age: 65
LOS: 1 days | Discharge: HOME | End: 2020-06-30

## 2020-06-30 DIAGNOSIS — B20 HUMAN IMMUNODEFICIENCY VIRUS [HIV] DISEASE: ICD-10-CM

## 2020-06-30 DIAGNOSIS — I10 ESSENTIAL (PRIMARY) HYPERTENSION: ICD-10-CM

## 2020-06-30 DIAGNOSIS — G99.0 AUTONOMIC NEUROPATHY IN DISEASES CLASSIFIED ELSEWHERE: ICD-10-CM

## 2020-06-30 DIAGNOSIS — M87.851 OTHER OSTEONECROSIS, RIGHT FEMUR: ICD-10-CM

## 2020-06-30 DIAGNOSIS — E23.6 OTHER DISORDERS OF PITUITARY GLAND: ICD-10-CM

## 2020-06-30 DIAGNOSIS — E78.5 HYPERLIPIDEMIA, UNSPECIFIED: ICD-10-CM

## 2020-06-30 LAB
AMPHET UR-MCNC: SIGNIFICANT CHANGE UP
BARBITURATES UR SCN-MCNC: SIGNIFICANT CHANGE UP
BENZODIAZ UR-MCNC: SIGNIFICANT CHANGE UP
COCAINE METAB.OTHER UR-MCNC: SIGNIFICANT CHANGE UP
DRUG SCREEN 1, URINE RESULT: SIGNIFICANT CHANGE UP
METHADONE UR-MCNC: SIGNIFICANT CHANGE UP
OPIATES UR-MCNC: SIGNIFICANT CHANGE UP
OXYCODONE UR-MCNC: SIGNIFICANT CHANGE UP
PCP UR-MCNC: SIGNIFICANT CHANGE UP
PROPOXYPHENE QUALITATIVE URINE RESULT: SIGNIFICANT CHANGE UP
THC UR QL: SIGNIFICANT CHANGE UP

## 2020-07-01 LAB
EDDP UR QL CFM: >5000 NG/ML — SIGNIFICANT CHANGE UP
EDDP, UR RESULT: >5000 NG/ML — SIGNIFICANT CHANGE UP
METHADONE IN-HOUSE INTERPRETATION: POSITIVE
METHADONE UR CFM-MCNC: POSITIVE

## 2020-07-06 ENCOUNTER — OUTPATIENT (OUTPATIENT)
Dept: OUTPATIENT SERVICES | Facility: HOSPITAL | Age: 65
LOS: 1 days | Discharge: HOME | End: 2020-07-06

## 2020-07-16 DIAGNOSIS — B20 HUMAN IMMUNODEFICIENCY VIRUS [HIV] DISEASE: ICD-10-CM

## 2020-07-24 ENCOUNTER — OUTPATIENT (OUTPATIENT)
Dept: OUTPATIENT SERVICES | Facility: HOSPITAL | Age: 65
LOS: 1 days | Discharge: HOME | End: 2020-07-24

## 2020-07-24 DIAGNOSIS — B20 HUMAN IMMUNODEFICIENCY VIRUS [HIV] DISEASE: ICD-10-CM

## 2020-07-28 ENCOUNTER — OUTPATIENT (OUTPATIENT)
Dept: OUTPATIENT SERVICES | Facility: HOSPITAL | Age: 65
LOS: 1 days | Discharge: HOME | End: 2020-07-28

## 2020-07-28 DIAGNOSIS — M87.851 OTHER OSTEONECROSIS, RIGHT FEMUR: ICD-10-CM

## 2020-07-28 DIAGNOSIS — B20 HUMAN IMMUNODEFICIENCY VIRUS [HIV] DISEASE: ICD-10-CM

## 2020-07-28 DIAGNOSIS — G99.0 AUTONOMIC NEUROPATHY IN DISEASES CLASSIFIED ELSEWHERE: ICD-10-CM

## 2020-07-28 DIAGNOSIS — K21.9 GASTRO-ESOPHAGEAL REFLUX DISEASE WITHOUT ESOPHAGITIS: ICD-10-CM

## 2020-07-28 DIAGNOSIS — E78.5 HYPERLIPIDEMIA, UNSPECIFIED: ICD-10-CM

## 2020-07-28 DIAGNOSIS — I10 ESSENTIAL (PRIMARY) HYPERTENSION: ICD-10-CM

## 2020-07-28 LAB
AMPHET UR-MCNC: NEGATIVE — SIGNIFICANT CHANGE UP
APPEARANCE UR: CLEAR — SIGNIFICANT CHANGE UP
BACTERIA # UR AUTO: ABNORMAL
BARBITURATES UR SCN-MCNC: NEGATIVE — SIGNIFICANT CHANGE UP
BENZODIAZ UR-MCNC: NEGATIVE — SIGNIFICANT CHANGE UP
BILIRUB UR-MCNC: NEGATIVE — SIGNIFICANT CHANGE UP
COCAINE METAB.OTHER UR-MCNC: NEGATIVE — SIGNIFICANT CHANGE UP
COLOR SPEC: YELLOW — SIGNIFICANT CHANGE UP
COMMENT - URINE: SIGNIFICANT CHANGE UP
DIFF PNL FLD: NEGATIVE — SIGNIFICANT CHANGE UP
DRUG SCREEN 1, URINE RESULT: SIGNIFICANT CHANGE UP
EPI CELLS # UR: ABNORMAL /HPF
GLUCOSE UR QL: NEGATIVE MG/DL — SIGNIFICANT CHANGE UP
GRAN CASTS # UR COMP ASSIST: ABNORMAL /LPF
HYALINE CASTS # UR AUTO: ABNORMAL /LPF
KETONES UR-MCNC: ABNORMAL
LEUKOCYTE ESTERASE UR-ACNC: NEGATIVE — SIGNIFICANT CHANGE UP
METHADONE UR-MCNC: POSITIVE
NITRITE UR-MCNC: NEGATIVE — SIGNIFICANT CHANGE UP
OPIATES UR-MCNC: NEGATIVE — SIGNIFICANT CHANGE UP
OXYCODONE UR-MCNC: POSITIVE
PCP UR-MCNC: NEGATIVE — SIGNIFICANT CHANGE UP
PH UR: 6 — SIGNIFICANT CHANGE UP (ref 5–8)
PROPOXYPHENE QUALITATIVE URINE RESULT: NEGATIVE — SIGNIFICANT CHANGE UP
PROT UR-MCNC: 30 MG/DL
RBC CASTS # UR COMP ASSIST: SIGNIFICANT CHANGE UP /HPF
SP GR SPEC: 1.02 — SIGNIFICANT CHANGE UP (ref 1.01–1.03)
THC UR QL: NEGATIVE — SIGNIFICANT CHANGE UP
UROBILINOGEN FLD QL: 1 MG/DL (ref 0.2–0.2)
WBC UR QL: SIGNIFICANT CHANGE UP /HPF

## 2020-07-31 LAB
NOROXYCODONE UR QL CFM: 7704 NG/ML — SIGNIFICANT CHANGE UP
NOROXYCODONE, UR RESULT: 7704 NG/ML — SIGNIFICANT CHANGE UP
OXYCODONE IN-HOUSE INTERPRETATION: POSITIVE
OXYCODONE UR QL CFM: 1852 NG/ML — SIGNIFICANT CHANGE UP
OXYCODONE UR QL CFM: POSITIVE
OXYCODONE, UR RESULT: 1852 NG/ML — SIGNIFICANT CHANGE UP
OXYMORPHONE UR QL CFM: NEGATIVE NG/ML — SIGNIFICANT CHANGE UP
OXYMORPHONE, UR RESULT: NEGATIVE NG/ML — SIGNIFICANT CHANGE UP

## 2020-08-04 DIAGNOSIS — B20 HUMAN IMMUNODEFICIENCY VIRUS [HIV] DISEASE: ICD-10-CM

## 2020-08-07 DIAGNOSIS — B20 HUMAN IMMUNODEFICIENCY VIRUS [HIV] DISEASE: ICD-10-CM

## 2020-08-21 ENCOUNTER — OUTPATIENT (OUTPATIENT)
Dept: OUTPATIENT SERVICES | Facility: HOSPITAL | Age: 65
LOS: 1 days | Discharge: HOME | End: 2020-08-21

## 2020-08-24 ENCOUNTER — OUTPATIENT (OUTPATIENT)
Dept: OUTPATIENT SERVICES | Facility: HOSPITAL | Age: 65
LOS: 1 days | Discharge: HOME | End: 2020-08-24

## 2020-08-25 ENCOUNTER — OUTPATIENT (OUTPATIENT)
Dept: OUTPATIENT SERVICES | Facility: HOSPITAL | Age: 65
LOS: 1 days | Discharge: HOME | End: 2020-08-25

## 2020-08-25 DIAGNOSIS — I10 ESSENTIAL (PRIMARY) HYPERTENSION: ICD-10-CM

## 2020-08-25 DIAGNOSIS — K21.9 GASTRO-ESOPHAGEAL REFLUX DISEASE WITHOUT ESOPHAGITIS: ICD-10-CM

## 2020-08-25 DIAGNOSIS — E23.6 OTHER DISORDERS OF PITUITARY GLAND: ICD-10-CM

## 2020-08-25 DIAGNOSIS — G99.0 AUTONOMIC NEUROPATHY IN DISEASES CLASSIFIED ELSEWHERE: ICD-10-CM

## 2020-08-25 DIAGNOSIS — E78.5 HYPERLIPIDEMIA, UNSPECIFIED: ICD-10-CM

## 2020-08-25 DIAGNOSIS — M87.851 OTHER OSTEONECROSIS, RIGHT FEMUR: ICD-10-CM

## 2020-08-25 DIAGNOSIS — B20 HUMAN IMMUNODEFICIENCY VIRUS [HIV] DISEASE: ICD-10-CM

## 2020-08-28 LAB
EDDP UR QL CFM: SIGNIFICANT CHANGE UP NG/ML
EDDP, UR RESULT: SIGNIFICANT CHANGE UP NG/ML
METHADONE IN-HOUSE INTERPRETATION: POSITIVE
METHADONE UR CFM-MCNC: POSITIVE
NOROXYCODONE UR QL CFM: >5000 NG/ML — SIGNIFICANT CHANGE UP
NOROXYCODONE, UR RESULT: >5000 NG/ML — SIGNIFICANT CHANGE UP
OXYCODONE IN-HOUSE INTERPRETATION: POSITIVE
OXYCODONE UR QL CFM: 577 NG/ML — SIGNIFICANT CHANGE UP
OXYCODONE UR QL CFM: POSITIVE
OXYCODONE, UR RESULT: 577 NG/ML — SIGNIFICANT CHANGE UP
OXYMORPHONE UR QL CFM: NEGATIVE NG/ML — SIGNIFICANT CHANGE UP
OXYMORPHONE, UR RESULT: NEGATIVE NG/ML — SIGNIFICANT CHANGE UP

## 2020-08-31 ENCOUNTER — OUTPATIENT (OUTPATIENT)
Dept: OUTPATIENT SERVICES | Facility: HOSPITAL | Age: 65
LOS: 1 days | Discharge: HOME | End: 2020-08-31

## 2020-09-02 DIAGNOSIS — B20 HUMAN IMMUNODEFICIENCY VIRUS [HIV] DISEASE: ICD-10-CM

## 2020-09-03 ENCOUNTER — OUTPATIENT (OUTPATIENT)
Dept: OUTPATIENT SERVICES | Facility: HOSPITAL | Age: 65
LOS: 1 days | Discharge: HOME | End: 2020-09-03

## 2020-09-15 ENCOUNTER — OUTPATIENT (OUTPATIENT)
Dept: OUTPATIENT SERVICES | Facility: HOSPITAL | Age: 65
LOS: 1 days | Discharge: HOME | End: 2020-09-15

## 2020-09-22 ENCOUNTER — OUTPATIENT (OUTPATIENT)
Dept: OUTPATIENT SERVICES | Facility: HOSPITAL | Age: 65
LOS: 1 days | Discharge: HOME | End: 2020-09-22

## 2020-09-22 DIAGNOSIS — M87.851 OTHER OSTEONECROSIS, RIGHT FEMUR: ICD-10-CM

## 2020-09-22 DIAGNOSIS — I10 ESSENTIAL (PRIMARY) HYPERTENSION: ICD-10-CM

## 2020-09-22 DIAGNOSIS — B20 HUMAN IMMUNODEFICIENCY VIRUS [HIV] DISEASE: ICD-10-CM

## 2020-09-22 DIAGNOSIS — E23.6 OTHER DISORDERS OF PITUITARY GLAND: ICD-10-CM

## 2020-09-22 DIAGNOSIS — E78.5 HYPERLIPIDEMIA, UNSPECIFIED: ICD-10-CM

## 2020-09-22 DIAGNOSIS — G99.0 AUTONOMIC NEUROPATHY IN DISEASES CLASSIFIED ELSEWHERE: ICD-10-CM

## 2020-09-25 DIAGNOSIS — B20 HUMAN IMMUNODEFICIENCY VIRUS [HIV] DISEASE: ICD-10-CM

## 2020-10-09 LAB
NOROXYCODONE UR QL CFM: 3879 NG/ML — SIGNIFICANT CHANGE UP
NOROXYCODONE, UR RESULT: 3879 NG/ML — SIGNIFICANT CHANGE UP
OXYCODONE IN-HOUSE INTERPRETATION: POSITIVE
OXYCODONE UR QL CFM: 1667 NG/ML — SIGNIFICANT CHANGE UP
OXYCODONE UR QL CFM: POSITIVE
OXYCODONE, UR RESULT: 1667 NG/ML — SIGNIFICANT CHANGE UP
OXYMORPHONE UR QL CFM: NEGATIVE NG/ML — SIGNIFICANT CHANGE UP
OXYMORPHONE, UR RESULT: NEGATIVE NG/ML — SIGNIFICANT CHANGE UP

## 2020-10-14 ENCOUNTER — OUTPATIENT (OUTPATIENT)
Dept: OUTPATIENT SERVICES | Facility: HOSPITAL | Age: 65
LOS: 1 days | Discharge: HOME | End: 2020-10-14

## 2020-10-15 ENCOUNTER — OUTPATIENT (OUTPATIENT)
Dept: OUTPATIENT SERVICES | Facility: HOSPITAL | Age: 65
LOS: 1 days | Discharge: HOME | End: 2020-10-15

## 2020-10-16 ENCOUNTER — OUTPATIENT (OUTPATIENT)
Dept: OUTPATIENT SERVICES | Facility: HOSPITAL | Age: 65
LOS: 1 days | Discharge: HOME | End: 2020-10-16

## 2020-10-20 ENCOUNTER — OUTPATIENT (OUTPATIENT)
Dept: OUTPATIENT SERVICES | Facility: HOSPITAL | Age: 65
LOS: 1 days | Discharge: HOME | End: 2020-10-20

## 2020-10-20 DIAGNOSIS — E78.5 HYPERLIPIDEMIA, UNSPECIFIED: ICD-10-CM

## 2020-10-20 DIAGNOSIS — B20 HUMAN IMMUNODEFICIENCY VIRUS [HIV] DISEASE: ICD-10-CM

## 2020-10-20 DIAGNOSIS — M87.851 OTHER OSTEONECROSIS, RIGHT FEMUR: ICD-10-CM

## 2020-10-20 DIAGNOSIS — E03.9 HYPOTHYROIDISM, UNSPECIFIED: ICD-10-CM

## 2020-10-20 DIAGNOSIS — I10 ESSENTIAL (PRIMARY) HYPERTENSION: ICD-10-CM

## 2020-10-20 DIAGNOSIS — G99.0 AUTONOMIC NEUROPATHY IN DISEASES CLASSIFIED ELSEWHERE: ICD-10-CM

## 2020-10-23 LAB
NOROXYCODONE UR QL CFM: SIGNIFICANT CHANGE UP NG/ML
NOROXYCODONE, UR RESULT: SIGNIFICANT CHANGE UP NG/ML
OXYCODONE IN-HOUSE INTERPRETATION: POSITIVE
OXYCODONE UR QL CFM: 3390 NG/ML — SIGNIFICANT CHANGE UP
OXYCODONE UR QL CFM: POSITIVE
OXYCODONE, UR RESULT: 3390 NG/ML — SIGNIFICANT CHANGE UP
OXYMORPHONE UR QL CFM: NEGATIVE NG/ML — SIGNIFICANT CHANGE UP
OXYMORPHONE, UR RESULT: NEGATIVE NG/ML — SIGNIFICANT CHANGE UP

## 2020-10-27 LAB
EDDP UR QL CFM: 8285 NG/ML — SIGNIFICANT CHANGE UP
EDDP, UR RESULT: 8285 NG/ML — SIGNIFICANT CHANGE UP
METHADONE IN-HOUSE INTERPRETATION: POSITIVE
METHADONE UR CFM-MCNC: POSITIVE

## 2020-11-09 ENCOUNTER — OUTPATIENT (OUTPATIENT)
Dept: OUTPATIENT SERVICES | Facility: HOSPITAL | Age: 65
LOS: 1 days | Discharge: HOME | End: 2020-11-09

## 2020-11-10 ENCOUNTER — OUTPATIENT (OUTPATIENT)
Dept: OUTPATIENT SERVICES | Facility: HOSPITAL | Age: 65
LOS: 1 days | Discharge: HOME | End: 2020-11-10

## 2020-11-11 ENCOUNTER — OUTPATIENT (OUTPATIENT)
Dept: OUTPATIENT SERVICES | Facility: HOSPITAL | Age: 65
LOS: 1 days | Discharge: HOME | End: 2020-11-11

## 2020-11-12 LAB
A1C WITH ESTIMATED AVERAGE GLUCOSE RESULT: 5.5 % — SIGNIFICANT CHANGE UP (ref 4–5.6)
ALBUMIN SERPL ELPH-MCNC: 4.7 G/DL — SIGNIFICANT CHANGE UP (ref 3.5–5.2)
ALP SERPL-CCNC: 80 U/L — SIGNIFICANT CHANGE UP (ref 30–115)
ALT FLD-CCNC: 39 U/L — SIGNIFICANT CHANGE UP (ref 0–41)
ANION GAP SERPL CALC-SCNC: 12 MMOL/L — SIGNIFICANT CHANGE UP (ref 7–14)
AST SERPL-CCNC: 53 U/L — HIGH (ref 0–41)
BILIRUB SERPL-MCNC: <0.2 MG/DL — SIGNIFICANT CHANGE UP (ref 0.2–1.2)
BUN SERPL-MCNC: 22 MG/DL — HIGH (ref 10–20)
CALCIUM SERPL-MCNC: 9.7 MG/DL — SIGNIFICANT CHANGE UP (ref 8.5–10.1)
CHLORIDE SERPL-SCNC: 101 MMOL/L — SIGNIFICANT CHANGE UP (ref 98–110)
CHOLEST SERPL-MCNC: 188 MG/DL — SIGNIFICANT CHANGE UP
CO2 SERPL-SCNC: 28 MMOL/L — SIGNIFICANT CHANGE UP (ref 17–32)
CREAT SERPL-MCNC: 1 MG/DL — SIGNIFICANT CHANGE UP (ref 0.7–1.5)
ESTIMATED AVERAGE GLUCOSE: 111 MG/DL — SIGNIFICANT CHANGE UP (ref 68–114)
GLUCOSE SERPL-MCNC: 115 MG/DL — HIGH (ref 70–99)
HCT VFR BLD CALC: 41.7 % — LOW (ref 42–52)
HDLC SERPL-MCNC: 49 MG/DL — SIGNIFICANT CHANGE UP
HGB BLD-MCNC: 14 G/DL — SIGNIFICANT CHANGE UP (ref 14–18)
LIPID PNL WITH DIRECT LDL SERPL: 91 MG/DL — SIGNIFICANT CHANGE UP
MCHC RBC-ENTMCNC: 33.6 G/DL — SIGNIFICANT CHANGE UP (ref 32–37)
MCHC RBC-ENTMCNC: 34 PG — HIGH (ref 27–31)
MCV RBC AUTO: 101.2 FL — HIGH (ref 80–94)
NON HDL CHOLESTEROL: 139 MG/DL — HIGH
NRBC # BLD: 0 /100 WBCS — SIGNIFICANT CHANGE UP (ref 0–0)
PLATELET # BLD AUTO: 224 K/UL — SIGNIFICANT CHANGE UP (ref 130–400)
POTASSIUM SERPL-MCNC: 4.5 MMOL/L — SIGNIFICANT CHANGE UP (ref 3.5–5)
POTASSIUM SERPL-SCNC: 4.5 MMOL/L — SIGNIFICANT CHANGE UP (ref 3.5–5)
PROT SERPL-MCNC: 7.2 G/DL — SIGNIFICANT CHANGE UP (ref 6–8)
RBC # BLD: 4.12 M/UL — LOW (ref 4.7–6.1)
RBC # FLD: 13.1 % — SIGNIFICANT CHANGE UP (ref 11.5–14.5)
SODIUM SERPL-SCNC: 141 MMOL/L — SIGNIFICANT CHANGE UP (ref 135–146)
TRIGL SERPL-MCNC: 425 MG/DL — HIGH
WBC # BLD: 2.87 K/UL — LOW (ref 4.8–10.8)
WBC # FLD AUTO: 2.87 K/UL — LOW (ref 4.8–10.8)

## 2020-11-13 LAB
4/8 RATIO: 0.25 RATIO — LOW (ref 0.86–4.14)
ABS CD8: 864 /UL — HIGH (ref 90–775)
AFP-TM SERPL-MCNC: 2.5 NG/ML — SIGNIFICANT CHANGE UP
CD16+CD56+ CELLS NFR BLD: 14 % — SIGNIFICANT CHANGE UP (ref 7–27)
CD16+CD56+ CELLS NFR SPEC: 192 /UL — SIGNIFICANT CHANGE UP (ref 80–426)
CD19 BLASTS SPEC-ACNC: 3 % — LOW (ref 4–18)
CD19 BLASTS SPEC-ACNC: 36 /UL — SIGNIFICANT CHANGE UP (ref 32–326)
CD3 BLASTS SPEC-ACNC: 1089 /UL — SIGNIFICANT CHANGE UP (ref 396–2024)
CD3 BLASTS SPEC-ACNC: 82 % — SIGNIFICANT CHANGE UP (ref 58–84)
CD4 %: 16 % — LOW (ref 30–56)
CD8 %: 65 % — HIGH (ref 11–43)
HIV-1 VIRAL LOAD RESULT: SIGNIFICANT CHANGE UP
HIV1 RNA # SERPL NAA+PROBE: SIGNIFICANT CHANGE UP
HIV1 RNA SERPL NAA+PROBE-ACNC: SIGNIFICANT CHANGE UP
HIV1 RNA SERPL NAA+PROBE-LOG#: SIGNIFICANT CHANGE UP LG COP/ML
T-CELL CD4 SUBSET PNL BLD: 214 /UL — LOW (ref 325–1251)

## 2020-11-17 ENCOUNTER — OUTPATIENT (OUTPATIENT)
Dept: OUTPATIENT SERVICES | Facility: HOSPITAL | Age: 65
LOS: 1 days | Discharge: HOME | End: 2020-11-17
Payer: COMMERCIAL

## 2020-11-17 DIAGNOSIS — I10 ESSENTIAL (PRIMARY) HYPERTENSION: ICD-10-CM

## 2020-11-17 DIAGNOSIS — B20 HUMAN IMMUNODEFICIENCY VIRUS [HIV] DISEASE: ICD-10-CM

## 2020-11-17 DIAGNOSIS — G99.0 AUTONOMIC NEUROPATHY IN DISEASES CLASSIFIED ELSEWHERE: ICD-10-CM

## 2020-11-17 DIAGNOSIS — E23.6 OTHER DISORDERS OF PITUITARY GLAND: ICD-10-CM

## 2020-11-17 DIAGNOSIS — E78.5 HYPERLIPIDEMIA, UNSPECIFIED: ICD-10-CM

## 2020-11-17 DIAGNOSIS — M87.851 OTHER OSTEONECROSIS, RIGHT FEMUR: ICD-10-CM

## 2020-11-17 PROCEDURE — ZZZZZ: CPT

## 2020-11-24 DIAGNOSIS — B20 HUMAN IMMUNODEFICIENCY VIRUS [HIV] DISEASE: ICD-10-CM

## 2020-12-03 LAB
AMPHET UR-MCNC: NEGATIVE — SIGNIFICANT CHANGE UP
BARBITURATES UR SCN-MCNC: NEGATIVE — SIGNIFICANT CHANGE UP
BENZODIAZ UR-MCNC: NEGATIVE — SIGNIFICANT CHANGE UP
CHOLEST SERPL-MCNC: 192 MG/DL — SIGNIFICANT CHANGE UP
COCAINE METAB.OTHER UR-MCNC: POSITIVE
DRUG SCREEN 1, URINE RESULT: SIGNIFICANT CHANGE UP
HDLC SERPL-MCNC: 57 MG/DL — SIGNIFICANT CHANGE UP
LIPID PNL WITH DIRECT LDL SERPL: 105 MG/DL — HIGH
METHADONE UR-MCNC: POSITIVE
NON HDL CHOLESTEROL: 135 MG/DL — HIGH
OPIATES UR-MCNC: NEGATIVE — SIGNIFICANT CHANGE UP
OXYCODONE UR-MCNC: POSITIVE
PCP UR-MCNC: NEGATIVE — SIGNIFICANT CHANGE UP
PROPOXYPHENE QUALITATIVE URINE RESULT: NEGATIVE — SIGNIFICANT CHANGE UP
THC UR QL: NEGATIVE — SIGNIFICANT CHANGE UP
TRIGL SERPL-MCNC: 181 MG/DL — HIGH

## 2020-12-04 LAB
FOLATE SERPL-MCNC: 4.6 NG/ML — LOW
VIT B12 SERPL-MCNC: 1195 PG/ML — SIGNIFICANT CHANGE UP (ref 232–1245)

## 2020-12-07 LAB
NOROXYCODONE UR QL CFM: 6482 NG/ML — SIGNIFICANT CHANGE UP
NOROXYCODONE, UR RESULT: 6482 NG/ML — SIGNIFICANT CHANGE UP
OXYCODONE IN-HOUSE INTERPRETATION: POSITIVE
OXYCODONE UR QL CFM: 2281 NG/ML — SIGNIFICANT CHANGE UP
OXYCODONE UR QL CFM: POSITIVE
OXYCODONE, UR RESULT: 2281 NG/ML — SIGNIFICANT CHANGE UP
OXYMORPHONE UR QL CFM: NEGATIVE NG/ML — SIGNIFICANT CHANGE UP
OXYMORPHONE, UR RESULT: NEGATIVE NG/ML — SIGNIFICANT CHANGE UP

## 2020-12-15 ENCOUNTER — OUTPATIENT (OUTPATIENT)
Dept: OUTPATIENT SERVICES | Facility: HOSPITAL | Age: 65
LOS: 1 days | Discharge: HOME | End: 2020-12-15
Payer: COMMERCIAL

## 2020-12-15 DIAGNOSIS — M87.851 OTHER OSTEONECROSIS, RIGHT FEMUR: ICD-10-CM

## 2020-12-15 DIAGNOSIS — B20 HUMAN IMMUNODEFICIENCY VIRUS [HIV] DISEASE: ICD-10-CM

## 2020-12-15 DIAGNOSIS — I10 ESSENTIAL (PRIMARY) HYPERTENSION: ICD-10-CM

## 2020-12-15 DIAGNOSIS — E78.2 MIXED HYPERLIPIDEMIA: ICD-10-CM

## 2020-12-15 DIAGNOSIS — G99.0 AUTONOMIC NEUROPATHY IN DISEASES CLASSIFIED ELSEWHERE: ICD-10-CM

## 2020-12-15 DIAGNOSIS — E23.6 OTHER DISORDERS OF PITUITARY GLAND: ICD-10-CM

## 2020-12-15 LAB
BENZOYLEGONINE, UR RESULT: 1098 NG/ML — SIGNIFICANT CHANGE UP
BZE UR QL SCN: 1098 NG/ML — SIGNIFICANT CHANGE UP
COCAINE IN-HOUSE INTERPRETATION: POSITIVE
COCAINE UR QL SCN: POSITIVE
EDDP UR QL CFM: SIGNIFICANT CHANGE UP NG/ML
EDDP, UR RESULT: SIGNIFICANT CHANGE UP NG/ML
METHADONE IN-HOUSE INTERPRETATION: POSITIVE
METHADONE UR CFM-MCNC: POSITIVE

## 2020-12-15 PROCEDURE — ZZZZZ: CPT

## 2021-01-12 ENCOUNTER — OUTPATIENT (OUTPATIENT)
Dept: OUTPATIENT SERVICES | Facility: HOSPITAL | Age: 66
LOS: 1 days | Discharge: HOME | End: 2021-01-12
Payer: COMMERCIAL

## 2021-01-12 DIAGNOSIS — B20 HUMAN IMMUNODEFICIENCY VIRUS [HIV] DISEASE: ICD-10-CM

## 2021-01-12 DIAGNOSIS — G99.0 AUTONOMIC NEUROPATHY IN DISEASES CLASSIFIED ELSEWHERE: ICD-10-CM

## 2021-01-12 DIAGNOSIS — E78.5 HYPERLIPIDEMIA, UNSPECIFIED: ICD-10-CM

## 2021-01-12 DIAGNOSIS — I10 ESSENTIAL (PRIMARY) HYPERTENSION: ICD-10-CM

## 2021-01-12 DIAGNOSIS — M87.851 OTHER OSTEONECROSIS, RIGHT FEMUR: ICD-10-CM

## 2021-01-12 DIAGNOSIS — E23.6 OTHER DISORDERS OF PITUITARY GLAND: ICD-10-CM

## 2021-01-12 PROCEDURE — ZZZZZ: CPT

## 2021-01-27 LAB
NOROXYCODONE UR QL CFM: 3871 NG/ML — SIGNIFICANT CHANGE UP
NOROXYCODONE, UR RESULT: 3871 NG/ML — SIGNIFICANT CHANGE UP
OXYCODONE IN-HOUSE INTERPRETATION: POSITIVE
OXYCODONE UR QL CFM: 2019 NG/ML — SIGNIFICANT CHANGE UP
OXYCODONE UR QL CFM: POSITIVE
OXYCODONE, UR RESULT: 2019 NG/ML — SIGNIFICANT CHANGE UP
OXYMORPHONE UR QL CFM: NEGATIVE NG/ML — SIGNIFICANT CHANGE UP
OXYMORPHONE, UR RESULT: NEGATIVE NG/ML — SIGNIFICANT CHANGE UP

## 2021-02-08 ENCOUNTER — OUTPATIENT (OUTPATIENT)
Dept: OUTPATIENT SERVICES | Facility: HOSPITAL | Age: 66
LOS: 1 days | Discharge: HOME | End: 2021-02-08

## 2021-02-09 ENCOUNTER — OUTPATIENT (OUTPATIENT)
Dept: OUTPATIENT SERVICES | Facility: HOSPITAL | Age: 66
LOS: 1 days | Discharge: HOME | End: 2021-02-09
Payer: COMMERCIAL

## 2021-02-09 DIAGNOSIS — K21.9 GASTRO-ESOPHAGEAL REFLUX DISEASE WITHOUT ESOPHAGITIS: ICD-10-CM

## 2021-02-09 DIAGNOSIS — I10 ESSENTIAL (PRIMARY) HYPERTENSION: ICD-10-CM

## 2021-02-09 DIAGNOSIS — E23.6 OTHER DISORDERS OF PITUITARY GLAND: ICD-10-CM

## 2021-02-09 DIAGNOSIS — G99.0 AUTONOMIC NEUROPATHY IN DISEASES CLASSIFIED ELSEWHERE: ICD-10-CM

## 2021-02-09 DIAGNOSIS — M87.851 OTHER OSTEONECROSIS, RIGHT FEMUR: ICD-10-CM

## 2021-02-09 DIAGNOSIS — E78.5 HYPERLIPIDEMIA, UNSPECIFIED: ICD-10-CM

## 2021-02-09 DIAGNOSIS — B20 HUMAN IMMUNODEFICIENCY VIRUS [HIV] DISEASE: ICD-10-CM

## 2021-02-09 PROCEDURE — ZZZZZ: CPT

## 2021-02-11 ENCOUNTER — OUTPATIENT (OUTPATIENT)
Dept: OUTPATIENT SERVICES | Facility: HOSPITAL | Age: 66
LOS: 1 days | Discharge: HOME | End: 2021-02-11

## 2021-02-16 ENCOUNTER — OUTPATIENT (OUTPATIENT)
Dept: OUTPATIENT SERVICES | Facility: HOSPITAL | Age: 66
LOS: 1 days | Discharge: HOME | End: 2021-02-16

## 2021-02-24 LAB
NOROXYCODONE UR QL CFM: 3889 NG/ML — SIGNIFICANT CHANGE UP
NOROXYCODONE, UR RESULT: 3889 NG/ML — SIGNIFICANT CHANGE UP
OXYCODONE IN-HOUSE INTERPRETATION: POSITIVE
OXYCODONE UR QL CFM: 1670 NG/ML — SIGNIFICANT CHANGE UP
OXYCODONE UR QL CFM: POSITIVE
OXYCODONE, UR RESULT: 1670 NG/ML — SIGNIFICANT CHANGE UP
OXYMORPHONE UR QL CFM: NEGATIVE NG/ML — SIGNIFICANT CHANGE UP
OXYMORPHONE, UR RESULT: NEGATIVE NG/ML — SIGNIFICANT CHANGE UP

## 2021-03-08 ENCOUNTER — OUTPATIENT (OUTPATIENT)
Dept: OUTPATIENT SERVICES | Facility: HOSPITAL | Age: 66
LOS: 1 days | Discharge: HOME | End: 2021-03-08

## 2021-03-09 ENCOUNTER — OUTPATIENT (OUTPATIENT)
Dept: OUTPATIENT SERVICES | Facility: HOSPITAL | Age: 66
LOS: 1 days | Discharge: HOME | End: 2021-03-09
Payer: COMMERCIAL

## 2021-03-09 DIAGNOSIS — B20 HUMAN IMMUNODEFICIENCY VIRUS [HIV] DISEASE: ICD-10-CM

## 2021-03-09 DIAGNOSIS — G99.0 AUTONOMIC NEUROPATHY IN DISEASES CLASSIFIED ELSEWHERE: ICD-10-CM

## 2021-03-09 DIAGNOSIS — E78.5 HYPERLIPIDEMIA, UNSPECIFIED: ICD-10-CM

## 2021-03-09 DIAGNOSIS — K21.9 GASTRO-ESOPHAGEAL REFLUX DISEASE WITHOUT ESOPHAGITIS: ICD-10-CM

## 2021-03-09 DIAGNOSIS — I10 ESSENTIAL (PRIMARY) HYPERTENSION: ICD-10-CM

## 2021-03-09 DIAGNOSIS — E23.6 OTHER DISORDERS OF PITUITARY GLAND: ICD-10-CM

## 2021-03-09 DIAGNOSIS — M87.851 OTHER OSTEONECROSIS, RIGHT FEMUR: ICD-10-CM

## 2021-03-09 PROCEDURE — ZZZZZ: CPT

## 2021-03-18 LAB
A1C WITH ESTIMATED AVERAGE GLUCOSE RESULT: 5.8 % — HIGH (ref 4–5.6)
ALBUMIN SERPL ELPH-MCNC: 4.9 G/DL — SIGNIFICANT CHANGE UP (ref 3.5–5.2)
ALP SERPL-CCNC: 83 U/L — SIGNIFICANT CHANGE UP (ref 30–115)
ALT FLD-CCNC: 48 U/L — HIGH (ref 0–41)
AMPHET UR-MCNC: NEGATIVE — SIGNIFICANT CHANGE UP
ANION GAP SERPL CALC-SCNC: 14 MMOL/L — SIGNIFICANT CHANGE UP (ref 7–14)
AST SERPL-CCNC: 52 U/L — HIGH (ref 0–41)
BARBITURATES UR SCN-MCNC: NEGATIVE — SIGNIFICANT CHANGE UP
BENZODIAZ UR-MCNC: NEGATIVE — SIGNIFICANT CHANGE UP
BILIRUB SERPL-MCNC: 0.3 MG/DL — SIGNIFICANT CHANGE UP (ref 0.2–1.2)
BUN SERPL-MCNC: 22 MG/DL — HIGH (ref 10–20)
CALCIUM SERPL-MCNC: 9.9 MG/DL — SIGNIFICANT CHANGE UP (ref 8.5–10.1)
CHLORIDE SERPL-SCNC: 103 MMOL/L — SIGNIFICANT CHANGE UP (ref 98–110)
CHOLEST SERPL-MCNC: 172 MG/DL — SIGNIFICANT CHANGE UP
CO2 SERPL-SCNC: 24 MMOL/L — SIGNIFICANT CHANGE UP (ref 17–32)
COCAINE METAB.OTHER UR-MCNC: NEGATIVE — SIGNIFICANT CHANGE UP
CREAT SERPL-MCNC: 0.8 MG/DL — SIGNIFICANT CHANGE UP (ref 0.7–1.5)
DRUG SCREEN 1, URINE RESULT: SIGNIFICANT CHANGE UP
ESTIMATED AVERAGE GLUCOSE: 120 MG/DL — HIGH (ref 68–114)
GGT SERPL-CCNC: 141 U/L — HIGH (ref 1–40)
GLUCOSE SERPL-MCNC: 95 MG/DL — SIGNIFICANT CHANGE UP (ref 70–99)
HCT VFR BLD CALC: 39.5 % — LOW (ref 42–52)
HDLC SERPL-MCNC: 52 MG/DL — SIGNIFICANT CHANGE UP
HGB BLD-MCNC: 13 G/DL — LOW (ref 14–18)
LDH SERPL L TO P-CCNC: 193 U/L — SIGNIFICANT CHANGE UP (ref 50–242)
LIPID PNL WITH DIRECT LDL SERPL: 87 MG/DL — SIGNIFICANT CHANGE UP
MCHC RBC-ENTMCNC: 32.9 G/DL — SIGNIFICANT CHANGE UP (ref 32–37)
MCHC RBC-ENTMCNC: 33.1 PG — HIGH (ref 27–31)
MCV RBC AUTO: 100.5 FL — HIGH (ref 80–94)
METHADONE UR-MCNC: POSITIVE
NON HDL CHOLESTEROL: 120 MG/DL — SIGNIFICANT CHANGE UP
NRBC # BLD: 0 /100 WBCS — SIGNIFICANT CHANGE UP (ref 0–0)
OPIATES UR-MCNC: NEGATIVE — SIGNIFICANT CHANGE UP
OXYCODONE UR-MCNC: POSITIVE
PCP UR-MCNC: NEGATIVE — SIGNIFICANT CHANGE UP
PLATELET # BLD AUTO: 223 K/UL — SIGNIFICANT CHANGE UP (ref 130–400)
POTASSIUM SERPL-MCNC: 4.1 MMOL/L — SIGNIFICANT CHANGE UP (ref 3.5–5)
POTASSIUM SERPL-SCNC: 4.1 MMOL/L — SIGNIFICANT CHANGE UP (ref 3.5–5)
PROPOXYPHENE QUALITATIVE URINE RESULT: NEGATIVE — SIGNIFICANT CHANGE UP
PROT SERPL-MCNC: 7.7 G/DL — SIGNIFICANT CHANGE UP (ref 6–8)
RBC # BLD: 3.93 M/UL — LOW (ref 4.7–6.1)
RBC # FLD: 13.1 % — SIGNIFICANT CHANGE UP (ref 11.5–14.5)
SODIUM SERPL-SCNC: 141 MMOL/L — SIGNIFICANT CHANGE UP (ref 135–146)
THC UR QL: NEGATIVE — SIGNIFICANT CHANGE UP
TRIGL SERPL-MCNC: 210 MG/DL — HIGH
WBC # BLD: 4.27 K/UL — LOW (ref 4.8–10.8)
WBC # FLD AUTO: 4.27 K/UL — LOW (ref 4.8–10.8)

## 2021-03-19 LAB
4/8 RATIO: 0.25 RATIO — LOW (ref 0.86–4.14)
ABS CD8: 735 /UL — SIGNIFICANT CHANGE UP (ref 90–775)
AFP-TM SERPL-MCNC: 2.8 NG/ML — SIGNIFICANT CHANGE UP
C TRACH RRNA SPEC QL NAA+PROBE: SIGNIFICANT CHANGE UP
CD16+CD56+ CELLS NFR BLD: 16 % — SIGNIFICANT CHANGE UP (ref 7–27)
CD16+CD56+ CELLS NFR SPEC: 189 /UL — SIGNIFICANT CHANGE UP (ref 80–426)
CD19 BLASTS SPEC-ACNC: 3 % — LOW (ref 4–18)
CD19 BLASTS SPEC-ACNC: 36 /UL — SIGNIFICANT CHANGE UP (ref 32–326)
CD3 BLASTS SPEC-ACNC: 80 % — SIGNIFICANT CHANGE UP (ref 58–84)
CD3 BLASTS SPEC-ACNC: 929 /UL — SIGNIFICANT CHANGE UP (ref 396–2024)
CD4 %: 16 % — LOW (ref 30–56)
CD8 %: 63 % — HIGH (ref 11–43)
HAV IGG SER QL IA: REACTIVE
HAV IGM SER-ACNC: SIGNIFICANT CHANGE UP
HBV CORE IGM SER-ACNC: SIGNIFICANT CHANGE UP
HBV SURFACE AB SER-ACNC: SIGNIFICANT CHANGE UP
HBV SURFACE AG SER-ACNC: SIGNIFICANT CHANGE UP
HCV AB S/CO SERPL IA: 46.55 COI — HIGH
HCV AB SERPL-IMP: REACTIVE
N GONORRHOEA RRNA SPEC QL NAA+PROBE: SIGNIFICANT CHANGE UP
PSA SERPL-MCNC: 0.29 NG/ML — SIGNIFICANT CHANGE UP (ref 0–4)
SPECIMEN SOURCE: SIGNIFICANT CHANGE UP
T-CELL CD4 SUBSET PNL BLD: 181 /UL — LOW (ref 325–1251)
TSH SERPL-MCNC: 0.85 UIU/ML — SIGNIFICANT CHANGE UP (ref 0.27–4.2)

## 2021-03-20 LAB
GAMMA INTERFERON BACKGROUND BLD IA-ACNC: 0.09 IU/ML — SIGNIFICANT CHANGE UP
M TB IFN-G BLD-IMP: NEGATIVE — SIGNIFICANT CHANGE UP
M TB IFN-G CD4+ BCKGRND COR BLD-ACNC: 0.02 IU/ML — SIGNIFICANT CHANGE UP
M TB IFN-G CD4+CD8+ BCKGRND COR BLD-ACNC: 0.03 IU/ML — SIGNIFICANT CHANGE UP
QUANT TB PLUS MITOGEN MINUS NIL: 6.62 IU/ML — SIGNIFICANT CHANGE UP

## 2021-03-22 ENCOUNTER — OUTPATIENT (OUTPATIENT)
Dept: OUTPATIENT SERVICES | Facility: HOSPITAL | Age: 66
LOS: 1 days | Discharge: HOME | End: 2021-03-22

## 2021-03-22 LAB — HCV RNA FLD QL NAA+PROBE: SIGNIFICANT CHANGE UP

## 2021-03-23 DIAGNOSIS — B20 HUMAN IMMUNODEFICIENCY VIRUS [HIV] DISEASE: ICD-10-CM

## 2021-03-24 LAB
NOROXYCODONE UR QL CFM: 3953 NG/ML — SIGNIFICANT CHANGE UP
NOROXYCODONE, UR RESULT: 3953 NG/ML — SIGNIFICANT CHANGE UP
OXYCODONE IN-HOUSE INTERPRETATION: POSITIVE
OXYCODONE UR QL CFM: 2566 NG/ML — SIGNIFICANT CHANGE UP
OXYCODONE UR QL CFM: POSITIVE
OXYCODONE, UR RESULT: 2566 NG/ML — SIGNIFICANT CHANGE UP
OXYMORPHONE UR QL CFM: NEGATIVE NG/ML — SIGNIFICANT CHANGE UP
OXYMORPHONE, UR RESULT: NEGATIVE NG/ML — SIGNIFICANT CHANGE UP

## 2021-03-26 LAB
EDDP UR QL CFM: SIGNIFICANT CHANGE UP NG/ML
EDDP, UR RESULT: SIGNIFICANT CHANGE UP NG/ML
HIV-1 VIRAL LOAD RESULT: SIGNIFICANT CHANGE UP
HIV1 RNA # SERPL NAA+PROBE: SIGNIFICANT CHANGE UP
HIV1 RNA SERPL NAA+PROBE-ACNC: SIGNIFICANT CHANGE UP
HIV1 RNA SERPL NAA+PROBE-LOG#: SIGNIFICANT CHANGE UP LG COP/ML
METHADONE IN-HOUSE INTERPRETATION: POSITIVE
METHADONE UR CFM-MCNC: POSITIVE

## 2021-04-06 ENCOUNTER — OUTPATIENT (OUTPATIENT)
Dept: OUTPATIENT SERVICES | Facility: HOSPITAL | Age: 66
LOS: 1 days | Discharge: HOME | End: 2021-04-06
Payer: COMMERCIAL

## 2021-04-06 DIAGNOSIS — M87.851 OTHER OSTEONECROSIS, RIGHT FEMUR: ICD-10-CM

## 2021-04-06 DIAGNOSIS — E23.6 OTHER DISORDERS OF PITUITARY GLAND: ICD-10-CM

## 2021-04-06 DIAGNOSIS — G99.0 AUTONOMIC NEUROPATHY IN DISEASES CLASSIFIED ELSEWHERE: ICD-10-CM

## 2021-04-06 DIAGNOSIS — K21.9 GASTRO-ESOPHAGEAL REFLUX DISEASE WITHOUT ESOPHAGITIS: ICD-10-CM

## 2021-04-06 DIAGNOSIS — E78.2 MIXED HYPERLIPIDEMIA: ICD-10-CM

## 2021-04-06 DIAGNOSIS — I10 ESSENTIAL (PRIMARY) HYPERTENSION: ICD-10-CM

## 2021-04-06 DIAGNOSIS — B20 HUMAN IMMUNODEFICIENCY VIRUS [HIV] DISEASE: ICD-10-CM

## 2021-04-06 PROCEDURE — ZZZZZ: CPT

## 2021-04-19 ENCOUNTER — OUTPATIENT (OUTPATIENT)
Dept: OUTPATIENT SERVICES | Facility: HOSPITAL | Age: 66
LOS: 1 days | Discharge: HOME | End: 2021-04-19
Payer: COMMERCIAL

## 2021-04-19 ENCOUNTER — APPOINTMENT (OUTPATIENT)
Dept: OPHTHALMOLOGY | Facility: CLINIC | Age: 66
End: 2021-04-19

## 2021-04-19 PROCEDURE — 92012 INTRM OPH EXAM EST PATIENT: CPT

## 2021-04-21 DIAGNOSIS — H02.88A MEIBOMIAN GLAND DYSFUNCTION RIGHT EYE, UPPER AND LOWER EYELIDS: ICD-10-CM

## 2021-04-21 DIAGNOSIS — H02.88B MEIBOMIAN GLAND DYSFUNCTION LEFT EYE, UPPER AND LOWER EYELIDS: ICD-10-CM

## 2021-04-21 DIAGNOSIS — H25.13 AGE-RELATED NUCLEAR CATARACT, BILATERAL: ICD-10-CM

## 2021-04-21 DIAGNOSIS — Z82.49 FAMILY HISTORY OF ISCHEMIC HEART DISEASE AND OTHER DISEASES OF THE CIRCULATORY SYSTEM: ICD-10-CM

## 2021-04-21 DIAGNOSIS — Z82.61 FAMILY HISTORY OF ARTHRITIS: ICD-10-CM

## 2021-04-21 DIAGNOSIS — Z83.3 FAMILY HISTORY OF DIABETES MELLITUS: ICD-10-CM

## 2021-04-21 DIAGNOSIS — B20 HUMAN IMMUNODEFICIENCY VIRUS [HIV] DISEASE: ICD-10-CM

## 2021-04-21 DIAGNOSIS — Z82.3 FAMILY HISTORY OF STROKE: ICD-10-CM

## 2021-04-21 DIAGNOSIS — H35.039 HYPERTENSIVE RETINOPATHY, UNSPECIFIED EYE: ICD-10-CM

## 2021-04-28 LAB
NOROXYCODONE UR QL CFM: 967 NG/ML — SIGNIFICANT CHANGE UP
NOROXYCODONE, UR RESULT: 967 NG/ML — SIGNIFICANT CHANGE UP
OXYCODONE IN-HOUSE INTERPRETATION: POSITIVE
OXYCODONE UR QL CFM: 563 NG/ML — SIGNIFICANT CHANGE UP
OXYCODONE UR QL CFM: POSITIVE
OXYCODONE, UR RESULT: 563 NG/ML — SIGNIFICANT CHANGE UP
OXYMORPHONE UR QL CFM: 325 NG/ML — SIGNIFICANT CHANGE UP
OXYMORPHONE, UR RESULT: 325 NG/ML — SIGNIFICANT CHANGE UP

## 2021-04-29 ENCOUNTER — RX RENEWAL (OUTPATIENT)
Age: 66
End: 2021-04-29

## 2021-05-03 ENCOUNTER — APPOINTMENT (OUTPATIENT)
Dept: OPHTHALMOLOGY | Facility: CLINIC | Age: 66
End: 2021-05-03

## 2021-05-04 ENCOUNTER — RX RENEWAL (OUTPATIENT)
Age: 66
End: 2021-05-04

## 2021-05-04 ENCOUNTER — APPOINTMENT (OUTPATIENT)
Dept: INFECTIOUS DISEASE | Facility: CLINIC | Age: 66
End: 2021-05-04
Payer: COMMERCIAL

## 2021-05-04 LAB
EDDP UR QL CFM: 2789 NG/ML — SIGNIFICANT CHANGE UP
EDDP, UR RESULT: 2789 NG/ML — SIGNIFICANT CHANGE UP
METHADONE IN-HOUSE INTERPRETATION: POSITIVE
METHADONE UR CFM-MCNC: POSITIVE

## 2021-05-04 PROCEDURE — ZZZZZ: CPT

## 2021-05-04 RX ORDER — NICOTINE TRANSDERMAL SYSTEM 21 MG/24H
21 PATCH, EXTENDED RELEASE TRANSDERMAL
Qty: 28 | Refills: 0 | Status: DISCONTINUED | COMMUNITY
Start: 2020-11-17

## 2021-05-04 NOTE — REASON FOR VISIT
[Home] : at home, [unfilled] , at the time of the visit. [Medical Office: ([facility])___] : at the medical office located in  [Verbal consent obtained from patient] : the patient, [unfilled]

## 2021-05-04 NOTE — REVIEW OF SYSTEMS
[As Noted in HPI] : as noted in HPI [Joint Pain] : joint pain [Limb Pain] : limb pain [Negative] : Heme/Lymph [___ # of Missed Doses in The Past Week] : [unfilled] doses missed in the past week

## 2021-05-04 NOTE — HISTORY OF PRESENT ILLNESS
[FreeTextEntry1] : Pt still with chronic bilateral hip pain relieved by Oxycodone. He needs THR but hasn't rescheduled appt yet. No new complaints.His BPs were reviewed with him and have been normal. He reported full compliance with all meds without any SE. [Sexually Active] : The patient is not sexually active

## 2021-05-05 ENCOUNTER — OUTPATIENT (OUTPATIENT)
Dept: OUTPATIENT SERVICES | Facility: HOSPITAL | Age: 66
LOS: 1 days | Discharge: HOME | End: 2021-05-05

## 2021-05-05 DIAGNOSIS — E78.5 HYPERLIPIDEMIA, UNSPECIFIED: ICD-10-CM

## 2021-05-05 DIAGNOSIS — E03.9 HYPOTHYROIDISM, UNSPECIFIED: ICD-10-CM

## 2021-05-05 DIAGNOSIS — M87.851 OTHER OSTEONECROSIS, RIGHT FEMUR: ICD-10-CM

## 2021-05-05 DIAGNOSIS — M16.2 BILATERAL OSTEOARTHRITIS RESULTING FROM HIP DYSPLASIA: ICD-10-CM

## 2021-05-05 DIAGNOSIS — G99.0 AUTONOMIC NEUROPATHY IN DISEASES CLASSIFIED ELSEWHERE: ICD-10-CM

## 2021-05-05 DIAGNOSIS — I10 ESSENTIAL (PRIMARY) HYPERTENSION: ICD-10-CM

## 2021-05-05 DIAGNOSIS — K21.9 GASTRO-ESOPHAGEAL REFLUX DISEASE WITHOUT ESOPHAGITIS: ICD-10-CM

## 2021-05-05 DIAGNOSIS — B20 HUMAN IMMUNODEFICIENCY VIRUS [HIV] DISEASE: ICD-10-CM

## 2021-05-05 DIAGNOSIS — M19.90 UNSPECIFIED OSTEOARTHRITIS, UNSPECIFIED SITE: ICD-10-CM

## 2021-05-10 ENCOUNTER — RX RENEWAL (OUTPATIENT)
Age: 66
End: 2021-05-10

## 2021-05-11 ENCOUNTER — RX RENEWAL (OUTPATIENT)
Age: 66
End: 2021-05-11

## 2021-05-20 ENCOUNTER — LABORATORY RESULT (OUTPATIENT)
Age: 66
End: 2021-05-20

## 2021-05-21 ENCOUNTER — OUTPATIENT (OUTPATIENT)
Dept: OUTPATIENT SERVICES | Facility: HOSPITAL | Age: 66
LOS: 1 days | Discharge: HOME | End: 2021-05-21

## 2021-05-24 LAB
OXYCODONE UR-MCNC: POSITIVE
SIUH URINE DRUG SCREEN 1: NORMAL

## 2021-05-26 ENCOUNTER — OUTPATIENT (OUTPATIENT)
Dept: OUTPATIENT SERVICES | Facility: HOSPITAL | Age: 66
LOS: 1 days | Discharge: HOME | End: 2021-05-26

## 2021-06-02 ENCOUNTER — OUTPATIENT (OUTPATIENT)
Dept: OUTPATIENT SERVICES | Facility: HOSPITAL | Age: 66
LOS: 1 days | Discharge: HOME | End: 2021-06-02

## 2021-06-03 ENCOUNTER — APPOINTMENT (OUTPATIENT)
Dept: INFECTIOUS DISEASE | Facility: CLINIC | Age: 66
End: 2021-06-03
Payer: COMMERCIAL

## 2021-06-03 ENCOUNTER — OUTPATIENT (OUTPATIENT)
Dept: OUTPATIENT SERVICES | Facility: HOSPITAL | Age: 66
LOS: 1 days | Discharge: HOME | End: 2021-06-03

## 2021-06-03 ENCOUNTER — RX RENEWAL (OUTPATIENT)
Age: 66
End: 2021-06-03

## 2021-06-03 VITALS
BODY MASS INDEX: 28.44 KG/M2 | WEIGHT: 210 LBS | DIASTOLIC BLOOD PRESSURE: 73 MMHG | HEIGHT: 72 IN | RESPIRATION RATE: 20 BRPM | TEMPERATURE: 98.6 F | HEART RATE: 65 BPM | SYSTOLIC BLOOD PRESSURE: 175 MMHG

## 2021-06-03 DIAGNOSIS — I10 ESSENTIAL (PRIMARY) HYPERTENSION: ICD-10-CM

## 2021-06-03 DIAGNOSIS — M16.12 UNILATERAL PRIMARY OSTEOARTHRITIS, LEFT HIP: ICD-10-CM

## 2021-06-03 DIAGNOSIS — F17.200 NICOTINE DEPENDENCE, UNSPECIFIED, UNCOMPLICATED: ICD-10-CM

## 2021-06-03 DIAGNOSIS — B20 HUMAN IMMUNODEFICIENCY VIRUS [HIV] DISEASE: ICD-10-CM

## 2021-06-03 DIAGNOSIS — K21.9 GASTRO-ESOPHAGEAL REFLUX DISEASE WITHOUT ESOPHAGITIS: ICD-10-CM

## 2021-06-03 DIAGNOSIS — M19.90 UNSPECIFIED OSTEOARTHRITIS, UNSPECIFIED SITE: ICD-10-CM

## 2021-06-03 DIAGNOSIS — E23.6 OTHER DISORDERS OF PITUITARY GLAND: ICD-10-CM

## 2021-06-03 DIAGNOSIS — E78.5 HYPERLIPIDEMIA, UNSPECIFIED: ICD-10-CM

## 2021-06-03 PROCEDURE — 99215 OFFICE O/P EST HI 40 MIN: CPT

## 2021-06-03 NOTE — HISTORY OF PRESENT ILLNESS
[FreeTextEntry1] : Pt here for Annual H & P and Rxs. CD4 ct 181 (3/21), VL undetectable (3/21). Pt with chronic bilateral hip pain especially left hip.He takes Oxycodone for the severe pain with good response. He needs THR and had been to [facility] Orthopedist in the past but has not been there recently. He stated that he needs to have updated hip Xrays here first. Pt C/O mild throat irritation this am with ? mucous in the back of the throat. He denied fevers, cough, SOB, myalgias or other associated symptoms. He reported full compliance with all meds without any SE. [Sexually Active] : The patient is not sexually active

## 2021-06-03 NOTE — ASSESSMENT
[FreeTextEntry1] : Stable HIV Disease\par     Continue present meds\par Bilateral hip pain due to severe OA\par      Pt to make appt with Ortho at [facility]\par HTN with increased systolic BP this am\par      Pt's home BPs have been normal, will continue to monitor closely, cont present ;meds

## 2021-06-03 NOTE — REVIEW OF SYSTEMS
[Earache] : no earache [Loss Of Hearing] : no hearing loss [Nasal Discharge] : no nasal discharge [Hoarseness] : no hoarseness

## 2021-07-01 ENCOUNTER — APPOINTMENT (OUTPATIENT)
Dept: INFECTIOUS DISEASE | Facility: CLINIC | Age: 66
End: 2021-07-01
Payer: COMMERCIAL

## 2021-07-01 ENCOUNTER — NON-APPOINTMENT (OUTPATIENT)
Age: 66
End: 2021-07-01

## 2021-07-01 ENCOUNTER — OUTPATIENT (OUTPATIENT)
Dept: OUTPATIENT SERVICES | Facility: HOSPITAL | Age: 66
LOS: 1 days | Discharge: HOME | End: 2021-07-01

## 2021-07-01 VITALS
BODY MASS INDEX: 24.09 KG/M2 | RESPIRATION RATE: 16 BRPM | SYSTOLIC BLOOD PRESSURE: 137 MMHG | WEIGHT: 204 LBS | DIASTOLIC BLOOD PRESSURE: 78 MMHG | HEART RATE: 58 BPM | HEIGHT: 77 IN | TEMPERATURE: 97.9 F

## 2021-07-01 DIAGNOSIS — K21.9 GASTRO-ESOPHAGEAL REFLUX DISEASE WITHOUT ESOPHAGITIS: ICD-10-CM

## 2021-07-01 DIAGNOSIS — E78.5 HYPERLIPIDEMIA, UNSPECIFIED: ICD-10-CM

## 2021-07-01 DIAGNOSIS — E23.6 OTHER DISORDERS OF PITUITARY GLAND: ICD-10-CM

## 2021-07-01 DIAGNOSIS — M16.12 UNILATERAL PRIMARY OSTEOARTHRITIS, LEFT HIP: ICD-10-CM

## 2021-07-01 DIAGNOSIS — B20 HUMAN IMMUNODEFICIENCY VIRUS [HIV] DISEASE: ICD-10-CM

## 2021-07-01 DIAGNOSIS — G62.9 POLYNEUROPATHY, UNSPECIFIED: ICD-10-CM

## 2021-07-01 DIAGNOSIS — M19.90 UNSPECIFIED OSTEOARTHRITIS, UNSPECIFIED SITE: ICD-10-CM

## 2021-07-01 DIAGNOSIS — I10 ESSENTIAL (PRIMARY) HYPERTENSION: ICD-10-CM

## 2021-07-01 PROCEDURE — 99214 OFFICE O/P EST MOD 30 MIN: CPT

## 2021-07-01 NOTE — PHYSICAL EXAM
[General Appearance - Alert] : alert [General Appearance - In No Acute Distress] : in no acute distress [General Appearance - Well Nourished] : well nourished [Sclera] : the sclera and conjunctiva were normal [PERRL With Normal Accommodation] : pupils were equal in size, round, reactive to light [Extraocular Movements] : extraocular movements were intact [Outer Ear] : the ears and nose were normal in appearance [Oropharynx] : the oropharynx was normal with no thrush [Neck Appearance] : the appearance of the neck was normal [Neck Cervical Mass (___cm)] : no neck mass was observed [Jugular Venous Distention Increased] : there was no jugular-venous distention [Thyroid Diffuse Enlargement] : the thyroid was not enlarged [Auscultation Breath Sounds / Voice Sounds] : lungs were clear to auscultation bilaterally [Heart Rate And Rhythm] : heart rate was normal and rhythm regular [Heart Sounds] : normal S1 and S2 [Heart Sounds Gallop] : no gallops [Murmurs] : no murmurs [Heart Sounds Pericardial Friction Rub] : no pericardial rub [Full Pulse] : the pedal pulses are present [Edema] : there was no peripheral edema [Bowel Sounds] : normal bowel sounds [Abdomen Soft] : soft [Abdomen Tenderness] : non-tender [Abdomen Mass (___ Cm)] : no abdominal mass palpated [Costovertebral Angle Tenderness] : no CVA tenderness [No Palpable Adenopathy] : no palpable adenopathy [Musculoskeletal - Swelling] : no joint swelling [Nail Clubbing] : no clubbing  or cyanosis of the fingernails [Motor Tone] : muscle strength and tone were normal [FreeTextEntry1] : Decreased ROM of Left hip [Skin Color & Pigmentation] : normal skin color and pigmentation [] : no rash [Deep Tendon Reflexes (DTR)] : deep tendon reflexes were 2+ and symmetric [Sensation] : the sensory exam was normal to light touch and pinprick [No Focal Deficits] : no focal deficits [Oriented To Time, Place, And Person] : oriented to person, place, and time [Affect] : the affect was normal

## 2021-07-01 NOTE — HISTORY OF PRESENT ILLNESS
[FreeTextEntry1] : Pt here for routine labs, exam and Rxs. He still has severe pain of left hip relieved by Oxycodone. He tried to make an Orthopedist appt in [place] but there were no appts available until late September. He reported decreased cigarette use, down to 1 cigarette a day. He reported full compliance with all meds without any SE.  [Sexually Active] : The patient is not sexually active [de-identified] : abstinent x 2 months.

## 2021-07-01 NOTE — REVIEW OF SYSTEMS
[Negative] : Heme/Lymph [As Noted in HPI] : as noted in HPI [Joint Pain] : joint pain [Joint Swelling] : no joint swelling [Joint Stiffness] : no joint stiffness [Limb Pain] : limb pain [Limb Swelling] : no limb swelling [FreeTextEntry9] : Left hip apin, ambulating with cane, bilateral leg pain due to Neuropathy

## 2021-07-01 NOTE — ASSESSMENT
[Treatment Adherence] : treatment adherence [Nutritional / Food Issues] : nutritional/food issues [Medical Care Issues] : medical care issues [Smoking Cessation] : smoking cessation

## 2021-07-06 ENCOUNTER — RX RENEWAL (OUTPATIENT)
Age: 66
End: 2021-07-06

## 2021-07-15 ENCOUNTER — LABORATORY RESULT (OUTPATIENT)
Age: 66
End: 2021-07-15

## 2021-07-15 LAB
OXYCODONE UR-MCNC: POSITIVE
SIUH URINE DRUG SCREEN 1: NORMAL

## 2021-07-28 ENCOUNTER — LABORATORY RESULT (OUTPATIENT)
Age: 66
End: 2021-07-28

## 2021-07-28 ENCOUNTER — OUTPATIENT (OUTPATIENT)
Dept: OUTPATIENT SERVICES | Facility: HOSPITAL | Age: 66
LOS: 1 days | Discharge: HOME | End: 2021-07-28

## 2021-07-28 ENCOUNTER — NON-APPOINTMENT (OUTPATIENT)
Age: 66
End: 2021-07-28

## 2021-07-29 ENCOUNTER — OUTPATIENT (OUTPATIENT)
Dept: OUTPATIENT SERVICES | Facility: HOSPITAL | Age: 66
LOS: 1 days | Discharge: HOME | End: 2021-07-29

## 2021-07-29 ENCOUNTER — APPOINTMENT (OUTPATIENT)
Dept: INFECTIOUS DISEASE | Facility: CLINIC | Age: 66
End: 2021-07-29
Payer: COMMERCIAL

## 2021-07-29 VITALS
BODY MASS INDEX: 25.39 KG/M2 | TEMPERATURE: 98.4 F | RESPIRATION RATE: 16 BRPM | HEART RATE: 68 BPM | HEIGHT: 77 IN | WEIGHT: 215 LBS | DIASTOLIC BLOOD PRESSURE: 68 MMHG | SYSTOLIC BLOOD PRESSURE: 127 MMHG

## 2021-07-29 DIAGNOSIS — K21.9 GASTRO-ESOPHAGEAL REFLUX DISEASE WITHOUT ESOPHAGITIS: ICD-10-CM

## 2021-07-29 DIAGNOSIS — M19.90 UNSPECIFIED OSTEOARTHRITIS, UNSPECIFIED SITE: ICD-10-CM

## 2021-07-29 DIAGNOSIS — I10 ESSENTIAL (PRIMARY) HYPERTENSION: ICD-10-CM

## 2021-07-29 DIAGNOSIS — G62.9 POLYNEUROPATHY, UNSPECIFIED: ICD-10-CM

## 2021-07-29 DIAGNOSIS — E78.2 MIXED HYPERLIPIDEMIA: ICD-10-CM

## 2021-07-29 DIAGNOSIS — B20 HUMAN IMMUNODEFICIENCY VIRUS [HIV] DISEASE: ICD-10-CM

## 2021-07-29 DIAGNOSIS — M16.12 UNILATERAL PRIMARY OSTEOARTHRITIS, LEFT HIP: ICD-10-CM

## 2021-07-29 DIAGNOSIS — E23.6 OTHER DISORDERS OF PITUITARY GLAND: ICD-10-CM

## 2021-07-29 PROCEDURE — 99214 OFFICE O/P EST MOD 30 MIN: CPT

## 2021-07-29 RX ORDER — METHADONE HYDROCHLORIDE 40 MG/1
40 TABLET ORAL
Refills: 0 | Status: ACTIVE | COMMUNITY
Start: 2021-07-29

## 2021-07-29 NOTE — REVIEW OF SYSTEMS
[As Noted in HPI] : as noted in HPI [Joint Pain] : joint pain [Joint Stiffness] : joint stiffness [Negative] : Heme/Lymph [Joint Swelling] : no joint swelling [Limb Pain] : no limb pain [Limb Swelling] : no limb swelling

## 2021-07-29 NOTE — PHYSICAL EXAM
[General Appearance - Alert] : alert [General Appearance - In No Acute Distress] : in no acute distress [Sclera] : the sclera and conjunctiva were normal [PERRL With Normal Accommodation] : pupils were equal in size, round, reactive to light [Extraocular Movements] : extraocular movements were intact [Outer Ear] : the ears and nose were normal in appearance [Oropharynx] : the oropharynx was normal with no thrush [Neck Appearance] : the appearance of the neck was normal [Neck Cervical Mass (___cm)] : no neck mass was observed [Jugular Venous Distention Increased] : there was no jugular-venous distention [Thyroid Diffuse Enlargement] : the thyroid was not enlarged [Auscultation Breath Sounds / Voice Sounds] : lungs were clear to auscultation bilaterally [Heart Rate And Rhythm] : heart rate was normal and rhythm regular [Heart Sounds] : normal S1 and S2 [Heart Sounds Gallop] : no gallops [Murmurs] : no murmurs [Heart Sounds Pericardial Friction Rub] : no pericardial rub [Full Pulse] : the pedal pulses are present [Edema] : there was no peripheral edema [Bowel Sounds] : normal bowel sounds [Abdomen Soft] : soft [Abdomen Tenderness] : non-tender [Abdomen Mass (___ Cm)] : no abdominal mass palpated [Costovertebral Angle Tenderness] : no CVA tenderness [No Palpable Adenopathy] : no palpable adenopathy [Musculoskeletal - Swelling] : no joint swelling [Nail Clubbing] : no clubbing  or cyanosis of the fingernails [FreeTextEntry1] : Tenderness both hips, left greater than right with decreased ROM [Skin Color & Pigmentation] : normal skin color and pigmentation [] : no rash [Deep Tendon Reflexes (DTR)] : deep tendon reflexes were 2+ and symmetric [Sensation] : the sensory exam was normal to light touch and pinprick [No Focal Deficits] : no focal deficits [Oriented To Time, Place, And Person] : oriented to person, place, and time [Affect] : the affect was normal

## 2021-07-29 NOTE — ASSESSMENT
[Treatment Adherence] : treatment adherence [Rx Dose / Side Effects] : Rx dose/side effects [Nutritional / Food Issues] : nutritional/food issues [Medical Care Issues] : medical care issues [Smoking Cessation] : smoking cessation

## 2021-07-29 NOTE — HISTORY OF PRESENT ILLNESS
[FreeTextEntry1] : Pt here for monthly Oxycodone Rx. Pt still with chronic, severe left pain relieved by Oxycodone. He denied any new complaints. He reported full compliance with all meds without any SE. Pt's BPs done at home were reviewed today and have all been normal. Pt with 11 lb weight gain in the past week (although does not appear to have gained that much weight, ?different scale)  [Sexually Active] : The patient is not sexually active

## 2021-08-02 DIAGNOSIS — B20 HUMAN IMMUNODEFICIENCY VIRUS [HIV] DISEASE: ICD-10-CM

## 2021-08-02 LAB
OXYCODONE UR-MCNC: POSITIVE
SIUH URINE DRUG SCREEN 1: NORMAL

## 2021-08-04 DIAGNOSIS — B20 HUMAN IMMUNODEFICIENCY VIRUS [HIV] DISEASE: ICD-10-CM

## 2021-08-09 DIAGNOSIS — B20 HUMAN IMMUNODEFICIENCY VIRUS [HIV] DISEASE: ICD-10-CM

## 2021-08-13 DIAGNOSIS — B20 HUMAN IMMUNODEFICIENCY VIRUS [HIV] DISEASE: ICD-10-CM

## 2021-08-23 DIAGNOSIS — B20 HUMAN IMMUNODEFICIENCY VIRUS [HIV] DISEASE: ICD-10-CM

## 2021-08-24 DIAGNOSIS — B20 HUMAN IMMUNODEFICIENCY VIRUS [HIV] DISEASE: ICD-10-CM

## 2021-08-25 ENCOUNTER — OUTPATIENT (OUTPATIENT)
Dept: OUTPATIENT SERVICES | Facility: HOSPITAL | Age: 66
LOS: 1 days | Discharge: HOME | End: 2021-08-25

## 2021-08-26 ENCOUNTER — LABORATORY RESULT (OUTPATIENT)
Age: 66
End: 2021-08-26

## 2021-08-26 ENCOUNTER — OUTPATIENT (OUTPATIENT)
Dept: OUTPATIENT SERVICES | Facility: HOSPITAL | Age: 66
LOS: 1 days | Discharge: HOME | End: 2021-08-26

## 2021-08-26 ENCOUNTER — RX RENEWAL (OUTPATIENT)
Age: 66
End: 2021-08-26

## 2021-08-26 ENCOUNTER — NON-APPOINTMENT (OUTPATIENT)
Age: 66
End: 2021-08-26

## 2021-08-26 ENCOUNTER — APPOINTMENT (OUTPATIENT)
Dept: INFECTIOUS DISEASE | Facility: CLINIC | Age: 66
End: 2021-08-26
Payer: COMMERCIAL

## 2021-08-26 VITALS
SYSTOLIC BLOOD PRESSURE: 141 MMHG | WEIGHT: 217 LBS | DIASTOLIC BLOOD PRESSURE: 68 MMHG | HEART RATE: 65 BPM | TEMPERATURE: 98.6 F | HEIGHT: 77 IN | RESPIRATION RATE: 16 BRPM | BODY MASS INDEX: 25.62 KG/M2

## 2021-08-26 DIAGNOSIS — M16.12 UNILATERAL PRIMARY OSTEOARTHRITIS, LEFT HIP: ICD-10-CM

## 2021-08-26 DIAGNOSIS — E78.5 HYPERLIPIDEMIA, UNSPECIFIED: ICD-10-CM

## 2021-08-26 DIAGNOSIS — E03.9 HYPOTHYROIDISM, UNSPECIFIED: ICD-10-CM

## 2021-08-26 DIAGNOSIS — Z72.0 TOBACCO USE: ICD-10-CM

## 2021-08-26 DIAGNOSIS — G62.9 POLYNEUROPATHY, UNSPECIFIED: ICD-10-CM

## 2021-08-26 DIAGNOSIS — K21.9 GASTRO-ESOPHAGEAL REFLUX DISEASE WITHOUT ESOPHAGITIS: ICD-10-CM

## 2021-08-26 DIAGNOSIS — B20 HUMAN IMMUNODEFICIENCY VIRUS [HIV] DISEASE: ICD-10-CM

## 2021-08-26 DIAGNOSIS — I10 ESSENTIAL (PRIMARY) HYPERTENSION: ICD-10-CM

## 2021-08-26 DIAGNOSIS — F11.20 OPIOID DEPENDENCE, UNCOMPLICATED: ICD-10-CM

## 2021-08-26 DIAGNOSIS — M19.90 UNSPECIFIED OSTEOARTHRITIS, UNSPECIFIED SITE: ICD-10-CM

## 2021-08-26 DIAGNOSIS — E23.6 OTHER DISORDERS OF PITUITARY GLAND: ICD-10-CM

## 2021-08-26 PROCEDURE — 99214 OFFICE O/P EST MOD 30 MIN: CPT

## 2021-08-26 RX ORDER — DOCUSATE SODIUM 100 MG/1
100 CAPSULE ORAL
Qty: 90 | Refills: 0 | Status: COMPLETED | COMMUNITY
Start: 2020-07-28 | End: 2021-08-26

## 2021-08-26 RX ORDER — POLYETHYLENE GLYCOL 3350 17 G/17G
17 POWDER, FOR SOLUTION ORAL DAILY
Qty: 30 | Refills: 0 | Status: DISCONTINUED | COMMUNITY
Start: 2017-12-08 | End: 2021-08-26

## 2021-08-26 RX ORDER — OXYCODONE HYDROCHLORIDE 30 MG/1
30 TABLET ORAL
Qty: 120 | Refills: 0 | Status: DISCONTINUED | COMMUNITY
Start: 2020-11-17 | End: 2021-08-26

## 2021-08-26 RX ORDER — STANDARDIZED SENNA CONCENTRATE 8.6 MG/1
8.6 TABLET ORAL
Qty: 30 | Refills: 2 | Status: DISCONTINUED | COMMUNITY
Start: 2017-12-08 | End: 2021-08-26

## 2021-08-26 RX ORDER — ASPIRIN 325 MG/1
TABLET, FILM COATED ORAL
Refills: 0 | Status: COMPLETED | COMMUNITY
End: 2021-08-26

## 2021-08-26 RX ORDER — POLYETHYLENE GLYCOL 3350 AND ELECTROLYTES WITH LEMON FLAVOR 236; 22.74; 6.74; 5.86; 2.97 G/4L; G/4L; G/4L; G/4L; G/4L
236 POWDER, FOR SOLUTION ORAL
Qty: 1 | Refills: 0 | Status: COMPLETED | COMMUNITY
Start: 2017-12-08 | End: 2021-08-26

## 2021-08-26 NOTE — REVIEW OF SYSTEMS
[As Noted in HPI] : as noted in HPI [Joint Pain] : joint pain [Joint Swelling] : no joint swelling [Joint Stiffness] : no joint stiffness [Limb Pain] : limb pain [Limb Swelling] : no limb swelling [Negative] : Heme/Lymph [___ # of Missed Doses in The Past Week] : [unfilled] doses missed in the past week

## 2021-08-26 NOTE — ASSESSMENT
[FreeTextEntry1] : Stable HIV with severe OA left hip\par     All lab results were reviewed with pt today with full understanding. Increased triglycerides but specimen was non-fasting\par     Continue present meds\par      Pt advised to make appt with Orthopedist for left THR  [Treatment Adherence] : treatment adherence [Rx Dose / Side Effects] : Rx dose/side effects [Nutritional / Food Issues] : nutritional/food issues [Medical Care Issues] : medical care issues [Sexuality / Safer Sex] : sexuality/safer sex [Smoking Cessation] : smoking cessation

## 2021-08-26 NOTE — HISTORY OF PRESENT ILLNESS
[FreeTextEntry1] : Pt here for monthly F/U for pain meds and lab results. Still C/O left hip pain but he hasn't made the Orthopedic appt yet. He stated the Oxycodone has been alleviating the pain although still with difficulty ambulating and using a cane. He reported full compliance with all meds without any SE. He reported that the Colace helps  prevent his constipation. [Sexually Active] : The patient is sexually active [Condom Use] : using condoms [Condom Use - All Encounters] : for every encounter [Men] : with men [Male ___] : [unfilled] male

## 2021-08-26 NOTE — PHYSICAL EXAM
[General Appearance - Alert] : alert [General Appearance - In No Acute Distress] : in no acute distress [FreeTextEntry1] : Overweight, gait slow with use of a cane [Sclera] : the sclera and conjunctiva were normal [PERRL With Normal Accommodation] : pupils were equal in size, round, reactive to light [Extraocular Movements] : extraocular movements were intact [Outer Ear] : the ears and nose were normal in appearance [Oropharynx] : the oropharynx was normal with no thrush [Neck Appearance] : the appearance of the neck was normal [Neck Cervical Mass (___cm)] : no neck mass was observed [Jugular Venous Distention Increased] : there was no jugular-venous distention [Thyroid Diffuse Enlargement] : the thyroid was not enlarged [Auscultation Breath Sounds / Voice Sounds] : lungs were clear to auscultation bilaterally [Heart Rate And Rhythm] : heart rate was normal and rhythm regular [Heart Sounds] : normal S1 and S2 [Heart Sounds Gallop] : no gallops [Murmurs] : no murmurs [Heart Sounds Pericardial Friction Rub] : no pericardial rub [Full Pulse] : the pedal pulses are present [Edema] : there was no peripheral edema [Abdomen Soft] : soft [Bowel Sounds] : normal bowel sounds [Abdomen Tenderness] : non-tender [Abdomen Mass (___ Cm)] : no abdominal mass palpated [Costovertebral Angle Tenderness] : no CVA tenderness [No Palpable Adenopathy] : no palpable adenopathy [Musculoskeletal - Swelling] : no joint swelling [Nail Clubbing] : no clubbing  or cyanosis of the fingernails [Motor Tone] : muscle strength and tone were normal [Skin Color & Pigmentation] : normal skin color and pigmentation [] : no rash [Deep Tendon Reflexes (DTR)] : deep tendon reflexes were 2+ and symmetric [Sensation] : the sensory exam was normal to light touch and pinprick [No Focal Deficits] : no focal deficits [Affect] : the affect was normal [Oriented To Time, Place, And Person] : oriented to person, place, and time

## 2021-08-30 LAB — SIUH URINE DRUG SCREEN 1: NORMAL

## 2021-08-31 DIAGNOSIS — B20 HUMAN IMMUNODEFICIENCY VIRUS [HIV] DISEASE: ICD-10-CM

## 2021-09-01 DIAGNOSIS — B20 HUMAN IMMUNODEFICIENCY VIRUS [HIV] DISEASE: ICD-10-CM

## 2021-09-20 ENCOUNTER — NON-APPOINTMENT (OUTPATIENT)
Age: 66
End: 2021-09-20

## 2021-09-20 ENCOUNTER — OUTPATIENT (OUTPATIENT)
Dept: OUTPATIENT SERVICES | Facility: HOSPITAL | Age: 66
LOS: 1 days | Discharge: HOME | End: 2021-09-20

## 2021-09-20 DIAGNOSIS — B20 HUMAN IMMUNODEFICIENCY VIRUS [HIV] DISEASE: ICD-10-CM

## 2021-09-22 ENCOUNTER — NON-APPOINTMENT (OUTPATIENT)
Age: 66
End: 2021-09-22

## 2021-09-22 ENCOUNTER — OUTPATIENT (OUTPATIENT)
Dept: OUTPATIENT SERVICES | Facility: HOSPITAL | Age: 66
LOS: 1 days | Discharge: HOME | End: 2021-09-22

## 2021-09-23 ENCOUNTER — APPOINTMENT (OUTPATIENT)
Dept: INFECTIOUS DISEASE | Facility: CLINIC | Age: 66
End: 2021-09-23
Payer: COMMERCIAL

## 2021-09-23 ENCOUNTER — OUTPATIENT (OUTPATIENT)
Dept: OUTPATIENT SERVICES | Facility: HOSPITAL | Age: 66
LOS: 1 days | Discharge: HOME | End: 2021-09-23

## 2021-09-23 VITALS
HEIGHT: 72.5 IN | BODY MASS INDEX: 29.38 KG/M2 | WEIGHT: 219.31 LBS | SYSTOLIC BLOOD PRESSURE: 124 MMHG | HEART RATE: 60 BPM | TEMPERATURE: 98.2 F | RESPIRATION RATE: 14 BRPM | DIASTOLIC BLOOD PRESSURE: 70 MMHG

## 2021-09-23 DIAGNOSIS — M16.12 UNILATERAL PRIMARY OSTEOARTHRITIS, LEFT HIP: ICD-10-CM

## 2021-09-23 DIAGNOSIS — K26.9 DUODENAL ULCER, UNSPECIFIED AS ACUTE OR CHRONIC, WITHOUT HEMORRHAGE OR PERFORATION: ICD-10-CM

## 2021-09-23 DIAGNOSIS — I10 ESSENTIAL (PRIMARY) HYPERTENSION: ICD-10-CM

## 2021-09-23 DIAGNOSIS — B20 HUMAN IMMUNODEFICIENCY VIRUS [HIV] DISEASE: ICD-10-CM

## 2021-09-23 DIAGNOSIS — G62.9 POLYNEUROPATHY, UNSPECIFIED: ICD-10-CM

## 2021-09-23 PROCEDURE — 99214 OFFICE O/P EST MOD 30 MIN: CPT

## 2021-09-23 NOTE — ASSESSMENT
[Treatment Adherence] : treatment adherence [Rx Dose / Side Effects] : Rx dose/side effects [Nutritional / Food Issues] : nutritional/food issues [Smoking Cessation] : smoking cessation [FreeTextEntry1] : Stable HIV\par     Pt declined flu vaccine today\par     Pt encouraged to get the Covid 19 vaccine

## 2021-09-23 NOTE — PHYSICAL EXAM
[General Appearance - Alert] : alert [General Appearance - In No Acute Distress] : in no acute distress [Sclera] : the sclera and conjunctiva were normal [PERRL With Normal Accommodation] : pupils were equal in size, round, reactive to light [Extraocular Movements] : extraocular movements were intact [Outer Ear] : the ears and nose were normal in appearance [Oropharynx] : the oropharynx was normal with no thrush [Neck Appearance] : the appearance of the neck was normal [Neck Cervical Mass (___cm)] : no neck mass was observed [Jugular Venous Distention Increased] : there was no jugular-venous distention [Thyroid Diffuse Enlargement] : the thyroid was not enlarged [Auscultation Breath Sounds / Voice Sounds] : lungs were clear to auscultation bilaterally [Heart Rate And Rhythm] : heart rate was normal and rhythm regular [Heart Sounds] : normal S1 and S2 [Heart Sounds Gallop] : no gallops [Murmurs] : no murmurs [Full Pulse] : the pedal pulses are present [Heart Sounds Pericardial Friction Rub] : no pericardial rub [Edema] : there was no peripheral edema [Abdomen Soft] : soft [Bowel Sounds] : normal bowel sounds [Abdomen Tenderness] : non-tender [Abdomen Mass (___ Cm)] : no abdominal mass palpated [Costovertebral Angle Tenderness] : no CVA tenderness [No Palpable Adenopathy] : no palpable adenopathy [Musculoskeletal - Swelling] : no joint swelling [Nail Clubbing] : no clubbing  or cyanosis of the fingernails [Skin Color & Pigmentation] : normal skin color and pigmentation [] : no rash [Deep Tendon Reflexes (DTR)] : deep tendon reflexes were 2+ and symmetric [Sensation] : the sensory exam was normal to light touch and pinprick [No Focal Deficits] : no focal deficits [Oriented To Time, Place, And Person] : oriented to person, place, and time [Affect] : the affect was normal [FreeTextEntry1] : Decreased ROM of both hips

## 2021-09-23 NOTE — HISTORY OF PRESENT ILLNESS
[FreeTextEntry1] : Pt here for monthly renewal of pain meds and Testosterone. He still has chronic bilateral hip pain relieved by Oxycodone but he still hasn't made an appointment with the Orthopedist yet. No new complaints. He reported full compliance with all meds without any SE.

## 2021-09-24 DIAGNOSIS — B20 HUMAN IMMUNODEFICIENCY VIRUS [HIV] DISEASE: ICD-10-CM

## 2021-09-29 ENCOUNTER — NON-APPOINTMENT (OUTPATIENT)
Age: 66
End: 2021-09-29

## 2021-09-29 ENCOUNTER — OUTPATIENT (OUTPATIENT)
Dept: OUTPATIENT SERVICES | Facility: HOSPITAL | Age: 66
LOS: 1 days | Discharge: HOME | End: 2021-09-29

## 2021-10-11 ENCOUNTER — LABORATORY RESULT (OUTPATIENT)
Age: 66
End: 2021-10-11

## 2021-10-12 LAB
OXYCODONE UR-MCNC: NORMAL
SIUH URINE DRUG SCREEN 1: NORMAL

## 2021-10-20 ENCOUNTER — NON-APPOINTMENT (OUTPATIENT)
Age: 66
End: 2021-10-20

## 2021-10-20 ENCOUNTER — OUTPATIENT (OUTPATIENT)
Dept: OUTPATIENT SERVICES | Facility: HOSPITAL | Age: 66
LOS: 1 days | Discharge: HOME | End: 2021-10-20

## 2021-10-21 ENCOUNTER — APPOINTMENT (OUTPATIENT)
Dept: INFECTIOUS DISEASE | Facility: CLINIC | Age: 66
End: 2021-10-21
Payer: COMMERCIAL

## 2021-10-21 ENCOUNTER — OUTPATIENT (OUTPATIENT)
Dept: OUTPATIENT SERVICES | Facility: HOSPITAL | Age: 66
LOS: 1 days | Discharge: HOME | End: 2021-10-21

## 2021-10-21 VITALS
SYSTOLIC BLOOD PRESSURE: 140 MMHG | BODY MASS INDEX: 29.07 KG/M2 | TEMPERATURE: 98.7 F | WEIGHT: 217 LBS | HEIGHT: 72.5 IN | HEART RATE: 68 BPM | DIASTOLIC BLOOD PRESSURE: 80 MMHG | RESPIRATION RATE: 16 BRPM

## 2021-10-21 DIAGNOSIS — B20 HUMAN IMMUNODEFICIENCY VIRUS [HIV] DISEASE: ICD-10-CM

## 2021-10-21 DIAGNOSIS — M16.2 BILATERAL OSTEOARTHRITIS RESULTING FROM HIP DYSPLASIA: ICD-10-CM

## 2021-10-21 DIAGNOSIS — E23.0 HYPOPITUITARISM: ICD-10-CM

## 2021-10-21 DIAGNOSIS — E78.5 HYPERLIPIDEMIA, UNSPECIFIED: ICD-10-CM

## 2021-10-21 DIAGNOSIS — G62.9 POLYNEUROPATHY, UNSPECIFIED: ICD-10-CM

## 2021-10-21 DIAGNOSIS — E23.6 OTHER DISORDERS OF PITUITARY GLAND: ICD-10-CM

## 2021-10-21 PROCEDURE — 99214 OFFICE O/P EST MOD 30 MIN: CPT

## 2021-10-21 NOTE — ASSESSMENT
[FreeTextEntry1] : Stable HIV\par     Continue present meds including Oxycodone and Testosterone (IStop checked today and regularly)\par     Pt told to F/U with Orthopedist for THR evaluation\par     Pt declined flu vaccine despite my strong recommendations to get it. [Treatment Adherence] : treatment adherence [Rx Dose / Side Effects] : Rx dose/side effects [Nutritional / Food Issues] : nutritional/food issues [Medical Care Issues] : medical care issues [Smoking Cessation] : smoking cessation [Other: ____] : [unfilled]

## 2021-10-21 NOTE — REVIEW OF SYSTEMS
[Negative] : Heme/Lymph [As Noted in HPI] : as noted in HPI [Joint Pain] : joint pain [Joint Swelling] : no joint swelling [Joint Stiffness] : joint stiffness [Limb Pain] : limb pain [Limb Swelling] : no limb swelling [Confused] : no confusion [Convulsions] : no convulsions [Dizziness] : no dizziness [Limb Weakness] : limb weakness [___ # of Missed Doses in The Past Week] : [unfilled] doses missed in the past week

## 2021-10-21 NOTE — HISTORY OF PRESENT ILLNESS
[Sexually Active] : The patient is sexually active [Monogamous] : monogamous [Condom Use] : using condoms [Oral] : oral [Anal] : anal [Men] : with men [Male ___] : [unfilled] male [FreeTextEntry1] : Pt here for monthly HIV F/U with renewal of  Pain medication as well as testosterone and to do urine toxicology. He makes all of his appointments q month and urine toxicology consistently Positive for Oxycodone without any illicit substances. He has been on the Oxycodone x > 10 years for both neuropathic pain in his legs and severe OA in both hips with good response. He has had difficulty making an appointment with the Orthopedist for THR evaluation but he prefers to go in [place] where he had gone before. He reported full compliance with all meds without any SE.

## 2021-10-21 NOTE — PHYSICAL EXAM
[General Appearance - Alert] : alert [General Appearance - In No Acute Distress] : in no acute distress [Sclera] : the sclera and conjunctiva were normal [PERRL With Normal Accommodation] : pupils were equal in size, round, reactive to light [Extraocular Movements] : extraocular movements were intact [Outer Ear] : the ears and nose were normal in appearance [Oropharynx] : the oropharynx was normal with no thrush [Neck Appearance] : the appearance of the neck was normal [Neck Cervical Mass (___cm)] : no neck mass was observed [Jugular Venous Distention Increased] : there was no jugular-venous distention [Thyroid Diffuse Enlargement] : the thyroid was not enlarged [Auscultation Breath Sounds / Voice Sounds] : lungs were clear to auscultation bilaterally [Heart Rate And Rhythm] : heart rate was normal and rhythm regular [Heart Sounds] : normal S1 and S2 [Heart Sounds Gallop] : no gallops [Murmurs] : no murmurs [Heart Sounds Pericardial Friction Rub] : no pericardial rub [Full Pulse] : the pedal pulses are present [Edema] : there was no peripheral edema [Bowel Sounds] : normal bowel sounds [Abdomen Soft] : soft [Abdomen Tenderness] : non-tender [Abdomen Mass (___ Cm)] : no abdominal mass palpated [Costovertebral Angle Tenderness] : no CVA tenderness [No Palpable Adenopathy] : no palpable adenopathy [Musculoskeletal - Swelling] : no joint swelling [Nail Clubbing] : no clubbing  or cyanosis of the fingernails [FreeTextEntry1] : Decreased ROM both hips [Skin Color & Pigmentation] : normal skin color and pigmentation [] : no rash [Deep Tendon Reflexes (DTR)] : deep tendon reflexes were 2+ and symmetric [Sensation] : the sensory exam was normal to light touch and pinprick [No Focal Deficits] : no focal deficits [Oriented To Time, Place, And Person] : oriented to person, place, and time [Affect] : the affect was normal

## 2021-11-09 ENCOUNTER — LABORATORY RESULT (OUTPATIENT)
Age: 66
End: 2021-11-09

## 2021-11-10 ENCOUNTER — NON-APPOINTMENT (OUTPATIENT)
Age: 66
End: 2021-11-10

## 2021-11-10 LAB
OXYCODONE UR-MCNC: NORMAL
SIUH URINE DRUG SCREEN 1: NORMAL

## 2021-11-12 ENCOUNTER — OUTPATIENT (OUTPATIENT)
Dept: OUTPATIENT SERVICES | Facility: HOSPITAL | Age: 66
LOS: 1 days | Discharge: HOME | End: 2021-11-12

## 2021-11-12 ENCOUNTER — NON-APPOINTMENT (OUTPATIENT)
Age: 66
End: 2021-11-12

## 2021-11-15 ENCOUNTER — LABORATORY RESULT (OUTPATIENT)
Age: 66
End: 2021-11-15

## 2021-11-16 ENCOUNTER — NON-APPOINTMENT (OUTPATIENT)
Age: 66
End: 2021-11-16

## 2021-11-16 ENCOUNTER — OUTPATIENT (OUTPATIENT)
Dept: OUTPATIENT SERVICES | Facility: HOSPITAL | Age: 66
LOS: 1 days | Discharge: HOME | End: 2021-11-16

## 2021-11-17 LAB
OXYCODONE UR-MCNC: NORMAL
SIUH URINE DRUG SCREEN 1: NORMAL

## 2021-11-18 ENCOUNTER — LABORATORY RESULT (OUTPATIENT)
Age: 66
End: 2021-11-18

## 2021-11-18 ENCOUNTER — OUTPATIENT (OUTPATIENT)
Dept: OUTPATIENT SERVICES | Facility: HOSPITAL | Age: 66
LOS: 1 days | Discharge: HOME | End: 2021-11-18

## 2021-11-18 ENCOUNTER — APPOINTMENT (OUTPATIENT)
Dept: INFECTIOUS DISEASE | Facility: CLINIC | Age: 66
End: 2021-11-18
Payer: COMMERCIAL

## 2021-11-18 VITALS
SYSTOLIC BLOOD PRESSURE: 140 MMHG | WEIGHT: 207 LBS | BODY MASS INDEX: 27.73 KG/M2 | TEMPERATURE: 98.3 F | HEART RATE: 60 BPM | RESPIRATION RATE: 16 BRPM | HEIGHT: 72.5 IN | DIASTOLIC BLOOD PRESSURE: 80 MMHG

## 2021-11-18 DIAGNOSIS — I10 ESSENTIAL (PRIMARY) HYPERTENSION: ICD-10-CM

## 2021-11-18 DIAGNOSIS — M19.90 UNSPECIFIED OSTEOARTHRITIS, UNSPECIFIED SITE: ICD-10-CM

## 2021-11-18 DIAGNOSIS — E78.5 HYPERLIPIDEMIA, UNSPECIFIED: ICD-10-CM

## 2021-11-18 DIAGNOSIS — K21.9 GASTRO-ESOPHAGEAL REFLUX DISEASE WITHOUT ESOPHAGITIS: ICD-10-CM

## 2021-11-18 DIAGNOSIS — R82.90 UNSPECIFIED ABNORMAL FINDINGS IN URINE: ICD-10-CM

## 2021-11-18 DIAGNOSIS — G62.9 POLYNEUROPATHY, UNSPECIFIED: ICD-10-CM

## 2021-11-18 DIAGNOSIS — B20 HUMAN IMMUNODEFICIENCY VIRUS [HIV] DISEASE: ICD-10-CM

## 2021-11-18 PROCEDURE — 99214 OFFICE O/P EST MOD 30 MIN: CPT

## 2021-11-18 RX ORDER — PREGABALIN 200 MG/1
200 CAPSULE ORAL
Qty: 60 | Refills: 0 | Status: DISCONTINUED | COMMUNITY
Start: 2020-10-20 | End: 2021-11-18

## 2021-11-18 NOTE — PHYSICAL EXAM
[General Appearance - Alert] : alert [General Appearance - In No Acute Distress] : in no acute distress [FreeTextEntry1] : Overweight, walking with a cane [Sclera] : the sclera and conjunctiva were normal [PERRL With Normal Accommodation] : pupils were equal in size, round, reactive to light [Extraocular Movements] : extraocular movements were intact [Outer Ear] : the ears and nose were normal in appearance [Oropharynx] : the oropharynx was normal with no thrush [Neck Appearance] : the appearance of the neck was normal [Neck Cervical Mass (___cm)] : no neck mass was observed [Jugular Venous Distention Increased] : there was no jugular-venous distention [Thyroid Diffuse Enlargement] : the thyroid was not enlarged [Auscultation Breath Sounds / Voice Sounds] : lungs were clear to auscultation bilaterally [Heart Rate And Rhythm] : heart rate was normal and rhythm regular [Heart Sounds] : normal S1 and S2 [Heart Sounds Gallop] : no gallops [Murmurs] : no murmurs [Heart Sounds Pericardial Friction Rub] : no pericardial rub [Full Pulse] : the pedal pulses are present [Edema] : there was no peripheral edema [Bowel Sounds] : normal bowel sounds [Abdomen Soft] : soft [Abdomen Tenderness] : non-tender [Abdomen Mass (___ Cm)] : no abdominal mass palpated [Costovertebral Angle Tenderness] : no CVA tenderness [No Palpable Adenopathy] : no palpable adenopathy [Musculoskeletal - Swelling] : no joint swelling [Nail Clubbing] : no clubbing  or cyanosis of the fingernails [Skin Color & Pigmentation] : normal skin color and pigmentation [] : no rash [Deep Tendon Reflexes (DTR)] : deep tendon reflexes were 2+ and symmetric [Sensation] : the sensory exam was normal to light touch and pinprick [No Focal Deficits] : no focal deficits [Oriented To Time, Place, And Person] : oriented to person, place, and time [Affect] : the affect was normal

## 2021-11-18 NOTE — HISTORY OF PRESENT ILLNESS
[FreeTextEntry1] : Pt here for monthly HIV F/U for routine labs and renewal of pain meds and Testosterone. He still has chronic left hip pain relieved by Oxycodone. He denied taking it more than prescribed; UDS could not be done due to high urine ph x 2. Urine C&S done today to R/O UTI. He denied dysuria, abdominal pain, fevers but he did report urinary frequency. He reported full compliance with all meds without any SE.

## 2021-11-18 NOTE — ASSESSMENT
[FreeTextEntry1] : Stable HIV, R/O UTI, Kidney Disease (high urine ph)\par      Routine labs today with PSA level, UDS, Urine for Oxycodone\par      Continue present meds\par      RTC in 4 weeks [Treatment Education] : treatment education [Treatment Adherence] : treatment adherence [Rx Dose / Side Effects] : Rx dose/side effects [Medical Care Issues] : medical care issues [Drug Interactions / Side Effects] : drug interactions/side effects [Sexuality / Safer Sex] : sexuality/safer sex [Smoking Cessation] : smoking cessation

## 2021-11-18 NOTE — REVIEW OF SYSTEMS
[As Noted in HPI] : as noted in HPI [Joint Pain] : joint pain [Joint Swelling] : no joint swelling [Joint Stiffness] : no joint stiffness [Limb Pain] : no limb pain [Limb Swelling] : no limb swelling [Negative] : Heme/Lymph [___ # of Missed Doses in The Past Week] : [unfilled] doses missed in the past week

## 2021-11-22 DIAGNOSIS — B20 HUMAN IMMUNODEFICIENCY VIRUS [HIV] DISEASE: ICD-10-CM

## 2021-11-22 LAB
ALBUMIN SERPL ELPH-MCNC: 4.5 G/DL
ALP BLD-CCNC: 115 U/L
ALT SERPL-CCNC: 30 U/L
ANION GAP SERPL CALC-SCNC: 11 MMOL/L
APPEARANCE: ABNORMAL
AST SERPL-CCNC: 32 U/L
BACTERIA: ABNORMAL
BASOPHILS # BLD AUTO: 0.05 K/UL
BASOPHILS NFR BLD AUTO: 0.9 %
BILIRUB SERPL-MCNC: 0.2 MG/DL
BILIRUBIN URINE: NEGATIVE
BLOOD URINE: NEGATIVE
BUN SERPL-MCNC: 21 MG/DL
CALCIUM SERPL-MCNC: 9.9 MG/DL
CD16+CD56+ CELLS # BLD: 218 /UL
CD16+CD56+ CELLS NFR BLD: 18 %
CD19 CELLS NFR BLD: 21 /UL
CD3 CELLS # BLD: 975 /UL
CD3 CELLS NFR BLD: 80 %
CD3+CD4+ CELLS # BLD: 212 /UL
CD3+CD4+ CELLS NFR BLD: 17 %
CD3+CD4+ CELLS/CD3+CD8+ CLL SPEC: 0.28 RATIO
CD3+CD8+ CELLS # SPEC: 752 /UL
CD3+CD8+ CELLS NFR BLD: 61 %
CELLS.CD3-CD19+/CELLS IN BLOOD: 2 %
CHLORIDE SERPL-SCNC: 99 MMOL/L
CHOLEST SERPL-MCNC: 141 MG/DL
CO2 SERPL-SCNC: 29 MMOL/L
COLOR: YELLOW
CREAT SERPL-MCNC: 1.2 MG/DL
EOSINOPHIL # BLD AUTO: 0.18 K/UL
EOSINOPHIL NFR BLD AUTO: 3.3 %
ESTIMATED AVERAGE GLUCOSE: 126 MG/DL
GLUCOSE QUALITATIVE U: NEGATIVE
GLUCOSE SERPL-MCNC: 98 MG/DL
HBA1C MFR BLD HPLC: 6 %
HCT VFR BLD CALC: 39.3 %
HDLC SERPL-MCNC: 42 MG/DL
HGB BLD-MCNC: 13 G/DL
HYALINE CASTS: 1 /LPF
IMM GRANULOCYTES NFR BLD AUTO: 0.4 %
KETONES URINE: NEGATIVE
LDLC SERPL CALC-MCNC: 70 MG/DL
LEUKOCYTE ESTERASE URINE: NEGATIVE
LYMPHOCYTES # BLD AUTO: 1.13 K/UL
LYMPHOCYTES NFR BLD AUTO: 20.8 %
MAN DIFF?: NORMAL
MCHC RBC-ENTMCNC: 30.7 PG
MCHC RBC-ENTMCNC: 33.1 G/DL
MCV RBC AUTO: 92.9 FL
MICROSCOPIC-UA: NORMAL
MONOCYTES # BLD AUTO: 0.49 K/UL
MONOCYTES NFR BLD AUTO: 9 %
NEUTROPHILS # BLD AUTO: 3.55 K/UL
NEUTROPHILS NFR BLD AUTO: 65.6 %
NITRITE URINE: POSITIVE
NONHDLC SERPL-MCNC: 99 MG/DL
OXYCODONE UR-MCNC: NORMAL
PH URINE: 8
PLATELET # BLD AUTO: 250 K/UL
POTASSIUM SERPL-SCNC: 4.5 MMOL/L
PROT SERPL-MCNC: 7.1 G/DL
PROTEIN URINE: NEGATIVE
PSA SERPL-MCNC: 2.2 NG/ML
RBC # BLD: 4.23 M/UL
RBC # FLD: 13.8 %
RED BLOOD CELLS URINE: 0 /HPF
SODIUM SERPL-SCNC: 139 MMOL/L
SPECIFIC GRAVITY URINE: 1.01
SQUAMOUS EPITHELIAL CELLS: 2 /HPF
TRIGL SERPL-MCNC: 146 MG/DL
UROBILINOGEN URINE: NORMAL
VIRAL LOAD INTERP: NOT DETECTED COPIES/ML
VIRAL LOAD LOG: NOT DETECTED LOGCOPIES/ML
WBC # FLD AUTO: 5.42 K/UL
WHITE BLOOD CELLS URINE: 12 /HPF

## 2021-11-23 LAB — SIUH URINE DRUG SCREEN 1: NORMAL

## 2021-11-24 DIAGNOSIS — B20 HUMAN IMMUNODEFICIENCY VIRUS [HIV] DISEASE: ICD-10-CM

## 2021-11-30 ENCOUNTER — NON-APPOINTMENT (OUTPATIENT)
Age: 66
End: 2021-11-30

## 2021-11-30 ENCOUNTER — OUTPATIENT (OUTPATIENT)
Dept: OUTPATIENT SERVICES | Facility: HOSPITAL | Age: 66
LOS: 1 days | Discharge: HOME | End: 2021-11-30

## 2021-12-13 DIAGNOSIS — B20 HUMAN IMMUNODEFICIENCY VIRUS [HIV] DISEASE: ICD-10-CM

## 2021-12-13 LAB — BACTERIA UR CULT: NORMAL

## 2021-12-14 ENCOUNTER — OUTPATIENT (OUTPATIENT)
Dept: OUTPATIENT SERVICES | Facility: HOSPITAL | Age: 66
LOS: 1 days | Discharge: HOME | End: 2021-12-14

## 2021-12-14 ENCOUNTER — NON-APPOINTMENT (OUTPATIENT)
Age: 66
End: 2021-12-14

## 2021-12-15 ENCOUNTER — LABORATORY RESULT (OUTPATIENT)
Age: 66
End: 2021-12-15

## 2021-12-16 ENCOUNTER — LABORATORY RESULT (OUTPATIENT)
Age: 66
End: 2021-12-16

## 2021-12-16 ENCOUNTER — OUTPATIENT (OUTPATIENT)
Dept: OUTPATIENT SERVICES | Facility: HOSPITAL | Age: 66
LOS: 1 days | Discharge: HOME | End: 2021-12-16

## 2021-12-16 ENCOUNTER — APPOINTMENT (OUTPATIENT)
Dept: INFECTIOUS DISEASE | Facility: CLINIC | Age: 66
End: 2021-12-16
Payer: COMMERCIAL

## 2021-12-16 VITALS
DIASTOLIC BLOOD PRESSURE: 70 MMHG | TEMPERATURE: 97.4 F | HEIGHT: 72.5 IN | BODY MASS INDEX: 27.73 KG/M2 | RESPIRATION RATE: 14 BRPM | HEART RATE: 75 BPM | SYSTOLIC BLOOD PRESSURE: 155 MMHG | WEIGHT: 207 LBS | OXYGEN SATURATION: 97 %

## 2021-12-16 DIAGNOSIS — E78.5 HYPERLIPIDEMIA, UNSPECIFIED: ICD-10-CM

## 2021-12-16 DIAGNOSIS — G62.9 POLYNEUROPATHY, UNSPECIFIED: ICD-10-CM

## 2021-12-16 DIAGNOSIS — I10 ESSENTIAL (PRIMARY) HYPERTENSION: ICD-10-CM

## 2021-12-16 DIAGNOSIS — R73.03 PREDIABETES: ICD-10-CM

## 2021-12-16 DIAGNOSIS — E23.0 HYPOPITUITARISM: ICD-10-CM

## 2021-12-16 DIAGNOSIS — M19.90 UNSPECIFIED OSTEOARTHRITIS, UNSPECIFIED SITE: ICD-10-CM

## 2021-12-16 DIAGNOSIS — B20 HUMAN IMMUNODEFICIENCY VIRUS [HIV] DISEASE: ICD-10-CM

## 2021-12-16 PROCEDURE — 99214 OFFICE O/P EST MOD 30 MIN: CPT

## 2021-12-16 NOTE — PHYSICAL EXAM
[General Appearance - Alert] : alert [General Appearance - In No Acute Distress] : in no acute distress [Sclera] : the sclera and conjunctiva were normal [PERRL With Normal Accommodation] : pupils were equal in size, round, reactive to light [Extraocular Movements] : extraocular movements were intact [Outer Ear] : the ears and nose were normal in appearance [Oropharynx] : the oropharynx was normal with no thrush [Neck Appearance] : the appearance of the neck was normal [Neck Cervical Mass (___cm)] : no neck mass was observed [Jugular Venous Distention Increased] : there was no jugular-venous distention [Thyroid Diffuse Enlargement] : the thyroid was not enlarged [Auscultation Breath Sounds / Voice Sounds] : lungs were clear to auscultation bilaterally [Heart Rate And Rhythm] : heart rate was normal and rhythm regular [Heart Sounds] : normal S1 and S2 [Heart Sounds Gallop] : no gallops [Murmurs] : no murmurs [Heart Sounds Pericardial Friction Rub] : no pericardial rub [Full Pulse] : the pedal pulses are present [Edema] : there was no peripheral edema [Bowel Sounds] : normal bowel sounds [Abdomen Soft] : soft [Abdomen Tenderness] : non-tender [Abdomen Mass (___ Cm)] : no abdominal mass palpated [Costovertebral Angle Tenderness] : no CVA tenderness [No Palpable Adenopathy] : no palpable adenopathy [Musculoskeletal - Swelling] : no joint swelling [Nail Clubbing] : no clubbing  or cyanosis of the fingernails [FreeTextEntry1] : Decreased ROM of both hips (chronic) [Skin Color & Pigmentation] : normal skin color and pigmentation [] : no rash [Deep Tendon Reflexes (DTR)] : deep tendon reflexes were 2+ and symmetric [Sensation] : the sensory exam was normal to light touch and pinprick [No Focal Deficits] : no focal deficits [Oriented To Time, Place, And Person] : oriented to person, place, and time [Affect] : the affect was normal

## 2021-12-16 NOTE — ASSESSMENT
[FreeTextEntry1] : Stable HIV with chronic bilateral hip pain, left worse than right due to severe OA\par      Pt urged to make an appt with Orthopedist\par      Continue present meds; were all reviewed with pt today\par      Refer to Dietician to educate pt regarding Low Carb, sugar free diet to prevent Diabetes\par      RTC in 5 weeks for monthly follow up\par      Do U/A and urine C&S to R/O UTI since ph has been consistently high\par All lab results were given to pt today with full understanding.\par  [Treatment Education] : treatment education [Treatment Adherence] : treatment adherence [Rx Dose / Side Effects] : Rx dose/side effects [Nutritional / Food Issues] : nutritional/food issues [Medical Care Issues] : medical care issues [Drug Interactions / Side Effects] : drug interactions/side effects [Smoking Cessation] : smoking cessation

## 2021-12-16 NOTE — HISTORY OF PRESENT ILLNESS
[FreeTextEntry1] : Patient here today for HIV F/U and renewal of pain meds. He still has chronic, severe left hip pain relieved by Oxycodone. I reminded him to make an appt with Ortho because he needs left THR. He denied taking Oxycodone more than  prescribed or using any illicit substances. He had no new complaints. He reported full compliance with all meds without any SE. [Sexually Active] : The patient is sexually active [Monogamous] : monogamous [Condom Use] : using condoms [Condom Use - All Encounters] : for every encounter [Men] : with men [Male ___] : [unfilled] male

## 2021-12-16 NOTE — REVIEW OF SYSTEMS
[Negative] : Heme/Lymph [___ # of Missed Doses in The Past Week] : [unfilled] doses missed in the past week  [As Noted in HPI] : as noted in HPI [Joint Pain] : joint pain [Joint Swelling] : no joint swelling [Joint Stiffness] : joint stiffness [Limb Pain] : limb pain [Limb Swelling] : no limb swelling

## 2021-12-20 ENCOUNTER — OUTPATIENT (OUTPATIENT)
Dept: OUTPATIENT SERVICES | Facility: HOSPITAL | Age: 66
LOS: 1 days | Discharge: HOME | End: 2021-12-20

## 2021-12-20 ENCOUNTER — APPOINTMENT (OUTPATIENT)
Dept: INFECTIOUS DISEASE | Facility: CLINIC | Age: 66
End: 2021-12-20
Payer: COMMERCIAL

## 2021-12-20 DIAGNOSIS — N39.0 URINARY TRACT INFECTION, SITE NOT SPECIFIED: ICD-10-CM

## 2021-12-20 DIAGNOSIS — G62.9 POLYNEUROPATHY, UNSPECIFIED: ICD-10-CM

## 2021-12-20 DIAGNOSIS — B20 HUMAN IMMUNODEFICIENCY VIRUS [HIV] DISEASE: ICD-10-CM

## 2021-12-20 DIAGNOSIS — I10 ESSENTIAL (PRIMARY) HYPERTENSION: ICD-10-CM

## 2021-12-20 DIAGNOSIS — E78.5 HYPERLIPIDEMIA, UNSPECIFIED: ICD-10-CM

## 2021-12-20 LAB — BACTERIA UR CULT: NORMAL

## 2021-12-20 PROCEDURE — 99442: CPT

## 2021-12-20 RX ORDER — CHOLECALCIFEROL (VITAMIN D3) 1250 MCG
1.25 MG CAPSULE ORAL
Qty: 4 | Refills: 0 | Status: DISCONTINUED | COMMUNITY
Start: 2021-09-23

## 2021-12-20 RX ORDER — DOCUSATE SODIUM 100 MG/1
100 TABLET ORAL
Qty: 90 | Refills: 0 | Status: DISCONTINUED | COMMUNITY
Start: 2021-07-06 | End: 1900-01-01

## 2021-12-20 NOTE — ASSESSMENT
[FreeTextEntry1] : Stable HIV with UTI\par     Will treat with Cipro 500 mg bid x 10 days\par     Pt educated regarding dosage, importance of completing antibiotics\par     Pt also told to do UDS after he completes the antibiotics. [Treatment Education] : treatment education [Treatment Adherence] : treatment adherence [Rx Dose / Side Effects] : Rx dose/side effects [Risk Reduction] : risk reduction [Medical Care Issues] : medical care issues [Drug Interactions / Side Effects] : drug interactions/side effects [Sexuality / Safer Sex] : sexuality/safer sex

## 2021-12-20 NOTE — HISTORY OF PRESENT ILLNESS
[FreeTextEntry1] : Telelvisit done today for results of U/A and urine C&S. Patient with no new complaints.He has persistent high ph with urinary frequency, unprotected sex (men and women) and U/A with bacteriuria and protein. Urine C&S x 2 with more than 3 organisms although repeated with antiseptic techniques (possible collection contamination).  [Sexually Active] : The patient is sexually active [Monogamous] : is not monogamous [Condom Use] : using condoms [Condom Use - Some Encounters] : for some encounters [Anal] : anal [Both] : with men and women

## 2021-12-20 NOTE — REVIEW OF SYSTEMS
[As Noted in HPI] : as noted in HPI [Dysuria] : no dysuria [Penile Discharge] : no penile discharge [Hesitancy] : no urinary hesitancy [Nocturia] : no nocturia [Genital Lesion] : no genital lesions [Testicular Pain] : no testicular pain [Negative] : Heme/Lymph [___ # of Missed Doses in The Past Week] : [unfilled] doses missed in the past week

## 2021-12-28 DIAGNOSIS — B20 HUMAN IMMUNODEFICIENCY VIRUS [HIV] DISEASE: ICD-10-CM

## 2021-12-29 DIAGNOSIS — B20 HUMAN IMMUNODEFICIENCY VIRUS [HIV] DISEASE: ICD-10-CM

## 2022-01-04 DIAGNOSIS — B20 HUMAN IMMUNODEFICIENCY VIRUS [HIV] DISEASE: ICD-10-CM

## 2022-01-20 ENCOUNTER — LABORATORY RESULT (OUTPATIENT)
Age: 67
End: 2022-01-20

## 2022-01-20 ENCOUNTER — OUTPATIENT (OUTPATIENT)
Dept: OUTPATIENT SERVICES | Facility: HOSPITAL | Age: 67
LOS: 1 days | Discharge: HOME | End: 2022-01-20

## 2022-01-20 ENCOUNTER — APPOINTMENT (OUTPATIENT)
Dept: INFECTIOUS DISEASE | Facility: CLINIC | Age: 67
End: 2022-01-20
Payer: COMMERCIAL

## 2022-01-20 VITALS
HEART RATE: 66 BPM | SYSTOLIC BLOOD PRESSURE: 128 MMHG | TEMPERATURE: 68.5 F | DIASTOLIC BLOOD PRESSURE: 66 MMHG | WEIGHT: 208 LBS | HEIGHT: 78 IN | BODY MASS INDEX: 24.07 KG/M2 | RESPIRATION RATE: 14 BRPM | OXYGEN SATURATION: 96 %

## 2022-01-20 DIAGNOSIS — I10 ESSENTIAL (PRIMARY) HYPERTENSION: ICD-10-CM

## 2022-01-20 DIAGNOSIS — M19.90 UNSPECIFIED OSTEOARTHRITIS, UNSPECIFIED SITE: ICD-10-CM

## 2022-01-20 DIAGNOSIS — K21.9 GASTRO-ESOPHAGEAL REFLUX DISEASE WITHOUT ESOPHAGITIS: ICD-10-CM

## 2022-01-20 DIAGNOSIS — E78.5 HYPERLIPIDEMIA, UNSPECIFIED: ICD-10-CM

## 2022-01-20 DIAGNOSIS — F11.20 OPIOID DEPENDENCE, UNCOMPLICATED: ICD-10-CM

## 2022-01-20 DIAGNOSIS — B20 HUMAN IMMUNODEFICIENCY VIRUS [HIV] DISEASE: ICD-10-CM

## 2022-01-20 DIAGNOSIS — G89.29 OTHER CHRONIC PAIN: ICD-10-CM

## 2022-01-20 PROCEDURE — 99213 OFFICE O/P EST LOW 20 MIN: CPT | Mod: GC

## 2022-01-20 RX ORDER — CIPROFLOXACIN HYDROCHLORIDE 500 MG/1
500 TABLET, FILM COATED ORAL TWICE DAILY
Qty: 20 | Refills: 0 | Status: DISCONTINUED | COMMUNITY
Start: 2021-12-20 | End: 2022-01-20

## 2022-01-20 NOTE — REVIEW OF SYSTEMS
[As Noted in HPI] : as noted in HPI [Joint Pain] : joint pain [Joint Swelling] : no joint swelling [Joint Stiffness] : joint stiffness [Limb Pain] : limb pain [Limb Swelling] : no limb swelling [Negative] : Heme/Lymph [___ # of Missed Doses in The Past Week] : [unfilled] doses missed in the past week

## 2022-01-20 NOTE — HISTORY OF PRESENT ILLNESS
[FreeTextEntry1] : Pt here for HIV F/U with UDS and Pain medication renewal. He still has chronic severe pain in both hips relieved by Oxycodone. He takes the Oxycodone as prescribed without evidence of abuse or misuse of it. No new complaints but he stated that he had a sore throat x 2 days last weekend without fevers, cough, SOB or other associated symptoms. He did not go for Covid 19 testing. He still has not gotten the Covid 19 vaccine even though I strongly urged him to make an appt ASAP.  He also still hasn't made an appt with Orthopedics yet. He reported full compliance with all meds without any SE.  [Sexually Active] : The patient is sexually active [Monogamous] : is not monogamous [Condom Use] : using condoms [Condom Use - Some Encounters] : for some encounters [Women] : with women

## 2022-01-20 NOTE — ASSESSMENT
[FreeTextEntry1] : Stable HIV with Severe OA both hips\par        Pt strongly urged to make an appt for the Covid 19 vaccine ASAP especially because he is high risk with CoMorbidities\par        Continue present meds; all meds reviewed with pt today\par        UDS today and q visit\par        RTC in 4 weeks\par        Annual labs in March\par        Pt urged to make an Orthopedist appt\par       [Treatment Education] : treatment education [Treatment Adherence] : treatment adherence [Rx Dose / Side Effects] : Rx dose/side effects [Nutritional / Food Issues] : nutritional/food issues [Medical Care Issues] : medical care issues [Drug Interactions / Side Effects] : drug interactions/side effects [Smoking Cessation] : smoking cessation

## 2022-01-24 LAB
OXYCODONE UR-MCNC: POSITIVE
SIUH URINE DRUG SCREEN 1: NORMAL

## 2022-02-16 ENCOUNTER — NON-APPOINTMENT (OUTPATIENT)
Age: 67
End: 2022-02-16

## 2022-02-17 ENCOUNTER — LABORATORY RESULT (OUTPATIENT)
Age: 67
End: 2022-02-17

## 2022-02-17 ENCOUNTER — APPOINTMENT (OUTPATIENT)
Dept: INFECTIOUS DISEASE | Facility: CLINIC | Age: 67
End: 2022-02-17
Payer: COMMERCIAL

## 2022-02-17 ENCOUNTER — OUTPATIENT (OUTPATIENT)
Dept: OUTPATIENT SERVICES | Facility: HOSPITAL | Age: 67
LOS: 1 days | Discharge: HOME | End: 2022-02-17

## 2022-02-17 VITALS
HEART RATE: 85 BPM | TEMPERATURE: 99 F | OXYGEN SATURATION: 96 % | BODY MASS INDEX: 28.13 KG/M2 | RESPIRATION RATE: 14 BRPM | DIASTOLIC BLOOD PRESSURE: 66 MMHG | WEIGHT: 210 LBS | SYSTOLIC BLOOD PRESSURE: 125 MMHG | HEIGHT: 72.5 IN

## 2022-02-17 DIAGNOSIS — R73.03 PREDIABETES: ICD-10-CM

## 2022-02-17 DIAGNOSIS — G62.9 POLYNEUROPATHY, UNSPECIFIED: ICD-10-CM

## 2022-02-17 DIAGNOSIS — E23.0 HYPOPITUITARISM: ICD-10-CM

## 2022-02-17 DIAGNOSIS — B20 HUMAN IMMUNODEFICIENCY VIRUS [HIV] DISEASE: ICD-10-CM

## 2022-02-17 DIAGNOSIS — Z23 ENCOUNTER FOR IMMUNIZATION: ICD-10-CM

## 2022-02-17 DIAGNOSIS — I10 ESSENTIAL (PRIMARY) HYPERTENSION: ICD-10-CM

## 2022-02-17 DIAGNOSIS — M19.90 UNSPECIFIED OSTEOARTHRITIS, UNSPECIFIED SITE: ICD-10-CM

## 2022-02-17 DIAGNOSIS — E78.5 HYPERLIPIDEMIA, UNSPECIFIED: ICD-10-CM

## 2022-02-17 PROCEDURE — 99213 OFFICE O/P EST LOW 20 MIN: CPT

## 2022-02-17 NOTE — HISTORY OF PRESENT ILLNESS
[FreeTextEntry1] : Patient here for HIV F/U with Pain medication renewal, exam, and other RXs. He had no new complaints. He still takes Oxycodone for chronic bilateral hip and leg pain with good response. He also is on Testosterone injections q 2 weeks with good response and no ill effects. He reported full compliance with all meds without any SE.  [Sexually Active] : The patient is not sexually active

## 2022-02-17 NOTE — ASSESSMENT
[FreeTextEntry1] : Stable HIV with Severe OA of both hips, Neuropathy of legs\par      UDS, Urine for Oxycodone  today\par      Continue present meds; all meds were reviewed with patient today.\par      Pt told to F/U with Orthopedist at [facility] for THR\par      Will order first Covid 19 vaccine ( to be scheduled on a Friday in March)\par      Live visit in 4 weeks\par      Will do Annual labs next visit\par [facility] #[id number], last dispensed Oxycodone on1/30/22, #120 (30 day supply), last dispensed testosterone on 1/30/22, 28 day supply  \par       [Treatment Education] : treatment education [Treatment Adherence] : treatment adherence [Rx Dose / Side Effects] : Rx dose/side effects [Medical Care Issues] : medical care issues [Drug Interactions / Side Effects] : drug interactions/side effects

## 2022-02-17 NOTE — PHYSICAL EXAM
[General Appearance - Alert] : alert [General Appearance - In No Acute Distress] : in no acute distress [Sclera] : the sclera and conjunctiva were normal [PERRL With Normal Accommodation] : pupils were equal in size, round, reactive to light [Extraocular Movements] : extraocular movements were intact [Outer Ear] : the ears and nose were normal in appearance [Oropharynx] : the oropharynx was normal with no thrush [Neck Appearance] : the appearance of the neck was normal [Neck Cervical Mass (___cm)] : no neck mass was observed [Jugular Venous Distention Increased] : there was no jugular-venous distention [Thyroid Diffuse Enlargement] : the thyroid was not enlarged [Auscultation Breath Sounds / Voice Sounds] : lungs were clear to auscultation bilaterally [Heart Rate And Rhythm] : heart rate was normal and rhythm regular [Heart Sounds] : normal S1 and S2 [Heart Sounds Gallop] : no gallops [Murmurs] : no murmurs [Heart Sounds Pericardial Friction Rub] : no pericardial rub [Full Pulse] : the pedal pulses are present [Edema] : there was no peripheral edema [Bowel Sounds] : normal bowel sounds [Abdomen Soft] : soft [Abdomen Tenderness] : non-tender [Abdomen Mass (___ Cm)] : no abdominal mass palpated [Costovertebral Angle Tenderness] : no CVA tenderness [No Palpable Adenopathy] : no palpable adenopathy [Musculoskeletal - Swelling] : no joint swelling [Nail Clubbing] : no clubbing  or cyanosis of the fingernails [FreeTextEntry1] : Decreased ROM of both hips [Skin Color & Pigmentation] : normal skin color and pigmentation [] : no rash [Deep Tendon Reflexes (DTR)] : deep tendon reflexes were 2+ and symmetric [Sensation] : the sensory exam was normal to light touch and pinprick [No Focal Deficits] : no focal deficits [Oriented To Time, Place, And Person] : oriented to person, place, and time [Affect] : the affect was normal

## 2022-02-22 LAB
OXYCODONE UR-MCNC: NORMAL
SIUH URINE DRUG SCREEN 1: NORMAL

## 2022-02-24 ENCOUNTER — RX RENEWAL (OUTPATIENT)
Age: 67
End: 2022-02-24

## 2022-03-22 ENCOUNTER — OUTPATIENT (OUTPATIENT)
Dept: OUTPATIENT SERVICES | Facility: HOSPITAL | Age: 67
LOS: 1 days | Discharge: HOME | End: 2022-03-22

## 2022-03-23 ENCOUNTER — LABORATORY RESULT (OUTPATIENT)
Age: 67
End: 2022-03-23

## 2022-03-23 ENCOUNTER — OUTPATIENT (OUTPATIENT)
Dept: OUTPATIENT SERVICES | Facility: HOSPITAL | Age: 67
LOS: 1 days | Discharge: HOME | End: 2022-03-23

## 2022-03-23 ENCOUNTER — APPOINTMENT (OUTPATIENT)
Dept: INFECTIOUS DISEASE | Facility: CLINIC | Age: 67
End: 2022-03-23
Payer: COMMERCIAL

## 2022-03-23 VITALS
TEMPERATURE: 98.4 F | HEIGHT: 72.5 IN | RESPIRATION RATE: 18 BRPM | WEIGHT: 202 LBS | DIASTOLIC BLOOD PRESSURE: 67 MMHG | OXYGEN SATURATION: 98 % | BODY MASS INDEX: 27.06 KG/M2 | HEART RATE: 68 BPM | SYSTOLIC BLOOD PRESSURE: 151 MMHG

## 2022-03-23 DIAGNOSIS — B20 HUMAN IMMUNODEFICIENCY VIRUS [HIV] DISEASE: ICD-10-CM

## 2022-03-23 DIAGNOSIS — R29.898 OTHER SYMPTOMS AND SIGNS INVOLVING THE MUSCULOSKELETAL SYSTEM: ICD-10-CM

## 2022-03-23 DIAGNOSIS — M16.12 UNILATERAL PRIMARY OSTEOARTHRITIS, LEFT HIP: ICD-10-CM

## 2022-03-23 DIAGNOSIS — I10 ESSENTIAL (PRIMARY) HYPERTENSION: ICD-10-CM

## 2022-03-23 DIAGNOSIS — R25.8 OTHER ABNORMAL INVOLUNTARY MOVEMENTS: ICD-10-CM

## 2022-03-23 DIAGNOSIS — M19.90 UNSPECIFIED OSTEOARTHRITIS, UNSPECIFIED SITE: ICD-10-CM

## 2022-03-23 DIAGNOSIS — K21.9 GASTRO-ESOPHAGEAL REFLUX DISEASE WITHOUT ESOPHAGITIS: ICD-10-CM

## 2022-03-23 DIAGNOSIS — E78.5 HYPERLIPIDEMIA, UNSPECIFIED: ICD-10-CM

## 2022-03-23 DIAGNOSIS — H53.8 OTHER VISUAL DISTURBANCES: ICD-10-CM

## 2022-03-23 PROCEDURE — 99214 OFFICE O/P EST MOD 30 MIN: CPT

## 2022-03-24 LAB
25(OH)D3 SERPL-MCNC: 51 NG/ML
ALBUMIN SERPL ELPH-MCNC: 4.4 G/DL
ALP BLD-CCNC: 139 U/L
ALT SERPL-CCNC: 62 U/L
ANION GAP SERPL CALC-SCNC: 12 MMOL/L
APPEARANCE: ABNORMAL
AST SERPL-CCNC: 70 U/L
BILIRUB SERPL-MCNC: <0.2 MG/DL
BILIRUBIN URINE: NEGATIVE
BLOOD URINE: NEGATIVE
BUN SERPL-MCNC: 39 MG/DL
C TRACH RRNA SPEC QL NAA+PROBE: NOT DETECTED
CALCIUM SERPL-MCNC: 10.1 MG/DL
CD16+CD56+ CELLS # BLD: 350 /UL
CD16+CD56+ CELLS NFR BLD: 20 %
CD19 CELLS NFR BLD: 12 /UL
CD3 CELLS # BLD: 1337 /UL
CD3 CELLS NFR BLD: 78 %
CD3+CD4+ CELLS # BLD: 246 /UL
CD3+CD4+ CELLS NFR BLD: 14 %
CD3+CD4+ CELLS/CD3+CD8+ CLL SPEC: 0.23 RATIO
CD3+CD8+ CELLS # SPEC: 1065 /UL
CD3+CD8+ CELLS NFR BLD: 62 %
CELLS.CD3-CD19+/CELLS IN BLOOD: 1 %
CHLORIDE SERPL-SCNC: 103 MMOL/L
CHOLEST SERPL-MCNC: 162 MG/DL
CO2 SERPL-SCNC: 23 MMOL/L
COLOR: YELLOW
CREAT SERPL-MCNC: 1.5 MG/DL
EGFR: 51 ML/MIN/1.73M2
ESTIMATED AVERAGE GLUCOSE: 131 MG/DL
GGT SERPL-CCNC: 145 U/L
GLUCOSE QUALITATIVE U: NEGATIVE
GLUCOSE SERPL-MCNC: 104 MG/DL
HBA1C MFR BLD HPLC: 6.2 %
HDLC SERPL-MCNC: 30 MG/DL
KETONES URINE: NEGATIVE
LDH SERPL-CCNC: 188 U/L
LDLC SERPL CALC-MCNC: 65 MG/DL
LEUKOCYTE ESTERASE URINE: NEGATIVE
N GONORRHOEA RRNA SPEC QL NAA+PROBE: NOT DETECTED
NITRITE URINE: POSITIVE
NONHDLC SERPL-MCNC: 132 MG/DL
PH URINE: 8.5
POTASSIUM SERPL-SCNC: 5.3 MMOL/L
PROT SERPL-MCNC: 7.8 G/DL
PROTEIN URINE: ABNORMAL
PSA SERPL-MCNC: 0.37 NG/ML
SIUH URINE DRUG SCREEN 1: NORMAL
SODIUM SERPL-SCNC: 138 MMOL/L
SOURCE AMPLIFICATION: NORMAL
SPECIFIC GRAVITY URINE: 1.02
T PALLIDUM AB SER QL IA: NEGATIVE
TRIGL SERPL-MCNC: 267 MG/DL
TSH SERPL-ACNC: 2.19 UIU/ML
UROBILINOGEN URINE: NORMAL

## 2022-03-25 ENCOUNTER — APPOINTMENT (OUTPATIENT)
Dept: INFECTIOUS DISEASE | Facility: CLINIC | Age: 67
End: 2022-03-25

## 2022-03-28 ENCOUNTER — RX RENEWAL (OUTPATIENT)
Age: 67
End: 2022-03-28

## 2022-03-28 LAB
HAV IGM SER QL: NONREACTIVE
HBV CORE IGG+IGM SER QL: REACTIVE
HBV SURFACE AB SER QL: NONREACTIVE
HBV SURFACE AG SER QL: NONREACTIVE
HCV AB SER QL: REACTIVE
HCV S/CO RATIO: 15.92 S/CO
HEPATITIS A IGG ANTIBODY: REACTIVE
HIV1 RNA # SERPL NAA+PROBE: NORMAL COPIES/ML
M TB IFN-G BLD-IMP: NEGATIVE
QUANTIFERON TB PLUS MITOGEN MINUS NIL: 3.1 IU/ML
QUANTIFERON TB PLUS NIL: 0.73 IU/ML
QUANTIFERON TB PLUS TB1 MINUS NIL: -0.03 IU/ML
QUANTIFERON TB PLUS TB2 MINUS NIL: 0.05 IU/ML
VIRAL LOAD INTERP: DETECTED COPIES/ML
VIRAL LOAD LOG: 4.66 LOGCOPIES/ML

## 2022-03-28 NOTE — ASSESSMENT
[Treatment Education] : treatment education [Treatment Adherence] : treatment adherence [Rx Dose / Side Effects] : Rx dose/side effects [Nutritional / Food Issues] : nutritional/food issues [Medical Care Issues] : medical care issues [Drug Interactions / Side Effects] : drug interactions/side effects [EtOH Counseling] : EtOH counseling [FreeTextEntry1] : Stable HIV with weakness of both hands, R/O neuropathy, radiculopathy, recurrence of CTS\par                  Do Annual labs today with UDS and Urine for Oxycodone\par                  EMG of Upper Extremities\par                  Eye referral (routine)\par                  Continue present meds; all meds were reviewed with him today.\par                  Live visit in 4 weeks\par                  RN will call to try to get recent Covid 19 results and will order Covid 19 vaccine accordingly.\par Bilateral OA, severe of hips\par                  Told pt to F/U with Orthopedist soon for THR (left first)\par                  [facility] #[id number], Testosterone last dispensed on 2/27/22 (28 d supply), Oxycodone last dispensed on 2/27/22 (30 day supply), Lyrica last dispensed on 2/27/22 (30 day supply)\par

## 2022-03-28 NOTE — HISTORY OF PRESENT ILLNESS
[FreeTextEntry1] : Patient here for HIV F/U with Annual labs and UDS,Urine for Oxycodone. S/P low grade temp with vomiting on 3/11/22 so his Covid vaccine was postponed. He stated he went for Covid testing the following week at [facility] but he stated that he never got the results. He had no recent fevers, cough loss of taste, SOB, and no further vomiting. He C/O weakness of both hands at times for the past month, with abnormal "jerky" type movements of hands and he has been dropping things over the past month. He denied pain or numbness in his hands or upper extremities but he is already on Lyrica bid for Neuropathy of legs which may be helping any pain or numbness.  No new medications and he has been taking all of his meds as prescribed including the Lyrica. He denied any SE from his meds. He admitted to drinking Scotch a few times a week. He denied illicit drug use. He stated that the Oxycodone still alleviates his hip pain. He lost 8 lbs in the past month, may be due to vomiting almost 2 weeks ago; reported better appetite and eating well now.  [Sexually Active] : The patient is not sexually active

## 2022-03-28 NOTE — PHYSICAL EXAM
[General Appearance - Alert] : alert [General Appearance - In No Acute Distress] : in no acute distress [Sclera] : the sclera and conjunctiva were normal [PERRL With Normal Accommodation] : pupils were equal in size, round, reactive to light [Extraocular Movements] : extraocular movements were intact [Outer Ear] : the ears and nose were normal in appearance [Oropharynx] : the oropharynx was normal with no thrush [Neck Appearance] : the appearance of the neck was normal [Neck Cervical Mass (___cm)] : no neck mass was observed [Jugular Venous Distention Increased] : there was no jugular-venous distention [Thyroid Diffuse Enlargement] : the thyroid was not enlarged [Auscultation Breath Sounds / Voice Sounds] : lungs were clear to auscultation bilaterally [Heart Rate And Rhythm] : heart rate was normal and rhythm regular [Heart Sounds] : normal S1 and S2 [Heart Sounds Gallop] : no gallops [Murmurs] : no murmurs [Heart Sounds Pericardial Friction Rub] : no pericardial rub [Full Pulse] : the pedal pulses are present [Edema] : there was no peripheral edema [Bowel Sounds] : normal bowel sounds [Abdomen Soft] : soft [Abdomen Tenderness] : non-tender [Abdomen Mass (___ Cm)] : no abdominal mass palpated [Costovertebral Angle Tenderness] : no CVA tenderness [No Palpable Adenopathy] : no palpable adenopathy [Musculoskeletal - Swelling] : no joint swelling [Nail Clubbing] : no clubbing  or cyanosis of the fingernails [Skin Color & Pigmentation] : normal skin color and pigmentation [] : no rash [Sensation] : the sensory exam was normal to light touch and pinprick [Oriented To Time, Place, And Person] : oriented to person, place, and time [Affect] : the affect was normal [FreeTextEntry1] : Decreased strength of upper extremities, 4/5 B/L

## 2022-03-29 DIAGNOSIS — B20 HUMAN IMMUNODEFICIENCY VIRUS [HIV] DISEASE: ICD-10-CM

## 2022-03-30 LAB
BASOPHILS # BLD AUTO: 0.07 K/UL
BASOPHILS NFR BLD AUTO: 2 %
EOSINOPHIL # BLD AUTO: 0.19 K/UL
EOSINOPHIL NFR BLD AUTO: 5.5 %
HCT VFR BLD CALC: 31.9 %
HGB BLD-MCNC: 10.2 G/DL
IMM GRANULOCYTES NFR BLD AUTO: 0.3 %
LYMPHOCYTES # BLD AUTO: 1.47 K/UL
LYMPHOCYTES NFR BLD AUTO: 42.6 %
MAN DIFF?: NORMAL
MCHC RBC-ENTMCNC: 30.7 PG
MCHC RBC-ENTMCNC: 32 G/DL
MCV RBC AUTO: 96.1 FL
MONOCYTES # BLD AUTO: 0.51 K/UL
MONOCYTES NFR BLD AUTO: 14.8 %
NEUTROPHILS # BLD AUTO: 1.2 K/UL
NEUTROPHILS NFR BLD AUTO: 34.8 %
PLATELET # BLD AUTO: 256 K/UL
RBC # BLD: 3.32 M/UL
RBC # FLD: 14.2 %
WBC # FLD AUTO: 3.45 K/UL

## 2022-04-01 ENCOUNTER — APPOINTMENT (OUTPATIENT)
Dept: INFECTIOUS DISEASE | Facility: CLINIC | Age: 67
End: 2022-04-01

## 2022-04-08 ENCOUNTER — APPOINTMENT (OUTPATIENT)
Dept: INFECTIOUS DISEASE | Facility: CLINIC | Age: 67
End: 2022-04-08

## 2022-04-08 ENCOUNTER — OUTPATIENT (OUTPATIENT)
Dept: OUTPATIENT SERVICES | Facility: HOSPITAL | Age: 67
LOS: 1 days | Discharge: HOME | End: 2022-04-08

## 2022-04-08 VITALS
BODY MASS INDEX: 26.93 KG/M2 | WEIGHT: 201 LBS | TEMPERATURE: 98.2 F | SYSTOLIC BLOOD PRESSURE: 137 MMHG | OXYGEN SATURATION: 97 % | RESPIRATION RATE: 18 BRPM | HEART RATE: 64 BPM | HEIGHT: 72.5 IN | DIASTOLIC BLOOD PRESSURE: 69 MMHG

## 2022-04-08 DIAGNOSIS — Z23 ENCOUNTER FOR IMMUNIZATION: ICD-10-CM

## 2022-04-19 ENCOUNTER — OUTPATIENT (OUTPATIENT)
Dept: OUTPATIENT SERVICES | Facility: HOSPITAL | Age: 67
LOS: 1 days | Discharge: HOME | End: 2022-04-19

## 2022-04-20 ENCOUNTER — NON-APPOINTMENT (OUTPATIENT)
Age: 67
End: 2022-04-20

## 2022-04-25 DIAGNOSIS — B20 HUMAN IMMUNODEFICIENCY VIRUS [HIV] DISEASE: ICD-10-CM

## 2022-04-27 ENCOUNTER — OUTPATIENT (OUTPATIENT)
Dept: OUTPATIENT SERVICES | Facility: HOSPITAL | Age: 67
LOS: 1 days | Discharge: HOME | End: 2022-04-27

## 2022-04-27 ENCOUNTER — APPOINTMENT (OUTPATIENT)
Dept: INFECTIOUS DISEASE | Facility: CLINIC | Age: 67
End: 2022-04-27
Payer: COMMERCIAL

## 2022-04-27 ENCOUNTER — NON-APPOINTMENT (OUTPATIENT)
Age: 67
End: 2022-04-27

## 2022-04-27 VITALS
HEART RATE: 70 BPM | DIASTOLIC BLOOD PRESSURE: 80 MMHG | WEIGHT: 200 LBS | HEIGHT: 72.5 IN | BODY MASS INDEX: 26.8 KG/M2 | RESPIRATION RATE: 14 BRPM | OXYGEN SATURATION: 98 % | SYSTOLIC BLOOD PRESSURE: 141 MMHG | TEMPERATURE: 98.5 F

## 2022-04-27 DIAGNOSIS — R74.8 ABNORMAL LEVELS OF OTHER SERUM ENZYMES: ICD-10-CM

## 2022-04-27 DIAGNOSIS — D64.9 ANEMIA, UNSPECIFIED: ICD-10-CM

## 2022-04-27 DIAGNOSIS — E78.5 HYPERLIPIDEMIA, UNSPECIFIED: ICD-10-CM

## 2022-04-27 DIAGNOSIS — I10 ESSENTIAL (PRIMARY) HYPERTENSION: ICD-10-CM

## 2022-04-27 DIAGNOSIS — R73.03 PREDIABETES: ICD-10-CM

## 2022-04-27 DIAGNOSIS — B20 HUMAN IMMUNODEFICIENCY VIRUS [HIV] DISEASE: ICD-10-CM

## 2022-04-27 DIAGNOSIS — G62.9 POLYNEUROPATHY, UNSPECIFIED: ICD-10-CM

## 2022-04-27 DIAGNOSIS — M19.90 UNSPECIFIED OSTEOARTHRITIS, UNSPECIFIED SITE: ICD-10-CM

## 2022-04-27 PROCEDURE — 99214 OFFICE O/P EST MOD 30 MIN: CPT

## 2022-04-27 NOTE — PHYSICAL EXAM
[General Appearance - Alert] : alert [General Appearance - In No Acute Distress] : in no acute distress [FreeTextEntry1] : Overweight, walking with a cane [Sclera] : the sclera and conjunctiva were normal [PERRL With Normal Accommodation] : pupils were equal in size, round, reactive to light [Extraocular Movements] : extraocular movements were intact [Outer Ear] : the ears and nose were normal in appearance [Oropharynx] : the oropharynx was normal with no thrush [Neck Appearance] : the appearance of the neck was normal [Neck Cervical Mass (___cm)] : no neck mass was observed [Jugular Venous Distention Increased] : there was no jugular-venous distention [Thyroid Diffuse Enlargement] : the thyroid was not enlarged [Auscultation Breath Sounds / Voice Sounds] : lungs were clear to auscultation bilaterally [Heart Rate And Rhythm] : heart rate was normal and rhythm regular [Heart Sounds] : normal S1 and S2 [Heart Sounds Gallop] : no gallops [Murmurs] : no murmurs [Heart Sounds Pericardial Friction Rub] : no pericardial rub [Full Pulse] : the pedal pulses are present [Edema] : there was no peripheral edema [Bowel Sounds] : normal bowel sounds [Abdomen Soft] : soft [Abdomen Tenderness] : non-tender [Abdomen Mass (___ Cm)] : no abdominal mass palpated [Costovertebral Angle Tenderness] : no CVA tenderness [No Palpable Adenopathy] : no palpable adenopathy [Musculoskeletal - Swelling] : no joint swelling [Nail Clubbing] : no clubbing  or cyanosis of the fingernails [Motor Tone] : muscle strength and tone were normal [Skin Color & Pigmentation] : normal skin color and pigmentation [] : no rash [Deep Tendon Reflexes (DTR)] : deep tendon reflexes were 2+ and symmetric [Sensation] : the sensory exam was normal to light touch and pinprick [No Focal Deficits] : no focal deficits [Oriented To Time, Place, And Person] : oriented to person, place, and time [Affect] : the affect was normal

## 2022-04-27 NOTE — HISTORY OF PRESENT ILLNESS
[FreeTextEntry1] : Patient here for monthly HIV F/U for pain medication renewal. He still has left hip pain but stated it has not gotten worse and the Oxycodone alleviates the pain. No new complaints. He admitted to still drinking alcohol as well as missing some doses of his meds including HAART. He stated that he forgets to take them sometimes. He denied having any SE from his meds. He requested more hours for his HHA from 3 days a week to 5 days a week because he has difficulty walking due to severe OA of both hips and Neuropathic pain in his legs.

## 2022-04-27 NOTE — ASSESSMENT
[FreeTextEntry1] : HIV with Viremia due to poor compliance, Anemia, and Increased LFTs most likely due to alcohol use\par                       Do HIV Genosure testing today\par                       Repeat CBC with Iron studies and Hgb electrophoresis with repeat LFTs today as well as Hep B PCR\par                       Continue present meds for now; allo meds were reviewed with him today.\par                       TTM in 1 week from tomorrow\par                       He refused blister packs but he will set an alarm on his phone to helop him remember to take his meds\par                       He refused a referral for Alcohol detox/treatment as an outpatient; I strongly advised him to stop and educated of the consequences of high LFTs; may need a sonogram\par                       He refused a referral to the Dietician to educate him regarding a low Carb, sugar free diet \par                       \par                       \par ' [Treatment Education] : treatment education [Treatment Adherence] : treatment adherence [Rx Dose / Side Effects] : Rx dose/side effects [Nutritional / Food Issues] : nutritional/food issues [Medical Care Issues] : medical care issues [Drug Interactions / Side Effects] : drug interactions/side effects [EtOH Counseling] : EtOH counseling [Smoking Cessation] : smoking cessation

## 2022-04-27 NOTE — REVIEW OF SYSTEMS
[As Noted in HPI] : as noted in HPI [Joint Pain] : joint pain [Joint Swelling] : no joint swelling [Joint Stiffness] : joint stiffness [Limb Pain] : no limb pain [Limb Swelling] : no limb swelling [Negative] : Heme/Lymph [___ # of Missed Doses in The Past Week] : [unfilled] doses missed in the past week

## 2022-04-28 LAB
ALBUMIN SERPL ELPH-MCNC: 4.7 G/DL
ALP BLD-CCNC: 118 U/L
ALT SERPL-CCNC: 46 U/L
ANION GAP SERPL CALC-SCNC: 13 MMOL/L
AST SERPL-CCNC: 68 U/L
BASOPHILS # BLD AUTO: 0.03 K/UL
BASOPHILS NFR BLD AUTO: 0.8 %
BILIRUB SERPL-MCNC: 0.2 MG/DL
BUN SERPL-MCNC: 21 MG/DL
CALCIUM SERPL-MCNC: 10.1 MG/DL
CHLORIDE SERPL-SCNC: 99 MMOL/L
CO2 SERPL-SCNC: 25 MMOL/L
CREAT SERPL-MCNC: 1.1 MG/DL
EGFR: 74 ML/MIN/1.73M2
EOSINOPHIL # BLD AUTO: 0.1 K/UL
EOSINOPHIL NFR BLD AUTO: 2.5 %
FERRITIN SERPL-MCNC: 441 NG/ML
GLUCOSE SERPL-MCNC: 78 MG/DL
HCT VFR BLD CALC: 34.9 %
HGB A MFR BLD: 97.2 %
HGB A2 MFR BLD: 2.8 %
HGB BLD-MCNC: 11.2 G/DL
HGB FRACT BLD-IMP: NORMAL
IMM GRANULOCYTES NFR BLD AUTO: 0.5 %
IRON SATN MFR SERPL: 31 %
IRON SERPL-MCNC: 131 UG/DL
LYMPHOCYTES # BLD AUTO: 0.78 K/UL
LYMPHOCYTES NFR BLD AUTO: 19.7 %
MAN DIFF?: NORMAL
MCHC RBC-ENTMCNC: 30.4 PG
MCHC RBC-ENTMCNC: 32.1 G/DL
MCV RBC AUTO: 94.8 FL
MONOCYTES # BLD AUTO: 0.41 K/UL
MONOCYTES NFR BLD AUTO: 10.4 %
NEUTROPHILS # BLD AUTO: 2.62 K/UL
NEUTROPHILS NFR BLD AUTO: 66.1 %
PLATELET # BLD AUTO: 197 K/UL
POTASSIUM SERPL-SCNC: 4.6 MMOL/L
PROT SERPL-MCNC: 8.5 G/DL
RBC # BLD: 3.68 M/UL
RBC # FLD: 13.7 %
SODIUM SERPL-SCNC: 137 MMOL/L
TIBC SERPL-MCNC: 419 UG/DL
UIBC SERPL-MCNC: 288 UG/DL
WBC # FLD AUTO: 3.96 K/UL

## 2022-04-29 ENCOUNTER — OUTPATIENT (OUTPATIENT)
Dept: OUTPATIENT SERVICES | Facility: HOSPITAL | Age: 67
LOS: 1 days | Discharge: HOME | End: 2022-04-29

## 2022-04-29 ENCOUNTER — APPOINTMENT (OUTPATIENT)
Dept: INFECTIOUS DISEASE | Facility: CLINIC | Age: 67
End: 2022-04-29

## 2022-04-29 VITALS
BODY MASS INDEX: 26.93 KG/M2 | SYSTOLIC BLOOD PRESSURE: 140 MMHG | RESPIRATION RATE: 17 BRPM | HEIGHT: 72.5 IN | HEART RATE: 74 BPM | DIASTOLIC BLOOD PRESSURE: 80 MMHG | TEMPERATURE: 98.6 F | WEIGHT: 201 LBS | OXYGEN SATURATION: 98 %

## 2022-04-29 DIAGNOSIS — Z23 ENCOUNTER FOR IMMUNIZATION: ICD-10-CM

## 2022-05-02 LAB
HEPB DNA PCR INT: NOT DETECTED IU/ML
HEPB DNA PCR LOG: NOT DETECTED LOGIU/ML

## 2022-05-03 DIAGNOSIS — B20 HUMAN IMMUNODEFICIENCY VIRUS [HIV] DISEASE: ICD-10-CM

## 2022-05-04 ENCOUNTER — APPOINTMENT (OUTPATIENT)
Dept: INFECTIOUS DISEASE | Facility: CLINIC | Age: 67
End: 2022-05-04

## 2022-05-05 ENCOUNTER — NON-APPOINTMENT (OUTPATIENT)
Age: 67
End: 2022-05-05

## 2022-05-09 ENCOUNTER — APPOINTMENT (OUTPATIENT)
Dept: INFECTIOUS DISEASE | Facility: CLINIC | Age: 67
End: 2022-05-09

## 2022-05-09 LAB
BICTEGRAVIR RESISTANCE: NORMAL
CABOTEGRAVIR RESISTANCE: NORMAL
DOLUTEGRAVIR RESISTANCE: NORMAL
ELVITEGRAVIR RESISTANCE: NORMAL
HIV RT GENE MUT DET ISLT: NORMAL
HIV-1 GENOTYPE DRUG RESISTANCE: DETECTED
HIV-1 INTEGRASE GENOTYPE: NORMAL
RALTEGRAVIR RESISTANCE: NORMAL
VIRAL LOAD DATE: NORMAL

## 2022-05-18 ENCOUNTER — APPOINTMENT (OUTPATIENT)
Dept: INFECTIOUS DISEASE | Facility: CLINIC | Age: 67
End: 2022-05-18
Payer: COMMERCIAL

## 2022-05-18 ENCOUNTER — LABORATORY RESULT (OUTPATIENT)
Age: 67
End: 2022-05-18

## 2022-05-18 ENCOUNTER — OUTPATIENT (OUTPATIENT)
Dept: OUTPATIENT SERVICES | Facility: HOSPITAL | Age: 67
LOS: 1 days | Discharge: HOME | End: 2022-05-18

## 2022-05-18 VITALS
SYSTOLIC BLOOD PRESSURE: 140 MMHG | WEIGHT: 199 LBS | RESPIRATION RATE: 14 BRPM | BODY MASS INDEX: 26.66 KG/M2 | HEART RATE: 76 BPM | HEIGHT: 72.5 IN | TEMPERATURE: 98.3 F | DIASTOLIC BLOOD PRESSURE: 77 MMHG

## 2022-05-18 DIAGNOSIS — I10 ESSENTIAL (PRIMARY) HYPERTENSION: ICD-10-CM

## 2022-05-18 DIAGNOSIS — G62.9 POLYNEUROPATHY, UNSPECIFIED: ICD-10-CM

## 2022-05-18 DIAGNOSIS — M19.90 UNSPECIFIED OSTEOARTHRITIS, UNSPECIFIED SITE: ICD-10-CM

## 2022-05-18 DIAGNOSIS — E78.5 HYPERLIPIDEMIA, UNSPECIFIED: ICD-10-CM

## 2022-05-18 DIAGNOSIS — B20 HUMAN IMMUNODEFICIENCY VIRUS [HIV] DISEASE: ICD-10-CM

## 2022-05-18 DIAGNOSIS — E23.0 HYPOPITUITARISM: ICD-10-CM

## 2022-05-18 DIAGNOSIS — R74.8 ABNORMAL LEVELS OF OTHER SERUM ENZYMES: ICD-10-CM

## 2022-05-18 DIAGNOSIS — D64.9 ANEMIA, UNSPECIFIED: ICD-10-CM

## 2022-05-18 DIAGNOSIS — R73.03 PREDIABETES: ICD-10-CM

## 2022-05-18 PROCEDURE — 99214 OFFICE O/P EST MOD 30 MIN: CPT

## 2022-05-18 NOTE — ASSESSMENT
[FreeTextEntry1] : HIV with Viremia, Pansensitive, due to poor compliance, reported better compliance now\par                    Repeat VL today\par                    Continue present meds\par                    RTC in 5 weeks (live)\par Increased LFTs with slight improvement, ?due to alcohol use\par                     Repeat LFTs today (he stated he has cut down significantly on alcohol use)\par                     Check AFP as well as abdominal sonogram\par Anemia, improved ,Iron studies and Hgb electrophoresis are normal\par \par Urine  culture with gram negative rods (pt asymptomatic) spoke to Lab on 5/12/22, they were in the process of sending the specimen out to the [facility] since they were unable to ID the organism\par                      Await Urine culture results from the [facility]\par                      Will continue to hold antibiotics since he is asymptomatic [Treatment Education] : treatment education [Treatment Adherence] : treatment adherence [Rx Dose / Side Effects] : Rx dose/side effects [Nutritional / Food Issues] : nutritional/food issues [Medical Care Issues] : medical care issues [Drug Interactions / Side Effects] : drug interactions/side effects [EtOH Counseling] : EtOH counseling [Smoking Cessation] : smoking cessation [Other: ____] : [unfilled]

## 2022-05-18 NOTE — HISTORY OF PRESENT ILLNESS
[FreeTextEntry1] : Patient here for monthly HIV F/U.He still has bilateral hip  pain but he has not made an appt with Ortho for a THR. He stated that he is afraid of having surgery. He had no new complaints including dysuria, flank pain, urinary frequency, or hesitancy. He stated that he has cut down on alcohol consumption and he denied illicit drug use recently. He reported full compliance with HAART and all meds without any SE.

## 2022-05-18 NOTE — REVIEW OF SYSTEMS
[Negative] : Heme/Lymph [As Noted in HPI] : as noted in HPI [Joint Pain] : joint pain [Joint Swelling] : no joint swelling [Joint Stiffness] : joint stiffness [Limb Pain] : no limb pain [Limb Swelling] : no limb swelling [___ # of Missed Doses in The Past Week] : [unfilled] doses missed in the past week

## 2022-05-19 ENCOUNTER — RX RENEWAL (OUTPATIENT)
Age: 67
End: 2022-05-19

## 2022-05-19 DIAGNOSIS — N39.0 URINARY TRACT INFECTION, SITE NOT SPECIFIED: ICD-10-CM

## 2022-05-19 LAB
AFP-TM SERPL-MCNC: 2.1 NG/ML
ALBUMIN SERPL ELPH-MCNC: 4.7 G/DL
ALP BLD-CCNC: 97 U/L
ALT SERPL-CCNC: 52 U/L
ANION GAP SERPL CALC-SCNC: 12 MMOL/L
APPEARANCE: ABNORMAL
AST SERPL-CCNC: 59 U/L
BILIRUB SERPL-MCNC: <0.2 MG/DL
BILIRUBIN URINE: ABNORMAL
BLOOD URINE: NEGATIVE
BUN SERPL-MCNC: 42 MG/DL
CALCIUM SERPL-MCNC: 10.1 MG/DL
CHLORIDE SERPL-SCNC: 103 MMOL/L
CO2 SERPL-SCNC: 22 MMOL/L
COLOR: YELLOW
CREAT SERPL-MCNC: 2.1 MG/DL
EGFR: 34 ML/MIN/1.73M2
GLUCOSE QUALITATIVE U: NEGATIVE
GLUCOSE SERPL-MCNC: 81 MG/DL
KETONES URINE: NEGATIVE
LEUKOCYTE ESTERASE URINE: NEGATIVE
NITRITE URINE: POSITIVE
OXYCODONE UR-MCNC: NORMAL
PH URINE: >8.9
POTASSIUM SERPL-SCNC: 5.5 MMOL/L
PROT SERPL-MCNC: 8.4 G/DL
PROTEIN URINE: ABNORMAL
SIUH URINE DRUG SCREEN 1: NORMAL
SODIUM SERPL-SCNC: 137 MMOL/L
SPECIFIC GRAVITY URINE: 1.02
UROBILINOGEN URINE: NORMAL
VIRAL LOAD INTERP: NOT DETECTED COPIES/ML
VIRAL LOAD LOG: NOT DETECTED LOGCOPIES/ML

## 2022-06-09 LAB — BACTERIA UR CULT: ABNORMAL

## 2022-06-10 DIAGNOSIS — B20 HUMAN IMMUNODEFICIENCY VIRUS [HIV] DISEASE: ICD-10-CM

## 2022-06-14 ENCOUNTER — RX RENEWAL (OUTPATIENT)
Age: 67
End: 2022-06-14

## 2022-06-20 ENCOUNTER — RX RENEWAL (OUTPATIENT)
Age: 67
End: 2022-06-20

## 2022-06-22 ENCOUNTER — LABORATORY RESULT (OUTPATIENT)
Age: 67
End: 2022-06-22

## 2022-06-22 ENCOUNTER — OUTPATIENT (OUTPATIENT)
Dept: OUTPATIENT SERVICES | Facility: HOSPITAL | Age: 67
LOS: 1 days | Discharge: HOME | End: 2022-06-22

## 2022-06-22 ENCOUNTER — APPOINTMENT (OUTPATIENT)
Dept: INFECTIOUS DISEASE | Facility: CLINIC | Age: 67
End: 2022-06-22
Payer: COMMERCIAL

## 2022-06-22 VITALS
SYSTOLIC BLOOD PRESSURE: 116 MMHG | DIASTOLIC BLOOD PRESSURE: 71 MMHG | HEART RATE: 71 BPM | TEMPERATURE: 98.9 F | RESPIRATION RATE: 14 BRPM | HEIGHT: 72.5 IN | WEIGHT: 197 LBS | BODY MASS INDEX: 26.39 KG/M2

## 2022-06-22 DIAGNOSIS — G62.9 POLYNEUROPATHY, UNSPECIFIED: ICD-10-CM

## 2022-06-22 DIAGNOSIS — B20 HUMAN IMMUNODEFICIENCY VIRUS [HIV] DISEASE: ICD-10-CM

## 2022-06-22 DIAGNOSIS — R74.8 ABNORMAL LEVELS OF OTHER SERUM ENZYMES: ICD-10-CM

## 2022-06-22 DIAGNOSIS — I10 ESSENTIAL (PRIMARY) HYPERTENSION: ICD-10-CM

## 2022-06-22 DIAGNOSIS — M19.90 UNSPECIFIED OSTEOARTHRITIS, UNSPECIFIED SITE: ICD-10-CM

## 2022-06-22 DIAGNOSIS — K21.9 GASTRO-ESOPHAGEAL REFLUX DISEASE WITHOUT ESOPHAGITIS: ICD-10-CM

## 2022-06-22 DIAGNOSIS — N28.9 DISORDER OF KIDNEY AND URETER, UNSPECIFIED: ICD-10-CM

## 2022-06-22 DIAGNOSIS — E78.5 HYPERLIPIDEMIA, UNSPECIFIED: ICD-10-CM

## 2022-06-22 PROCEDURE — 99213 OFFICE O/P EST LOW 20 MIN: CPT

## 2022-06-22 RX ORDER — CIPROFLOXACIN HYDROCHLORIDE 500 MG/1
500 TABLET, FILM COATED ORAL TWICE DAILY
Qty: 28 | Refills: 0 | Status: DISCONTINUED | COMMUNITY
Start: 2022-05-19 | End: 2022-06-22

## 2022-06-22 NOTE — ASSESSMENT
[FreeTextEntry1] : Stable HIV, S/P UTI with Renal Insufficiency, symptoms resolved, H/O Increased LFTs probably due to alcohol use\par                                     Continue present meds\par                                     Repeat CMP, urine culture today with UDS, Urine for Oxycodone\par                                     RTC in 4 weeks (live)\par                                     Abdominal sonogram to further assess liver and kidneys\par                                     [facility] #[id number], last dispensed Oxycodone on 5/25/22, 30 day supply\par                                     Will order Narcan as well in case of Opiate overdose\par                                  [Treatment Education] : treatment education [Treatment Adherence] : treatment adherence [Rx Dose / Side Effects] : Rx dose/side effects [Nutritional / Food Issues] : nutritional/food issues [Medical Care Issues] : medical care issues [Drug Interactions / Side Effects] : drug interactions/side effects [Smoking Cessation] : smoking cessation

## 2022-06-22 NOTE — HISTORY OF PRESENT ILLNESS
[FreeTextEntry1] : Patient here for monthly HIV F/U. He had no new complaints. He did report that he was experiencing dysuria which resolved after he completed the antibiotics. He still has bilateral hip pain that is alleviated by Oxycodone. He still did not make an appt with the Orthopedist. He re ported full compliance with all meds without any SE. He stated that he stopped alcohol use.  [Sexually Active] : The patient is not sexually active

## 2022-06-23 LAB
ALBUMIN SERPL ELPH-MCNC: 4.2 G/DL
ALP BLD-CCNC: 112 U/L
ALT SERPL-CCNC: 57 U/L
ANION GAP SERPL CALC-SCNC: 11 MMOL/L
AST SERPL-CCNC: 61 U/L
BILIRUB SERPL-MCNC: 0.2 MG/DL
BUN SERPL-MCNC: 24 MG/DL
CALCIUM SERPL-MCNC: 9.7 MG/DL
CHLORIDE SERPL-SCNC: 104 MMOL/L
CO2 SERPL-SCNC: 25 MMOL/L
CREAT SERPL-MCNC: 1.5 MG/DL
EGFR: 51 ML/MIN/1.73M2
GLUCOSE SERPL-MCNC: 95 MG/DL
OXYCODONE UR-MCNC: POSITIVE
POTASSIUM SERPL-SCNC: 4.6 MMOL/L
PROT SERPL-MCNC: 7.9 G/DL
SIUH URINE DRUG SCREEN 1: NORMAL
SODIUM SERPL-SCNC: 140 MMOL/L

## 2022-06-27 LAB — BACTERIA UR CULT: NORMAL

## 2022-07-05 LAB
TESTOST FREE SERPL-MCNC: 0.4 PG/ML
TESTOST SERPL-MCNC: 9.1 NG/DL

## 2022-07-18 ENCOUNTER — RX RENEWAL (OUTPATIENT)
Age: 67
End: 2022-07-18

## 2022-07-20 ENCOUNTER — LABORATORY RESULT (OUTPATIENT)
Age: 67
End: 2022-07-20

## 2022-07-20 ENCOUNTER — OUTPATIENT (OUTPATIENT)
Dept: OUTPATIENT SERVICES | Facility: HOSPITAL | Age: 67
LOS: 1 days | Discharge: HOME | End: 2022-07-20

## 2022-07-20 ENCOUNTER — MED ADMIN CHARGE (OUTPATIENT)
Age: 67
End: 2022-07-20

## 2022-07-20 ENCOUNTER — APPOINTMENT (OUTPATIENT)
Dept: INFECTIOUS DISEASE | Facility: CLINIC | Age: 67
End: 2022-07-20

## 2022-07-20 VITALS
HEIGHT: 77 IN | BODY MASS INDEX: 23.26 KG/M2 | HEART RATE: 98 BPM | TEMPERATURE: 98.6 F | RESPIRATION RATE: 16 BRPM | DIASTOLIC BLOOD PRESSURE: 90 MMHG | SYSTOLIC BLOOD PRESSURE: 150 MMHG | WEIGHT: 197 LBS

## 2022-07-20 DIAGNOSIS — R74.8 ABNORMAL LEVELS OF OTHER SERUM ENZYMES: ICD-10-CM

## 2022-07-20 DIAGNOSIS — M19.90 UNSPECIFIED OSTEOARTHRITIS, UNSPECIFIED SITE: ICD-10-CM

## 2022-07-20 DIAGNOSIS — E78.5 HYPERLIPIDEMIA, UNSPECIFIED: ICD-10-CM

## 2022-07-20 DIAGNOSIS — E23.0 HYPOPITUITARISM: ICD-10-CM

## 2022-07-20 DIAGNOSIS — B20 HUMAN IMMUNODEFICIENCY VIRUS [HIV] DISEASE: ICD-10-CM

## 2022-07-20 DIAGNOSIS — I10 ESSENTIAL (PRIMARY) HYPERTENSION: ICD-10-CM

## 2022-07-20 DIAGNOSIS — Z87.440 PERSONAL HISTORY OF URINARY (TRACT) INFECTIONS: ICD-10-CM

## 2022-07-20 DIAGNOSIS — G62.9 POLYNEUROPATHY, UNSPECIFIED: ICD-10-CM

## 2022-07-20 PROCEDURE — 99213 OFFICE O/P EST LOW 20 MIN: CPT

## 2022-07-20 NOTE — HISTORY OF PRESENT ILLNESS
[FreeTextEntry1] : Patient here for monthly HIV F/U. He still has chronic hip pain and using a cane to ambulate but he stated that the Oxycodone at present dose still alleviates the pain. No new complaints. He admitted to still drinking alcohol which probably explain the increased LFTs. He scheduled the abdominal sonogram for 7/27/22. He reported full compliance with all meds except the Amlodipine because he stated that he did not receive it last month even though he had 6 refills on it! He denied any SE with any of his meds.

## 2022-07-20 NOTE — PHYSICAL EXAM
[General Appearance - Alert] : alert [General Appearance - In No Acute Distress] : in no acute distress [General Appearance - Well Nourished] : well nourished [Sclera] : the sclera and conjunctiva were normal [PERRL With Normal Accommodation] : pupils were equal in size, round, reactive to light [Extraocular Movements] : extraocular movements were intact [Outer Ear] : the ears and nose were normal in appearance [Oropharynx] : the oropharynx was normal with no thrush [Neck Appearance] : the appearance of the neck was normal [Neck Cervical Mass (___cm)] : no neck mass was observed [Jugular Venous Distention Increased] : there was no jugular-venous distention [Thyroid Diffuse Enlargement] : the thyroid was not enlarged [Auscultation Breath Sounds / Voice Sounds] : lungs were clear to auscultation bilaterally [Heart Rate And Rhythm] : heart rate was normal and rhythm regular [Heart Sounds] : normal S1 and S2 [Heart Sounds Gallop] : no gallops [Murmurs] : no murmurs [Heart Sounds Pericardial Friction Rub] : no pericardial rub [Full Pulse] : the pedal pulses are present [Edema] : there was no peripheral edema [Bowel Sounds] : normal bowel sounds [Abdomen Soft] : soft [Abdomen Tenderness] : non-tender [Abdomen Mass (___ Cm)] : no abdominal mass palpated [Costovertebral Angle Tenderness] : no CVA tenderness [No Palpable Adenopathy] : no palpable adenopathy [Musculoskeletal - Swelling] : no joint swelling [Nail Clubbing] : no clubbing  or cyanosis of the fingernails [FreeTextEntry1] : Decreased ROM of both hips [Skin Color & Pigmentation] : normal skin color and pigmentation [] : no rash [Deep Tendon Reflexes (DTR)] : deep tendon reflexes were 2+ and symmetric [Sensation] : the sensory exam was normal to light touch and pinprick [No Focal Deficits] : no focal deficits [Oriented To Time, Place, And Person] : oriented to person, place, and time [Affect] : the affect was normal

## 2022-07-20 NOTE — ASSESSMENT
[FreeTextEntry1] : Stable HIV with Chronic severe OA (hips), Neuropathy\par                               Continue present meds\par                               [facility]# [id number], Oxycodone last dispensed on 6/23/22, a 30 day supply\par                               UDS and Urine Oxy q visit\par                               Live visit in 4 weeks\par                               Tdap and Pneumovax today\par \par Urine C&S contaminated\par                               Repeat urine culture today\par \par Increased LFTs probably due to alcohol use\par                               F/U Abdominal sonogram\par                               He refused a referral for alcohol treatment program; I educated him on the dangers of continued alcohol use.\par                               Will probably refer to Hepatology [Treatment Education] : treatment education [Treatment Adherence] : treatment adherence [Rx Dose / Side Effects] : Rx dose/side effects [Nutritional / Food Issues] : nutritional/food issues [Medical Care Issues] : medical care issues [Drug Interactions / Side Effects] : drug interactions/side effects

## 2022-07-21 LAB
OXYCODONE UR-MCNC: POSITIVE
SIUH URINE DRUG SCREEN 1: NORMAL

## 2022-07-25 LAB — BACTERIA UR CULT: NORMAL

## 2022-07-27 ENCOUNTER — OUTPATIENT (OUTPATIENT)
Dept: OUTPATIENT SERVICES | Facility: HOSPITAL | Age: 67
LOS: 1 days | Discharge: HOME | End: 2022-07-27

## 2022-07-27 ENCOUNTER — APPOINTMENT (OUTPATIENT)
Dept: INFECTIOUS DISEASE | Facility: CLINIC | Age: 67
End: 2022-07-27

## 2022-07-27 DIAGNOSIS — N28.9 DISORDER OF KIDNEY AND URETER, UNSPECIFIED: ICD-10-CM

## 2022-07-27 DIAGNOSIS — R29.898 OTHER SYMPTOMS AND SIGNS INVOLVING THE MUSCULOSKELETAL SYSTEM: ICD-10-CM

## 2022-07-27 DIAGNOSIS — R74.8 ABNORMAL LEVELS OF OTHER SERUM ENZYMES: ICD-10-CM

## 2022-07-27 PROCEDURE — 76700 US EXAM ABDOM COMPLETE: CPT | Mod: 26

## 2022-07-27 PROCEDURE — 99442: CPT

## 2022-07-27 NOTE — HISTORY OF PRESENT ILLNESS
[FreeTextEntry1] : TTM done today for HIV F/U with abnormal abdominal sonogram results. His only complaint was dropping things with his right hand and having to hold objects with 2 hands instead of one for the past 4 weeks. No known trauma to his neck or RUE. He denied numbness or pain of his neck, right arm, or right hand. He reported full compliance with all meds without any SE.  [Sexually Active] : The patient is not sexually active

## 2022-07-27 NOTE — REASON FOR VISIT
[Home] : at home, [unfilled] , at the time of the visit. [Medical Office: ([facility])___] : at the medical office located in  [Verbal consent obtained from patient] : the patient, [unfilled] [Follow-Up: _____] : a [unfilled] follow-up visit

## 2022-07-27 NOTE — ASSESSMENT
[FreeTextEntry1] : Stable HIV with Increased LFTs and dilatation of pancreatic duct, R/O pancreatic lesion\par                                      MRI of Abdomen, particularly pancreas to further evaluate pancreatic duct with and without contrast\par                                      Continue present meds; all meds were reviewed with him today.\par                                      F/U results on his next appt next month\par \par Weakness of hands, R>L, R/O Neuropathy vs Radiculopathy\par                                       EMG of UEs\par \par Pt was given the results of the abdominal sonogram with full understanding. [Treatment Education] : treatment education [Treatment Adherence] : treatment adherence [Rx Dose / Side Effects] : Rx dose/side effects [Nutritional / Food Issues] : nutritional/food issues [Medical Care Issues] : medical care issues [Drug Interactions / Side Effects] : drug interactions/side effects

## 2022-07-27 NOTE — REVIEW OF SYSTEMS
[Joint Pain] : joint pain [Joint Swelling] : no joint swelling [Joint Stiffness] : joint stiffness [Limb Pain] : no limb pain [Limb Swelling] : no limb swelling [As Noted in HPI] : as noted in HPI [Confused] : no confusion [Convulsions] : no convulsions [Dizziness] : no dizziness [Limb Weakness] : no limb weakness [Negative] : Heme/Lymph [___ # of Missed Doses in The Past Week] : [unfilled] doses missed in the past week

## 2022-07-28 DIAGNOSIS — M19.90 UNSPECIFIED OSTEOARTHRITIS, UNSPECIFIED SITE: ICD-10-CM

## 2022-07-28 DIAGNOSIS — B20 HUMAN IMMUNODEFICIENCY VIRUS [HIV] DISEASE: ICD-10-CM

## 2022-07-28 DIAGNOSIS — G62.9 POLYNEUROPATHY, UNSPECIFIED: ICD-10-CM

## 2022-07-28 DIAGNOSIS — I10 ESSENTIAL (PRIMARY) HYPERTENSION: ICD-10-CM

## 2022-07-28 DIAGNOSIS — E78.5 HYPERLIPIDEMIA, UNSPECIFIED: ICD-10-CM

## 2022-07-28 DIAGNOSIS — R29.898 OTHER SYMPTOMS AND SIGNS INVOLVING THE MUSCULOSKELETAL SYSTEM: ICD-10-CM

## 2022-07-28 DIAGNOSIS — K86.89 OTHER SPECIFIED DISEASES OF PANCREAS: ICD-10-CM

## 2022-08-09 ENCOUNTER — NON-APPOINTMENT (OUTPATIENT)
Age: 67
End: 2022-08-09

## 2022-08-17 ENCOUNTER — LABORATORY RESULT (OUTPATIENT)
Age: 67
End: 2022-08-17

## 2022-08-17 ENCOUNTER — OUTPATIENT (OUTPATIENT)
Dept: OUTPATIENT SERVICES | Facility: HOSPITAL | Age: 67
LOS: 1 days | Discharge: HOME | End: 2022-08-17

## 2022-08-17 ENCOUNTER — APPOINTMENT (OUTPATIENT)
Dept: INFECTIOUS DISEASE | Facility: CLINIC | Age: 67
End: 2022-08-17

## 2022-08-17 VITALS
HEART RATE: 65 BPM | SYSTOLIC BLOOD PRESSURE: 111 MMHG | RESPIRATION RATE: 16 BRPM | TEMPERATURE: 98.6 F | HEIGHT: 72.5 IN | BODY MASS INDEX: 25.72 KG/M2 | WEIGHT: 192 LBS | DIASTOLIC BLOOD PRESSURE: 60 MMHG

## 2022-08-17 DIAGNOSIS — I10 ESSENTIAL (PRIMARY) HYPERTENSION: ICD-10-CM

## 2022-08-17 DIAGNOSIS — E23.0 HYPOPITUITARISM: ICD-10-CM

## 2022-08-17 DIAGNOSIS — K21.9 GASTRO-ESOPHAGEAL REFLUX DISEASE W/OUT ESOPHAGITIS: ICD-10-CM

## 2022-08-17 DIAGNOSIS — K59.00 CONSTIPATION, UNSPECIFIED: ICD-10-CM

## 2022-08-17 DIAGNOSIS — G62.9 POLYNEUROPATHY, UNSPECIFIED: ICD-10-CM

## 2022-08-17 DIAGNOSIS — B20 HUMAN IMMUNODEFICIENCY VIRUS [HIV] DISEASE: ICD-10-CM

## 2022-08-17 DIAGNOSIS — K21.9 GASTRO-ESOPHAGEAL REFLUX DISEASE WITHOUT ESOPHAGITIS: ICD-10-CM

## 2022-08-17 DIAGNOSIS — M16.12 UNILATERAL PRIMARY OSTEOARTHRITIS, LEFT HIP: ICD-10-CM

## 2022-08-17 DIAGNOSIS — M19.90 UNSPECIFIED OSTEOARTHRITIS, UNSPECIFIED SITE: ICD-10-CM

## 2022-08-17 DIAGNOSIS — E78.5 HYPERLIPIDEMIA, UNSPECIFIED: ICD-10-CM

## 2022-08-17 DIAGNOSIS — K86.89 OTHER SPECIFIED DISEASES OF PANCREAS: ICD-10-CM

## 2022-08-17 PROCEDURE — 99215 OFFICE O/P EST HI 40 MIN: CPT

## 2022-08-17 RX ORDER — NICOTINE TRANSDERMAL SYSTEM 7 MG/24H
7 PATCH, EXTENDED RELEASE TRANSDERMAL DAILY
Qty: 28 | Refills: 3 | Status: DISCONTINUED | COMMUNITY
Start: 2021-01-12 | End: 2022-08-17

## 2022-08-17 NOTE — REVIEW OF SYSTEMS
[Fever] : no fever [Chills] : no chills [Body Aches] : no body aches [Difficulty Sleeping] : difficulty sleeping [Feeling Sick] : not feeling sick [Feeling Tired] : feeling tired [Normal Appetite] : normal appetite [Recent Weight Gain (___ Lbs)] : no recent weight gain [Recent Weight Loss (___ Lbs)] : recent [unfilled] ~Ulb weight loss [Abdominal Pain] : no abdominal pain [Vomiting] : no vomiting [Constipation] : constipation [Diarrhea] : no diarrhea [As Noted in HPI] : as noted in HPI [Joint Pain] : joint pain [Joint Swelling] : no joint swelling [Joint Stiffness] : joint stiffness [Limb Pain] : limb pain [Limb Swelling] : no limb swelling [Negative] : Heme/Lymph [___ # of Missed Doses in The Past Week] : [unfilled] doses missed in the past week

## 2022-08-17 NOTE — HISTORY OF PRESENT ILLNESS
[FreeTextEntry1] : Patient here for Annual H&P,Routine labs and RXs. He is still having a lot of pain in his left hip as well as both legs due to neuropathy. He stated that he fell recently because there was water on the fall and the cane slipped on the water. He denied any injuries sustained, no head trauma or LOC. His HHA was not there at that time. He only gets 5 hrs a day 3 days per week so we will request more hours for him. He has had increased difficulty doing his household chores and preparing meals. He lost 5 lbs in the past month but he stated that he has been trying to lose weight. He stated that his appetite is good and he has been eating well.  He also C/O constipation with no relief with the Colace. He reported resolution of his pain with his current pain regimen. He reported full compliance with all meds without any SE. He denied any recent drugs or alcohol use.  [Sexually Active] : The patient is not sexually active

## 2022-08-17 NOTE — PHYSICAL EXAM
[General Appearance - Alert] : alert [General Appearance - In No Acute Distress] : in no acute distress [General Appearance - Well Nourished] : well nourished [Sclera] : the sclera and conjunctiva were normal [PERRL With Normal Accommodation] : pupils were equal in size, round, reactive to light [Extraocular Movements] : extraocular movements were intact [Outer Ear] : the ears and nose were normal in appearance [Oropharynx] : the oropharynx was normal with no thrush [Neck Appearance] : the appearance of the neck was normal [Neck Cervical Mass (___cm)] : no neck mass was observed [Jugular Venous Distention Increased] : there was no jugular-venous distention [Thyroid Diffuse Enlargement] : the thyroid was not enlarged [Auscultation Breath Sounds / Voice Sounds] : lungs were clear to auscultation bilaterally [Heart Rate And Rhythm] : heart rate was normal and rhythm regular [Heart Sounds] : normal S1 and S2 [Heart Sounds Gallop] : no gallops [Murmurs] : no murmurs [Heart Sounds Pericardial Friction Rub] : no pericardial rub [Full Pulse] : the pedal pulses are present [Edema] : there was no peripheral edema [Bowel Sounds] : normal bowel sounds [Abdomen Soft] : soft [Abdomen Tenderness] : non-tender [Abdomen Mass (___ Cm)] : no abdominal mass palpated [Costovertebral Angle Tenderness] : no CVA tenderness [No Palpable Adenopathy] : no palpable adenopathy [Musculoskeletal - Swelling] : no joint swelling [Nail Clubbing] : no clubbing  or cyanosis of the fingernails [FreeTextEntry1] : Decreased ROM of both hips with weakness of both legs [Skin Color & Pigmentation] : normal skin color and pigmentation [] : no rash [No Focal Deficits] : no focal deficits [Oriented To Time, Place, And Person] : oriented to person, place, and time [Affect] : the affect was normal [Over the Past 2 Weeks, Have You Felt Down, Depressed, or Hopeless?] : 1.) Over the past 2 weeks, have you felt down, depressed, or hopeless? No [Over the Past 2 Weeks, Have You Felt Little Interest or Pleasure Doing Things?] : 2.) Over the past 2 weeks, have you felt little interest or pleasure doing things? No

## 2022-08-17 NOTE — ASSESSMENT
[FreeTextEntry1] : Stable HIV with Dilatation of pancreatic duct seen on abdominal sonogram\par                                   Routine labs today\par                                   He was given another copy of the MRI of Abdomen today and I urged him to make an appt ASAP\par                                   Continue present meds including Oxycodone and Testosterone\par                                   UDS and Urine for Oxycodone today\par                                   IStop #[id number], last dispensed Testosterone on 8/16/22, a 28 day supply\par                                   Last dispensed Oxycodone on 7/21/22, a 30 day supply, last dispensed Lyrica on 7/21/22 a 30 day supply.\par                                   Add Lactulose 30 cc q day prn/constipation\par                                  TTM in 1-2 weeks for all results\par \par Weakness of both hands, R/O Neuropathy vs Radiculopathy\par                                  EMG was ordered last month and not done yet, pt was given another copy of the requisition for the EMG today.\par \par H/O Increased LFTs, he reported no further alcohol use\par                                  Repeat LFTs today [Treatment Education] : treatment education [Treatment Adherence] : treatment adherence [Rx Dose / Side Effects] : Rx dose/side effects [Nutritional / Food Issues] : nutritional/food issues [Medical Care Issues] : medical care issues [Drug Interactions / Side Effects] : drug interactions/side effects

## 2022-08-18 DIAGNOSIS — R79.89 OTHER SPECIFIED ABNORMAL FINDINGS OF BLOOD CHEMISTRY: ICD-10-CM

## 2022-08-18 LAB
BASOPHILS # BLD AUTO: 0.04 K/UL
BASOPHILS NFR BLD AUTO: 1.3 %
CHOLEST SERPL-MCNC: 144 MG/DL
EOSINOPHIL # BLD AUTO: 0.05 K/UL
EOSINOPHIL NFR BLD AUTO: 1.6 %
ESTIMATED AVERAGE GLUCOSE: 123 MG/DL
HBA1C MFR BLD HPLC: 5.9 %
HCT VFR BLD CALC: 29.4 %
HDLC SERPL-MCNC: 33 MG/DL
HGB BLD-MCNC: 9.4 G/DL
IMM GRANULOCYTES NFR BLD AUTO: 0.3 %
LDLC SERPL CALC-MCNC: 75 MG/DL
LYMPHOCYTES # BLD AUTO: 0.86 K/UL
LYMPHOCYTES NFR BLD AUTO: 28.3 %
MAN DIFF?: NORMAL
MCHC RBC-ENTMCNC: 30.2 PG
MCHC RBC-ENTMCNC: 32 G/DL
MCV RBC AUTO: 94.5 FL
MONOCYTES # BLD AUTO: 0.42 K/UL
MONOCYTES NFR BLD AUTO: 13.8 %
NEUTROPHILS # BLD AUTO: 1.66 K/UL
NEUTROPHILS NFR BLD AUTO: 54.7 %
NONHDLC SERPL-MCNC: 111 MG/DL
OXYCODONE UR-MCNC: NORMAL
PLATELET # BLD AUTO: 280 K/UL
RBC # BLD: 3.11 M/UL
RBC # FLD: 15.7 %
SIUH URINE DRUG SCREEN 1: NORMAL
TRIGL SERPL-MCNC: 181 MG/DL
WBC # FLD AUTO: 3.04 K/UL

## 2022-08-22 LAB
ALBUMIN SERPL ELPH-MCNC: 4.3 G/DL
ALP BLD-CCNC: 129 U/L
ALT SERPL-CCNC: 50 U/L
ANION GAP SERPL CALC-SCNC: 12 MMOL/L
AST SERPL-CCNC: 58 U/L
BILIRUB SERPL-MCNC: <0.2 MG/DL
BUN SERPL-MCNC: 68 MG/DL
CALCIUM SERPL-MCNC: 9.5 MG/DL
CD16+CD56+ CELLS # BLD: 130 /UL
CD16+CD56+ CELLS NFR BLD: 13 %
CD19 CELLS NFR BLD: 29 /UL
CD3 CELLS # BLD: 855 /UL
CD3 CELLS NFR BLD: 84 %
CD3+CD4+ CELLS # BLD: 212 /UL
CD3+CD4+ CELLS NFR BLD: 21 %
CD3+CD4+ CELLS/CD3+CD8+ CLL SPEC: 0.36 RATIO
CD3+CD8+ CELLS # SPEC: 592 /UL
CD3+CD8+ CELLS NFR BLD: 58 %
CELLS.CD3-CD19+/CELLS IN BLOOD: 3 %
CHLORIDE SERPL-SCNC: 105 MMOL/L
CO2 SERPL-SCNC: 18 MMOL/L
CREAT SERPL-MCNC: 2.9 MG/DL
EGFR: 23 ML/MIN/1.73M2
GLUCOSE SERPL-MCNC: 131 MG/DL
POTASSIUM SERPL-SCNC: 4.9 MMOL/L
PROT SERPL-MCNC: 7.9 G/DL
SODIUM SERPL-SCNC: 135 MMOL/L
VIRAL LOAD INTERP: NOT DETECTED COPIES/ML
VIRAL LOAD LOG: NOT DETECTED LOGCOPIES/ML

## 2022-08-22 RX ORDER — IBUPROFEN 600 MG/1
600 TABLET, FILM COATED ORAL
Qty: 60 | Refills: 5 | Status: DISCONTINUED | COMMUNITY
Start: 2020-06-01 | End: 2022-08-22

## 2022-08-23 ENCOUNTER — APPOINTMENT (OUTPATIENT)
Dept: INFECTIOUS DISEASE | Facility: CLINIC | Age: 67
End: 2022-08-23

## 2022-08-23 ENCOUNTER — OUTPATIENT (OUTPATIENT)
Dept: OUTPATIENT SERVICES | Facility: HOSPITAL | Age: 67
LOS: 1 days | Discharge: HOME | End: 2022-08-23

## 2022-08-23 DIAGNOSIS — N28.9 DISORDER OF KIDNEY AND URETER, UNSPECIFIED: ICD-10-CM

## 2022-08-23 DIAGNOSIS — R74.8 ABNORMAL LEVELS OF OTHER SERUM ENZYMES: ICD-10-CM

## 2022-08-23 PROCEDURE — 99443: CPT

## 2022-08-23 NOTE — ASSESSMENT
[FreeTextEntry1] : Stable HIV with Renal Insufficiency, Anemia (worsened) and increased LFTs\par                                      Repeat CBC and CMP (he stated that he will go to the lab on Thursday)\par                                      Pt reminded to make an appt for MRI of the Abdomen to further assess the pancreatic duct and R/O a pancreatic lesion.\par                                     Continue present meds; all meds were reviewed with him today. I did stop the Ibuprofen and switched him to Tylenol which is safer for his kidneys but I told him to only take as prescribed.\par                                     I will call him  early next week when lab results are available.\par                                     He was given all lab results today with full understanding. [Treatment Education] : treatment education [Treatment Adherence] : treatment adherence [Rx Dose / Side Effects] : Rx dose/side effects [Nutritional / Food Issues] : nutritional/food issues [Medical Care Issues] : medical care issues [Drug Interactions / Side Effects] : drug interactions/side effects

## 2022-08-23 NOTE — HISTORY OF PRESENT ILLNESS
[FreeTextEntry1] : TTM done for HIV F/U with lab results. He had no complaints. He reported full compliance with all meds without any SE.  [Sexually Active] : The patient is not sexually active [de-identified] : Abstinent x about a year.

## 2022-08-23 NOTE — REASON FOR VISIT
[Home] : at home, [unfilled] , at the time of the visit. [Medical Office: ([facility])___] : at the medical office located in  [Verbal consent obtained from patient] : the patient, [unfilled] [Follow-Up: _____] : a [unfilled] follow-up visit

## 2022-08-23 NOTE — REVIEW OF SYSTEMS
[Joint Pain] : joint pain [Joint Swelling] : no joint swelling [Joint Stiffness] : joint stiffness [Limb Pain] : no limb pain [Limb Swelling] : no limb swelling [Negative] : Heme/Lymph [___ # of Missed Doses in The Past Week] : [unfilled] doses missed in the past week

## 2022-08-24 DIAGNOSIS — B20 HUMAN IMMUNODEFICIENCY VIRUS [HIV] DISEASE: ICD-10-CM

## 2022-08-24 DIAGNOSIS — E23.0 HYPOPITUITARISM: ICD-10-CM

## 2022-08-24 DIAGNOSIS — M16.12 UNILATERAL PRIMARY OSTEOARTHRITIS, LEFT HIP: ICD-10-CM

## 2022-08-24 DIAGNOSIS — D64.9 ANEMIA, UNSPECIFIED: ICD-10-CM

## 2022-08-24 DIAGNOSIS — N28.9 DISORDER OF KIDNEY AND URETER, UNSPECIFIED: ICD-10-CM

## 2022-08-24 DIAGNOSIS — M19.90 UNSPECIFIED OSTEOARTHRITIS, UNSPECIFIED SITE: ICD-10-CM

## 2022-08-24 DIAGNOSIS — R74.8 ABNORMAL LEVELS OF OTHER SERUM ENZYMES: ICD-10-CM

## 2022-08-24 DIAGNOSIS — G62.9 POLYNEUROPATHY, UNSPECIFIED: ICD-10-CM

## 2022-08-24 DIAGNOSIS — E78.5 HYPERLIPIDEMIA, UNSPECIFIED: ICD-10-CM

## 2022-08-24 DIAGNOSIS — I10 ESSENTIAL (PRIMARY) HYPERTENSION: ICD-10-CM

## 2022-08-25 LAB
BASOPHILS # BLD AUTO: 0.03 K/UL
BASOPHILS NFR BLD AUTO: 0.9 %
EOSINOPHIL # BLD AUTO: 0.13 K/UL
EOSINOPHIL NFR BLD AUTO: 3.8 %
HCT VFR BLD CALC: 28.3 %
HGB BLD-MCNC: 9.3 G/DL
IMM GRANULOCYTES NFR BLD AUTO: 0.6 %
LYMPHOCYTES # BLD AUTO: 1.23 K/UL
LYMPHOCYTES NFR BLD AUTO: 35.7 %
MAN DIFF?: NORMAL
MCHC RBC-ENTMCNC: 30.4 PG
MCHC RBC-ENTMCNC: 32.9 G/DL
MCV RBC AUTO: 92.5 FL
MONOCYTES # BLD AUTO: 0.6 K/UL
MONOCYTES NFR BLD AUTO: 17.4 %
NEUTROPHILS # BLD AUTO: 1.44 K/UL
NEUTROPHILS NFR BLD AUTO: 41.6 %
PLATELET # BLD AUTO: 228 K/UL
RBC # BLD: 3.06 M/UL
RBC # FLD: 15.5 %
WBC # FLD AUTO: 3.45 K/UL

## 2022-08-29 ENCOUNTER — NON-APPOINTMENT (OUTPATIENT)
Age: 67
End: 2022-08-29

## 2022-08-31 ENCOUNTER — NON-APPOINTMENT (OUTPATIENT)
Age: 67
End: 2022-08-31

## 2022-08-31 ENCOUNTER — OUTPATIENT (OUTPATIENT)
Dept: OUTPATIENT SERVICES | Facility: HOSPITAL | Age: 67
LOS: 1 days | Discharge: HOME | End: 2022-08-31

## 2022-08-31 LAB
ALBUMIN SERPL ELPH-MCNC: 4.5 G/DL
ALP BLD-CCNC: 118 U/L
ALT SERPL-CCNC: 47 U/L
ANION GAP SERPL CALC-SCNC: 14 MMOL/L
AST SERPL-CCNC: 47 U/L
BILIRUB SERPL-MCNC: 0.2 MG/DL
BUN SERPL-MCNC: 68 MG/DL
CALCIUM SERPL-MCNC: 9.6 MG/DL
CHLORIDE SERPL-SCNC: 105 MMOL/L
CO2 SERPL-SCNC: 17 MMOL/L
CREAT SERPL-MCNC: 4.4 MG/DL
EGFR: 14 ML/MIN/1.73M2
GLUCOSE SERPL-MCNC: 82 MG/DL
POTASSIUM SERPL-SCNC: 6 MMOL/L
PROT SERPL-MCNC: 7.6 G/DL
SODIUM SERPL-SCNC: 136 MMOL/L

## 2022-09-01 ENCOUNTER — NON-APPOINTMENT (OUTPATIENT)
Age: 67
End: 2022-09-01

## 2022-09-01 ENCOUNTER — OUTPATIENT (OUTPATIENT)
Dept: OUTPATIENT SERVICES | Facility: HOSPITAL | Age: 67
LOS: 1 days | Discharge: HOME | End: 2022-09-01

## 2022-09-01 ENCOUNTER — EMERGENCY (EMERGENCY)
Facility: HOSPITAL | Age: 67
LOS: 0 days | Discharge: HOME | End: 2022-09-01
Attending: EMERGENCY MEDICINE | Admitting: EMERGENCY MEDICINE

## 2022-09-01 VITALS
HEART RATE: 106 BPM | RESPIRATION RATE: 20 BRPM | SYSTOLIC BLOOD PRESSURE: 96 MMHG | DIASTOLIC BLOOD PRESSURE: 54 MMHG | OXYGEN SATURATION: 98 % | TEMPERATURE: 100 F

## 2022-09-01 DIAGNOSIS — R79.89 OTHER SPECIFIED ABNORMAL FINDINGS OF BLOOD CHEMISTRY: ICD-10-CM

## 2022-09-01 DIAGNOSIS — E87.5 HYPERKALEMIA: ICD-10-CM

## 2022-09-01 DIAGNOSIS — I12.9 HYPERTENSIVE CHRONIC KIDNEY DISEASE WITH STAGE 1 THROUGH STAGE 4 CHRONIC KIDNEY DISEASE, OR UNSPECIFIED CHRONIC KIDNEY DISEASE: ICD-10-CM

## 2022-09-01 DIAGNOSIS — Z86.19 PERSONAL HISTORY OF OTHER INFECTIOUS AND PARASITIC DISEASES: ICD-10-CM

## 2022-09-01 DIAGNOSIS — F17.200 NICOTINE DEPENDENCE, UNSPECIFIED, UNCOMPLICATED: ICD-10-CM

## 2022-09-01 DIAGNOSIS — B20 HUMAN IMMUNODEFICIENCY VIRUS [HIV] DISEASE: ICD-10-CM

## 2022-09-01 DIAGNOSIS — F11.20 OPIOID DEPENDENCE, UNCOMPLICATED: ICD-10-CM

## 2022-09-01 DIAGNOSIS — M19.90 UNSPECIFIED OSTEOARTHRITIS, UNSPECIFIED SITE: ICD-10-CM

## 2022-09-01 DIAGNOSIS — N18.9 CHRONIC KIDNEY DISEASE, UNSPECIFIED: ICD-10-CM

## 2022-09-01 DIAGNOSIS — R79.9 ABNORMAL FINDING OF BLOOD CHEMISTRY, UNSPECIFIED: ICD-10-CM

## 2022-09-01 LAB
ANION GAP SERPL CALC-SCNC: 13 MMOL/L — SIGNIFICANT CHANGE UP (ref 7–14)
APPEARANCE UR: CLEAR — SIGNIFICANT CHANGE UP
BACTERIA # UR AUTO: NEGATIVE — SIGNIFICANT CHANGE UP
BASE EXCESS BLDV CALC-SCNC: -7.7 MMOL/L — LOW (ref -2–3)
BASOPHILS # BLD AUTO: 0.02 K/UL — SIGNIFICANT CHANGE UP (ref 0–0.2)
BASOPHILS NFR BLD AUTO: 0.6 % — SIGNIFICANT CHANGE UP (ref 0–1)
BILIRUB UR-MCNC: NEGATIVE — SIGNIFICANT CHANGE UP
BUN SERPL-MCNC: 63 MG/DL — CRITICAL HIGH (ref 10–20)
CALCIUM SERPL-MCNC: 9.2 MG/DL — SIGNIFICANT CHANGE UP (ref 8.5–10.1)
CHLORIDE SERPL-SCNC: 109 MMOL/L — SIGNIFICANT CHANGE UP (ref 98–110)
CO2 SERPL-SCNC: 17 MMOL/L — SIGNIFICANT CHANGE UP (ref 17–32)
COLOR SPEC: YELLOW — SIGNIFICANT CHANGE UP
CREAT SERPL-MCNC: 2.9 MG/DL — HIGH (ref 0.7–1.5)
DIFF PNL FLD: ABNORMAL
EGFR: 23 ML/MIN/1.73M2 — LOW
EOSINOPHIL # BLD AUTO: 0.03 K/UL — SIGNIFICANT CHANGE UP (ref 0–0.7)
EOSINOPHIL NFR BLD AUTO: 0.9 % — SIGNIFICANT CHANGE UP (ref 0–8)
EPI CELLS # UR: 8 /HPF — HIGH (ref 0–5)
GLUCOSE SERPL-MCNC: 79 MG/DL — SIGNIFICANT CHANGE UP (ref 70–99)
GLUCOSE UR QL: NEGATIVE — SIGNIFICANT CHANGE UP
HCO3 BLDV-SCNC: 19 MMOL/L — LOW (ref 22–29)
HCT VFR BLD CALC: 28.2 % — LOW (ref 42–52)
HGB BLD-MCNC: 9.3 G/DL — LOW (ref 14–18)
HYALINE CASTS # UR AUTO: 2 /LPF — SIGNIFICANT CHANGE UP (ref 0–7)
IMM GRANULOCYTES NFR BLD AUTO: 0.6 % — HIGH (ref 0.1–0.3)
KETONES UR-MCNC: SIGNIFICANT CHANGE UP
LACTATE BLDV-MCNC: 0.7 MMOL/L — SIGNIFICANT CHANGE UP (ref 0.5–2)
LEUKOCYTE ESTERASE UR-ACNC: ABNORMAL
LYMPHOCYTES # BLD AUTO: 0.35 K/UL — LOW (ref 1.2–3.4)
LYMPHOCYTES # BLD AUTO: 10.2 % — LOW (ref 20.5–51.1)
MCHC RBC-ENTMCNC: 30.7 PG — SIGNIFICANT CHANGE UP (ref 27–31)
MCHC RBC-ENTMCNC: 33 G/DL — SIGNIFICANT CHANGE UP (ref 32–37)
MCV RBC AUTO: 93.1 FL — SIGNIFICANT CHANGE UP (ref 80–94)
MONOCYTES # BLD AUTO: 0.32 K/UL — SIGNIFICANT CHANGE UP (ref 0.1–0.6)
MONOCYTES NFR BLD AUTO: 9.3 % — SIGNIFICANT CHANGE UP (ref 1.7–9.3)
NEUTROPHILS # BLD AUTO: 2.7 K/UL — SIGNIFICANT CHANGE UP (ref 1.4–6.5)
NEUTROPHILS NFR BLD AUTO: 78.4 % — HIGH (ref 42.2–75.2)
NITRITE UR-MCNC: NEGATIVE — SIGNIFICANT CHANGE UP
NRBC # BLD: 0 /100 WBCS — SIGNIFICANT CHANGE UP (ref 0–0)
PCO2 BLDV: 41 MMHG — LOW (ref 42–55)
PH BLDV: 7.27 — LOW (ref 7.32–7.43)
PH UR: 6.5 — SIGNIFICANT CHANGE UP (ref 5–8)
PLATELET # BLD AUTO: 178 K/UL — SIGNIFICANT CHANGE UP (ref 130–400)
PO2 BLDV: 39 MMHG — SIGNIFICANT CHANGE UP
POTASSIUM SERPL-MCNC: 5.4 MMOL/L — HIGH (ref 3.5–5)
POTASSIUM SERPL-SCNC: 5.4 MMOL/L — HIGH (ref 3.5–5)
PROT UR-MCNC: ABNORMAL
RBC # BLD: 3.03 M/UL — LOW (ref 4.7–6.1)
RBC # FLD: 15.1 % — HIGH (ref 11.5–14.5)
RBC CASTS # UR COMP ASSIST: 1 /HPF — SIGNIFICANT CHANGE UP (ref 0–4)
SAO2 % BLDV: 65.7 % — SIGNIFICANT CHANGE UP
SODIUM SERPL-SCNC: 139 MMOL/L — SIGNIFICANT CHANGE UP (ref 135–146)
SP GR SPEC: 1.02 — SIGNIFICANT CHANGE UP (ref 1.01–1.03)
UROBILINOGEN FLD QL: ABNORMAL
WBC # BLD: 3.44 K/UL — LOW (ref 4.8–10.8)
WBC # FLD AUTO: 3.44 K/UL — LOW (ref 4.8–10.8)
WBC UR QL: 7 /HPF — HIGH (ref 0–5)

## 2022-09-01 PROCEDURE — 93010 ELECTROCARDIOGRAM REPORT: CPT

## 2022-09-01 PROCEDURE — 99285 EMERGENCY DEPT VISIT HI MDM: CPT

## 2022-09-01 RX ORDER — OXYCODONE HYDROCHLORIDE 5 MG/1
30 TABLET ORAL
Qty: 0 | Refills: 0 | DISCHARGE

## 2022-09-01 NOTE — ED PROVIDER NOTE - CARE PROVIDER_API CALL
Robbin Campbell)  Infectious Disease; Internal Medicine  [address] [place] [address]  Phone: [phone]  Fax: [phone]  Follow Up Time: Routine

## 2022-09-01 NOTE — SBIRT NOTE ADULT - NSSBIRTALCPOSREINDET_GEN_A_CORE
Provided SBIRT services: Full Screen Negative. Positive reinforcement provided given patient currently within healthy guidelines.

## 2022-09-01 NOTE — ED PROVIDER NOTE - OBJECTIVE STATEMENT
66-year-old male, with past medical history of substance dependence–on methadone, IVDA (admits to currently using heroin), HIV (on HAART), renal insufficiency and hepatitis C, presents to the ED with abnormal lab results.  Patient states he received a phone call from his physician saying that his creatinine was elevated from recent blood work on August 25.  Patient is without complaints; denies chest pain, dyspnea, fever, chills, abdominal pain, nausea and vomiting.

## 2022-09-01 NOTE — ED ADULT NURSE NOTE - NSSEPSISNEWALTERMENTAL_ED_A_ED
Hypertension, Ambulatory Care   GENERAL INFORMATION:   Hypertension  is high blood pressure (BP)  Your BP is the force of your blood moving against the walls of your arteries  Hypertension is a BP of 140/90 or higher  Hypertension causes your BP to get so high that your heart has to work much harder than normal  This can cause damage to your heart  Common symptoms include the following:   · Headache     · Blurred vision     · Chest pain     · Dizziness or weakness     · Trouble breathing    · Nosebleeds  Seek immediate care for the following symptoms:   · Severe headache or vision loss    · Weakness in an arm or leg    · Confusion or difficulty speaking    · Discomfort in your chest that feels like squeezing, pressure, fullness, or pain    · Suddenly feeling lightheaded or trouble breathing    · Pain or discomfort in your back, neck, jaw, stomach, or arm  Treatment for hypertension  may include medicine to lower your BP  You may also need to make lifestyle changes  Take your medicine exactly as directed  Manage hypertension:   · Take your BP at home  Sit and rest for 5 minutes before you take your BP  Extend your arm and support it on a flat surface  Your arm should be at the same level as your heart  Follow the directions that came with your BP monitor  If possible, take at least 2 BP readings each time  Take your BP at least twice a day at the same times each day, such as morning and evening  Keep a log of your BP readings and bring it to your follow-up visits  · Eat less sodium (salt)  Do not add sodium to your food  Limit foods that are high in sodium, such as canned foods, potato chips, and cold cuts  Your healthcare provider may suggest that you follow the [address] [place] [address]  The plan is low in sodium, unhealthy fats, and total fat  It is high in potassium, calcium, and fiber  · Exercise regularly  Exercise at least 30 minutes per day, on most days of the week  This will help decrease your BP  Ask your healthcare provider about the best exercise plan for you  · Limit alcohol  Women should limit alcohol to 1 drink a day  Men should limit alcohol to 2 drinks a day  A drink of alcohol is 12 ounces of beer, 5 ounces of wine, or 1½ ounces of liquor  · Do not smoke  If you smoke, it is never too late to quit  Smoking can increase your BP  Smoking also worsens other health conditions you may have that can increase your risk for hypertension  Ask your healthcare provider for information if you need help quitting  Follow up with your healthcare provider as directed: You will need to return to have your BP checked and to have other lab tests done  Write down your questions so you remember to ask them during your visits  CARE AGREEMENT:   You have the right to help plan your care  Learn about your health condition and how it may be treated  Discuss treatment options with your caregivers to decide what care you want to receive  You always have the right to refuse treatment  The above information is an  only  It is not intended as medical advice for individual conditions or treatments  Talk to your doctor, nurse or pharmacist before following any medical regimen to see if it is safe and effective for you  © 2014 [address] is for End User's use only and may not be sold, redistributed or otherwise used for commercial purposes  All illustrations and images included in CareNotes® are the copyrighted property of A D A M , Inc  or Jacob Dugan  No

## 2022-09-01 NOTE — ED PROVIDER NOTE - PATIENT PORTAL LINK FT
You can access the FollowMyHealth Patient Portal offered by [facility] by registering at the following website: [email or web address]. By joining [facility]’s FollowMyHealth portal, you will also be able to view your health information using other applications (apps) compatible with our system.

## 2022-09-01 NOTE — ED PROVIDER NOTE - ATTENDING APP SHARED VISIT CONTRIBUTION OF CARE
65 y/o male h/o HTN, CKD (baseline creat 2.9 ), HIVD (viral load undetectable), IVDU (heroine and cocaine), HepC, p/w elevated creat and K on routine blood work recently, denies modifying factors or associated complaints. Old chart reviewed. I have reviewed and agree with the initial nursing note, except as documented in my note.    VSS, awake, alert, non-toxic appearing, oropharynx clear, mmm, no skin rash or lesions, chest CTAB, non-labored breathing, no w/r/r, +S1/S2, RRR, no m/r/g, abdomen soft, NT, ND, +BS, no peripheral edema or deformities, alert, clear speech and steady gait.

## 2022-09-01 NOTE — ED PROVIDER NOTE - NS ED ATTENDING STATEMENT MOD
This was a shared visit with the KHALIF. I reviewed and verified the documentation and independently performed the documented:

## 2022-09-01 NOTE — SBIRT NOTE ADULT - NSSBIRTDRGBRIEFINTDET_GEN_A_CORE
Provided SBIRT services: Full Screen Positive. Brief Intervention Performed and Referral to Treatment Attempted.   Screening results were reviewed with the patient and patient was provided information about healthy guidelines and potential negative consequences associated with level of risk. Motivation and readiness to reduce or stop use was discussed and goals and activities to make changes were suggested/offered.   Options discussed for further evaluation and treatment, but referral to treatment was not completed because   patient refused. Pt offered but declined a referral for inpatient/outpatient rehab services. Patient reported that he is currently in treatment at a [facility] in [place].  Naloxone Rescue Kit dispensed: Pt was educated about Naloxone and trained on how utilize the kit.

## 2022-09-06 DIAGNOSIS — B20 HUMAN IMMUNODEFICIENCY VIRUS [HIV] DISEASE: ICD-10-CM

## 2022-09-14 ENCOUNTER — NON-APPOINTMENT (OUTPATIENT)
Age: 67
End: 2022-09-14

## 2022-09-14 ENCOUNTER — OUTPATIENT (OUTPATIENT)
Dept: OUTPATIENT SERVICES | Facility: HOSPITAL | Age: 67
LOS: 1 days | Discharge: HOME | End: 2022-09-14

## 2022-09-15 ENCOUNTER — NON-APPOINTMENT (OUTPATIENT)
Age: 67
End: 2022-09-15

## 2022-09-15 ENCOUNTER — APPOINTMENT (OUTPATIENT)
Dept: INFECTIOUS DISEASE | Facility: CLINIC | Age: 67
End: 2022-09-15

## 2022-09-15 ENCOUNTER — OUTPATIENT (OUTPATIENT)
Dept: OUTPATIENT SERVICES | Facility: HOSPITAL | Age: 67
LOS: 1 days | Discharge: HOME | End: 2022-09-15

## 2022-09-15 ENCOUNTER — LABORATORY RESULT (OUTPATIENT)
Age: 67
End: 2022-09-15

## 2022-09-15 VITALS
WEIGHT: 188 LBS | TEMPERATURE: 99.6 F | BODY MASS INDEX: 25.19 KG/M2 | RESPIRATION RATE: 14 BRPM | DIASTOLIC BLOOD PRESSURE: 68 MMHG | OXYGEN SATURATION: 98 % | SYSTOLIC BLOOD PRESSURE: 125 MMHG | HEIGHT: 72.5 IN

## 2022-09-15 DIAGNOSIS — B20 HUMAN IMMUNODEFICIENCY VIRUS [HIV] DISEASE: ICD-10-CM

## 2022-09-15 PROBLEM — I10 ESSENTIAL (PRIMARY) HYPERTENSION: Chronic | Status: ACTIVE | Noted: 2022-09-01

## 2022-09-15 PROBLEM — F19.10 OTHER PSYCHOACTIVE SUBSTANCE ABUSE, UNCOMPLICATED: Chronic | Status: ACTIVE | Noted: 2022-09-01

## 2022-09-15 PROBLEM — M19.90 UNSPECIFIED OSTEOARTHRITIS, UNSPECIFIED SITE: Chronic | Status: ACTIVE | Noted: 2022-09-01

## 2022-09-15 PROCEDURE — 99214 OFFICE O/P EST MOD 30 MIN: CPT

## 2022-09-15 NOTE — ASSESSMENT
[FreeTextEntry1] : #HIV\par - on triumeq -- will repeat BMP, if CrCL< 30, may try Juluca \par \par #CKD, proteuria \par - worsening creatinine since \par - BP controlled, No diabetes\par - check renal US\par - UA and microscopy, BMP\par - check Iron studies, B12/Folate\par - check SPEP/UPEP\par - refer to renal \par \par #Hyperkalemia\par - repeat BMP - seen in ED 9/1/2022 \par - if Potassium still elevated, will discuss enalapril 20 mg BID \par \par #Left Hip Pain \par - on chronic opioids - On Oxycydone 30 mg q 6 hours\par - ISTOP reviewed#: [id number]\par - check UDS\par \par #HTN \par - amlodipin 10 mg daily \par - enalparil 20 mg BID -- will switch/stop pending BMP \par \par #Low Testosterone \par - on testosterone gell \par \par #Neuropathy\par - Pregabpin 200 mg twice a day \par \par #Opioid dependence\par - Methadone 110 mg daily \par - [address] [facility]

## 2022-09-15 NOTE — HISTORY OF PRESENT ILLNESS
[FreeTextEntry1] : 67 yo M \par \par Seen in ED on 9/1 for evaluation of KATERIN and Hyperkalemia. \par K 5.4 on 9/1. \par Creatinine 2.4 \par Was discharged with primary follow-up. \par \par He has continued his lisinopril since. \par He is not taking Bactrim as noted on Medication list. \par \par Denies any dysuria, hematuria. Reports increased urinary frequency. \par Denies any fevers, chills, night sweats, weight loss. \par

## 2022-09-15 NOTE — PHYSICAL EXAM
[General Appearance - Alert] : alert [Sclera] : the sclera and conjunctiva were normal [PERRL With Normal Accommodation] : pupils were equal in size, round, reactive to light [Outer Ear] : the ears and nose were normal in appearance [Both Tympanic Membranes Were Examined] : both tympanic membranes were normal [Respiration, Rhythm And Depth] : normal respiratory rhythm and effort [Auscultation Breath Sounds / Voice Sounds] : lungs were clear to auscultation bilaterally [Abdomen Soft] : soft [Abdomen Tenderness] : non-tender [Musculoskeletal - Swelling] : no joint swelling [] : no rash

## 2022-09-16 ENCOUNTER — NON-APPOINTMENT (OUTPATIENT)
Age: 67
End: 2022-09-16

## 2022-09-16 RX ORDER — ENALAPRIL MALEATE 20 MG/1
20 TABLET ORAL TWICE DAILY
Qty: 60 | Refills: 5 | Status: DISCONTINUED | COMMUNITY
Start: 2022-05-19 | End: 2022-09-16

## 2022-09-19 DIAGNOSIS — B20 HUMAN IMMUNODEFICIENCY VIRUS [HIV] DISEASE: ICD-10-CM

## 2022-09-20 ENCOUNTER — APPOINTMENT (OUTPATIENT)
Dept: NEPHROLOGY | Facility: CLINIC | Age: 67
End: 2022-09-20

## 2022-09-21 ENCOUNTER — OUTPATIENT (OUTPATIENT)
Dept: OUTPATIENT SERVICES | Facility: HOSPITAL | Age: 67
LOS: 1 days | Discharge: HOME | End: 2022-09-21

## 2022-09-21 ENCOUNTER — NON-APPOINTMENT (OUTPATIENT)
Age: 67
End: 2022-09-21

## 2022-09-21 LAB
ALBUMIN SERPL ELPH-MCNC: 4.3 G/DL
ALBUPE: 35.9 %
ALP BLD-CCNC: 164 U/L
ALPHA1UPE: 24.8 %
ALPHA2UPE: 18.2 %
ALT SERPL-CCNC: 73 U/L
ANION GAP SERPL CALC-SCNC: 15 MMOL/L
APPEARANCE: ABNORMAL
AST SERPL-CCNC: 87 U/L
BACTERIA: ABNORMAL
BASOPHILS # BLD AUTO: 0.04 K/UL
BASOPHILS NFR BLD AUTO: 0.9 %
BETAUPE: 11.5 %
BILIRUB SERPL-MCNC: <0.2 MG/DL
BILIRUBIN URINE: ABNORMAL
BLOOD URINE: NEGATIVE
BUN SERPL-MCNC: 52 MG/DL
CALCIUM SERPL-MCNC: 9.5 MG/DL
CHLORIDE SERPL-SCNC: 103 MMOL/L
CO2 SERPL-SCNC: 16 MMOL/L
COLOR: ABNORMAL
CREAT 24H UR-MCNC: NORMAL G/24 H
CREAT SERPL-MCNC: 2.7 MG/DL
CREAT SPEC-SCNC: 122 MG/DL
CREATININE UR (MAYO): 117 MG/DL
EGFR: 25 ML/MIN/1.73M2
EOSINOPHIL # BLD AUTO: 0.01 K/UL
EOSINOPHIL NFR BLD AUTO: 0.2 %
FOLATE SERPL-MCNC: 6.1 NG/ML
GAMMAUPE: 9.6 %
GLUCOSE QUALITATIVE U: NEGATIVE
GLUCOSE SERPL-MCNC: 88 MG/DL
HCT VFR BLD CALC: 30.3 %
HGB BLD-MCNC: 9.6 G/DL
HYALINE CASTS: 1 /LPF
IGA 24H UR QL IFE: NORMAL
IMM GRANULOCYTES NFR BLD AUTO: 0.4 %
IRON SATN MFR SERPL: 5 %
IRON SERPL-MCNC: 19 UG/DL
KAPPA LC 24H UR QL: NORMAL
KETONES URINE: NEGATIVE
LEUKOCYTE ESTERASE URINE: NEGATIVE
LYMPHOCYTES # BLD AUTO: 0.97 K/UL
LYMPHOCYTES NFR BLD AUTO: 21.3 %
MAN DIFF?: NORMAL
MCHC RBC-ENTMCNC: 30.9 PG
MCHC RBC-ENTMCNC: 31.7 G/DL
MCV RBC AUTO: 97.4 FL
MICROALBUMIN 24H UR DL<=1MG/L-MCNC: 7.3 MG/DL
MICROALBUMIN/CREAT 24H UR-RTO: 60 MG/G
MICROSCOPIC-UA: NORMAL
MONOCYTES # BLD AUTO: 0.59 K/UL
MONOCYTES NFR BLD AUTO: 12.9 %
NEUTROPHILS # BLD AUTO: 2.93 K/UL
NEUTROPHILS NFR BLD AUTO: 64.3 %
NITRITE URINE: POSITIVE
PH URINE: 9
PLATELET # BLD AUTO: 251 K/UL
POTASSIUM SERPL-SCNC: 6.2 MMOL/L
PROT PATTERN 24H UR ELPH-IMP: NORMAL
PROT SERPL-MCNC: 7.8 G/DL
PROT UR-MCNC: 63 MG/DL
PROT UR-MCNC: 63 MG/DL
PROTEIN URINE: ABNORMAL
RBC # BLD: 3.11 M/UL
RBC # FLD: 14.5 %
RED BLOOD CELLS URINE: 0 /HPF
SODIUM SERPL-SCNC: 134 MMOL/L
SPECIFIC GRAVITY URINE: 1.03
SPECIMEN VOL 24H UR: NORMAL ML
SQUAMOUS EPITHELIAL CELLS: 0 /HPF
TIBC SERPL-MCNC: 354 UG/DL
TRANSFERRIN SERPL-MCNC: 310 MG/DL
TRIPLE PHOSPHATE CRYSTALS: ABNORMAL
UIBC SERPL-MCNC: 335 UG/DL
URINE COMMENTS: NORMAL
UROBILINOGEN URINE: NORMAL
VIT B12 SERPL-MCNC: 1463 PG/ML
WBC # FLD AUTO: 4.56 K/UL
WHITE BLOOD CELLS URINE: 0 /HPF

## 2022-09-28 DIAGNOSIS — B20 HUMAN IMMUNODEFICIENCY VIRUS [HIV] DISEASE: ICD-10-CM

## 2022-10-03 ENCOUNTER — OUTPATIENT (OUTPATIENT)
Dept: OUTPATIENT SERVICES | Facility: HOSPITAL | Age: 67
LOS: 1 days | Discharge: HOME | End: 2022-10-03

## 2022-10-05 ENCOUNTER — NON-APPOINTMENT (OUTPATIENT)
Age: 67
End: 2022-10-05

## 2022-10-05 ENCOUNTER — OUTPATIENT (OUTPATIENT)
Dept: OUTPATIENT SERVICES | Facility: HOSPITAL | Age: 67
LOS: 1 days | Discharge: HOME | End: 2022-10-05

## 2022-10-07 ENCOUNTER — OUTPATIENT (OUTPATIENT)
Dept: OUTPATIENT SERVICES | Facility: HOSPITAL | Age: 67
LOS: 1 days | Discharge: HOME | End: 2022-10-07

## 2022-10-07 DIAGNOSIS — B20 HUMAN IMMUNODEFICIENCY VIRUS [HIV] DISEASE: ICD-10-CM

## 2022-10-10 ENCOUNTER — RX RENEWAL (OUTPATIENT)
Age: 67
End: 2022-10-10

## 2022-10-10 LAB
ALBUMIN MFR SERPL ELPH: 51.1 %
ALBUMIN SERPL-MCNC: 4 G/DL
ALBUMIN/GLOB SERPL: 1.1 RATIO
ALPHA1 GLOB MFR SERPL ELPH: 6.1 %
ALPHA1 GLOB SERPL ELPH-MCNC: 0.5 G/DL
ALPHA2 GLOB MFR SERPL ELPH: 10.4 %
ALPHA2 GLOB SERPL ELPH-MCNC: 0.8 G/DL
B-GLOBULIN MFR SERPL ELPH: 9.7 %
B-GLOBULIN SERPL ELPH-MCNC: 0.8 G/DL
GAMMA GLOB FLD ELPH-MCNC: 1.8 G/DL
GAMMA GLOB MFR SERPL ELPH: 22.7 %
INTERPRETATION SERPL IEP-IMP: NORMAL
M PROTEIN MFR SERPL ELPH: NORMAL
MONOCLON BAND OBS SERPL: NORMAL
PROT SERPL-MCNC: 7.8 G/DL

## 2022-10-12 ENCOUNTER — RX RENEWAL (OUTPATIENT)
Age: 67
End: 2022-10-12

## 2022-10-17 ENCOUNTER — NON-APPOINTMENT (OUTPATIENT)
Age: 67
End: 2022-10-17

## 2022-10-18 DIAGNOSIS — B20 HUMAN IMMUNODEFICIENCY VIRUS [HIV] DISEASE: ICD-10-CM

## 2022-10-19 ENCOUNTER — FORM ENCOUNTER (OUTPATIENT)
Age: 67
End: 2022-10-19

## 2022-10-20 ENCOUNTER — APPOINTMENT (OUTPATIENT)
Dept: INFECTIOUS DISEASE | Facility: CLINIC | Age: 67
End: 2022-10-20

## 2022-10-20 ENCOUNTER — OUTPATIENT (OUTPATIENT)
Dept: OUTPATIENT SERVICES | Facility: HOSPITAL | Age: 67
LOS: 1 days | Discharge: HOME | End: 2022-10-20

## 2022-10-20 VITALS
HEART RATE: 88 BPM | BODY MASS INDEX: 24.12 KG/M2 | OXYGEN SATURATION: 98 % | WEIGHT: 180 LBS | DIASTOLIC BLOOD PRESSURE: 66 MMHG | SYSTOLIC BLOOD PRESSURE: 112 MMHG | TEMPERATURE: 98.6 F | RESPIRATION RATE: 16 BRPM | HEIGHT: 72.5 IN

## 2022-10-20 DIAGNOSIS — B20 HUMAN IMMUNODEFICIENCY VIRUS [HIV] DISEASE: ICD-10-CM

## 2022-10-20 PROCEDURE — 99214 OFFICE O/P EST MOD 30 MIN: CPT

## 2022-10-20 NOTE — PHYSICAL EXAM
[General Appearance - Alert] : alert [Sclera] : the sclera and conjunctiva were normal [PERRL With Normal Accommodation] : pupils were equal in size, round, reactive to light [Outer Ear] : the ears and nose were normal in appearance [Both Tympanic Membranes Were Examined] : both tympanic membranes were normal [Respiration, Rhythm And Depth] : normal respiratory rhythm and effort [Auscultation Breath Sounds / Voice Sounds] : lungs were clear to auscultation bilaterally [Abdomen Soft] : soft [Abdomen Tenderness] : non-tender Cooperative/Awake/Sleepy [] : no rash [Musculoskeletal - Swelling] : no joint swelling

## 2022-10-20 NOTE — HISTORY OF PRESENT ILLNESS
[FreeTextEntry1] : 66 yo M \par \par Seen in ED on 9/1 for evaluation of KATERIN and Hyperkalemia. \par K 5.4 on 9/1. \par Creatinine 2.4 \par Was discharged with primary follow-up. \par \par CKD/Hyperkalemia- \par He has been lost to follow-up. \par Med rec obtained by pharmacy; He was dispensed ACE inhibitor. \par He is not taking Bactrim as noted on Medication list. \par Denies any dysuria, hematuria. \par Denies any fevers, chills, night sweats, weight loss. \par \par Vomiting\par Episode of vomiting this AM. This was the first time this happened. \par Denies any abdominal pain, epigastric pain. \par He has not made appointment for MRI yet.

## 2022-10-20 NOTE — ASSESSMENT
[FreeTextEntry1] : #HIV\par - on triumeq -- will repeat BMP, if CrCL< 30, will consider Juluca\par \par #CKD \par - worsening creatinine since \par - BP controlled, No diabetes\par - will need repeat SPEP - faint band seen, no M spike\par - Albumin/Cr 9/15/2022  60\par - check renal US\par - refer to renal \par \par #Hyperkalemia\par - repeat CMP\par \par #Pancreatic Duct dilitation\par - Abd US 7/27/2022: New mild pancreatic duct dilatation measuring 3-4 mm. A central obstructing lesion cannot be excluded on this examination. Further evaluation of the pancreas with MRI and MRCP with and without gadolinium is necessary for complete characterization. Essentially stable dilatation of the common bile duct of unclear etiology\par - GI referral\par - will work on obtaining MRI/MRCP w/o DARINEL given CKD \par \par #Left Hip Pain \par - on chronic opioids\par \par #HTN \par - amlodipin 10 mg daily \par - enalparil 20 mg BID\par \par #Low Testosterone \par - on testosterone gell \par \par #Neuropathy\par - Pregabpin 200 mg twice a day \par \par #Opioid dependence\par - Methadone 110 mg daily \par - [address] [place] [address]

## 2022-10-21 ENCOUNTER — OUTPATIENT (OUTPATIENT)
Dept: OUTPATIENT SERVICES | Facility: HOSPITAL | Age: 67
LOS: 1 days | Discharge: HOME | End: 2022-10-21

## 2022-10-21 ENCOUNTER — NON-APPOINTMENT (OUTPATIENT)
Age: 67
End: 2022-10-21

## 2022-10-21 ENCOUNTER — INPATIENT (INPATIENT)
Facility: HOSPITAL | Age: 67
LOS: 9 days | Discharge: HOME | End: 2022-10-31
Attending: STUDENT IN AN ORGANIZED HEALTH CARE EDUCATION/TRAINING PROGRAM | Admitting: STUDENT IN AN ORGANIZED HEALTH CARE EDUCATION/TRAINING PROGRAM

## 2022-10-21 VITALS
SYSTOLIC BLOOD PRESSURE: 110 MMHG | TEMPERATURE: 97 F | HEART RATE: 84 BPM | DIASTOLIC BLOOD PRESSURE: 62 MMHG | OXYGEN SATURATION: 98 % | RESPIRATION RATE: 18 BRPM

## 2022-10-21 DIAGNOSIS — I13.0 HYPERTENSIVE HEART AND CHRONIC KIDNEY DISEASE WITH HEART FAILURE AND STAGE 1 THROUGH STAGE 4 CHRONIC KIDNEY DISEASE, OR UNSPECIFIED CHRONIC KIDNEY DISEASE: ICD-10-CM

## 2022-10-21 DIAGNOSIS — Z79.82 LONG TERM (CURRENT) USE OF ASPIRIN: ICD-10-CM

## 2022-10-21 DIAGNOSIS — U07.1 COVID-19: ICD-10-CM

## 2022-10-21 DIAGNOSIS — G62.9 POLYNEUROPATHY, UNSPECIFIED: ICD-10-CM

## 2022-10-21 DIAGNOSIS — F11.20 OPIOID DEPENDENCE, UNCOMPLICATED: ICD-10-CM

## 2022-10-21 DIAGNOSIS — N17.9 ACUTE KIDNEY FAILURE, UNSPECIFIED: ICD-10-CM

## 2022-10-21 DIAGNOSIS — M25.552 PAIN IN LEFT HIP: ICD-10-CM

## 2022-10-21 DIAGNOSIS — E87.5 HYPERKALEMIA: ICD-10-CM

## 2022-10-21 DIAGNOSIS — B20 HUMAN IMMUNODEFICIENCY VIRUS [HIV] DISEASE: ICD-10-CM

## 2022-10-21 DIAGNOSIS — E83.42 HYPOMAGNESEMIA: ICD-10-CM

## 2022-10-21 DIAGNOSIS — L03.114 CELLULITIS OF LEFT UPPER LIMB: ICD-10-CM

## 2022-10-21 DIAGNOSIS — D63.1 ANEMIA IN CHRONIC KIDNEY DISEASE: ICD-10-CM

## 2022-10-21 DIAGNOSIS — N18.4 CHRONIC KIDNEY DISEASE, STAGE 4 (SEVERE): ICD-10-CM

## 2022-10-21 DIAGNOSIS — R79.9 ABNORMAL FINDING OF BLOOD CHEMISTRY, UNSPECIFIED: ICD-10-CM

## 2022-10-21 DIAGNOSIS — E87.20 ACIDOSIS, UNSPECIFIED: ICD-10-CM

## 2022-10-21 DIAGNOSIS — B19.20 UNSPECIFIED VIRAL HEPATITIS C WITHOUT HEPATIC COMA: ICD-10-CM

## 2022-10-21 DIAGNOSIS — I50.9 HEART FAILURE, UNSPECIFIED: ICD-10-CM

## 2022-10-21 LAB
ALBUMIN SERPL ELPH-MCNC: 3.8 G/DL — SIGNIFICANT CHANGE UP (ref 3.5–5.2)
ALBUMIN SERPL ELPH-MCNC: 4 G/DL — SIGNIFICANT CHANGE UP (ref 3.5–5.2)
ALP SERPL-CCNC: 129 U/L — HIGH (ref 30–115)
ALP SERPL-CCNC: 131 U/L — HIGH (ref 30–115)
ALT FLD-CCNC: 47 U/L — HIGH (ref 0–41)
ALT FLD-CCNC: 47 U/L — HIGH (ref 0–41)
ANION GAP SERPL CALC-SCNC: 12 MMOL/L — SIGNIFICANT CHANGE UP (ref 7–14)
ANION GAP SERPL CALC-SCNC: 13 MMOL/L — SIGNIFICANT CHANGE UP (ref 7–14)
APPEARANCE UR: CLEAR — SIGNIFICANT CHANGE UP
AST SERPL-CCNC: 51 U/L — HIGH (ref 0–41)
AST SERPL-CCNC: 54 U/L — HIGH (ref 0–41)
BACTERIA # UR AUTO: NEGATIVE — SIGNIFICANT CHANGE UP
BASOPHILS # BLD AUTO: 0.02 K/UL
BASOPHILS # BLD AUTO: 0.05 K/UL — SIGNIFICANT CHANGE UP (ref 0–0.2)
BASOPHILS NFR BLD AUTO: 0.4 %
BASOPHILS NFR BLD AUTO: 1.7 % — HIGH (ref 0–1)
BILIRUB SERPL-MCNC: <0.2 MG/DL — SIGNIFICANT CHANGE UP (ref 0.2–1.2)
BILIRUB SERPL-MCNC: <0.2 MG/DL — SIGNIFICANT CHANGE UP (ref 0.2–1.2)
BILIRUB UR-MCNC: NEGATIVE — SIGNIFICANT CHANGE UP
BUN SERPL-MCNC: 67 MG/DL — CRITICAL HIGH (ref 10–20)
BUN SERPL-MCNC: 70 MG/DL — CRITICAL HIGH (ref 10–20)
CALCIUM SERPL-MCNC: 9.3 MG/DL — SIGNIFICANT CHANGE UP (ref 8.4–10.5)
CALCIUM SERPL-MCNC: 9.4 MG/DL — SIGNIFICANT CHANGE UP (ref 8.4–10.4)
CHLORIDE SERPL-SCNC: 101 MMOL/L — SIGNIFICANT CHANGE UP (ref 98–110)
CHLORIDE SERPL-SCNC: 108 MMOL/L — SIGNIFICANT CHANGE UP (ref 98–110)
CO2 SERPL-SCNC: 18 MMOL/L — SIGNIFICANT CHANGE UP (ref 17–32)
CO2 SERPL-SCNC: 19 MMOL/L — SIGNIFICANT CHANGE UP (ref 17–32)
COLOR SPEC: SIGNIFICANT CHANGE UP
CREAT SERPL-MCNC: 4.8 MG/DL — CRITICAL HIGH (ref 0.7–1.5)
CREAT SERPL-MCNC: 5 MG/DL — CRITICAL HIGH (ref 0.7–1.5)
DIFF PNL FLD: ABNORMAL
EGFR: 12 ML/MIN/1.73M2 — LOW
EGFR: 12 ML/MIN/1.73M2 — LOW
EOSINOPHIL # BLD AUTO: 0.01 K/UL
EOSINOPHIL # BLD AUTO: 0.03 K/UL — SIGNIFICANT CHANGE UP (ref 0–0.7)
EOSINOPHIL NFR BLD AUTO: 0.2 %
EOSINOPHIL NFR BLD AUTO: 0.9 % — SIGNIFICANT CHANGE UP (ref 0–8)
EPI CELLS # UR: 2 /HPF — SIGNIFICANT CHANGE UP (ref 0–5)
GIANT PLATELETS BLD QL SMEAR: PRESENT — SIGNIFICANT CHANGE UP
GLUCOSE BLDC GLUCOMTR-MCNC: 216 MG/DL — HIGH (ref 70–99)
GLUCOSE SERPL-MCNC: 63 MG/DL — LOW (ref 70–99)
GLUCOSE SERPL-MCNC: 68 MG/DL — LOW (ref 70–99)
GLUCOSE UR QL: NEGATIVE — SIGNIFICANT CHANGE UP
HCT VFR BLD CALC: 25.6 %
HCT VFR BLD CALC: 26.3 % — LOW (ref 42–52)
HGB BLD-MCNC: 8.2 G/DL
HGB BLD-MCNC: 8.8 G/DL — LOW (ref 14–18)
HYALINE CASTS # UR AUTO: 1 /LPF — SIGNIFICANT CHANGE UP (ref 0–7)
HYPOCHROMIA BLD QL: SLIGHT — SIGNIFICANT CHANGE UP
IMM GRANULOCYTES NFR BLD AUTO: 0.4 %
KETONES UR-MCNC: NEGATIVE — SIGNIFICANT CHANGE UP
LEUKOCYTE ESTERASE UR-ACNC: NEGATIVE — SIGNIFICANT CHANGE UP
LPL SERPL-CCNC: 55 U/L
LYMPHOCYTES # BLD AUTO: 0.51 K/UL
LYMPHOCYTES # BLD AUTO: 1 K/UL — LOW (ref 1.2–3.4)
LYMPHOCYTES # BLD AUTO: 32.5 % — SIGNIFICANT CHANGE UP (ref 20.5–51.1)
LYMPHOCYTES NFR BLD AUTO: 9.7 %
MACROCYTES BLD QL: SLIGHT — SIGNIFICANT CHANGE UP
MAGNESIUM SERPL-MCNC: 1.9 MG/DL — SIGNIFICANT CHANGE UP (ref 1.8–2.4)
MAN DIFF?: NORMAL
MANUAL SMEAR VERIFICATION: SIGNIFICANT CHANGE UP
MCHC RBC-ENTMCNC: 30.6 PG
MCHC RBC-ENTMCNC: 30.6 PG — SIGNIFICANT CHANGE UP (ref 27–31)
MCHC RBC-ENTMCNC: 32 G/DL
MCHC RBC-ENTMCNC: 33.5 G/DL — SIGNIFICANT CHANGE UP (ref 32–37)
MCV RBC AUTO: 91.3 FL — SIGNIFICANT CHANGE UP (ref 80–94)
MCV RBC AUTO: 95.5 FL
MONOCYTES # BLD AUTO: 0.19 K/UL — SIGNIFICANT CHANGE UP (ref 0.1–0.6)
MONOCYTES # BLD AUTO: 0.57 K/UL
MONOCYTES NFR BLD AUTO: 10.9 %
MONOCYTES NFR BLD AUTO: 6.1 % — SIGNIFICANT CHANGE UP (ref 1.7–9.3)
NEUTROPHILS # BLD AUTO: 1.81 K/UL — SIGNIFICANT CHANGE UP (ref 1.4–6.5)
NEUTROPHILS # BLD AUTO: 4.11 K/UL
NEUTROPHILS NFR BLD AUTO: 57.9 % — SIGNIFICANT CHANGE UP (ref 42.2–75.2)
NEUTROPHILS NFR BLD AUTO: 78.4 %
NEUTS BAND # BLD: 0.9 % — SIGNIFICANT CHANGE UP (ref 0–6)
NITRITE UR-MCNC: NEGATIVE — SIGNIFICANT CHANGE UP
NRBC # BLD: 1 /100 — HIGH (ref 0–0)
NRBC # BLD: SIGNIFICANT CHANGE UP /100 WBCS (ref 0–0)
OVALOCYTES BLD QL SMEAR: SLIGHT — SIGNIFICANT CHANGE UP
PH UR: 6.5 — SIGNIFICANT CHANGE UP (ref 5–8)
PLAT MORPH BLD: NORMAL — SIGNIFICANT CHANGE UP
PLATELET # BLD AUTO: 231 K/UL — SIGNIFICANT CHANGE UP (ref 130–400)
PLATELET # BLD AUTO: 233 K/UL
POTASSIUM SERPL-MCNC: 6.4 MMOL/L — CRITICAL HIGH (ref 3.5–5)
POTASSIUM SERPL-MCNC: 6.8 MMOL/L — CRITICAL HIGH (ref 3.5–5)
POTASSIUM SERPL-SCNC: 6.4 MMOL/L — CRITICAL HIGH (ref 3.5–5)
POTASSIUM SERPL-SCNC: 6.8 MMOL/L — CRITICAL HIGH (ref 3.5–5)
PROT SERPL-MCNC: 7.1 G/DL — SIGNIFICANT CHANGE UP (ref 6–8)
PROT SERPL-MCNC: 7.2 G/DL — SIGNIFICANT CHANGE UP (ref 6–8)
PROT UR-MCNC: ABNORMAL
RBC # BLD: 2.68 M/UL
RBC # BLD: 2.88 M/UL — LOW (ref 4.7–6.1)
RBC # FLD: 14.8 % — HIGH (ref 11.5–14.5)
RBC # FLD: 15.6 %
RBC BLD AUTO: ABNORMAL
RBC CASTS # UR COMP ASSIST: 1 /HPF — SIGNIFICANT CHANGE UP (ref 0–4)
SARS-COV-2 RNA SPEC QL NAA+PROBE: SIGNIFICANT CHANGE UP
SODIUM SERPL-SCNC: 132 MMOL/L — LOW (ref 135–146)
SODIUM SERPL-SCNC: 139 MMOL/L — SIGNIFICANT CHANGE UP (ref 135–146)
SP GR SPEC: 1.01 — SIGNIFICANT CHANGE UP (ref 1.01–1.03)
UROBILINOGEN FLD QL: SIGNIFICANT CHANGE UP
WBC # BLD: 3.07 K/UL — LOW (ref 4.8–10.8)
WBC # FLD AUTO: 3.07 K/UL — LOW (ref 4.8–10.8)
WBC # FLD AUTO: 5.24 K/UL
WBC UR QL: 2 /HPF — SIGNIFICANT CHANGE UP (ref 0–5)

## 2022-10-21 PROCEDURE — 99285 EMERGENCY DEPT VISIT HI MDM: CPT

## 2022-10-21 PROCEDURE — 93010 ELECTROCARDIOGRAM REPORT: CPT

## 2022-10-21 PROCEDURE — 71045 X-RAY EXAM CHEST 1 VIEW: CPT | Mod: 26

## 2022-10-21 RX ORDER — HEPARIN SODIUM 5000 [USP'U]/ML
5000 INJECTION INTRAVENOUS; SUBCUTANEOUS EVERY 12 HOURS
Refills: 0 | Status: DISCONTINUED | OUTPATIENT
Start: 2022-10-21 | End: 2022-10-31

## 2022-10-21 RX ORDER — AMLODIPINE BESYLATE 2.5 MG/1
1 TABLET ORAL
Qty: 0 | Refills: 0 | DISCHARGE

## 2022-10-21 RX ORDER — INSULIN HUMAN 100 [IU]/ML
10 INJECTION, SOLUTION SUBCUTANEOUS ONCE
Refills: 0 | Status: COMPLETED | OUTPATIENT
Start: 2022-10-21 | End: 2022-10-21

## 2022-10-21 RX ORDER — DEXTROSE 50 % IN WATER 50 %
50 SYRINGE (ML) INTRAVENOUS ONCE
Refills: 0 | Status: COMPLETED | OUTPATIENT
Start: 2022-10-21 | End: 2022-10-21

## 2022-10-21 RX ORDER — SODIUM ZIRCONIUM CYCLOSILICATE 10 G/10G
10 POWDER, FOR SUSPENSION ORAL THREE TIMES A DAY
Refills: 0 | Status: DISCONTINUED | OUTPATIENT
Start: 2022-10-21 | End: 2022-10-21

## 2022-10-21 RX ORDER — ASPIRIN/CALCIUM CARB/MAGNESIUM 324 MG
1 TABLET ORAL
Qty: 0 | Refills: 0 | DISCHARGE

## 2022-10-21 RX ORDER — AZTREONAM 2 G
1 VIAL (EA) INJECTION
Qty: 0 | Refills: 0 | DISCHARGE

## 2022-10-21 RX ORDER — SODIUM ZIRCONIUM CYCLOSILICATE 10 G/10G
10 POWDER, FOR SUSPENSION ORAL THREE TIMES A DAY
Refills: 0 | Status: COMPLETED | OUTPATIENT
Start: 2022-10-21 | End: 2022-10-23

## 2022-10-21 RX ORDER — SODIUM CHLORIDE 9 MG/ML
1000 INJECTION, SOLUTION INTRAVENOUS
Refills: 0 | Status: DISCONTINUED | OUTPATIENT
Start: 2022-10-21 | End: 2022-10-22

## 2022-10-21 RX ORDER — CEFEPIME 1 G/1
2000 INJECTION, POWDER, FOR SOLUTION INTRAMUSCULAR; INTRAVENOUS ONCE
Refills: 0 | Status: COMPLETED | OUTPATIENT
Start: 2022-10-21 | End: 2022-10-21

## 2022-10-21 RX ORDER — ONDANSETRON 8 MG/1
4 TABLET, FILM COATED ORAL ONCE
Refills: 0 | Status: COMPLETED | OUTPATIENT
Start: 2022-10-21 | End: 2022-10-21

## 2022-10-21 RX ORDER — SODIUM CHLORIDE 9 MG/ML
1000 INJECTION, SOLUTION INTRAVENOUS ONCE
Refills: 0 | Status: COMPLETED | OUTPATIENT
Start: 2022-10-21 | End: 2022-10-21

## 2022-10-21 RX ORDER — CALCIUM GLUCONATE 100 MG/ML
1 VIAL (ML) INTRAVENOUS ONCE
Refills: 0 | Status: COMPLETED | OUTPATIENT
Start: 2022-10-21 | End: 2022-10-21

## 2022-10-21 RX ORDER — SODIUM ZIRCONIUM CYCLOSILICATE 10 G/10G
10 POWDER, FOR SUSPENSION ORAL ONCE
Refills: 0 | Status: COMPLETED | OUTPATIENT
Start: 2022-10-21 | End: 2022-10-21

## 2022-10-21 RX ORDER — VANCOMYCIN HCL 1 G
1750 VIAL (EA) INTRAVENOUS ONCE
Refills: 0 | Status: DISCONTINUED | OUTPATIENT
Start: 2022-10-21 | End: 2022-10-22

## 2022-10-21 RX ORDER — VANCOMYCIN HCL 1 G
1700 VIAL (EA) INTRAVENOUS ONCE
Refills: 0 | Status: DISCONTINUED | OUTPATIENT
Start: 2022-10-21 | End: 2022-10-21

## 2022-10-21 RX ORDER — CHLORTHALIDONE 50 MG
1 TABLET ORAL
Qty: 0 | Refills: 0 | DISCHARGE

## 2022-10-21 RX ORDER — METHADONE HYDROCHLORIDE 40 MG/1
110 TABLET ORAL
Qty: 0 | Refills: 0 | DISCHARGE

## 2022-10-21 RX ORDER — ATORVASTATIN CALCIUM 80 MG/1
1 TABLET, FILM COATED ORAL
Qty: 0 | Refills: 0 | DISCHARGE

## 2022-10-21 RX ADMIN — ONDANSETRON 4 MILLIGRAM(S): 8 TABLET, FILM COATED ORAL at 16:29

## 2022-10-21 RX ADMIN — Medication 100 GRAM(S): at 16:30

## 2022-10-21 RX ADMIN — SODIUM CHLORIDE 1000 MILLILITER(S): 9 INJECTION, SOLUTION INTRAVENOUS at 16:30

## 2022-10-21 RX ADMIN — CEFEPIME 100 MILLIGRAM(S): 1 INJECTION, POWDER, FOR SOLUTION INTRAMUSCULAR; INTRAVENOUS at 16:31

## 2022-10-21 RX ADMIN — Medication 50 MILLILITER(S): at 16:30

## 2022-10-21 RX ADMIN — INSULIN HUMAN 10 UNIT(S): 100 INJECTION, SOLUTION SUBCUTANEOUS at 17:35

## 2022-10-21 RX ADMIN — SODIUM ZIRCONIUM CYCLOSILICATE 10 GRAM(S): 10 POWDER, FOR SUSPENSION ORAL at 16:31

## 2022-10-21 NOTE — ED PROVIDER NOTE - OBJECTIVE STATEMENT
Pt is a 66-year-old man, h/o HTN, arthritis, HIV (on HAART, most recent Tcell count 237 and undetectable viral load, as per patient, results from yesterday), IV drug use (heroin+cocaine "speedball"), opioid dependence on methadone, CKD, HCV. Presents to the ED for hyperkalemia and renal failure. Patient c/o left forearm pain and swelling at site of IV injection, and vomiting since Tue (3 days). No other complaints - no fever/chills, rhinorrhea, sore throat, CP, palpitations, SOB, cough, abd pain, diarrhea, dysuria, hematuria, new joint pain, FND, rash, trauma.

## 2022-10-21 NOTE — ED PROVIDER NOTE - ATTENDING CONTRIBUTION TO CARE
66-year-old male, with past medical history of substance dependence–on methadone, IVDA (admits to currently using heroin), HIV (on HAART), renal insufficiency and hepatitis C, presents to the ED with abnormal lab results.  Patient states he was told his K is high. 66-year-old male, with past medical history of substance dependence–on methadone, IVDA (admits to currently using heroin/cocaine), HIV (on HAART), renal insufficiency and hepatitis C, presents to the ED with abnormal lab results.  Patient states he was told his K is high. Pt is also reporting n/v for 3 days. No fever/chills, CP/SOB, c/d, urinary symptoms. Pt states he is hungry and wants to eat. Pt is not vomiting in the ED. On exam, pt in NAD, AAOx3, head NC/AT, CN II-XII intact, PEERL, EOMi, neck (-) midline tenderness, lungs CTA B/L, CV S1S2 regular, abdomen soft/NT/ND/(+)BS, ext (-) edema, motor 5/5x4, sensation intact, ambulating with steady gait. Will do labs, EKG, and reevaluate.

## 2022-10-21 NOTE — ED ADULT NURSE REASSESSMENT NOTE - NS ED NURSE REASSESS COMMENT FT1
pt refusing segovia. pt uses urinal. Md Pichardo aware. pt US IV not patent anymore. MD SURESH meyer aware. will attempt to place another IV. stretcher alarm activated. pt ambulates with cane.

## 2022-10-21 NOTE — H&P ADULT - NSHPPHYSICALEXAM_GEN_ALL_CORE
Constitutional: NAD  Neck: No JVD  Respiratory: CTAB,   Cardiovascular: S1, S2, RRR  Gastrointestinal: BS+, soft, NT/ND  Extremities: No cyanosis or clubbing. No peripheral edema  Neurological: A/O x 3, no focal deficits  : No CVA tenderness. No segovia.   Skin: No rashes

## 2022-10-21 NOTE — ED PROVIDER NOTE - PHYSICAL EXAMINATION
_  Vital signs reviewed; ABCs intact  GENERAL: Well nourished, comfortable  SKIN: Warm, dry  HEAD & NECK: NCAT, supple neck  EYES: EOMI, PER B/L, no scleral icterus, no conjunctival injection, no conjunctival pallor  ENT: Mildly dry mucous membranes   CARD: RRR, S1, S2; no murmurs, no rubs, no gallops  RESP: Normal respiratory effort, CTAB, no rales, no wheezing  ABD: Soft, ND, NT, no rebound, no guarding  EXT: Tracks from IV injections; left forearm with thrombophlebitis  NEUROMSK: Grossly intact  PSYCH: AAOx3, cooperative, appropriate

## 2022-10-21 NOTE — ED ADULT NURSE NOTE - OBJECTIVE STATEMENT
pt sent in for abnormal lab results. pt states he also has been vomiting for many days, unable to eat or drink anything.

## 2022-10-21 NOTE — CONSULT NOTE ADULT - ASSESSMENT
Patient is a 67y Male with PMH of HIV / CKD not seen by a nephrologist / HTN whom presented  with KATERIN on CKD - hyperkalemia    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/ HIV/ Hyperkalemia / low bicarb c/w Acidosis/ anemia / leukopenia  HTN is a risk factor along with HIV and HAART therapy (was told to stop certain HAART meds)/ unknown if he was on tenofovir/ abacavir....  suggest insert segovia catheter  sp D50 insulin and started on lokelma  consider IVF LR at 75 cc per hour   will need renal bladder sono to check kidneys size and echogenicity   check repeat BMP tonight and in AM   may need RRT if no improvement/ discussed with patient and agreeable if needed   check IP and PTH / hepatitis profile / FE STUDIES  ID for HAART meds    will follow

## 2022-10-21 NOTE — H&P ADULT - ASSESSMENT
66-year-old male, with PMH of substance dependence–on methadone, IVDA (admits to currently using heroin/cocaine), HIV (on HAART), NTU8v-4 (baseline Cr ~3), and hepatitis C, sent in by the infectious team for KATERIN on CKD and hyperkalmia noted on OP labs.       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  HTN is a risk factor along with HIV and HAART therapy on Triumeq (abacavir, dolutegravir, and lamivudine)  - Cr 5 on admission (baseline ~3)   - K 6.8 on admission sp D50 insulin in ED  - segovia inserted with 300 cc UO   - will start on lokelma 10 TID   - IVF LR at 75 cc per hour   - US renal ordered    - Phos, PTH ordered   - CPK ordered 11:30 (unlikely rhabdo given neg UA)   - repeat bmp 11:30     # HIV (on HAART)  # Hepatitis C  - pt is on Triumeq (abacavir, dolutegravir, and lamivudine) and was advised to stop given his worsening kidney fx and to start Julaca instead (Dolutegravir- rilpivirine)  - HIV-1 viral load: <20 (5/2022 AND 8/2022)   - CD4: 246 ( 3/2022) >212 (8/2022)  - start on Julaca (50-25) OD after ID approval       # Normocytic Anemia likely 2/2 to CKD  - Hgb 8.8 on admission ( baseline ~9.5)   - iron studies, b12, folate  - SPEP, UPEP, IF    - keep active T&S      # mild transaminitis  # mild pancreatic duct dilatation  # stable dilatation of the common bile duct  - ALT/AST 51/47 on admission  - been noted to have elevated LFTs on previous labs  - US abdomen (July 2022) New mild pancreatic duct dilatation measuring 3-4 mm.A central obstructing lesion cannot be excluded on this examination.  Further evaluation of the pancreas with MRI and MRCP with and without   gadolinium is necessary for complete characterization. Essentially stable dilatation of the common bile duct of unclear etiology.   - will get repeat US abdomen  - pt was advised to get MRI/MRCP as OP but he did not schedule it yet - consider obtaining as inpt if repeat US abdomen results abnormal    - GI advanced c/s if persistant abnormal results on US abdomen       # CHF   - TTE (2018) EF 55-60% , GIDD  - repeat TTE ordered   - keep euvolemic      # NEUROPATHY   - c/w pregabalin       # Low testosterone   - on testosterone gel       #Left hip pain   - on chronic opioids       # HTN - currently borderline low  BP  - will hold off amlo   - dc enalapril        # substance dependence–on methadone  # IVDA (admits to currently using heroin/cocaine)  - methadone 110mg OD  - [address] [place] [address] (confirm in am)    66-year-old male, with PMH of substance dependence–on methadone, IVDA (admits to currently using heroin/cocaine), HIV (on HAART), XJP2g-3 (baseline Cr ~3), and hepatitis C, sent in by the infectious team for KATERIN on CKD and hyperkalmia noted on OP labs.       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  HTN is a risk factor along with HIV and HAART therapy on Triumeq (abacavir, dolutegravir, and lamivudine)  - Cr 5 on admission (baseline ~3)   - K 6.8 on admission sp D50 insulin in ED  - place segovia for acc Is and Os   - will start on lokelma 10 TID   - IVF LR at 75 cc per hour   - US renal ordered    - Phos, PTH ordered   - CPK ordered 11:30 (unlikely rhabdo given neg UA)   - repeat bmp 11:30     # HIV (on HAART)  # Hepatitis C  - pt is on Triumeq (abacavir, dolutegravir, and lamivudine) and was advised to stop given his worsening kidney fx and to start Julaca instead (Dolutegravir- rilpivirine)  - HIV-1 viral load: <20 (5/2022 AND 8/2022)   - CD4: 246 ( 3/2022) >212 (8/2022)  - start on Julaca (50-25) OD after ID approval       # Normocytic Anemia likely 2/2 to CKD  - Hgb 8.8 on admission ( baseline ~9.5)   - iron studies, b12, folate  - SPEP, UPEP, IF    - keep active T&S      # mild transaminitis  # mild pancreatic duct dilatation  # stable dilatation of the common bile duct  - ALT/AST 51/47 on admission  - been noted to have elevated LFTs on previous labs  - US abdomen (July 2022) New mild pancreatic duct dilatation measuring 3-4 mm.A central obstructing lesion cannot be excluded on this examination.  Further evaluation of the pancreas with MRI and MRCP with and without   gadolinium is necessary for complete characterization. Essentially stable dilatation of the common bile duct of unclear etiology.   - will get repeat US abdomen  - pt was advised to get MRI/MRCP as OP but he did not schedule it yet - consider obtaining as inpt if repeat US abdomen results abnormal    - GI advanced c/s if persistant abnormal results on US abdomen       # CHF   - TTE (2018) EF 55-60% , GIDD  - repeat TTE ordered   - keep euvolemic      # NEUROPATHY   - c/w pregabalin       # Low testosterone   - on testosterone gel       #Left hip pain   - on chronic opioids       # HTN - currently borderline low  BP  - will hold off amlo   - dc enalapril        # substance dependence–on methadone  # IVDA (admits to currently using heroin/cocaine)  - methadone 110mg OD  - [address] [place] [address] (confirm in am)     # DIET: DASH

## 2022-10-21 NOTE — ED PROVIDER NOTE - CLINICAL SUMMARY MEDICAL DECISION MAKING FREE TEXT BOX
67-year-old male presents to the ED for nausea and vomiting for 3 days.  Patient also reports he has had abnormal lab results.  Patient was evaluated by the initial team who obtained labs which noted to have anemia, hyperkalemia, CKD.  Vitals reviewed by me noted to be wnl.  Case was signed out to me pending labs, nephrology evaluation and admission.  Labs reviewed by me as noted previously.  Consulted with nephrology with recommendations for insulin as well as Lokelma.  EKG reviewed with no peaked T waves.  First-degree AV block noted. Continued hydration.  Admission for additional studies including being a candidate for hemodialysis.

## 2022-10-21 NOTE — ED PROVIDER NOTE - CARE PLAN
1 Principal Discharge DX:	Sepsis with acute renal failure  Secondary Diagnosis:	Hyperkalemia  Secondary Diagnosis:	At risk for thrombophlebitis  Secondary Diagnosis:	Opioid dependence  Secondary Diagnosis:	IV drug user

## 2022-10-21 NOTE — H&P ADULT - HISTORY OF PRESENT ILLNESS
66-year-old male, with PMH of substance dependence–on methadone, IVDA (admits to currently using heroin/cocaine), HIV (on HAART), VRK1d-2 (baseline Cr ~3), and hepatitis C, sent in by the infectious team for abnormal labs done as OP. Pt was noted to have elevated K and Cr on his most recent labs done as OP. Pt is also reporting n/v for 3 days. No fever/chills, CP/SOB, c/d, urinary symptoms.      ED course:   VS: stable  Labs: Hgb 8.8/ K 6.8/ Cr 5   Imaging:

## 2022-10-21 NOTE — CONSULT NOTE ADULT - SUBJECTIVE AND OBJECTIVE BOX
NEPHROLOGY CONSULTATION NOTE    THIS CONSULT IS INCOMPLETE / FULL CONSULT TO FOLLOW    Patient is a 67y Male whom presented to the hospital with     PAST MEDICAL & SURGICAL HISTORY:  HIV disease  IV drug abuse  Arthritis  Hypertension        Allergies:  No Known Allergies    Home Medications   Home Medications:  oxyCODONE 30 mg oral tablet, extended release: 30 milligram(s) orally 4 times a day (01 Sep 2022 13:37)    Hospital Medications:   MEDICATIONS  (STANDING):  dextrose 50% Injectable 50 milliLiter(s) IV Push Once  insulin regular  human recombinant 10 Unit(s) IV Push Once  vancomycin  IVPB. 1750 milliGRAM(s) IV Intermittent once      SOCIAL HISTORY:  Denies ETOH,Smoking,   FAMILY HISTORY:        REVIEW OF SYSTEMS:  CONSTITUTIONAL: No weakness, fevers or chills  EYES/ENT: No visual changes;  No vertigo or throat pain   NECK: No pain or stiffness  RESPIRATORY: No cough, wheezing, hemoptysis; No shortness of breath  CARDIOVASCULAR: No chest pain or palpitations.  GASTROINTESTINAL: No abdominal or epigastric pain. No nausea, vomiting, or hematemesis; No diarrhea or constipation. No melena or hematochezia.  GENITOURINARY: No dysuria, frequency, foamy urine, urinary urgency, incontinence or hematuria  NEUROLOGICAL: No numbness or weakness  SKIN: No itching, burning, rashes, or lesions   VASCULAR: No bilateral lower extremity edema.   All other review of systems is negative unless indicated above.    VITALS:  T(F): 97.4 (10-21-22 @ 16:08), Max: 97.4 (10-21-22 @ 16:08)  HR: 80 (10-21-22 @ 16:08)  BP: 116/62 (10-21-22 @ 16:08)  RR: 18 (10-21-22 @ 16:08)  SpO2: 98% (10-21-22 @ 16:08)      Weight (kg): 85 (10-21 @ 16:08)    I&O's Detail        PHYSICAL EXAM:  Constitutional: NAD  HEENT: anicteric sclera, oropharynx clear, MMM  Neck: No JVD  Respiratory: CTAB, no wheezes, rales or rhonchi  Cardiovascular: S1, S2, RRR  Gastrointestinal: BS+, soft, NT/ND  Extremities: No cyanosis or clubbing. No peripheral edema  Neurological: A/O x 3, no focal deficits  Psychiatric: Normal mood, normal affect  : No CVA tenderness. No segovia.   Skin: No rashes  Vascular Access:    LABS:  10-21    132<L>  |  101  |  70<HH>  ----------------------------<  68<L>  6.8<HH>   |  19  |  5.0<HH>    Potassium Trend: 6.8<--  Ca    9.3      21 Oct 2022 13:44  Mg     1.9     10-21    TPro  7.1  /  Alb  3.8  /  TBili  <0.2  /  DBili      /  AST  54<H>  /  ALT  47<H>  /  AlkPhos  129<H>  10-21    Creatinine Trend: 5.0 <--                        8.8    3.07  )-----------( 231      ( 21 Oct 2022 16:06 )             26.3     Urine Studies:          HbA1c 5.3      [01-14-20 @ 09:09]    HBsAb Nonreact      [03-18-21 @ 11:38]  HBsAg Nonreact      [03-18-21 @ 11:38]  HBcAb Reactive      [01-14-20 @ 09:09]  HCV 14.67, Reactive      [01-14-20 @ 09:09]    Syphilis Screen (Treponema Pallidum Ab) Negative      [01-14-20 @ 09:09]      RADIOLOGY & ADDITIONAL STUDIES:                 NEPHROLOGY CONSULTATION NOTE  Patient is a 67y Male with PMH of HIV / CKD not seen by a nephrologist / HTN whom presented to the hospital after being called by his ID physician for elevated K . Patient denied any symptoms , denied chest pain no SOB no chest pain no abdominal pain no diarrhea no NSAIDS intake , no urinary retention no prostatism  symptoms .  Chart reviewed patient has chronic CKD with creatinine ranging between 2 and 4 for the last 2 years , had several hyperkalemic episodes as noted in [facility] notes and sp multiple ER visits    PAST MEDICAL & SURGICAL HISTORY:  HIV disease  IV drug abuse  Arthritis  Hypertension        Allergies:  No Known Allergies    Home Medications   Home Medications:  oxyCODONE 30 mg oral tablet, extended release: 30 milligram(s) orally 4 times a day (01 Sep 2022 13:37)    Hospital Medications:   MEDICATIONS  (STANDING):  dextrose 50% Injectable 50 milliLiter(s) IV Push Once  insulin regular  human recombinant 10 Unit(s) IV Push Once  vancomycin  IVPB. 1750 milliGRAM(s) IV Intermittent once      SOCIAL HISTORY:  Denies ETOH,Smoking,   FAMILY HISTORY:        REVIEW OF SYSTEMS:.   All other review of systems is negative unless indicated above.    VITALS:  T(F): 97.4 (10-21-22 @ 16:08), Max: 97.4 (10-21-22 @ 16:08)  HR: 80 (10-21-22 @ 16:08)  BP: 116/62 (10-21-22 @ 16:08)  RR: 18 (10-21-22 @ 16:08)  SpO2: 98% (10-21-22 @ 16:08)      Weight (kg): 85 (10-21 @ 16:08)    I&O's Detail        PHYSICAL EXAM:  Constitutional: NAD  Neck: No JVD  Respiratory: CTAB,   Cardiovascular: S1, S2, RRR  Gastrointestinal: BS+, soft, NT/ND  Extremities: No cyanosis or clubbing. No peripheral edema  Neurological: A/O x 3, no focal deficits  : No CVA tenderness. No segovia.   Skin: No rashes  Vascular Access:    LABS:  10-21    132<L>  |  101  |  70<HH>  ----------------------------<  68<L>  6.8<HH>   |  19  |  5.0<HH>    Potassium Trend: 6.8<--  Ca    9.3      21 Oct 2022 13:44  Mg     1.9     10-21    TPro  7.1  /  Alb  3.8  /  TBili  <0.2  /  DBili      /  AST  54<H>  /  ALT  47<H>  /  AlkPhos  129<H>  10-21    Creatinine Trend: 5.0 <--                        8.8    3.07  )-----------( 231      ( 21 Oct 2022 16:06 )             26.3     Urine Studies:          HbA1c 5.3      [01-14-20 @ 09:09]    HBsAb Nonreact      [03-18-21 @ 11:38]  HBsAg Nonreact      [03-18-21 @ 11:38]  HBcAb Reactive      [01-14-20 @ 09:09]  HCV 14.67, Reactive      [01-14-20 @ 09:09]    Syphilis Screen (Treponema Pallidum Ab) Negative      [01-14-20 @ 09:09]      RADIOLOGY & ADDITIONAL STUDIES:      < from: Xray Chest 2 Views PA/Lat (09.24.19 @ 10:07) >  Impression:      No radiographic evidence of acute cardiopulmonary disease.    < end of copied text >

## 2022-10-21 NOTE — ED ADULT NURSE REASSESSMENT NOTE - NS ED NURSE REASSESS COMMENT FT1
pt alert and oriented x4, pt denies chest pain or SOB. pt updated on plan of care. will continue to monitor.

## 2022-10-21 NOTE — H&P ADULT - NSHPLABSRESULTS_GEN_ALL_CORE
8.8    3.07  )-----------( 231      ( 21 Oct 2022 16:06 )             26.3     10-21    139  |  108  |  67<HH>  ----------------------------<  63<L>  6.4<HH>   |  18  |  4.8<HH>    Ca    9.4      21 Oct 2022 16:06  Mg     1.9     10-21    TPro  7.2  /  Alb  4.0  /  TBili  <0.2  /  DBili  x   /  AST  51<H>  /  ALT  47<H>  /  AlkPhos  131<H>  10-21

## 2022-10-22 LAB
ALBUMIN SERPL ELPH-MCNC: 3.3 G/DL — LOW (ref 3.5–5.2)
ALBUMIN SERPL ELPH-MCNC: 3.5 G/DL — SIGNIFICANT CHANGE UP (ref 3.5–5.2)
ALBUMIN SERPL ELPH-MCNC: 3.8 G/DL — SIGNIFICANT CHANGE UP (ref 3.5–5.2)
ALP SERPL-CCNC: 108 U/L — SIGNIFICANT CHANGE UP (ref 30–115)
ALP SERPL-CCNC: 111 U/L — SIGNIFICANT CHANGE UP (ref 30–115)
ALP SERPL-CCNC: 116 U/L — HIGH (ref 30–115)
ALT FLD-CCNC: 36 U/L — SIGNIFICANT CHANGE UP (ref 0–41)
ALT FLD-CCNC: 40 U/L — SIGNIFICANT CHANGE UP (ref 0–41)
ALT FLD-CCNC: 41 U/L — SIGNIFICANT CHANGE UP (ref 0–41)
ANION GAP SERPL CALC-SCNC: 12 MMOL/L — SIGNIFICANT CHANGE UP (ref 7–14)
ANION GAP SERPL CALC-SCNC: 12 MMOL/L — SIGNIFICANT CHANGE UP (ref 7–14)
ANION GAP SERPL CALC-SCNC: 13 MMOL/L — SIGNIFICANT CHANGE UP (ref 7–14)
ANION GAP SERPL CALC-SCNC: 9 MMOL/L — SIGNIFICANT CHANGE UP (ref 7–14)
APTT BLD: 26.8 SEC — LOW (ref 27–39.2)
AST SERPL-CCNC: 36 U/L — SIGNIFICANT CHANGE UP (ref 0–41)
AST SERPL-CCNC: 43 U/L — HIGH (ref 0–41)
AST SERPL-CCNC: 44 U/L — HIGH (ref 0–41)
BASE EXCESS BLDV CALC-SCNC: -3.8 MMOL/L — LOW (ref -2–3)
BASOPHILS # BLD AUTO: 0.02 K/UL — SIGNIFICANT CHANGE UP (ref 0–0.2)
BASOPHILS NFR BLD AUTO: 0.6 % — SIGNIFICANT CHANGE UP (ref 0–1)
BILIRUB SERPL-MCNC: <0.2 MG/DL — SIGNIFICANT CHANGE UP (ref 0.2–1.2)
BUN SERPL-MCNC: 49 MG/DL — HIGH (ref 10–20)
BUN SERPL-MCNC: 53 MG/DL — HIGH (ref 10–20)
BUN SERPL-MCNC: 57 MG/DL — HIGH (ref 10–20)
BUN SERPL-MCNC: 60 MG/DL — HIGH (ref 10–20)
CALCIUM SERPL-MCNC: 8.6 MG/DL — SIGNIFICANT CHANGE UP (ref 8.4–10.4)
CALCIUM SERPL-MCNC: 8.6 MG/DL — SIGNIFICANT CHANGE UP (ref 8.4–10.4)
CALCIUM SERPL-MCNC: 8.8 MG/DL — SIGNIFICANT CHANGE UP (ref 8.4–10.5)
CALCIUM SERPL-MCNC: 9.1 MG/DL — SIGNIFICANT CHANGE UP (ref 8.4–10.5)
CALCIUM SERPL-MCNC: 9.4 MG/DL — SIGNIFICANT CHANGE UP (ref 8.4–10.5)
CHLORIDE SERPL-SCNC: 104 MMOL/L — SIGNIFICANT CHANGE UP (ref 98–110)
CHLORIDE SERPL-SCNC: 105 MMOL/L — SIGNIFICANT CHANGE UP (ref 98–110)
CHLORIDE SERPL-SCNC: 106 MMOL/L — SIGNIFICANT CHANGE UP (ref 98–110)
CHLORIDE SERPL-SCNC: 107 MMOL/L — SIGNIFICANT CHANGE UP (ref 98–110)
CK SERPL-CCNC: 56 U/L — SIGNIFICANT CHANGE UP (ref 0–225)
CO2 SERPL-SCNC: 18 MMOL/L — SIGNIFICANT CHANGE UP (ref 17–32)
CO2 SERPL-SCNC: 18 MMOL/L — SIGNIFICANT CHANGE UP (ref 17–32)
CO2 SERPL-SCNC: 20 MMOL/L — SIGNIFICANT CHANGE UP (ref 17–32)
CO2 SERPL-SCNC: 20 MMOL/L — SIGNIFICANT CHANGE UP (ref 17–32)
CREAT SERPL-MCNC: 3.6 MG/DL — HIGH (ref 0.7–1.5)
CREAT SERPL-MCNC: 3.6 MG/DL — HIGH (ref 0.7–1.5)
CREAT SERPL-MCNC: 3.9 MG/DL — HIGH (ref 0.7–1.5)
CREAT SERPL-MCNC: 4.3 MG/DL — CRITICAL HIGH (ref 0.7–1.5)
EGFR: 14 ML/MIN/1.73M2 — LOW
EGFR: 16 ML/MIN/1.73M2 — LOW
EGFR: 18 ML/MIN/1.73M2 — LOW
EGFR: 18 ML/MIN/1.73M2 — LOW
EOSINOPHIL # BLD AUTO: 0.02 K/UL — SIGNIFICANT CHANGE UP (ref 0–0.7)
EOSINOPHIL NFR BLD AUTO: 0.6 % — SIGNIFICANT CHANGE UP (ref 0–8)
FERRITIN SERPL-MCNC: 271 NG/ML — SIGNIFICANT CHANGE UP (ref 30–400)
FOLATE SERPL-MCNC: 5.9 NG/ML — SIGNIFICANT CHANGE UP
GAS PNL BLDV: SIGNIFICANT CHANGE UP
GAS PNL BLDV: SIGNIFICANT CHANGE UP
GLUCOSE BLDC GLUCOMTR-MCNC: 115 MG/DL — HIGH (ref 70–99)
GLUCOSE SERPL-MCNC: 100 MG/DL — HIGH (ref 70–99)
GLUCOSE SERPL-MCNC: 147 MG/DL — HIGH (ref 70–99)
GLUCOSE SERPL-MCNC: 85 MG/DL — SIGNIFICANT CHANGE UP (ref 70–99)
GLUCOSE SERPL-MCNC: 96 MG/DL — SIGNIFICANT CHANGE UP (ref 70–99)
HCO3 BLDV-SCNC: 22 MMOL/L — SIGNIFICANT CHANGE UP (ref 22–29)
HCT VFR BLD CALC: 22.5 % — LOW (ref 42–52)
HGB BLD-MCNC: 7.6 G/DL — LOW (ref 14–18)
HOROWITZ INDEX BLDV+IHG-RTO: 21 — SIGNIFICANT CHANGE UP
IMM GRANULOCYTES NFR BLD AUTO: 0.3 % — SIGNIFICANT CHANGE UP (ref 0.1–0.3)
INR BLD: 1.14 RATIO — SIGNIFICANT CHANGE UP (ref 0.65–1.3)
IRON SATN MFR SERPL: 17 % — SIGNIFICANT CHANGE UP (ref 15–50)
IRON SATN MFR SERPL: 45 UG/DL — SIGNIFICANT CHANGE UP (ref 35–150)
LACTATE BLDV-MCNC: 1.4 MMOL/L — SIGNIFICANT CHANGE UP (ref 0.5–2)
LYMPHOCYTES # BLD AUTO: 0.6 K/UL — LOW (ref 1.2–3.4)
LYMPHOCYTES # BLD AUTO: 19.3 % — LOW (ref 20.5–51.1)
MAGNESIUM SERPL-MCNC: 1.5 MG/DL — LOW (ref 1.8–2.4)
MAGNESIUM SERPL-MCNC: 1.7 MG/DL — LOW (ref 1.8–2.4)
MCHC RBC-ENTMCNC: 30.9 PG — SIGNIFICANT CHANGE UP (ref 27–31)
MCHC RBC-ENTMCNC: 33.8 G/DL — SIGNIFICANT CHANGE UP (ref 32–37)
MCV RBC AUTO: 91.5 FL — SIGNIFICANT CHANGE UP (ref 80–94)
MONOCYTES # BLD AUTO: 0.51 K/UL — SIGNIFICANT CHANGE UP (ref 0.1–0.6)
MONOCYTES NFR BLD AUTO: 16.4 % — HIGH (ref 1.7–9.3)
NEUTROPHILS # BLD AUTO: 1.95 K/UL — SIGNIFICANT CHANGE UP (ref 1.4–6.5)
NEUTROPHILS NFR BLD AUTO: 62.8 % — SIGNIFICANT CHANGE UP (ref 42.2–75.2)
NRBC # BLD: 0 /100 WBCS — SIGNIFICANT CHANGE UP (ref 0–0)
NT-PROBNP SERPL-SCNC: 431 PG/ML — HIGH (ref 0–300)
PCO2 BLDV: 39 MMHG — LOW (ref 42–55)
PH BLDV: 7.35 — SIGNIFICANT CHANGE UP (ref 7.32–7.43)
PHOSPHATE SERPL-MCNC: 3.3 MG/DL — SIGNIFICANT CHANGE UP (ref 2.1–4.9)
PLATELET # BLD AUTO: 215 K/UL — SIGNIFICANT CHANGE UP (ref 130–400)
PO2 BLDV: 36 MMHG — SIGNIFICANT CHANGE UP
POTASSIUM SERPL-MCNC: 5.4 MMOL/L — HIGH (ref 3.5–5)
POTASSIUM SERPL-MCNC: 5.6 MMOL/L — HIGH (ref 3.5–5)
POTASSIUM SERPL-MCNC: 6.3 MMOL/L — CRITICAL HIGH (ref 3.5–5)
POTASSIUM SERPL-MCNC: 6.7 MMOL/L — CRITICAL HIGH (ref 3.5–5)
POTASSIUM SERPL-SCNC: 5.4 MMOL/L — HIGH (ref 3.5–5)
POTASSIUM SERPL-SCNC: 5.6 MMOL/L — HIGH (ref 3.5–5)
POTASSIUM SERPL-SCNC: 6.3 MMOL/L — CRITICAL HIGH (ref 3.5–5)
POTASSIUM SERPL-SCNC: 6.7 MMOL/L — CRITICAL HIGH (ref 3.5–5)
PROT SERPL-MCNC: 6 G/DL — SIGNIFICANT CHANGE UP (ref 6–8)
PROT SERPL-MCNC: 6.5 G/DL — SIGNIFICANT CHANGE UP (ref 6–8)
PROT SERPL-MCNC: 6.7 G/DL — SIGNIFICANT CHANGE UP (ref 6–8)
PROT SERPL-MCNC: 6.8 G/DL — SIGNIFICANT CHANGE UP (ref 6–8.3)
PROT SERPL-MCNC: 6.8 G/DL — SIGNIFICANT CHANGE UP (ref 6–8.3)
PROTHROM AB SERPL-ACNC: 13 SEC — HIGH (ref 9.95–12.87)
PTH-INTACT FLD-MCNC: 41 PG/ML — SIGNIFICANT CHANGE UP (ref 15–65)
RBC # BLD: 2.46 M/UL — LOW (ref 4.7–6.1)
RBC # FLD: 14.6 % — HIGH (ref 11.5–14.5)
SAO2 % BLDV: 67 % — SIGNIFICANT CHANGE UP
SODIUM SERPL-SCNC: 134 MMOL/L — LOW (ref 135–146)
SODIUM SERPL-SCNC: 135 MMOL/L — SIGNIFICANT CHANGE UP (ref 135–146)
SODIUM SERPL-SCNC: 137 MMOL/L — SIGNIFICANT CHANGE UP (ref 135–146)
SODIUM SERPL-SCNC: 138 MMOL/L — SIGNIFICANT CHANGE UP (ref 135–146)
TIBC SERPL-MCNC: 260 UG/DL — SIGNIFICANT CHANGE UP (ref 220–430)
UIBC SERPL-MCNC: 215 UG/DL — SIGNIFICANT CHANGE UP (ref 110–370)
VIT B12 SERPL-MCNC: 844 PG/ML — SIGNIFICANT CHANGE UP (ref 232–1245)
WBC # BLD: 3.11 K/UL — LOW (ref 4.8–10.8)
WBC # FLD AUTO: 3.11 K/UL — LOW (ref 4.8–10.8)

## 2022-10-22 PROCEDURE — 76770 US EXAM ABDO BACK WALL COMP: CPT | Mod: 26,59

## 2022-10-22 PROCEDURE — 76705 ECHO EXAM OF ABDOMEN: CPT | Mod: 26

## 2022-10-22 PROCEDURE — 93010 ELECTROCARDIOGRAM REPORT: CPT

## 2022-10-22 RX ORDER — INFLUENZA VIRUS VACCINE 15; 15; 15; 15 UG/.5ML; UG/.5ML; UG/.5ML; UG/.5ML
0.7 SUSPENSION INTRAMUSCULAR ONCE
Refills: 0 | Status: DISCONTINUED | OUTPATIENT
Start: 2022-10-22 | End: 2022-10-31

## 2022-10-22 RX ORDER — CALCIUM GLUCONATE 100 MG/ML
1 VIAL (ML) INTRAVENOUS ONCE
Refills: 0 | Status: COMPLETED | OUTPATIENT
Start: 2022-10-22 | End: 2022-10-22

## 2022-10-22 RX ORDER — SODIUM BICARBONATE 1 MEQ/ML
650 SYRINGE (ML) INTRAVENOUS THREE TIMES A DAY
Refills: 0 | Status: DISCONTINUED | OUTPATIENT
Start: 2022-10-22 | End: 2022-10-23

## 2022-10-22 RX ORDER — ATORVASTATIN CALCIUM 80 MG/1
40 TABLET, FILM COATED ORAL DAILY
Refills: 0 | Status: DISCONTINUED | OUTPATIENT
Start: 2022-10-22 | End: 2022-10-31

## 2022-10-22 RX ORDER — METHADONE HYDROCHLORIDE 40 MG/1
110 TABLET ORAL DAILY
Refills: 0 | Status: DISCONTINUED | OUTPATIENT
Start: 2022-10-22 | End: 2022-10-22

## 2022-10-22 RX ORDER — MAGNESIUM SULFATE 500 MG/ML
2 VIAL (ML) INJECTION ONCE
Refills: 0 | Status: COMPLETED | OUTPATIENT
Start: 2022-10-22 | End: 2022-10-22

## 2022-10-22 RX ORDER — INSULIN HUMAN 100 [IU]/ML
10 INJECTION, SOLUTION SUBCUTANEOUS ONCE
Refills: 0 | Status: COMPLETED | OUTPATIENT
Start: 2022-10-22 | End: 2022-10-22

## 2022-10-22 RX ORDER — DEXTROSE 50 % IN WATER 50 %
50 SYRINGE (ML) INTRAVENOUS ONCE
Refills: 0 | Status: COMPLETED | OUTPATIENT
Start: 2022-10-22 | End: 2022-10-22

## 2022-10-22 RX ORDER — ALBUTEROL 90 UG/1
2.5 AEROSOL, METERED ORAL ONCE
Refills: 0 | Status: COMPLETED | OUTPATIENT
Start: 2022-10-22 | End: 2022-10-22

## 2022-10-22 RX ORDER — METHADONE HYDROCHLORIDE 40 MG/1
110 TABLET ORAL DAILY
Refills: 0 | Status: DISCONTINUED | OUTPATIENT
Start: 2022-10-22 | End: 2022-10-29

## 2022-10-22 RX ORDER — SODIUM CHLORIDE 9 MG/ML
1000 INJECTION INTRAMUSCULAR; INTRAVENOUS; SUBCUTANEOUS
Refills: 0 | Status: DISCONTINUED | OUTPATIENT
Start: 2022-10-22 | End: 2022-10-23

## 2022-10-22 RX ORDER — DOLUTEGRAVIR SODIUM AND RILPIVIRINE HYDROCHLORIDE 50; 25 MG/1; MG/1
1 TABLET, FILM COATED ORAL
Refills: 0 | Status: DISCONTINUED | OUTPATIENT
Start: 2022-10-22 | End: 2022-10-31

## 2022-10-22 RX ORDER — SODIUM ZIRCONIUM CYCLOSILICATE 10 G/10G
10 POWDER, FOR SUSPENSION ORAL ONCE
Refills: 0 | Status: COMPLETED | OUTPATIENT
Start: 2022-10-22 | End: 2022-10-22

## 2022-10-22 RX ORDER — ASPIRIN/CALCIUM CARB/MAGNESIUM 324 MG
81 TABLET ORAL DAILY
Refills: 0 | Status: DISCONTINUED | OUTPATIENT
Start: 2022-10-22 | End: 2022-10-31

## 2022-10-22 RX ADMIN — SODIUM ZIRCONIUM CYCLOSILICATE 10 GRAM(S): 10 POWDER, FOR SUSPENSION ORAL at 10:21

## 2022-10-22 RX ADMIN — Medication 650 MILLIGRAM(S): at 13:26

## 2022-10-22 RX ADMIN — SODIUM CHLORIDE 75 MILLILITER(S): 9 INJECTION, SOLUTION INTRAVENOUS at 05:42

## 2022-10-22 RX ADMIN — SODIUM ZIRCONIUM CYCLOSILICATE 10 GRAM(S): 10 POWDER, FOR SUSPENSION ORAL at 05:58

## 2022-10-22 RX ADMIN — Medication 650 MILLIGRAM(S): at 22:06

## 2022-10-22 RX ADMIN — SODIUM ZIRCONIUM CYCLOSILICATE 10 GRAM(S): 10 POWDER, FOR SUSPENSION ORAL at 13:25

## 2022-10-22 RX ADMIN — SODIUM CHLORIDE 75 MILLILITER(S): 9 INJECTION INTRAMUSCULAR; INTRAVENOUS; SUBCUTANEOUS at 21:12

## 2022-10-22 RX ADMIN — Medication 25 GRAM(S): at 21:11

## 2022-10-22 RX ADMIN — Medication 200 MILLIGRAM(S): at 05:59

## 2022-10-22 RX ADMIN — Medication 100 GRAM(S): at 11:17

## 2022-10-22 RX ADMIN — INSULIN HUMAN 10 UNIT(S): 100 INJECTION, SOLUTION SUBCUTANEOUS at 11:22

## 2022-10-22 RX ADMIN — SODIUM CHLORIDE 75 MILLILITER(S): 9 INJECTION INTRAMUSCULAR; INTRAVENOUS; SUBCUTANEOUS at 10:22

## 2022-10-22 RX ADMIN — Medication 200 MILLIGRAM(S): at 18:22

## 2022-10-22 RX ADMIN — ALBUTEROL 2.5 MILLIGRAM(S): 90 AEROSOL, METERED ORAL at 12:58

## 2022-10-22 RX ADMIN — Medication 50 MILLILITER(S): at 11:17

## 2022-10-22 RX ADMIN — Medication 81 MILLIGRAM(S): at 11:25

## 2022-10-22 RX ADMIN — METHADONE HYDROCHLORIDE 110 MILLIGRAM(S): 40 TABLET ORAL at 13:22

## 2022-10-22 RX ADMIN — DOLUTEGRAVIR SODIUM AND RILPIVIRINE HYDROCHLORIDE 1 TABLET(S): 50; 25 TABLET, FILM COATED ORAL at 18:20

## 2022-10-22 RX ADMIN — HEPARIN SODIUM 5000 UNIT(S): 5000 INJECTION INTRAVENOUS; SUBCUTANEOUS at 18:20

## 2022-10-22 RX ADMIN — ATORVASTATIN CALCIUM 40 MILLIGRAM(S): 80 TABLET, FILM COATED ORAL at 22:05

## 2022-10-22 RX ADMIN — SODIUM CHLORIDE 75 MILLILITER(S): 9 INJECTION, SOLUTION INTRAVENOUS at 05:58

## 2022-10-22 RX ADMIN — Medication 25 GRAM(S): at 23:25

## 2022-10-22 RX ADMIN — HEPARIN SODIUM 5000 UNIT(S): 5000 INJECTION INTRAVENOUS; SUBCUTANEOUS at 06:29

## 2022-10-22 RX ADMIN — SODIUM ZIRCONIUM CYCLOSILICATE 10 GRAM(S): 10 POWDER, FOR SUSPENSION ORAL at 22:06

## 2022-10-22 NOTE — CONSULT NOTE ADULT - SUBJECTIVE AND OBJECTIVE BOX
VILLEGASYAZMIN MILLER  67y, Male  Allergy: No Known Allergies      CHIEF COMPLAINT: abnormal labs (22 Oct 2022 07:42)      HPI:  66-year-old male, with PMH of substance dependence–on methadone, IVDA (admits to currently using heroin/cocaine), HIV (on HAART), XUM0l-8 (baseline Cr ~3), and hepatitis C, sent in by the infectious team for abnormal labs done as OP. Pt was noted to have elevated K and Cr on his most recent labs done as OP. Pt is also reporting n/v for 3 days. No fever/chills, CP/SOB, c/d, urinary symptoms.      ED course:   VS: stable  Labs: Hgb 8.8/ K 6.8/ Cr 5   Imaging: (21 Oct 2022 22:07)    FAMILY HISTORY:    PAST MEDICAL & SURGICAL HISTORY:  HIV disease      IV drug abuse      Arthritis      Hypertension          SOCIAL HISTORY  Social History:        ROS  General: Denies fevers, chills, nightsweats, weight loss  HEENT: Denies headache, rhinorrhea, sore throat, eye pain  CV: Denies CP, palpitations  PULM: Denies SOB, cough  GI: Denies abdominal pain, diarrhea  : Denies dysuria, hematuria  MSK: Denies arthralgias  SKIN: Denies rash   NEURO: Denies paresthesias, weakness  PSYCH: Denies depression    VITALS:  T(F): 99.6, Max: 99.6 (10-22-22 @ 06:05)  HR: 62  BP: 100/49  RR: 16Vital Signs Last 24 Hrs  T(C): 37.6 (22 Oct 2022 06:05), Max: 37.6 (22 Oct 2022 06:05)  T(F): 99.6 (22 Oct 2022 06:05), Max: 99.6 (22 Oct 2022 06:05)  HR: 62 (22 Oct 2022 06:05) (62 - 80)  BP: 100/49 (22 Oct 2022 06:05) (92/51 - 116/62)  BP(mean): --  RR: 16 (22 Oct 2022 06:05) (16 - 18)  SpO2: 100% (22 Oct 2022 06:05) (96% - 100%)    Parameters below as of 22 Oct 2022 06:05  Patient On (Oxygen Delivery Method): room air        PHYSICAL EXAM:  Gen: NAD, resting in bed  HEENT: Normocephalic, atraumatic  Neck: supple, no lymphadenopathy  CV: Regular rate & regular rhythm  Lungs: decreased BS at bases, no fremitus  Abdomen: Soft, BS present  Ext: Warm, well perfused  Neuro: non focal, awake  Skin: no rash, no erythema    TESTS & MEASUREMENTS:                        7.6    3.11  )-----------( 215      ( 22 Oct 2022 05:45 )             22.5     10-    134<L>  |  105  |  57<H>  ----------------------------<  96  6.7<HH>   |  20  |  3.9<H>    Ca    8.8      22 Oct 2022 05:45  Phos  3.3     10-22  Mg     1.7     10-22    TPro  6.5  /  Alb  3.8  /  TBili  <0.2  /  DBili  x   /  AST  44<H>  /  ALT  41  /  AlkPhos  111  10-22      LIVER FUNCTIONS - ( 22 Oct 2022 05:45 )  Alb: 3.8 g/dL / Pro: 6.5 g/dL / ALK PHOS: 111 U/L / ALT: 41 U/L / AST: 44 U/L / GGT: x           Urinalysis Basic - ( 21 Oct 2022 18:38 )    Color: Light Yellow / Appearance: Clear / S.015 / pH: x  Gluc: x / Ketone: Negative  / Bili: Negative / Urobili: <2 mg/dL   Blood: x / Protein: 30 mg/dL / Nitrite: Negative   Leuk Esterase: Negative / RBC: 1 /HPF / WBC 2 /HPF   Sq Epi: x / Non Sq Epi: 2 /HPF / Bacteria: Negative              INFECTIOUS DISEASES TESTING      RADIOLOGY & ADDITIONAL TESTS:  I have personally reviewed the last Chest xray  CXR  Xray Chest 1 View AP/PA:   ACC: [id number] EXAM:  XR CHEST 1 VIEW                          PROCEDURE DATE:  10/21/2022          INTERPRETATION:  Clinical History / Reason for exam: Sepsis.    Comparison : Chest radiograph 2019.    Technique/Positioning: Single frontal chest x-ray obtained.    Findings:    Support devices: None.    Cardiac/mediastinum/hilum: Unremarkable.    Lung parenchyma/Pleura: Within normal limits.    Skeleton/soft tissues: Unremarkable.    Impression:    No radiographic evidence of acute cardiopulmonary disease.        --- End of Report ---            DAYANARA SCHMIDT MD; Attending Radiologist  This document has been electronically signed. Oct 22 2022 12:23AM (10-21-22 @ 18:50)      CT      CARDIOLOGY TESTING  12 Lead ECG:   Ventricular Rate 63 BPM    Atrial Rate 63 BPM    P-R Interval 244 ms    QRS Duration 86 ms    Q-T Interval 362 ms    QTC Calculation(Bazett) 370 ms    P Axis 61 degrees    R Axis 64 degrees    T Axis 55 degrees    Diagnosis Line Sinus rhythm with 1st degree A-V block  Minimal voltage criteria for LVH, may be normal variant  Early repolarization  Borderline ECG    Confirmed by KAREN VEGA MD ([id number]) on 10/21/2022 3:42:25 PM (10-21-22 @ 14:33)      MEDICATIONS  ALBUTerol    0.083%. 2.5  aspirin enteric coated 81  atorvastatin Oral Tab/Cap - Peds 40  heparin   Injectable 5000  methadone    Tablet 110  pregabalin 200  sodium bicarbonate 650  sodium chloride 0.9%. 1000  sodium zirconium cyclosilicate 10  vancomycin  IVPB. 1750      ANTIBIOTICS:  vancomycin  IVPB. 1750 milliGRAM(s) IV Intermittent once      All available historical data has been reviewed

## 2022-10-22 NOTE — CONSULT NOTE ADULT - ASSESSMENT
66-year-old male, with PMH of substance dependence–on methadone, IVDA (admits to currently using heroin/cocaine), HIV (on HAART), TUM4o-2 (baseline Cr ~3), and hepatitis C, sent in by the infectious team for KATERIN on CKD and hyperkalemia noted on OP labs.     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  HTN is a risk factor along with HIV and HAART therapy on Triumeq (abacavir, dolutegravir, and lamivudine)  - Cr 5 on admission (baseline ~3)   - K 6.8 on admission sp D50 insulin in ED  - place segovia for acc Is and Os   - will start on lokelma 10 TID   - IVF LR at 75 cc per hour   - US renal ordered    - Phos, PTH ordered   - CPK ordered 11:30 (unlikely rhabdo given neg UA)   - repeat bmp 11:30     # HIV (on HAART)  # Hepatitis C  - pt is on Triumeq (abacavir, dolutegravir, and lamivudine) and was advised to stop given his worsening kidney fx and to start Julaca instead (Dolutegravir- rilpivirine)  - HIV-1 viral load: <20 (5/2022 AND 8/2022)   - CD4: 246 ( 3/2022) >212 (8/2022)  - start on Juluca (50-25) OD after ID approval       # Normocytic Anemia likely 2/2 to CKD  - Hgb 8.8 on admission ( baseline ~9.5)   - iron studies, b12, folate  - SPEP, UPEP, IF    - keep active T&S      # mild transaminitis  # mild pancreatic duct dilatation  # stable dilatation of the common bile duct  - ALT/AST 51/47 on admission  - been noted to have elevated LFTs on previous labs  - US abdomen (July 2022) New mild pancreatic duct dilatation measuring 3-4 mm.A central obstructing lesion cannot be excluded on this examination.  Further evaluation of the pancreas with MRI and MRCP with and without   gadolinium is necessary for complete characterization. Essentially stable dilatation of the common bile duct of unclear etiology.   - will get repeat US abdomen  - pt was advised to get MRI/MRCP as OP but he did not schedule it yet - consider obtaining as inpt if repeat US abdomen results abnormal    - GI advanced c/s if persistant abnormal results on US abdomen       # CHF   - TTE (2018) EF 55-60% , GIDD  - repeat TTE ordered   - keep euvolemic      # NEUROPATHY   - c/w pregabalin       # Low testosterone   - on testosterone gel       #Left hip pain   - on chronic opioids       # HTN - currently borderline low  BP  - will hold off amlo   - dc enalapril        # substance dependence–on methadone  # IVDA (admits to currently using heroin/cocaine)  - methadone 110mg OD  - [address] [facility] (confirm in am)     # DIET: DASH

## 2022-10-22 NOTE — PROGRESS NOTE ADULT - SUBJECTIVE AND OBJECTIVE BOX
seen and examined  24 h events noted   no distress         PAST HISTORY  --------------------------------------------------------------------------------  No significant changes to PMH, PSH, FHx, SHx, unless otherwise noted    ALLERGIES & MEDICATIONS  --------------------------------------------------------------------------------  Allergies    No Known Allergies    Intolerances      Standing Inpatient Medications  aspirin enteric coated 81 milliGRAM(s) Oral daily  atorvastatin Oral Tab/Cap - Peds 40 milliGRAM(s) Oral daily  heparin   Injectable 5000 Unit(s) SubCutaneous every 12 hours  lactated ringers. 1000 milliLiter(s) IV Continuous <Continuous>  pregabalin 200 milliGRAM(s) Oral two times a day  sodium zirconium cyclosilicate 10 Gram(s) Oral three times a day  vancomycin  IVPB. 1750 milliGRAM(s) IV Intermittent once          VITALS/PHYSICAL EXAM  --------------------------------------------------------------------------------  T(C): 37.6 (10-22-22 @ 06:05), Max: 37.6 (10-22-22 @ 06:05)  HR: 62 (10-22-22 @ 06:05) (62 - 84)  BP: 100/49 (10-22-22 @ 06:05) (92/51 - 116/62)  RR: 16 (10-22-22 @ 06:05) (16 - 18)  SpO2: 100% (10-22-22 @ 06:05) (96% - 100%)  Wt(kg): --    Weight (kg): 85 (10-21-22 @ 16:08)      10-21-22 @ 07:01  -  10-22-22 @ 07:00  --------------------------------------------------------  IN: 0 mL / OUT: 300 mL / NET: -300 mL      Physical Exam:  	Gen: NAD  	Pulm: CTA B/L  	CV:  S1S2; no rub  	Abd:  soft, nontender/nondistended  	LE:  no edema      LABS/STUDIES  --------------------------------------------------------------------------------              8.8    3.07  >-----------<  231      [10-21-22 @ 16:06]              26.3     137  |  107  |  60  ----------------------------<  100      [10-22-22 @ 00:28]  6.3   |  18  |  4.3        Ca     8.6     [10-22-22 @ 00:28]      Mg     1.9     [10-21-22 @ 13:44]    TPro  7.2  /  Alb  4.0  /  TBili  <0.2  /  DBili  x   /  AST  51  /  ALT  47  /  AlkPhos  131  [10-21-22 @ 16:06]    CK 56      [10-22-22 @ 00:28]    Creatinine Trend:  SCr 4.3 [10-22 @ 00:28]  SCr 4.8 [10-21 @ 16:06]  SCr 5.0 [10-21 @ 13:44]    Urinalysis - [10-21-22 @ 18:38]      Color Light Yellow / Appearance Clear / SG 1.015 / pH 6.5      Gluc Negative / Ketone Negative  / Bili Negative / Urobili <2 mg/dL       Blood Trace / Protein 30 mg/dL / Leuk Est Negative / Nitrite Negative      RBC 1 / WBC 2 / Hyaline 1 / Gran  / Sq Epi  / Non Sq Epi 2 / Bacteria Negative      HbA1c 5.3      [01-14-20 @ 09:09]

## 2022-10-22 NOTE — PATIENT PROFILE ADULT - FALL HARM RISK - UNIVERSAL INTERVENTIONS
Bed in lowest position, wheels locked, appropriate side rails in place/Call bell, personal items and telephone in reach/Instruct patient to call for assistance before getting out of bed or chair/Non-slip footwear when patient is out of bed/[place] to call system/Physically safe environment - no spills, clutter or unnecessary equipment/Purposeful Proactive Rounding/Room/bathroom lighting operational, light cord in reach

## 2022-10-22 NOTE — PROGRESS NOTE ADULT - ASSESSMENT
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  cr trending down   please document UO   sp D50 insulin and started on lokelma/ last k 6.3/ please repeat   will need renal bladder sono to check kidneys size and echogenicity / and PVR   change iv fluids to NS   start sodium bicarbonate 650 q 8   may need RRT if no improvement  check IP and PTH / hepatitis profile / FE STUDIES  ID for HAART meds  will follow

## 2022-10-22 NOTE — SBIRT NOTE ADULT - NSSBIRTDRGBRIEFINTDET_GEN_A_CORE
LMSW explored with pt his triggers for using. Pt notes he was sober for "so many years" and was "finally able to get coke and dope from one person" and relapsed. LMSW provided education on using substances while on methadone; pt verbalized understanding of this. He believes he also took something that was laced with fentanyl and is open to outpatient services. MD made aware to place CATCH consult for assistance with outpatient referral.

## 2022-10-22 NOTE — CHART NOTE - NSCHARTNOTEFT_GEN_A_CORE
Lab approached me for critical serum potassium level of 6.7 at 9:30 am. Patient was given stat insulin, dextrose, lokelma, albuterol inhalation and calcium gluconate. EKG stat was ordered. Repeat serum potassium 5.6. Will follow.

## 2022-10-23 LAB
ALBUMIN SERPL ELPH-MCNC: 3.2 G/DL — LOW (ref 3.5–5.2)
ALBUMIN SERPL ELPH-MCNC: 3.5 G/DL — SIGNIFICANT CHANGE UP (ref 3.5–5.2)
ALP SERPL-CCNC: 106 U/L — SIGNIFICANT CHANGE UP (ref 30–115)
ALP SERPL-CCNC: 111 U/L — SIGNIFICANT CHANGE UP (ref 30–115)
ALT FLD-CCNC: 35 U/L — SIGNIFICANT CHANGE UP (ref 0–41)
ALT FLD-CCNC: 37 U/L — SIGNIFICANT CHANGE UP (ref 0–41)
ANION GAP SERPL CALC-SCNC: 10 MMOL/L — SIGNIFICANT CHANGE UP (ref 7–14)
ANION GAP SERPL CALC-SCNC: 11 MMOL/L — SIGNIFICANT CHANGE UP (ref 7–14)
AST SERPL-CCNC: 36 U/L — SIGNIFICANT CHANGE UP (ref 0–41)
AST SERPL-CCNC: 36 U/L — SIGNIFICANT CHANGE UP (ref 0–41)
BASOPHILS # BLD AUTO: 0.05 K/UL — SIGNIFICANT CHANGE UP (ref 0–0.2)
BASOPHILS NFR BLD AUTO: 2.6 % — HIGH (ref 0–1)
BILIRUB SERPL-MCNC: <0.2 MG/DL — SIGNIFICANT CHANGE UP (ref 0.2–1.2)
BILIRUB SERPL-MCNC: <0.2 MG/DL — SIGNIFICANT CHANGE UP (ref 0.2–1.2)
BLD GP AB SCN SERPL QL: SIGNIFICANT CHANGE UP
BUN SERPL-MCNC: 40 MG/DL — HIGH (ref 10–20)
BUN SERPL-MCNC: 49 MG/DL — HIGH (ref 10–20)
CALCIUM SERPL-MCNC: 8.5 MG/DL — SIGNIFICANT CHANGE UP (ref 8.4–10.5)
CALCIUM SERPL-MCNC: 9 MG/DL — SIGNIFICANT CHANGE UP (ref 8.4–10.5)
CHLORIDE SERPL-SCNC: 107 MMOL/L — SIGNIFICANT CHANGE UP (ref 98–110)
CHLORIDE SERPL-SCNC: 110 MMOL/L — SIGNIFICANT CHANGE UP (ref 98–110)
CO2 SERPL-SCNC: 20 MMOL/L — SIGNIFICANT CHANGE UP (ref 17–32)
CO2 SERPL-SCNC: 22 MMOL/L — SIGNIFICANT CHANGE UP (ref 17–32)
CREAT SERPL-MCNC: 3 MG/DL — HIGH (ref 0.7–1.5)
CREAT SERPL-MCNC: 3.4 MG/DL — HIGH (ref 0.7–1.5)
CULTURE RESULTS: SIGNIFICANT CHANGE UP
EGFR: 19 ML/MIN/1.73M2 — LOW
EGFR: 22 ML/MIN/1.73M2 — LOW
EOSINOPHIL # BLD AUTO: 0.17 K/UL — SIGNIFICANT CHANGE UP (ref 0–0.7)
EOSINOPHIL NFR BLD AUTO: 8.7 % — HIGH (ref 0–8)
GIANT PLATELETS BLD QL SMEAR: PRESENT — SIGNIFICANT CHANGE UP
GLUCOSE SERPL-MCNC: 152 MG/DL — HIGH (ref 70–99)
GLUCOSE SERPL-MCNC: 94 MG/DL — SIGNIFICANT CHANGE UP (ref 70–99)
HCT VFR BLD CALC: 22.5 % — LOW (ref 42–52)
HGB BLD-MCNC: 7.5 G/DL — LOW (ref 14–18)
IRON SATN MFR SERPL: 32 % — SIGNIFICANT CHANGE UP (ref 15–50)
IRON SATN MFR SERPL: 79 UG/DL — SIGNIFICANT CHANGE UP (ref 35–150)
LYMPHOCYTES # BLD AUTO: 0.53 K/UL — LOW (ref 1.2–3.4)
LYMPHOCYTES # BLD AUTO: 27 % — SIGNIFICANT CHANGE UP (ref 20.5–51.1)
MAGNESIUM SERPL-MCNC: 2.4 MG/DL — SIGNIFICANT CHANGE UP (ref 1.8–2.4)
MANUAL SMEAR VERIFICATION: SIGNIFICANT CHANGE UP
MCHC RBC-ENTMCNC: 30.6 PG — SIGNIFICANT CHANGE UP (ref 27–31)
MCHC RBC-ENTMCNC: 33.3 G/DL — SIGNIFICANT CHANGE UP (ref 32–37)
MCV RBC AUTO: 91.8 FL — SIGNIFICANT CHANGE UP (ref 80–94)
MONOCYTES # BLD AUTO: 0.12 K/UL — SIGNIFICANT CHANGE UP (ref 0.1–0.6)
MONOCYTES NFR BLD AUTO: 6.1 % — SIGNIFICANT CHANGE UP (ref 1.7–9.3)
NEUTROPHILS # BLD AUTO: 1.08 K/UL — LOW (ref 1.4–6.5)
NEUTROPHILS NFR BLD AUTO: 55.6 % — SIGNIFICANT CHANGE UP (ref 42.2–75.2)
PLAT MORPH BLD: NORMAL — SIGNIFICANT CHANGE UP
PLATELET # BLD AUTO: 218 K/UL — SIGNIFICANT CHANGE UP (ref 130–400)
POLYCHROMASIA BLD QL SMEAR: SLIGHT — SIGNIFICANT CHANGE UP
POTASSIUM SERPL-MCNC: 5.7 MMOL/L — HIGH (ref 3.5–5)
POTASSIUM SERPL-MCNC: 5.8 MMOL/L — HIGH (ref 3.5–5)
POTASSIUM SERPL-SCNC: 5.7 MMOL/L — HIGH (ref 3.5–5)
POTASSIUM SERPL-SCNC: 5.8 MMOL/L — HIGH (ref 3.5–5)
PROT SERPL-MCNC: 6 G/DL — SIGNIFICANT CHANGE UP (ref 6–8)
PROT SERPL-MCNC: 6.4 G/DL — SIGNIFICANT CHANGE UP (ref 6–8)
RBC # BLD: 2.45 M/UL — LOW (ref 4.7–6.1)
RBC # FLD: 14.8 % — HIGH (ref 11.5–14.5)
RBC BLD AUTO: NORMAL — SIGNIFICANT CHANGE UP
SODIUM SERPL-SCNC: 139 MMOL/L — SIGNIFICANT CHANGE UP (ref 135–146)
SODIUM SERPL-SCNC: 141 MMOL/L — SIGNIFICANT CHANGE UP (ref 135–146)
SPECIMEN SOURCE: SIGNIFICANT CHANGE UP
TIBC SERPL-MCNC: 249 UG/DL — SIGNIFICANT CHANGE UP (ref 220–430)
TRANSFERRIN SERPL-MCNC: 240 MG/DL — SIGNIFICANT CHANGE UP (ref 200–360)
UIBC SERPL-MCNC: 170 UG/DL — SIGNIFICANT CHANGE UP (ref 110–370)
WBC # BLD: 1.95 K/UL — LOW (ref 4.8–10.8)
WBC # FLD AUTO: 1.95 K/UL — LOW (ref 4.8–10.8)

## 2022-10-23 RX ORDER — SODIUM CHLORIDE 9 MG/ML
1000 INJECTION INTRAMUSCULAR; INTRAVENOUS; SUBCUTANEOUS
Refills: 0 | Status: DISCONTINUED | OUTPATIENT
Start: 2022-10-23 | End: 2022-10-31

## 2022-10-23 RX ORDER — SODIUM BICARBONATE 1 MEQ/ML
1300 SYRINGE (ML) INTRAVENOUS THREE TIMES A DAY
Refills: 0 | Status: DISCONTINUED | OUTPATIENT
Start: 2022-10-23 | End: 2022-10-26

## 2022-10-23 RX ADMIN — Medication 1300 MILLIGRAM(S): at 21:53

## 2022-10-23 RX ADMIN — SODIUM ZIRCONIUM CYCLOSILICATE 10 GRAM(S): 10 POWDER, FOR SUSPENSION ORAL at 06:25

## 2022-10-23 RX ADMIN — ATORVASTATIN CALCIUM 40 MILLIGRAM(S): 80 TABLET, FILM COATED ORAL at 21:53

## 2022-10-23 RX ADMIN — SODIUM CHLORIDE 75 MILLILITER(S): 9 INJECTION INTRAMUSCULAR; INTRAVENOUS; SUBCUTANEOUS at 21:53

## 2022-10-23 RX ADMIN — Medication 650 MILLIGRAM(S): at 06:25

## 2022-10-23 RX ADMIN — SODIUM ZIRCONIUM CYCLOSILICATE 10 GRAM(S): 10 POWDER, FOR SUSPENSION ORAL at 16:00

## 2022-10-23 RX ADMIN — Medication 200 MILLIGRAM(S): at 18:15

## 2022-10-23 RX ADMIN — Medication 200 MILLIGRAM(S): at 06:27

## 2022-10-23 RX ADMIN — METHADONE HYDROCHLORIDE 110 MILLIGRAM(S): 40 TABLET ORAL at 12:02

## 2022-10-23 RX ADMIN — HEPARIN SODIUM 5000 UNIT(S): 5000 INJECTION INTRAVENOUS; SUBCUTANEOUS at 06:26

## 2022-10-23 RX ADMIN — Medication 81 MILLIGRAM(S): at 12:02

## 2022-10-23 RX ADMIN — HEPARIN SODIUM 5000 UNIT(S): 5000 INJECTION INTRAVENOUS; SUBCUTANEOUS at 18:15

## 2022-10-23 RX ADMIN — SODIUM ZIRCONIUM CYCLOSILICATE 10 GRAM(S): 10 POWDER, FOR SUSPENSION ORAL at 21:52

## 2022-10-23 RX ADMIN — DOLUTEGRAVIR SODIUM AND RILPIVIRINE HYDROCHLORIDE 1 TABLET(S): 50; 25 TABLET, FILM COATED ORAL at 13:40

## 2022-10-23 RX ADMIN — Medication 650 MILLIGRAM(S): at 13:42

## 2022-10-23 NOTE — PROGRESS NOTE ADULT - SUBJECTIVE AND OBJECTIVE BOX
YAZMIN VILLEGAS  67y, Male  Allergy: No Known Allergies      CHIEF COMPLAINT: abnormal labs (23 Oct 2022 00:52)      INTERVAL EVENTS/HPI  - No acute events overnight  - T(F): , Max: 98 (10-22-22 @ 20:43)  - Denies any worsening symptoms  - Tolerating medication  - WBC Count: 1.95 K/uL (10-23-22 @ 08:22)      ROS  General: Denies fevers, chills, nightsweats, weight loss  HEENT: Denies headache, rhinorrhea, sore throat, eye pain  CV: Denies CP, palpitations  PULM: Denies SOB, cough  GI: Denies abdominal pain, diarrhea  : Denies dysuria, hematuria  MSK: Denies arthralgias  SKIN: Denies rash   NEURO: Denies paresthesias, weakness  PSYCH: Denies depression    FH non-contributory   Social Hx non-contributory    VITALS:  T(F): 97.1, Max: 98 (10-22-22 @ 20:43)  HR: 67  BP: 137/58  RR: 18Vital Signs Last 24 Hrs  T(C): 36.2 (23 Oct 2022 12:50), Max: 36.7 (22 Oct 2022 20:43)  T(F): 97.1 (23 Oct 2022 12:50), Max: 98 (22 Oct 2022 20:43)  HR: 67 (23 Oct 2022 12:50) (54 - 73)  BP: 137/58 (23 Oct 2022 12:50) (95/47 - 152/65)  BP(mean): 76 (22 Oct 2022 18:29) (76 - 76)  RR: 18 (23 Oct 2022 12:50) (18 - 18)  SpO2: 100% (22 Oct 2022 13:00) (100% - 100%)    Parameters below as of 22 Oct 2022 13:00  Patient On (Oxygen Delivery Method): room air        PHYSICAL EXAM:  Gen: NAD, resting in bed  HEENT: Normocephalic, atraumatic  Neck: supple, no lymphadenopathy  CV: Regular rate & regular rhythm  Lungs: decreased BS at bases, no fremitus  Abdomen: Soft, BS present  Ext: Warm, well perfused  Neuro: non focal, awake  Skin: no rash, no erythema      TESTS & MEASUREMENTS:                        7.5    1.95  )-----------( 218      ( 23 Oct 2022 08:22 )             22.5     10    139  |  107  |  40<H>  ----------------------------<  94  5.7<H>   |  22  |  3.0<H>    Ca    9.0      23 Oct 2022 08:22  Phos  3.3     10-22  Mg     2.4     10-23    TPro  6.4  /  Alb  3.5  /  TBili  <0.2  /  DBili  x   /  AST  36  /  ALT  37  /  AlkPhos  111  10-23      LIVER FUNCTIONS - ( 23 Oct 2022 08:22 )  Alb: 3.5 g/dL / Pro: 6.4 g/dL / ALK PHOS: 111 U/L / ALT: 37 U/L / AST: 36 U/L / GGT: x           Urinalysis Basic - ( 21 Oct 2022 18:38 )    Color: Light Yellow / Appearance: Clear / S.015 / pH: x  Gluc: x / Ketone: Negative  / Bili: Negative / Urobili: <2 mg/dL   Blood: x / Protein: 30 mg/dL / Nitrite: Negative   Leuk Esterase: Negative / RBC: 1 /HPF / WBC 2 /HPF   Sq Epi: x / Non Sq Epi: 2 /HPF / Bacteria: Negative        Culture - Urine (collected 10-21-22 @ 18:38)  Source: Clean Catch Clean Catch (Midstream)  Final Report (10-23-22 @ 11:25):    <10,000 CFU/mL Normal Urogenital Jina        Blood Gas Venous - Lactate: 1.40 mmol/L (10-22-22 @ 13:20)      INFECTIOUS DISEASES TESTING      RADIOLOGY & ADDITIONAL TESTS:  I have personally reviewed the last Chest xray  CXR  Xray Chest 1 View AP/PA:   ACC: [id number] EXAM:  XR CHEST 1 VIEW                          PROCEDURE DATE:  10/21/2022          INTERPRETATION:  Clinical History / Reason for exam: Sepsis.    Comparison : Chest radiograph 2019.    Technique/Positioning: Single frontal chest x-ray obtained.    Findings:    Support devices: None.    Cardiac/mediastinum/hilum: Unremarkable.    Lung parenchyma/Pleura: Within normal limits.    Skeleton/soft tissues: Unremarkable.    Impression:    No radiographic evidence of acute cardiopulmonary disease.        --- End of Report ---            DAYANARA SCHMIDT MD; Attending Radiologist  This document has been electronically signed. Oct 22 2022 12:23AM (10-21-22 @ 18:50)      CT      CARDIOLOGY TESTING  12 Lead ECG:   Ventricular Rate 63 BPM    Atrial Rate 63 BPM    P-R Interval 244 ms    QRS Duration 86 ms    Q-T Interval 362 ms    QTC Calculation(Bazett) 370 ms    P Axis 61 degrees    R Axis 64 degrees    T Axis 55 degrees    Diagnosis Line Sinus rhythm with 1st degree A-V block  Minimal voltage criteria for LVH, may be normal variant  Early repolarization  Borderline ECG    Confirmed by KAREN VEGA MD ([id number]) on 10/21/2022 3:42:25 PM (10-21-22 @ 14:33)      MEDICATIONS  aspirin enteric coated 81  atorvastatin Oral Tab/Cap - Peds 40  dolutegravir 50 mG/rilpivirine 25 mG (JULUCA) 1  heparin   Injectable 5000  influenza  Vaccine (HIGH DOSE) 0.7  methadone    Tablet 110  pregabalin 200  sodium bicarbonate 650  sodium chloride 0.9%. 1000  sodium zirconium cyclosilicate 10      ANTIBIOTICS:  dolutegravir 50 mG/rilpivirine 25 mG (JULUCA) 1 Tablet(s) Oral with breakfast      All available historical data has been reviewed

## 2022-10-23 NOTE — PROGRESS NOTE ADULT - SUBJECTIVE AND OBJECTIVE BOX
SUBJECTIVE / OVERNIGHT EVENTS  Patient slept well overnight. No acute complaints this AM. Patient does not report fevers, chills, CP, SOB, or n/v/d    MEDICATIONS  aspirin enteric coated 81 milliGRAM(s) Oral daily  atorvastatin Oral Tab/Cap - Peds 40 milliGRAM(s) Oral daily  dolutegravir 50 mG/rilpivirine 25 mG (JULUCA) 1 Tablet(s) Oral with breakfast  heparin   Injectable 5000 Unit(s) SubCutaneous every 12 hours  influenza  Vaccine (HIGH DOSE) 0.7 milliLiter(s) IntraMuscular once  methadone    Tablet 110 milliGRAM(s) Oral daily  pregabalin 200 milliGRAM(s) Oral two times a day  sodium bicarbonate 650 milliGRAM(s) Oral three times a day  sodium chloride 0.9%. 1000 milliLiter(s) IV Continuous <Continuous>  sodium zirconium cyclosilicate 10 Gram(s) Oral three times a day      VITALS /  EXAM    T(C): 36.7 (10-22-22 @ 20:43), Max: 37.6 (10-22-22 @ 06:05)  HR: 64 (10-22-22 @ 20:43) (62 - 73)  BP: 95/47 (10-22-22 @ 20:43) (95/47 - 152/65)  RR: 18 (10-22-22 @ 20:43) (16 - 18)  SpO2: 100% (10-22-22 @ 13:00) (100% - 100%)  POCT Blood Glucose.: 115 mg/dL (10-22-22 @ 11:20)    GENERAL: NAD, well-developed  CHEST/LUNG: Clear to auscultation bilaterally; No wheezes, rales or rhonchi  HEART: Regular rate and rhythm; No murmurs, rubs, or gallops  ABDOMEN: Soft, Nontender, Nondistended; Bowel sounds present, no masses.  EXTREMITIES:  2+ Peripheral Pulses, No clubbing, cyanosis, or edema    I's & O's     10-21-22 @ 07:01  -  10-22-22 @ 07:00  --------------------------------------------------------  IN:  Total IN: 0 mL    OUT:    Voided (mL): 300 mL  Total OUT: 300 mL    Total NET: -300 mL      10-22-22 @ 07:01  -  10-23-22 @ 00:52  --------------------------------------------------------  IN:  Total IN: 0 mL    OUT:    Voided (mL): 250 mL  Total OUT: 250 mL    Total NET: -250 mL        LABS             7.6    3.11  )-----------( 215      ( 10-22-22 @ 05:45 )             22.5     141  |  110  |  49  -------------------------<  152   10-22-22 @ 22:26  5.8  |  20  |  3.4    Ca      8.5     10-22-22 @ 22:26  Phos   3.3     10-22-22 @ 05:45  Mg     1.5     10-22-22 @ 18:06    TPro  6.0  /  Alb  3.2  /  TBili  <0.2  /  DBili  x   /  AST  36  /  ALT  35  /  AlkPhos  106  /  GGT  x     10-22-22 @ 22:26    PT/INR - ( 10-22-22 @ 05:45 )   PT: 13.00 sec<H>;   INR: 1.14 ratio  PTT - ( 10-22-22 @ 05:45 )  PTT:26.8 sec        Creatine Kinase, Serum: 56 U/L (10-22-22 @ 00:28)      Serum Pro-Brain Natriuretic Peptide: 431 pg/mL (10-22-22 @ 00:28)      Blood Gas Venous - Lactate: 1.40 mmol/L (10-22-22 @ 13:20)    Urinalysis Basic - ( 21 Oct 2022 18:38 )    Color: Light Yellow / Appearance: Clear / S.015 / pH: x  Gluc: x / Ketone: Negative  / Bili: Negative / Urobili: <2 mg/dL   Blood: x / Protein: 30 mg/dL / Nitrite: Negative   Leuk Esterase: Negative / RBC: 1 /HPF / WBC 2 /HPF   Sq Epi: x / Non Sq Epi: 2 /HPF / Bacteria: Negative      MICRO / IMAGING / CARDIOLOGY  Telemetry: Reviewed   EKG: Reviewed    CULTURES    IMAGING  PACS Image:  (10-21-22 @ 18:50)    CARDIOLOGY

## 2022-10-23 NOTE — PROGRESS NOTE ADULT - ASSESSMENT
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  cr trending down   please document UO   sp D50 insulin and started on lokelma / ensure pt is on low K diet  renal ultrasound reviewed, no hydronephrosis, nml size kidneys  cont NS  increase sodium bicarbonate to 1300mg q8h  phos level at goal no need for binder  check hepatitis profile  leukopenia worsening / anemia / Tsat 32% / consider hematology eval  ID for HAART meds  no need for RRT   will follow

## 2022-10-23 NOTE — PROGRESS NOTE ADULT - ASSESSMENT
66-year-old male, with PMH of substance dependence–on methadone, IVDA (admits to currently using heroin/cocaine), HIV (on HAART), QIN9i-0 (baseline Cr ~3), and hepatitis C, sent in by the infectious team for KATERIN on CKD and hyperkalemia noted on OP labs.   Vancomycin was D/Slava.      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  HTN is a risk factor along with HIV and HAART therapy on Triumeq (abacavir, dolutegravir, and lamivudine)  - Cr 5 on admission (baseline ~3)   - K 6.8 on admission sp D50 insulin in ED  - place segovia for acc Is and Os   - will start on lokelma 10 TID   - IVF LR at 75 cc per hour   - US renal ordered    - Phos, PTH ordered   - CPK ordered 11:30 (unlikely rhabdo given neg UA)   - repeat bmp 11:30     # HIV (on HAART)  # Hepatitis C  - HIV-1 viral load: <20 (5/2022 AND 8/2022)   - CD4: 246 ( 3/2022) >212 (8/2022)  - On Juluca (50-25) OD     # Normocytic Anemia likely 2/2 to CKD  - Hgb 8.8 on admission ( baseline ~9.5)   - iron studies, b12, folate  - SPEP, UPEP, IF    - keep active T&S      # mild transaminitis  # mild pancreatic duct dilatation  # stable dilatation of the common bile duct  - ALT/AST 51/47 on admission  - been noted to have elevated LFTs on previous labs  - US abdomen (July 2022) New mild pancreatic duct dilatation measuring 3-4 mm.A central obstructing lesion cannot be excluded on this examination.  Further evaluation of the pancreas with MRI and MRCP with and without   gadolinium is necessary for complete characterization. Essentially stable dilatation of the common bile duct of unclear etiology.   - will get repeat US abdomen  - pt was advised to get MRI/MRCP as OP but he did not schedule it yet - consider obtaining as inpt if repeat US abdomen results abnormal    - GI advanced c/s if persistant abnormal results on US abdomen       # CHF   - TTE (2018) EF 55-60% , GIDD  - repeat TTE ordered   - keep euvolemic      # NEUROPATHY   - c/w pregabalin       # Low testosterone   - on testosterone gel       #Left hip pain   - on chronic opioids       # HTN - currently borderline low  BP  - will hold off amlo   - dc enalapril        # substance dependence–on methadone  # IVDA (admits to currently using heroin/cocaine)  - methadone 110mg OD  - [address] [facility] (confirm in am)     # DIET: DASH

## 2022-10-23 NOTE — PROGRESS NOTE ADULT - ASSESSMENT
66-year-old male, with PMH of substance dependence–on methadone, IVDA (admits to currently using heroin/cocaine), HIV (on HAART), WHR3u-7 (baseline Cr ~3), and hepatitis C, sent in by the infectious team for KATERIN on CKD and hyperkalmia noted on OP labs.     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  HTN is a risk factor along with HIV and HAART therapy on Triumeq (abacavir, dolutegravir, and lamivudine)  - Cr 5 on admission (baseline ~3) , no Cr  3.4  - K 6.8 on admission sp D50 insulin in ED, now K+ 5.8 on lokelma 10 mg TID   - place segovia for acc Is and Os   - IVF given LR at 75 cc per hour   - US renal done pending read   - Phos 3.3, PTH wnl  - CK wnl      # HIV (on HAART)  # Hepatitis C  - pt is on Triumeq (abacavir, dolutegravir, and lamivudine) and was advised to stop given his worsening kidney fx and to start Julaca instead (Dolutegravir- rilpivirine)  - HIV-1 viral load: <20 (5/2022 AND 8/2022)   - CD4: 246 ( 3/2022) >212 (8/2022)  - start on Julaca (50-25) OD after ID approval       # Normocytic Anemia likely 2/2 to CKD  - Hgb 8.8 on admission ( baseline ~9.5)   -  HBb 7.6  - iron studies normal, b12 wnl, folate wnl   - urine protein elevated   - SPEP, , IF    - keep active T&S  - keep Hb > 7      # mild transaminitis  # mild pancreatic duct dilatation  # stable dilatation of the common bile duct  - ALT/AST 51/47 on admission  - been noted to have elevated LFTs on previous labs  - US abdomen (July 2022) New mild pancreatic duct dilatation measuring 3-4 mm.A central obstructing lesion cannot be excluded on this examination.  Further evaluation of the pancreas with MRI and MRCP with and without   gadolinium is necessary for complete characterization. Essentially stable dilatation of the common bile duct of unclear etiology.   - will get repeat US abdomen  - pt was advised to get MRI/MRCP as OP but he did not schedule it yet - consider obtaining as inpt if repeat US abdomen results abnormal    - GI advanced c/s if persistant abnormal results on US abdomen       # CHF   - TTE (2018) EF 55-60% , GIDD  - repeat TTE ordered   - keep euvolemic      # NEUROPATHY   - c/w pregabalin       # Low testosterone   - on testosterone gel       #Left hip pain   - on chronic opioids       # HTN - currently borderline low  BP  - will hold off amlo   - dc enalapril        # substance dependence–on methadone  # IVDA (admits to currently using heroin/cocaine)  - methadone 110mg OD  - [address] [facility] (confirm in am)

## 2022-10-23 NOTE — PROGRESS NOTE ADULT - SUBJECTIVE AND OBJECTIVE BOX
Nephrology Progress Note    YAZMIN VILLEGAS  MRN-[id number]  67y  Male    S:  Patient is seen and examined, events over the last 24h noted.    O:  Allergies:  No Known Allergies    Hospital Medications:   MEDICATIONS  (STANDING):  aspirin enteric coated 81 milliGRAM(s) Oral daily  atorvastatin Oral Tab/Cap - Peds 40 milliGRAM(s) Oral daily  dolutegravir 50 mG/rilpivirine 25 mG (JULUCA) 1 Tablet(s) Oral with breakfast  heparin   Injectable 5000 Unit(s) SubCutaneous every 12 hours  influenza  Vaccine (HIGH DOSE) 0.7 milliLiter(s) IntraMuscular once  methadone    Tablet 110 milliGRAM(s) Oral daily  pregabalin 200 milliGRAM(s) Oral two times a day  sodium bicarbonate 650 milliGRAM(s) Oral three times a day  sodium chloride 0.9%. 1000 milliLiter(s) (75 mL/Hr) IV Continuous <Continuous>  sodium zirconium cyclosilicate 10 Gram(s) Oral three times a day    MEDICATIONS  (PRN):    Home Medications:  amLODIPine 10 mg oral tablet: 1 tab(s) orally once a day (21 Oct 2022 23:09)  Aspir-Low 81 mg oral delayed release tablet: 1 tab(s) orally once a day (21 Oct 2022 23:09)  atorvastatin 40 mg oral tablet: 1 tab(s) orally once a day (21 Oct 2022 23:09)  Bactrim  mg-160 mg oral tablet: 1 tab(s) orally OD  three times a week  (21 Oct 2022 23:26)  chlorthalidone 25 mg oral tablet: 1 tab(s) orally once a day (21 Oct 2022 23:10)  methadone 40 mg oral tablet: 110 milligram(s) orally once a day (21 Oct 2022 23:10)  oxyCODONE 30 mg oral tablet, extended release: 30 milligram(s) orally 4 times a day (01 Sep 2022 13:37)  pregabalin 200 mg oral capsule: 1 cap(s) orally 2 times a day (21 Oct 2022 23:11)  Triumeq oral tablet: 1 tab(s) orally once a day (21 Oct 2022 23:13)      VITALS:  Daily     Daily Weight in k.6 (23 Oct 2022 05:09)  T(F): 97.1 (10-23-22 @ 12:50), Max: 98 (10-22-22 @ 20:43)  HR: 67 (10-23-22 @ 12:50)  BP: 137/58 (10-23-22 @ 12:50)  RR: 18 (10-23-22 @ 12:50)  SpO2: --  Wt(kg): --  I&O's Detail    22 Oct 2022 07:  -  23 Oct 2022 07:00  --------------------------------------------------------  IN:    IV PiggyBack: 50 mL    Oral Fluid: 150 mL    sodium chloride 0.9%: 525 mL  Total IN: 725 mL    OUT:    Voided (mL): 750 mL  Total OUT: 750 mL    Total NET: -25 mL      23 Oct 2022 07:  -  23 Oct 2022 14:17  --------------------------------------------------------  IN:    Oral Fluid: 480 mL  Total IN: 480 mL    OUT:    Voided (mL): 400 mL  Total OUT: 400 mL    Total NET: 80 mL        I&O's Summary    22 Oct 2022 07:  -  23 Oct 2022 07:00  --------------------------------------------------------  IN: 725 mL / OUT: 750 mL / NET: -25 mL    23 Oct 2022 07:  -  23 Oct 2022 14:17  --------------------------------------------------------  IN: 480 mL / OUT: 400 mL / NET: 80 mL          PHYSICAL EXAM:  Gen: NAD  Resp: CTAB  Card: S1/S2  Abd: soft  Extremities: no edema      LABS:    Blood Gas Profile w/Lytes - Venous: Performed in Lab (10-22-22 @ 13:20)    10-23    139  |  107  |  40<H>  ----------------------------<  94  5.7<H>   |  22  |  3.0<H>    Ca    9.0      23 Oct 2022 08:22  Phos  3.3     10-22  Mg     2.4     10-23    TPro  6.4  /  Alb  3.5  /  TBili  <0.2  /  DBili      /  AST  36  /  ALT  37  /  AlkPhos  111  10-23      Phosphorus Level, Serum: 3.3 mg/dL (10-22-22 @ 05:45)    Intact PTH: 41 pg/mL (10-22-22 @ 05:45)  Vitamin D, 25-Hydroxy: 49 ng/mL (20 @ 09:09)                          7.5    1.95  )-----------( 218      ( 23 Oct 2022 08:22 )             22.5     Mean Cell Volume: 91.8 fL (10-23-22 @ 08:22)    Iron Total, Serum: 79 ug/dL (10-23-22 @ 08:22)  % Saturation, Iron: 32 % (10-23-22 @ 08:22)      Urine Studies:  Urinalysis Basic - ( 21 Oct 2022 18:38 )    Color: Light Yellow / Appearance: Clear / S.015 / pH:   Gluc:  / Ketone: Negative  / Bili: Negative / Urobili: <2 mg/dL   Blood:  / Protein: 30 mg/dL / Nitrite: Negative   Leuk Esterase: Negative / RBC: 1 /HPF / WBC 2 /HPF   Sq Epi:  / Non Sq Epi: 2 /HPF / Bacteria: Negative          Culture Results:   <10,000 CFU/mL Normal Urogenital Jina (10-21 @ 18:38)    Creatinine trend:  Creatinine, Serum: 3.0 mg/dL (10-23-22 @ 08:22)  Creatinine, Serum: 3.4 mg/dL (10-22-22 @ 22:26)  Creatinine, Serum: 3.6 mg/dL (10-22-22 @ 18:06)  Creatinine, Serum: 3.6 mg/dL (10-22-22 @ 11:00)  Creatinine, Serum: 3.9 mg/dL (10-22-22 @ 05:45)  Creatinine, Serum: 4.3 mg/dL (10-22-22 @ 00:28)  Creatinine, Serum: 4.8 mg/dL (10-21-22 @ 16:06)  Creatinine, Serum: 5.0 mg/dL (10-21-22 @ 13:44)  Creatinine, Serum: 2.9 mg/dL (22 @ 12:50)  Creatinine, Serum: 0.8 mg/dL (21 @ 11:38)    Hepatitis C Virus Cutoff Index: 46.55 COI (21 @ 11:38)  Hepatitis C Virus Interpretation Result: Reactive (21 @ 11:38)  Hepatitis B Core IgM Antibody: Nonreact (21 @ 11:38)    US Renal:   ACC: [id number] EXAM:  US KIDNEY(S)                          PROCEDURE DATE:  10/22/2022          INTERPRETATION:  CLINICAL INFORMATION: Acute kidney injury    COMPARISON: None available.    TECHNIQUE: Sonography of the kidneys and bladder.    FINDINGS:    Right kidney: 12.2 cm. No hydronephrosis or calculi. Right lower pole 3.1   cm cyst.    Left kidney:  11.9 cm. No hydronephrosis or calculi.    Urinary bladder: No debris or calculus. Bilateral ureteral jets are   visualized. Prevoid volume of approximately 232 cc. Patient was unable to   void during examination. Circumferential bladder wall thickening.        IMPRESSION:    No hydronephrosis bilaterally.    Urinary bladder volume of 232 cc. Patient unable to void during   examination.    Circumferential bladder wall thickening.    --- End of Report ---            ELLIOT LANDAU MD; Attending Radiologist  This document has been electronically signed. Oct 23 2022  8:13AM (10-22-22 @ 19:29)

## 2022-10-24 DIAGNOSIS — B20 HUMAN IMMUNODEFICIENCY VIRUS [HIV] DISEASE: ICD-10-CM

## 2022-10-24 LAB
ALBUMIN SERPL ELPH-MCNC: 3.4 G/DL — LOW (ref 3.5–5.2)
ALP SERPL-CCNC: 113 U/L — SIGNIFICANT CHANGE UP (ref 30–115)
ALT FLD-CCNC: 36 U/L — SIGNIFICANT CHANGE UP (ref 0–41)
ANION GAP SERPL CALC-SCNC: 9 MMOL/L — SIGNIFICANT CHANGE UP (ref 7–14)
AST SERPL-CCNC: 37 U/L — SIGNIFICANT CHANGE UP (ref 0–41)
BILIRUB SERPL-MCNC: <0.2 MG/DL — SIGNIFICANT CHANGE UP (ref 0.2–1.2)
BUN SERPL-MCNC: 34 MG/DL — HIGH (ref 10–20)
CALCIUM SERPL-MCNC: 8.5 MG/DL — SIGNIFICANT CHANGE UP (ref 8.4–10.5)
CALCIUM SERPL-MCNC: 8.7 MG/DL — SIGNIFICANT CHANGE UP (ref 8.4–10.5)
CHLORIDE SERPL-SCNC: 108 MMOL/L — SIGNIFICANT CHANGE UP (ref 98–110)
CO2 SERPL-SCNC: 22 MMOL/L — SIGNIFICANT CHANGE UP (ref 17–32)
CREAT SERPL-MCNC: 2.2 MG/DL — HIGH (ref 0.7–1.5)
CREATININE, URINE RESULT: 69 MG/DL — SIGNIFICANT CHANGE UP
EGFR: 32 ML/MIN/1.73M2 — LOW
FERRITIN SERPL-MCNC: 261 NG/ML — SIGNIFICANT CHANGE UP (ref 30–400)
GLUCOSE SERPL-MCNC: 105 MG/DL — HIGH (ref 70–99)
HAV IGM SER-ACNC: SIGNIFICANT CHANGE UP
HBV CORE IGM SER-ACNC: SIGNIFICANT CHANGE UP
HBV SURFACE AG SER-ACNC: SIGNIFICANT CHANGE UP
HCT VFR BLD CALC: 24.2 % — LOW (ref 42–52)
HCV AB S/CO SERPL IA: 11.24 S/CO — HIGH (ref 0–0.99)
HCV AB SERPL-IMP: REACTIVE
HGB BLD-MCNC: 7.9 G/DL — LOW (ref 14–18)
MAGNESIUM SERPL-MCNC: 1.6 MG/DL — LOW (ref 1.8–2.4)
MCHC RBC-ENTMCNC: 30.7 PG — SIGNIFICANT CHANGE UP (ref 27–31)
MCHC RBC-ENTMCNC: 32.6 G/DL — SIGNIFICANT CHANGE UP (ref 32–37)
MCV RBC AUTO: 94.2 FL — HIGH (ref 80–94)
NRBC # BLD: 0 /100 WBCS — SIGNIFICANT CHANGE UP (ref 0–0)
PLATELET # BLD AUTO: 204 K/UL — SIGNIFICANT CHANGE UP (ref 130–400)
POTASSIUM SERPL-MCNC: 5.3 MMOL/L — HIGH (ref 3.5–5)
POTASSIUM SERPL-SCNC: 5.3 MMOL/L — HIGH (ref 3.5–5)
PROT ?TM UR-MCNC: 124 MG/DL — HIGH (ref 0–12)
PROT SERPL-MCNC: 6.2 G/DL — SIGNIFICANT CHANGE UP (ref 6–8)
PTH-INTACT FLD-MCNC: 48 PG/ML — SIGNIFICANT CHANGE UP (ref 15–65)
RBC # BLD: 2.57 M/UL — LOW (ref 4.7–6.1)
RBC # FLD: 15.1 % — HIGH (ref 11.5–14.5)
SODIUM SERPL-SCNC: 139 MMOL/L — SIGNIFICANT CHANGE UP (ref 135–146)
WBC # BLD: 2.31 K/UL — LOW (ref 4.8–10.8)
WBC # FLD AUTO: 2.31 K/UL — LOW (ref 4.8–10.8)

## 2022-10-24 RX ORDER — MAGNESIUM SULFATE 500 MG/ML
2 VIAL (ML) INJECTION ONCE
Refills: 0 | Status: COMPLETED | OUTPATIENT
Start: 2022-10-24 | End: 2022-10-24

## 2022-10-24 RX ORDER — SODIUM ZIRCONIUM CYCLOSILICATE 10 G/10G
5 POWDER, FOR SUSPENSION ORAL
Refills: 0 | Status: DISCONTINUED | OUTPATIENT
Start: 2022-10-24 | End: 2022-10-25

## 2022-10-24 RX ORDER — SODIUM ZIRCONIUM CYCLOSILICATE 10 G/10G
10 POWDER, FOR SUSPENSION ORAL
Refills: 0 | Status: DISCONTINUED | OUTPATIENT
Start: 2022-10-24 | End: 2022-10-24

## 2022-10-24 RX ADMIN — HEPARIN SODIUM 5000 UNIT(S): 5000 INJECTION INTRAVENOUS; SUBCUTANEOUS at 05:15

## 2022-10-24 RX ADMIN — Medication 1300 MILLIGRAM(S): at 05:15

## 2022-10-24 RX ADMIN — Medication 1300 MILLIGRAM(S): at 21:20

## 2022-10-24 RX ADMIN — METHADONE HYDROCHLORIDE 110 MILLIGRAM(S): 40 TABLET ORAL at 11:27

## 2022-10-24 RX ADMIN — HEPARIN SODIUM 5000 UNIT(S): 5000 INJECTION INTRAVENOUS; SUBCUTANEOUS at 17:10

## 2022-10-24 RX ADMIN — ATORVASTATIN CALCIUM 40 MILLIGRAM(S): 80 TABLET, FILM COATED ORAL at 21:20

## 2022-10-24 RX ADMIN — Medication 200 MILLIGRAM(S): at 05:15

## 2022-10-24 RX ADMIN — Medication 81 MILLIGRAM(S): at 11:26

## 2022-10-24 RX ADMIN — Medication 1300 MILLIGRAM(S): at 13:13

## 2022-10-24 RX ADMIN — Medication 200 MILLIGRAM(S): at 17:10

## 2022-10-24 RX ADMIN — DOLUTEGRAVIR SODIUM AND RILPIVIRINE HYDROCHLORIDE 1 TABLET(S): 50; 25 TABLET, FILM COATED ORAL at 08:52

## 2022-10-24 RX ADMIN — Medication 25 GRAM(S): at 10:08

## 2022-10-24 NOTE — PROGRESS NOTE ADULT - SUBJECTIVE AND OBJECTIVE BOX
Nephrology progress note    THIS IS AN INCOMPLETE NOTE . FULL NOTE TO FOLLOW SHORTLY    Patient is seen and examined, events over the last 24 h noted .    Allergies:  No Known Allergies    Hospital Medications:   MEDICATIONS  (STANDING):  aspirin enteric coated 81 milliGRAM(s) Oral daily  atorvastatin Oral Tab/Cap - Peds 40 milliGRAM(s) Oral daily  dolutegravir 50 mG/rilpivirine 25 mG (JULUCA) 1 Tablet(s) Oral with breakfast  heparin   Injectable 5000 Unit(s) SubCutaneous every 12 hours  influenza  Vaccine (HIGH DOSE) 0.7 milliLiter(s) IntraMuscular once  methadone    Tablet 110 milliGRAM(s) Oral daily  pregabalin 200 milliGRAM(s) Oral two times a day  sodium bicarbonate 1300 milliGRAM(s) Oral three times a day  sodium chloride 0.9%. 1000 milliLiter(s) (75 mL/Hr) IV Continuous <Continuous>        VITALS:  T(F): 97.6 (10-24-22 @ 05:05), Max: 98.2 (10-23-22 @ 22:03)  HR: 60 (10-24-22 @ 05:05)  BP: 123/60 (10-24-22 @ 05:05)  RR: 16 (10-24-22 @ 05:05)  SpO2: --  Wt(kg): --    10-22 @ 07:01  -  10-23 @ 07:00  --------------------------------------------------------  IN: 725 mL / OUT: 750 mL / NET: -25 mL    10-23 @ 07:01  -  10-24 @ 07:00  --------------------------------------------------------  IN: 2640 mL / OUT: 1000 mL / NET: 1640 mL    10-24 @ 07:01  -  10-24 @ 09:24  --------------------------------------------------------  IN: 240 mL / OUT: 1000 mL / NET: -760 mL          PHYSICAL EXAM:  Constitutional: NAD  HEENT: anicteric sclera, oropharynx clear, MMM  Neck: No JVD  Respiratory: CTAB, no wheezes, rales or rhonchi  Cardiovascular: S1, S2, RRR  Gastrointestinal: BS+, soft, NT/ND  Extremities: No cyanosis or clubbing. No peripheral edema  :  No segovia.   Skin: No rashes    LABS:  10-24    139  |  108  |  34<H>  ----------------------------<  105<H>  5.3<H>   |  22  |  2.2<H>    Ca    8.5      24 Oct 2022 06:43  Mg     1.6     10-24    TPro  6.2  /  Alb  3.4<L>  /  TBili  <0.2  /  DBili      /  AST  37  /  ALT  36  /  AlkPhos  113  10-24                          7.9    2.31  )-----------( 204      ( 24 Oct 2022 06:43 )             24.2       Urine Studies:  Urinalysis Basic - ( 21 Oct 2022 18:38 )    Color: Light Yellow / Appearance: Clear / S.015 / pH:   Gluc:  / Ketone: Negative  / Bili: Negative / Urobili: <2 mg/dL   Blood:  / Protein: 30 mg/dL / Nitrite: Negative   Leuk Esterase: Negative / RBC: 1 /HPF / WBC 2 /HPF   Sq Epi:  / Non Sq Epi: 2 /HPF / Bacteria: Negative          Iron 79, TIBC 249, %sat 32      [10-23-22 @ 08:22]  Ferritin 271      [10-22-22 @ 05:45]  PTH -- (Ca 9.1)      [10-22-22 @ 05:45]   41  HbA1c 5.3      [20 @ 09:09]    HBsAb Nonreact      [21 @ 11:38]  HBsAg Nonreact      [21 @ 11:38]  HBcAb Reactive      [20 @ 09:09]  HCV 14.67, Reactive      [20 @ 09:09]    Syphilis Screen (Treponema Pallidum Ab) Negative      [20 @ 09:09]      RADIOLOGY & ADDITIONAL STUDIES:   Nephrology progress note  Patient is seen and examined, events over the last 24 h noted .  lying in bed comfortable   Allergies:  No Known Allergies    Hospital Medications:   MEDICATIONS  (STANDING):    aspirin enteric coated 81 milliGRAM(s) Oral daily  atorvastatin Oral Tab/Cap - Peds 40 milliGRAM(s) Oral daily  dolutegravir 50 mG/rilpivirine 25 mG (JULUCA) 1 Tablet(s) Oral with breakfast  heparin   Injectable 5000 Unit(s) SubCutaneous every 12 hours  influenza  Vaccine (HIGH DOSE) 0.7 milliLiter(s) IntraMuscular once  methadone    Tablet 110 milliGRAM(s) Oral daily  pregabalin 200 milliGRAM(s) Oral two times a day  sodium bicarbonate 1300 milliGRAM(s) Oral three times a day  sodium chloride 0.9%. 1000 milliLiter(s) (75 mL/Hr) IV Continuous <Continuous>        VITALS:  T(F): 97.6 (10-24-22 @ 05:05), Max: 98.2 (10-23-22 @ 22:03)  HR: 60 (10-24-22 @ 05:05)  BP: 123/60 (10-24-22 @ 05:05)  RR: 16 (10-24-22 @ 05:05)      10-22 @ 07:  -  10-23 @ 07:00  --------------------------------------------------------  IN: 725 mL / OUT: 750 mL / NET: -25 mL    10-23 @ 07:  -  10-24 @ 07:00  --------------------------------------------------------  IN: 2640 mL / OUT: 1000 mL / NET: 1640 mL    10-24 @ 07:01  -  10-24 @ 09:24  --------------------------------------------------------  IN: 240 mL / OUT: 1000 mL / NET: -760 mL          PHYSICAL EXAM:  Constitutional: NAD  Neck: No JVD  Respiratory: CTAB  Cardiovascular: S1, S2, RRR  Gastrointestinal: BS+, soft, NT/ND  Extremities: No cyanosis or clubbing. No peripheral edema  :  No segovia.   Skin: No rashes    LABS:    10-24    139  |  108  |  34<H>  ----------------------------<  105<H>  5.3<H>   |  22  |  2.2<H>    Ca    8.5      24 Oct 2022 06:43  Mg     1.6     10-24    TPro  6.2  /  Alb  3.4<L>  /  TBili  <0.2  /  DBili      /  AST  37  /  ALT  36  /  AlkPhos  113  10-24                          7.9    2.31  )-----------( 204      ( 24 Oct 2022 06:43 )             24.2       Urine Studies:  Urinalysis Basic - ( 21 Oct 2022 18:38 )    Color: Light Yellow / Appearance: Clear / S.015 / pH:   Gluc:  / Ketone: Negative  / Bili: Negative / Urobili: <2 mg/dL   Blood:  / Protein: 30 mg/dL / Nitrite: Negative   Leuk Esterase: Negative / RBC: 1 /HPF / WBC 2 /HPF   Sq Epi:  / Non Sq Epi: 2 /HPF / Bacteria: Negative          Iron 79, TIBC 249, %sat 32      [10-23-22 @ 08:22]  Ferritin 271      [10-22-22 @ 05:45]  PTH -- (Ca 9.1)      [10-22-22 @ 05:45]   41  HbA1c 5.3      [20 @ 09:09]    HBsAb Nonreact      [21 @ 11:38]  HBsAg Nonreact      [21 @ 11:38]  HBcAb Reactive      [20 @ 09:09]  HCV 14.67, Reactive      [20 @ 09:09]    Syphilis Screen (Treponema Pallidum Ab) Negative      [20 @ 09:09]      RADIOLOGY & ADDITIONAL STUDIES:

## 2022-10-24 NOTE — PROGRESS NOTE ADULT - ASSESSMENT
66-year-old male, with PMH of substance dependence–on methadone, IVDA (admits to currently using heroin/cocaine), HIV (on HAART), VNX4k-0 (baseline Cr ~3), and hepatitis C, sent in by the infectious team for KATERIN on CKD and hyperkalmia noted on OP labs.     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  HTN is a risk factor along with HIV and HAART therapy on Triumeq (abacavir, dolutegravir, and lamivudine)  - Cr 5 on admission (baseline ~3) , no Cr  3.4  - K 6.8 on admission sp D50 insulin in ED,, 5.3 this morning; dc tele   - place segovia for acc Is and Os   - IVF given LR at 75 cc per hour   - US renal---> no hydro   - Phos 3.3, PTH wnl  - CK wnl      # HIV (on HAART)  # Hepatitis C  - pt is on Triumeq (abacavir, dolutegravir, and lamivudine) and was advised to stop given his worsening kidney fx and to start Julaca instead (Dolutegravir- rilpivirine)  - HIV-1 viral load: <20 (5/2022 AND 8/2022)   - CD4: 246 ( 3/2022) >212 (8/2022)  - start on Julaca (50-25) OD after ID approval       # Normocytic Anemia likely 2/2 to CKD  - Hgb 8.8 on admission ( baseline ~9.5)   -  HBb 7.6  - iron studies normal, b12 wnl, folate wnl   - urine protein elevated   - SPEP, , IF    - keep active T&S  - keep Hb > 7      # mild transaminitis-resolved  # mild pancreatic duct dilatation  # stable dilatation of the common bile duct  - ALT/AST 51/47 on admission  - been noted to have elevated LFTs on previous labs  - US abdomen (July 2022) New mild pancreatic duct dilatation measuring 3-4 mm.A central obstructing lesion cannot be excluded on this examination.  Further evaluation of the pancreas with MRI and MRCP with and without   gadolinium is necessary for complete characterization. Essentially stable dilatation of the common bile duct of unclear etiology.   - will get repeat US abdomen  - pt was advised to get MRI/MRCP as OP but he did not schedule it yet - consider obtaining as inpt if repeat US abdomen results abnormal    - GI advanced c/s if persistant abnormal results on US abdomen       # CHF   - TTE (2018) EF 55-60% , GIDD  - repeat TTE ordered   - keep euvolemic      # NEUROPATHY   - c/w pregabalin       # Low testosterone   - on testosterone gel       #Left hip pain   - on chronic opioids       # HTN - currently borderline low  BP  - will hold off amlo   - dc enalapril        # substance dependence–on methadone  # IVDA (admits to currently using heroin/cocaine)  - methadone 110mg OD    DVT ppx: heparin  GI ppx: none  Full code

## 2022-10-24 NOTE — PROGRESS NOTE ADULT - SUBJECTIVE AND OBJECTIVE BOX
YAZMIN VILLEGAS  67y, Male  Allergy: No Known Allergies      LOS  3d    CHIEF COMPLAINT: abnormal labs (24 Oct 2022 09:24)      INTERVAL EVENTS/HPI  - No acute events overnight  - T(F): , Max: 98.2 (10-23-22 @ 22:03)  - Denies any worsening symptoms  - Tolerating medication  - WBC Count: 2.31 (10-24-22 @ 06:43)  WBC Count: 1.95 (10-23-22 @ 08:22)     - Creatinine, Serum: 2.2 (10-24-22 @ 06:43)  Creatinine, Serum: 3.0 (10-23-22 @ 08:22)       ROS  General: Denies rigors, nightsweats  HEENT: Denies headache, rhinorrhea, sore throat, eye pain  CV: Denies CP, palpitations  PULM: Denies wheezing, hemoptysis  GI: Denies hematemesis, hematochezia, melena  : Denies discharge, hematuria  MSK: Denies arthralgias, myalgias  SKIN: Denies rash, lesions  NEURO: Denies paresthesias, weakness  PSYCH: Denies depression, anxiety    VITALS:  T(F): 97.6, Max: 98.2 (10-23-22 @ 22:03)  HR: 60  BP: 123/60  RR: 16Vital Signs Last 24 Hrs  T(C): 36.4 (24 Oct 2022 05:05), Max: 36.8 (23 Oct 2022 22:03)  T(F): 97.6 (24 Oct 2022 05:05), Max: 98.2 (23 Oct 2022 22:03)  HR: 60 (24 Oct 2022 05:05) (60 - 68)  BP: 123/60 (24 Oct 2022 05:05) (123/60 - 137/58)  BP(mean): --  RR: 16 (24 Oct 2022 05:05) (16 - 18)  SpO2: --        PHYSICAL EXAM:  Gen: NAD, resting in bed  HEENT: Normocephalic, atraumatic  Neck: supple, no lymphadenopathy  CV: Regular rate & regular rhythm  Lungs: decreased BS at bases, no fremitus  Abdomen: Soft, BS present  Ext: Warm, well perfused  Neuro: non focal, awake  Skin: no rash, no erythema  Lines: no phlebitis    FH: Non-contributory  Social Hx: Non-contributory    TESTS & MEASUREMENTS:                        7.9    2.31  )-----------( 204      ( 24 Oct 2022 06:43 )             24.2     10-24    139  |  108  |  34<H>  ----------------------------<  105<H>  5.3<H>   |  22  |  2.2<H>    Ca    8.5      24 Oct 2022 06:43  Mg     1.6     10-24    TPro  6.2  /  Alb  3.4<L>  /  TBili  <0.2  /  DBili  x   /  AST  37  /  ALT  36  /  AlkPhos  113  10-24      LIVER FUNCTIONS - ( 24 Oct 2022 06:43 )  Alb: 3.4 g/dL / Pro: 6.2 g/dL / ALK PHOS: 113 U/L / ALT: 36 U/L / AST: 37 U/L / GGT: x               Culture - Urine (collected 10-21-22 @ 18:38)  Source: Clean Catch Clean Catch (Midstream)  Final Report (10-23-22 @ 11:25):    <10,000 CFU/mL Normal Urogenital Jina        Blood Gas Venous - Lactate: 1.40 mmol/L (10-22-22 @ 13:20)      INFECTIOUS DISEASES TESTING  COVID-19 PCR: NotDetec (10-21-22 @ 12:50)      INFLAMMATORY MARKERS      RADIOLOGY & ADDITIONAL TESTS:  I have personally reviewed the last available Chest xray  CXR  Xray Chest 1 View AP/PA:   ACC: [id number] EXAM:  XR CHEST 1 VIEW                          PROCEDURE DATE:  10/21/2022          INTERPRETATION:  Clinical History / Reason for exam: Sepsis.    Comparison : Chest radiograph 9/24/2019.    Technique/Positioning: Single frontal chest x-ray obtained.    Findings:    Support devices: None.    Cardiac/mediastinum/hilum: Unremarkable.    Lung parenchyma/Pleura: Within normal limits.    Skeleton/soft tissues: Unremarkable.    Impression:    No radiographic evidence of acute cardiopulmonary disease.        --- End of Report ---            DAYANARA SCHMIDT MD; Attending Radiologist  This document has been electronically signed. Oct 22 2022 12:23AM (10-21-22 @ 18:50)      CT      CARDIOLOGY TESTING  12 Lead ECG:   Ventricular Rate 72 BPM    Atrial Rate 72 BPM    P-R Interval 216 ms    QRS Duration 88 ms    Q-T Interval 350 ms    QTC Calculation(Bazett) 383 ms    P Axis 84 degrees    R Axis 30 degrees    T Axis 34 degrees    Diagnosis Line Sinus rhythm with 1st degree A-V block  Voltage criteria for left ventricular hypertrophy  Early repolarization  Abnormal ECG    Confirmed by Dea Levi MD ([id number]) on 10/23/2022 5:47:17 PM (10-22-22 @ 10:46)  12 Lead ECG:   Ventricular Rate 63 BPM    Atrial Rate 63 BPM    P-R Interval 244 ms    QRS Duration 86 ms    Q-T Interval 362 ms    QTC Calculation(Bazett) 370 ms    P Axis 61 degrees    R Axis 64 degrees    T Axis 55 degrees    Diagnosis Line Sinus rhythm with 1st degree A-V block  Minimal voltage criteria for LVH, may be normal variant  Early repolarization  Borderline ECG    Confirmed by KAREN VEGA MD ([id number]) on 10/21/2022 3:42:25 PM (10-21-22 @ 14:33)      MEDICATIONS  aspirin enteric coated 81 Oral daily  atorvastatin Oral Tab/Cap - Peds 40 Oral daily  dolutegravir 50 mG/rilpivirine 25 mG (JULUCA) 1 Oral with breakfast  heparin   Injectable 5000 SubCutaneous every 12 hours  influenza  Vaccine (HIGH DOSE) 0.7 IntraMuscular once  methadone    Tablet 110 Oral daily  pregabalin 200 Oral two times a day  sodium bicarbonate 1300 Oral three times a day  sodium chloride 0.9%. 1000 IV Continuous <Continuous>      WEIGHT  Weight (kg): 85 (10-21-22 @ 16:08)  Creatinine, Serum: 2.2 mg/dL (10-24-22 @ 06:43)      ANTIBIOTICS:  dolutegravir 50 mG/rilpivirine 25 mG (JULUCA) 1 Tablet(s) Oral with breakfast      All available historical records have been reviewed

## 2022-10-24 NOTE — PROGRESS NOTE ADULT - ASSESSMENT
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  cr trending down stable at 2.2 / baseline   please document UO   sp D50 insulin and started on lokelma / ensure pt is on low K diet  continue likelma 5 g po q12h on DC   renal ultrasound reviewed, no hydronephrosis, nml size kidneys  no IVF if good po intake   cont sodium bicarbonate to 1300mg q8h  phos level at goal no need for binder  leukopenia worsening / anemia / Tsat 32% / consider hematology eval  ID noted back on HAART  no need for RRT   will follow

## 2022-10-24 NOTE — PROGRESS NOTE ADULT - SUBJECTIVE AND OBJECTIVE BOX
----------Daily Progress Note----------    HISTORY OF PRESENT ILLNESS:  Patient is a 67y old Male who presents with a chief complaint of abnormal labs (24 Oct 2022 09:24)    Currently admitted to medicine with the primary diagnosis of Sepsis with acute renal failure       Today is hospital day 3d.     INTERVAL HOSPITAL COURSE / OVERNIGHT EVENTS:    No overnight events, no complaints.     Review of Systems: Otherwise unremarkable     <<<<<PAST MEDICAL & SURGICAL HISTORY>>>>>  HIV disease    IV drug abuse    Arthritis    Hypertension      ALLERGIES  No Known Allergies      Home Medications:  amLODIPine 10 mg oral tablet: 1 tab(s) orally once a day (21 Oct 2022 23:09)  Aspir-Low 81 mg oral delayed release tablet: 1 tab(s) orally once a day (21 Oct 2022 23:09)  atorvastatin 40 mg oral tablet: 1 tab(s) orally once a day (21 Oct 2022 23:09)  Bactrim  mg-160 mg oral tablet: 1 tab(s) orally OD  three times a week  (21 Oct 2022 23:26)  chlorthalidone 25 mg oral tablet: 1 tab(s) orally once a day (21 Oct 2022 23:10)  methadone 40 mg oral tablet: 110 milligram(s) orally once a day (21 Oct 2022 23:10)  oxyCODONE 30 mg oral tablet, extended release: 30 milligram(s) orally 4 times a day (01 Sep 2022 13:37)  pregabalin 200 mg oral capsule: 1 cap(s) orally 2 times a day (21 Oct 2022 23:11)  Triumeq oral tablet: 1 tab(s) orally once a day (21 Oct 2022 23:13)        MEDICATIONS  STANDING MEDICATIONS  aspirin enteric coated 81 milliGRAM(s) Oral daily  atorvastatin Oral Tab/Cap - Peds 40 milliGRAM(s) Oral daily  dolutegravir 50 mG/rilpivirine 25 mG (JULUCA) 1 Tablet(s) Oral with breakfast  heparin   Injectable 5000 Unit(s) SubCutaneous every 12 hours  influenza  Vaccine (HIGH DOSE) 0.7 milliLiter(s) IntraMuscular once  methadone    Tablet 110 milliGRAM(s) Oral daily  pregabalin 200 milliGRAM(s) Oral two times a day  sodium bicarbonate 1300 milliGRAM(s) Oral three times a day  sodium chloride 0.9%. 1000 milliLiter(s) IV Continuous <Continuous>    PRN MEDICATIONS    VITALS:  T(F): 97.6  HR: 60  BP: 123/60  RR: 16  SpO2: --    <<<<<LABS>>>>>                        7.9    2.31  )-----------( 204      ( 24 Oct 2022 06:43 )             24.2     10-24    139  |  108  |  34<H>  ----------------------------<  105<H>  5.3<H>   |  22  |  2.2<H>    Ca    8.5      24 Oct 2022 06:43  Mg     1.6     10-24    TPro  6.2  /  Alb  3.4<L>  /  TBili  <0.2  /  DBili  x   /  AST  37  /  ALT  36  /  AlkPhos  113  10-24              Culture - Urine (collected 21 Oct 2022 18:38)  Source: Clean Catch Clean Catch (Midstream)  Final Report (23 Oct 2022 11:25):    <10,000 CFU/mL Normal Urogenital Jina    [id number]        <<<<<RADIOLOGY>>>>>    < from: US Renal (10.22.22 @ 19:29) >  PROCEDURE DATE:  10/22/2022          INTERPRETATION:  CLINICAL INFORMATION: Acute kidney injury    COMPARISON: None available.    TECHNIQUE: Sonography of the kidneys and bladder.    FINDINGS:    Right kidney: 12.2 cm. No hydronephrosis or calculi. Right lower pole 3.1   cm cyst.    Left kidney:  11.9 cm. No hydronephrosis or calculi.    Urinary bladder: No debris or calculus. Bilateral ureteral jets are   visualized. Prevoid volume of approximately 232 cc. Patient was unable to   void during examination. Circumferential bladder wall thickening.        IMPRESSION:    No hydronephrosis bilaterally.    Urinary bladder volume of 232 cc. Patient unable to void during   examination.    Circumferential bladder wall thickening.    < end of copied text >      <<<<<PHYSICAL EXAM>>>>>  GENERAL: NAD, resting comfortably in bed  PULMONARY: Clear to auscultation bilaterally. No rales, rhonchi, or wheezing.  CARDIOVASCULAR: Regular rate and rhythm, S1-S2, no murmurs  GASTROINTESTINAL: Soft, non-tender, non-distended, no guarding.  SKIN/EXTREMITIES: No LE edema b/l  NEUROLOGIC: AAOX3      -----------------------------------------------------------------------------------------------------------------------------------------------------------------------------------------------

## 2022-10-24 NOTE — PROGRESS NOTE ADULT - ASSESSMENT
66-year-old male, with PMH of substance dependence–on methadone, IVDA (admits to currently using heroin/cocaine), HIV (on HAART), BCZ1v-1 (baseline Cr ~3), and hepatitis C, sent in by the infectious team for KATERIN on CKD and hyperkalemia noted on OP labs.   Vancomycin was D/Slava.      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  HTN is a risk factor along with HIV and HAART therapy on Triumeq (abacavir, dolutegravir, and lamivudine)  - Cr 5 on admission (baseline ~3)   - Renal US 10/22 - unremakrable   - continue to trend  - appreciate renal evaluation     #Hyperkalmiea  - Potassium, Serum: 5.3 mmol/L (10.24.22 @ 06:43)  - Lokelma 10 g daily   - renal US   -  IVF LR at 75 cc per hour     # HIV (on HAART)  - HIV-1 viral load: <20 (5/2022 AND 8/2022)   - CD4: 246 ( 3/2022) >212 (8/2022)  - On Juluca (50-25) OD       #Leukopenia - HIV controlled -- unclear if related to pancreatic duct diltation which needs further eveluation -  Heme evaluation in meat[place]   - Vitamin B12, Serum: 844 pg/mL (10.22.22 @ 05:45)  - Folate, Serum: 5.9 ng/mL (10.22.22 @ 05:45)  - HIV-1 VL undetectable in 8/17/2022    # CBD Diltation with mild pancreatic duct dilatation  - US abdomen (July 2022) New mild pancreatic duct dilatation measuring 3-4 mm.A central obstructing lesion cannot be excluded on this examination.  - Further evaluation of the pancreas with MRI and MRCP with and without  gadolinium is necessary for complete characterization. Essentially stable dilatation of the common   - US Abdomen Upper Quadrant Right (10.22.22 @ 19:28): Dilated common bile duct measuring up to 11 mm, similar in appearance  from prior examination.  - GI evaluation -- pt was advised to get MRI/MRCP as OP but he did not schedule it yet - deferring MRI imaging now -- will discuss again      #Hep C   - Hepatitis C RNA Qualitative Interp: Negative 3.18.21 @ 11:38)      # Normocytic Anemia likely 2/2 to CKD  - Hgb 8.8 on admission ( baseline ~9.5)   - iron studies, b12, folate  - SPEP, UPEP, IF    - keep active T&S      # CHF   - TTE (2018) EF 55-60% , GIDD  - repeat TTE ordered   - keep euvolemic      # NEUROPATHY   - c/w pregabalin       # Low testosterone   - on testosterone gel       #Left hip pain   - on chronic opioids       # HTN - currently borderline low  BP  - will hold off amlo   - dc enalapril        # substance dependence–on methadone  # IVDA (admits to currently using heroin/cocaine)  - methadone 110mg OD  - [address] [facility] (confirm in am)     # DIET: DASH

## 2022-10-25 LAB
ALBUMIN SERPL ELPH-MCNC: 3.4 G/DL — LOW (ref 3.5–5.2)
ALP SERPL-CCNC: 124 U/L — HIGH (ref 30–115)
ALT FLD-CCNC: 41 U/L — SIGNIFICANT CHANGE UP (ref 0–41)
ANION GAP SERPL CALC-SCNC: 7 MMOL/L — SIGNIFICANT CHANGE UP (ref 7–14)
ANION GAP SERPL CALC-SCNC: 8 MMOL/L — SIGNIFICANT CHANGE UP (ref 7–14)
AST SERPL-CCNC: 40 U/L — SIGNIFICANT CHANGE UP (ref 0–41)
BILIRUB SERPL-MCNC: <0.2 MG/DL — SIGNIFICANT CHANGE UP (ref 0.2–1.2)
BLD GP AB SCN SERPL QL: SIGNIFICANT CHANGE UP
BUN SERPL-MCNC: 28 MG/DL — HIGH (ref 10–20)
BUN SERPL-MCNC: 30 MG/DL — HIGH (ref 10–20)
CALCIUM SERPL-MCNC: 8.8 MG/DL — SIGNIFICANT CHANGE UP (ref 8.4–10.4)
CALCIUM SERPL-MCNC: 8.9 MG/DL — SIGNIFICANT CHANGE UP (ref 8.4–10.5)
CHLORIDE SERPL-SCNC: 103 MMOL/L — SIGNIFICANT CHANGE UP (ref 98–110)
CHLORIDE SERPL-SCNC: 106 MMOL/L — SIGNIFICANT CHANGE UP (ref 98–110)
CO2 SERPL-SCNC: 25 MMOL/L — SIGNIFICANT CHANGE UP (ref 17–32)
CO2 SERPL-SCNC: 25 MMOL/L — SIGNIFICANT CHANGE UP (ref 17–32)
CREAT SERPL-MCNC: 1.8 MG/DL — HIGH (ref 0.7–1.5)
CREAT SERPL-MCNC: 2.3 MG/DL — HIGH (ref 0.7–1.5)
EGFR: 30 ML/MIN/1.73M2 — LOW
EGFR: 40 ML/MIN/1.73M2 — LOW
GLUCOSE SERPL-MCNC: 106 MG/DL — HIGH (ref 70–99)
GLUCOSE SERPL-MCNC: 138 MG/DL — HIGH (ref 70–99)
HCT VFR BLD CALC: 22.6 % — LOW (ref 42–52)
HCV RNA FLD QL NAA+PROBE: SIGNIFICANT CHANGE UP
HCV RNA SPEC QL PROBE+SIG AMP: SIGNIFICANT CHANGE UP
HGB BLD-MCNC: 7.4 G/DL — LOW (ref 14–18)
MAGNESIUM SERPL-MCNC: 1.5 MG/DL — LOW (ref 1.8–2.4)
MCHC RBC-ENTMCNC: 30.8 PG — SIGNIFICANT CHANGE UP (ref 27–31)
MCHC RBC-ENTMCNC: 32.7 G/DL — SIGNIFICANT CHANGE UP (ref 32–37)
MCV RBC AUTO: 94.2 FL — HIGH (ref 80–94)
NRBC # BLD: 0 /100 WBCS — SIGNIFICANT CHANGE UP (ref 0–0)
PLATELET # BLD AUTO: 225 K/UL — SIGNIFICANT CHANGE UP (ref 130–400)
POTASSIUM SERPL-MCNC: 5.3 MMOL/L — HIGH (ref 3.5–5)
POTASSIUM SERPL-MCNC: 5.8 MMOL/L — HIGH (ref 3.5–5)
POTASSIUM SERPL-SCNC: 5.3 MMOL/L — HIGH (ref 3.5–5)
POTASSIUM SERPL-SCNC: 5.8 MMOL/L — HIGH (ref 3.5–5)
PROT SERPL-MCNC: 6.2 G/DL — SIGNIFICANT CHANGE UP (ref 6–8)
RBC # BLD: 2.4 M/UL — LOW (ref 4.7–6.1)
RBC # FLD: 15.2 % — HIGH (ref 11.5–14.5)
SODIUM SERPL-SCNC: 136 MMOL/L — SIGNIFICANT CHANGE UP (ref 135–146)
SODIUM SERPL-SCNC: 138 MMOL/L — SIGNIFICANT CHANGE UP (ref 135–146)
WBC # BLD: 2.11 K/UL — LOW (ref 4.8–10.8)
WBC # FLD AUTO: 2.11 K/UL — LOW (ref 4.8–10.8)

## 2022-10-25 PROCEDURE — 99223 1ST HOSP IP/OBS HIGH 75: CPT

## 2022-10-25 PROCEDURE — 93010 ELECTROCARDIOGRAM REPORT: CPT

## 2022-10-25 PROCEDURE — 93306 TTE W/DOPPLER COMPLETE: CPT | Mod: 26

## 2022-10-25 RX ORDER — MAGNESIUM SULFATE 500 MG/ML
2 VIAL (ML) INJECTION ONCE
Refills: 0 | Status: COMPLETED | OUTPATIENT
Start: 2022-10-25 | End: 2022-10-25

## 2022-10-25 RX ORDER — SODIUM ZIRCONIUM CYCLOSILICATE 10 G/10G
10 POWDER, FOR SUSPENSION ORAL
Refills: 0 | Status: DISCONTINUED | OUTPATIENT
Start: 2022-10-25 | End: 2022-10-26

## 2022-10-25 RX ADMIN — Medication 25 GRAM(S): at 15:32

## 2022-10-25 RX ADMIN — Medication 1300 MILLIGRAM(S): at 21:29

## 2022-10-25 RX ADMIN — Medication 1300 MILLIGRAM(S): at 15:33

## 2022-10-25 RX ADMIN — SODIUM ZIRCONIUM CYCLOSILICATE 5 GRAM(S): 10 POWDER, FOR SUSPENSION ORAL at 07:25

## 2022-10-25 RX ADMIN — HEPARIN SODIUM 5000 UNIT(S): 5000 INJECTION INTRAVENOUS; SUBCUTANEOUS at 06:11

## 2022-10-25 RX ADMIN — HEPARIN SODIUM 5000 UNIT(S): 5000 INJECTION INTRAVENOUS; SUBCUTANEOUS at 18:22

## 2022-10-25 RX ADMIN — METHADONE HYDROCHLORIDE 110 MILLIGRAM(S): 40 TABLET ORAL at 10:04

## 2022-10-25 RX ADMIN — ATORVASTATIN CALCIUM 40 MILLIGRAM(S): 80 TABLET, FILM COATED ORAL at 21:29

## 2022-10-25 RX ADMIN — Medication 200 MILLIGRAM(S): at 06:12

## 2022-10-25 RX ADMIN — Medication 1300 MILLIGRAM(S): at 06:12

## 2022-10-25 RX ADMIN — Medication 200 MILLIGRAM(S): at 18:25

## 2022-10-25 RX ADMIN — SODIUM ZIRCONIUM CYCLOSILICATE 10 GRAM(S): 10 POWDER, FOR SUSPENSION ORAL at 18:22

## 2022-10-25 RX ADMIN — DOLUTEGRAVIR SODIUM AND RILPIVIRINE HYDROCHLORIDE 1 TABLET(S): 50; 25 TABLET, FILM COATED ORAL at 10:04

## 2022-10-25 RX ADMIN — Medication 81 MILLIGRAM(S): at 10:04

## 2022-10-25 NOTE — PROGRESS NOTE ADULT - ASSESSMENT
66-year-old male, with PMH of substance dependence–on methadone, IVDA (admits to currently using heroin/cocaine), HIV (on HAART), IUQ1l-8 (baseline Cr ~3), and hepatitis C, sent in by the infectious team for KATERIN on CKD and hyperkalemia noted on OP labs.   Vancomycin was D/Slava.      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  HTN is a risk factor along with HIV and HAART therapy on Triumeq (abacavir, dolutegravir, and lamivudine)  - Cr 5 on admission (baseline ~3)   - Renal US 10/22 - unremakrable   - continue to trend  - appreciate renal evaluation     #Hyperkalmiea  - Potassium, Serum: 5.3 mmol/L (10.24.22 @ 06:43)  - Lokelma 10 g daily   - trend K   - renal US WNL  -  IVF LR at 75 cc per hour     # HIV (on HAART)  - HIV-1 viral load: <20 (5/2022 AND 8/2022)   - CD4: 246 ( 3/2022) >212 (8/2022)  - On Juluca (50-25) OD       #Leukopenia - HIV controlled, chronic    - Vitamin B12, Serum: 844 pg/mL (10.22.22 @ 05:45)  - Folate, Serum: 5.9 ng/mL (10.22.22 @ 05:45)  - HIV-1 VL undetectable in 8/17/2022  - eventual heme evaluation - can be done as outpatient     # CBD Diltation with mild pancreatic duct dilatation  - US abdomen (July 2022) New mild pancreatic duct dilatation measuring 3-4 mm.A central obstructing lesion cannot be excluded on this examination.  - Further evaluation of the pancreas with MRI and MRCP with and without  gadolinium is necessary for complete characterization. Essentially stable dilatation of the common   - US Abdomen Upper Quadrant Right (10.22.22 @ 19:28): Dilated common bile duct measuring up to 11 mm, similar in appearance  from prior examination.  - appreciate GI evaluation -- hold off on MRCP for now -- follow-up clinic -- possibly secondary to opioid use     #Hep C   - Hepatitis C RNA Qualitative Interp: Negative 3.18.21 @ 11:38)      # Normocytic Anemia likely 2/2 to CKD  - Hgb 8.8 on admission ( baseline ~9.5)   - iron studies, b12, folate  - SPEP, UPEP, IF    - keep active T&S      # CHF   - TTE (2018) EF 55-60% , GIDD  - repeat TTE 10/25 without significant change     # NEUROPATHY   - c/w pregabalin       # Low testosterone   - on testosterone gel       #Left hip pain   - on chronic opioids       # HTN - currently borderline low  BP  - will hold off amlo   - dc enalapril        # substance dependence–on methadone  # IVDA (admits to currently using heroin/cocaine)  - methadone 110mg OD  - [address] [place] [address] (confirm in am)     # DIET: DASH

## 2022-10-25 NOTE — PROGRESS NOTE ADULT - SUBJECTIVE AND OBJECTIVE BOX
[facility] day: 4      SUBJECTIVE:   Complaints: none  Overnight events: none    OBJECTIVE:   T(C): 36.6 (10-25-22 @ 05:04), Max: 36.6 (10-25-22 @ 05:04)  HR: 58 (10-25-22 @ 05:04) (53 - 58)  BP: 115/61 (10-25-22 @ 05:04) (110/52 - 115/61)  RR: 18 (10-25-22 @ 05:04) (18 - 20)  SpO2: --    Acc I&O    10-24-22 @ 07:01  -  10-25-22 @ 07:00  --------------------------------------------------------  IN: 720 mL / OUT: 2700 mL / NET: -1980 mL        PHYSICAL EXAM  General: NAD, alert, interactive  Resp: Normal respiratory effort, no wheezing, rhonchi or rates, CTAB  CV: RRR, normal s1/s2, no m/r/g  Abdomen: soft, non-distended, non-tender  Extremities: no cyanosis or edema  Neuro: alert and oriented x3, no focal deficits    MEDICATIONS  aspirin enteric coated 81 milliGRAM(s) Oral daily  atorvastatin Oral Tab/Cap - Peds 40 milliGRAM(s) Oral daily  dolutegravir 50 mG/rilpivirine 25 mG (JULUCA) 1 Tablet(s) Oral with breakfast  heparin   Injectable 5000 Unit(s) SubCutaneous every 12 hours  influenza  Vaccine (HIGH DOSE) 0.7 milliLiter(s) IntraMuscular once  magnesium sulfate  IVPB 2 Gram(s) IV Intermittent once  methadone    Tablet 110 milliGRAM(s) Oral daily  pregabalin 200 milliGRAM(s) Oral two times a day  sodium bicarbonate 1300 milliGRAM(s) Oral three times a day  sodium chloride 0.9%. 1000 milliLiter(s) IV Continuous <Continuous>  sodium zirconium cyclosilicate 10 Gram(s) Oral two times a day      LABS:                          7.4    2.11  )-----------( 225      ( 25 Oct 2022 07:49 )             22.6     10-25    138  |  106  |  28<H>  ----------------------------<  106<H>  5.8<H>   |  25  |  1.8<H>    Ca    8.8      25 Oct 2022 07:49  Mg     1.5     10-25    TPro  6.2  /  Alb  3.4<L>  /  TBili  <0.2  /  DBili  x   /  AST  40  /  ALT  41  /  AlkPhos  124<H>  10-25

## 2022-10-25 NOTE — CONSULT NOTE ADULT - SUBJECTIVE AND OBJECTIVE BOX
Patient is a 66-year-old gentleman with past medical history of substance abuse on methadone with occasional use of intravenous drugs including cocaine and heroin, HIV, hepatitis C8 currently undergoing treatment, hypertension, osteoarthritis, and CKD who was sent in by his infectious disease doctor for worsening renal functions.  Patient was noted in August 2022 to have common bile duct dilated to 16 mm with a pancreatic duct of 3 mm at the time.  On this admission abdominal ultrasound was repeated and common bile duct was found to be 11 mm.  Patient at baseline has anemia and upon further chart review was noted to have a colonoscopy in 2017 with poor prep without additional follow-up.  Patient does not recall whenever he had last endoscopic evaluation.  Currently patient is feeling well and is requesting to be discharged home as he feels much better.  [facility] consulted for abnormal common bile duct.  Patient currently denies any abdominal symptoms such as abdominal pain, nausea, vomiting, change in stools, weight loss, or change in appetite.        PAST MEDICAL & SURGICAL HISTORY:  HIV disease  IV drug abuse Heroin and Cocaine  Arthritis  Hypertension  Hep C treated by ID  Anemia without active GI bleed ( Last CRC 2017)    Social History  Denies Current Tobacco use  Denies Current ETOH use  Admits to occasional Co[place]ine and Heroin use, is on Methadone program     Family Hx:  Father: Non Contributory   Mother: Non Contributory      MEDICATIONS  (STANDING):  aspirin enteric coated 81 milliGRAM(s) Oral daily  atorvastatin Oral Tab/Cap - Peds 40 milliGRAM(s) Oral daily  dolutegravir 50 mG/rilpivirine 25 mG (JULUCA) 1 Tablet(s) Oral with breakfast  heparin   Injectable 5000 Unit(s) SubCutaneous every 12 hours  influenza  Vaccine (HIGH DOSE) 0.7 milliLiter(s) IntraMuscular once  magnesium sulfate  IVPB 2 Gram(s) IV Intermittent once  methadone    Tablet 110 milliGRAM(s) Oral daily  pregabalin 200 milliGRAM(s) Oral two times a day  sodium bicarbonate 1300 milliGRAM(s) Oral three times a day  sodium chloride 0.9%. 1000 milliLiter(s) (75 mL/Hr) IV Continuous <Continuous>  sodium zirconium cyclosilicate 10 Gram(s) Oral two times a day        Allergies  No Known Allergies      Review of Systems  General:  Denies Fatigue, Denies Fever, Denies Weakness ,Denies Weight Loss   HEENT: Denies Trouble Swallowing ,Denies  Sore Throat , Denies Change in hearing/vision/speech ,Denies Dizziness    Cardio: Denies  Chest Pain , Palpitations    Respiratory: Denies worsening of SOB, Denies Cough  Abdomen: See detailed HPI  Neuro: Denies Headache Denies Dizziness, Denies Paresthesias  MSK: Denies pain in Bones/Joints/Muscles   Psych: Patient denies depression, denies suicidal or homicidal ideations  Integ: Patient Denies rash, or new skin lesions         Vital Signs Last 24 Hrs  T(C): 36.6 (25 Oct 2022 05:04), Max: 36.6 (25 Oct 2022 05:04)  T(F): 97.9 (25 Oct 2022 05:04), Max: 97.9 (25 Oct 2022 05:04)  HR: 58 (25 Oct 2022 05:04) (53 - 58)  BP: 115/61 (25 Oct 2022 05:04) (110/52 - 115/61)  BP(mean): --  RR: 18 (25 Oct 2022 05:04) (18 - 20)  SpO2: --      Physical Exam  Gen: NAD  HEENT: NC/AT, Mucosal Membranes  Cardio: S1/S2 No S3/S4, Regular  Resp: CTA B/L  Abdomen: Soft, ND/NT  Neuro: AAOx3, Cranial Nerve II-XII intact   Extremities: FROM x 4  Skin: No jaundice         Labs:                        7.4    2.11  )-----------( 225      ( 25 Oct 2022 07:49 )             22.6         10-25    138  |  106  |  28<H>  ----------------------------<  106<H>  5.8<H>   |  25  |  1.8<H>      Calcium, Total Serum: 8.8 mg/dL (10-25-22 @ 07:49)      LFTs:             6.2  | <0.2 | 40       ------------------[124     ( 25 Oct 2022 07:49 )  3.4  | x    | 41            Blood Gas Venous - Lactate: 1.40 mmol/L (10-22-22 @ 13:20)    Serum Pro-Brain Natriuretic Peptide: 431 pg/mL (10-22-22 @ 00:28)      RADIOLOGY & ADDITIONAL STUDIES:  US Abdomen Upper Quadrant Right 10.22.22   IMPRESSION:    Dilated common bile duct measuring up to 11 mm, similar in appearance   from prior examination.    Contracted gallbladder.     Patient is a 66-year-old gentleman with past medical history of substance abuse on methadone with occasional use of intravenous drugs including cocaine and heroin, HIV, hepatitis C currently undergoing treatment, hypertension, osteoarthritis, and CKD who was sent in by his infectious disease doctor for worsening renal functions.    Patient was noted in August 2022 to have common bile duct dilated to 16 mm with a pancreatic duct of 3 mm at the time.    On this admission abdominal ultrasound was repeated and common bile duct was found to be 11 mm.    Patient at baseline has anemia and upon further chart review was noted to have a colonoscopy in 2017 with poor prep without additional follow-up.    Patient does not recall when he had last endoscopic evaluation.    Currently patient is feeling well and is requesting to be discharged home as he feels much better.   [facility] consulted for abnormal common bile duct.  Patient currently denies any abdominal symptoms such as abdominal pain, nausea, vomiting, change in stools, weight loss, or change in appetite.        PAST MEDICAL & SURGICAL HISTORY:  HIV disease  IV drug abuse Heroin and Cocaine  Arthritis  Hypertension  Hep C treated by ID  Anemia without active GI bleed ( Last CRC 2017)    Social History  Denies Current Tobacco use  Denies Current ETOH use  Admits to occasional Co[place]ine and Heroin use, is on Methadone program     Family Hx:  Father: Non Contributory   Mother: Non Contributory      MEDICATIONS  (STANDING):  aspirin enteric coated 81 milliGRAM(s) Oral daily  atorvastatin Oral Tab/Cap - Peds 40 milliGRAM(s) Oral daily  dolutegravir 50 mG/rilpivirine 25 mG (JULUCA) 1 Tablet(s) Oral with breakfast  heparin   Injectable 5000 Unit(s) SubCutaneous every 12 hours  influenza  Vaccine (HIGH DOSE) 0.7 milliLiter(s) IntraMuscular once  magnesium sulfate  IVPB 2 Gram(s) IV Intermittent once  methadone    Tablet 110 milliGRAM(s) Oral daily  pregabalin 200 milliGRAM(s) Oral two times a day  sodium bicarbonate 1300 milliGRAM(s) Oral three times a day  sodium chloride 0.9%. 1000 milliLiter(s) (75 mL/Hr) IV Continuous <Continuous>  sodium zirconium cyclosilicate 10 Gram(s) Oral two times a day        Allergies  No Known Allergies      Review of Systems  General:  Denies Fatigue, Denies Fever, Denies Weakness ,Denies Weight Loss   HEENT: Denies Trouble Swallowing ,Denies  Sore Throat , Denies Change in hearing/vision/speech ,Denies Dizziness    Cardio: Denies  Chest Pain , Palpitations    Respiratory: Denies worsening of SOB, Denies Cough  Abdomen: See detailed HPI  Neuro: Denies Headache Denies Dizziness, Denies Paresthesias  MSK: Denies pain in Bones/Joints/Muscles   Psych: Patient denies depression, denies suicidal or homicidal ideations  Integ: Patient Denies rash, or new skin lesions         Vital Signs Last 24 Hrs  T(C): 36.6 (25 Oct 2022 05:04), Max: 36.6 (25 Oct 2022 05:04)  T(F): 97.9 (25 Oct 2022 05:04), Max: 97.9 (25 Oct 2022 05:04)  HR: 58 (25 Oct 2022 05:04) (53 - 58)  BP: 115/61 (25 Oct 2022 05:04) (110/52 - 115/61)  BP(mean): --  RR: 18 (25 Oct 2022 05:04) (18 - 20)  SpO2: --      Physical Exam  Gen: NAD  HEENT: NC/AT, Mucosal Membranes  Cardio: S1/S2 No S3/S4, Regular  Resp: CTA B/L  Abdomen: Soft, ND/NT  Neuro: AAOx3, Cranial Nerve II-XII intact   Extremities: FROM x 4  Skin: No jaundice         Labs:                        7.4    2.11  )-----------( 225      ( 25 Oct 2022 07:49 )             22.6         10-25    138  |  106  |  28<H>  ----------------------------<  106<H>  5.8<H>   |  25  |  1.8<H>      Calcium, Total Serum: 8.8 mg/dL (10-25-22 @ 07:49)      LFTs:             6.2  | <0.2 | 40       ------------------[124     ( 25 Oct 2022 07:49 )  3.4  | x    | 41            Blood Gas Venous - Lactate: 1.40 mmol/L (10-22-22 @ 13:20)    Serum Pro-Brain Natriuretic Peptide: 431 pg/mL (10-22-22 @ 00:28)      RADIOLOGY & ADDITIONAL STUDIES:  US Abdomen Upper Quadrant Right 10.22.22   IMPRESSION:    Dilated common bile duct measuring up to 11 mm, similar in appearance   from prior examination.    Contracted gallbladder.

## 2022-10-25 NOTE — CONSULT NOTE ADULT - NS ATTEND AMEND GEN_ALL_CORE FT
I edited the note.   Time-based billing (NON-critical care).   70 minutes spent on total encounter; more than 50% of the visit was spent counseling and / or coordinating care by the attending physician.  The necessity of the time spent during the encounter on this date of service was due to:   Coordination of care.

## 2022-10-25 NOTE — PROGRESS NOTE ADULT - ASSESSMENT
66-year-old male, with PMH of substance dependence–on methadone, IVDA (admits to currently using heroin/cocaine), HIV (on HAART), PIH4r-7 (baseline Cr ~3), and hepatitis C, sent in by the infectious team for KATERIN on CKD and hyperkalmia noted on OP labs.     # KTAERIN on CKD 3B-4: improving  # Hyperkalemia: worsening  HTN is a risk factor along with HIV and HAART therapy on Triumeq (abacavir, dolutegravir, and lamivudine)  - Cr 5 on admission (baseline ~3) , now Cr  1.8  - K 6.8 on admission sp D50 insulin in ED  - place segovia for acc Is and Os   - IVF given LR at 75 cc per hour   - US renal---> no hydro   - Phos 3.3, PTH wnl  - CK wnl  - lokelma, EKG f/u    # HIV (on HAART)  # Hepatitis C  - pt is on Triumeq (abacavir, dolutegravir, and lamivudine) and was advised to stop given his worsening kidney fx and to start Julaca instead (Dolutegravir- rilpivirine)  - HIV-1 viral load: <20 (5/2022 AND 8/2022)   - CD4: 246 ( 3/2022) >212 (8/2022)  - start on Julaca (50-25) OD after ID approval     # Normocytic Anemia likely 2/2 to CKD  - Hgb 8.8 on admission ( baseline ~9.5)   -  HBb 7.6  - iron studies normal, b12 wnl, folate wnl   - urine protein elevated   - SPEP, , IF    - keep active T&S  - keep Hb > 7    # mild transaminitis-resolved  # CBD Dilation CBD 11-16 and PD 3mm  # stable dilatation of the common bile duct  - ALT/AST 51/47 on admission  - been noted to have elevated LFTs on previous labs  - US abdomen (July 2022) New mild pancreatic duct dilatation measuring 3-4 mm.A central obstructing lesion cannot be excluded on this examination.  - GI does not recommend MRCP. Per GI eval, CBD dilation ould be secondary to long term Opioid use since pt has reassuring LFT panel, physical exam, and no GI sx  - Follow [facility] ([id number][facility] outpatient for non urgent work up    #Anemia/ Hep C currently treated   - Due for CRC as last in 2017 with poor prep- would benefit also from EGD given Hep C Hx- Eval for varices   - AFP with next set of outpatient labs    # CHF   - TTE (2018) EF 55-60% , GIDD  - repeat TTE ordered   - keep euvolemic    # NEUROPATHY   - c/w pregabalin     # Low testosterone   - on testosterone gel     #Left hip pain   - on chronic opioids     # HTN - currently borderline low  BP  - will hold off amlo   - dc enalapril      # substance dependence–on methadone  # IVDA (admits to currently using heroin/cocaine)  - methadone 110mg OD    DVT ppx: heparin  GI ppx: none  Full code  Dispo: home; pt lives alone   66-year-old male, with PMH of substance dependence–on methadone, IVDA (admits to currently using heroin/cocaine), HIV (on HAART), [id number] (baseline Cr ~3), and hepatitis C, sent in by the infectious team for KATERIN on CKD and hyperkalmia noted on OP labs.     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: improving  # Hyperkalemia: worsening  HTN is a risk factor along with HIV and HAART therapy on Triumeq (abacavir, dolutegravir, and lamivudine)  - Cr 5 on admission (baseline ~3) , now Cr  1.8  - K 6.8 on admission sp D50 insulin in ED  - place segovia for acc Is and Os   - IVF given LR at 75 cc per hour   - US renal---> no hydro   - Phos 3.3, PTH wnl  - CK wnl  - lokelma, EKG f/u    # HIV (on HAART)  # Hepatitis C  - pt is on Triumeq (abacavir, dolutegravir, and lamivudine) and was advised to stop given his worsening kidney fx and to start Julaca instead (Dolutegravir- rilpivirine)  - HIV-1 viral load: <20 (5/2022 AND 8/2022)   - CD4: 246 ( 3/2022) >212 (8/2022)  - start on Julaca (50-25) OD after ID approval     # Normocytic Anemia likely 2/2 to CKD  - Hgb 8.8 on admission ( baseline ~9.5)   -  HBb 7.6  - iron studies normal, b12 wnl, folate wnl   - urine protein elevated   - SPEP, , IF    - keep active T&S  - keep Hb > 7    # mild transaminitis-resolved  # CBD Dilation CBD 11-16 and PD 3mm  # stable dilatation of the common bile duct  - ALT/AST 51/47 on admission  - been noted to have elevated LFTs on previous labs  - US abdomen (July 2022) New mild pancreatic duct dilatation measuring 3-4 mm.A central obstructing lesion cannot be excluded on this examination.  - GI does not recommend MRCP. Per GI eval, CBD dilation ould be secondary to long term Opioid use since pt has reassuring LFT panel, physical exam, and no GI sx  - Follow up at [facility] ([id number]) outpatient for non urgent work up    #Anemia/ Hep C currently treated   - Due for CRC as last in 2017 with poor prep- would benefit also from EGD given Hep C Hx- Eval for varices   - AFP with next set of outpatient labs    # CHF   - TTE (2018) EF 55-60% , GIDD  -TTE 10/2022 similar to previous; LVEF 55-60%  - repeat TTE ordered   - keep euvolemic    # NEUROPATHY   - c/w pregabalin     # Low testosterone   - on testosterone gel     #Left hip pain   - on chronic opioids     # HTN - currently borderline low  BP  - will hold off amlo   - dc enalapril      # substance dependence–on methadone  # IVDA (admits to currently using heroin/cocaine)  - methadone 110mg OD    DVT ppx: heparin  GI ppx: none  Full code  Dispo: home; pt lives alone   66-year-old male, with PMH of substance dependence–on methadone, IVDA (admits to currently using heroin/cocaine), HIV (on HAART), ATO8z-6 (baseline Cr ~3), and hepatitis C, sent in by the infectious team for KATERIN on CKD and hyperkalmia noted on OP labs.     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: improving  # Hyperkalemia: worsening  HTN is a risk factor along with HIV and HAART therapy on Triumeq (abacavir, dolutegravir, and lamivudine)  - Cr 5 on admission (baseline ~3) , now Cr  1.8  - K 6.8 on admission sp D50 insulin in ED  - place segovia for acc Is and Os   - IVF given LR at 75 cc per hour   - US renal---> no hydro   - Phos 3.3, PTH wnl  - CK wnl  - lokelma for K 5.8, EKG did not show peaked T-waves    # HIV (on HAART)  # Hepatitis C  - pt is on Triumeq (abacavir, dolutegravir, and lamivudine) and was advised to stop given his worsening kidney fx and to start Julaca instead (Dolutegravir- rilpivirine)  - HIV-1 viral load: <20 (5/2022 AND 8/2022)   - CD4: 246 ( 3/2022) >212 (8/2022)  - start on Julaca (50-25) OD after ID approval     # Normocytic Anemia likely 2/2 to CKD  - Hgb 8.8 on admission ( baseline ~9.5)   -  HBb 7.6  - iron studies normal, b12 wnl, folate wnl   - urine protein elevated   - SPEP, , IF    - keep active T&S  - keep Hb > 7    # mild transaminitis-resolved  # CBD Dilation CBD 11-16 and PD 3mm  # stable dilatation of the common bile duct  - ALT/AST 51/47 on admission  - been noted to have elevated LFTs on previous labs  - US abdomen (July 2022) New mild pancreatic duct dilatation measuring 3-4 mm.A central obstructing lesion cannot be excluded on this examination.  - GI does not recommend MRCP. Per GI eval, CBD dilation ould be secondary to long term Opioid use since pt has reassuring LFT panel, physical exam, and no GI sx  - Follow up at [facility] ([id number]) outpatient for non urgent work up    #Anemia/ Hep C currently treated   - Due for CRC as last in 2017 with poor prep- would benefit also from EGD given Hep C Hx- Eval for varices   - AFP with next set of outpatient labs    # CHF   - TTE (2018) EF 55-60% , GIDD  -TTE 10/2022 similar to previous; LVEF 55-60%  - repeat TTE ordered   - keep euvolemic    # NEUROPATHY   - c/w pregabalin     # Low testosterone   - on testosterone gel     #Left hip pain   - on chronic opioids     # HTN - currently borderline low  BP  - will hold off amlo   - dc enalapril      # substance dependence–on methadone  # IVDA (admits to currently using heroin/cocaine)  - methadone 110mg OD    DVT ppx: heparin  GI ppx: none  Full code  Dispo: home; pt lives alone

## 2022-10-25 NOTE — CONSULT NOTE ADULT - ASSESSMENT
Patient is a 66-year-old gentleman with past medical history of substance abuse on methadone with occasional use of intravenous drugs including cocaine and heroin, HIV, hepatitis C8 currently undergoing treatment, hypertension, osteoarthritis, and CKD who was sent in by his infectious disease doctor for worsening renal functions.  [facility] consulted for abnormal common bile duct. Common Bile duct is currently 11mm and PD 3mm. Liver functions are acceptable and he has a normal T marcello. Patient will benefit from follow up with GI at the [facility] clinic on discharge. He would benefit from CRC screen, as well as non urgent evaluation of CBD dilation ( Likely from Opioid use). Clinically appears well,     CBD Dilation CBD 11-16 and PD 3mm  - Absent GI symptoms  - LFT panel reassuring   - Physical exam reassuring   - Could be secondary to long term Opioid use   - Follow up at [facility] outpatient for non urgent work up    Anemia/ Hep C currently treated   - Due for CRC as last in 2017 with poor prep- would benefit also from EGD given Hep C Hx- Eval for varices   - AFP with next set of outpatient labs    Follow up at [facility] [phone] for appointment  66-year-old gentleman with past medical history of substance abuse on methadone with occasional use of intravenous drugs including cocaine and heroin, HIV, hepatitis C currently undergoing treatment, hypertension, osteoarthritis, and CKD who was sent in by his infectious disease doctor for worsening renal functions, being evaluated for dilated CBD and PD.     Common Bile duct is currently 11mm and PD 3-4 mm on US.   Liver functions are grossly unremarkable and he has a normal T marcello.   Patient will benefit from follow up with GI at the [facility] clinic on discharge.   He would benefit from CRC screen, as well as non urgent evaluation of CBD dilation (Likely from Opioid use) with MRI pancreatic protocol w/ MRCP.   Clinically appears well.    #CBD Dilation   #PD 3-4 mm  Absent GI symptoms, LFT panel reassuring, Physical exam reassuring   no fam hx of panc malignancy  - most likely secondary to long term Opioid use however, given minimal PD dilation, outpatient MRI panc protocol w/ MRCP would be reasonable  - Follow up at [facility] outpatient for non urgent work up    #Anemia/ Hep C currently treated   no objective GI-related blood loss  iron studies noted   anemia is likely multifactorial in the setting of chronic disease (CKD, HCV, HIV)  Due for CRC as last in 2017 with poor prep  -would benefit from outpatient EGD / colonoscopy    Follow up at [facility] [phone] for appointment  66-year-old gentleman with past medical history of substance abuse on methadone with occasional use of intravenous drugs including cocaine and heroin, HIV, hepatitis C currently undergoing treatment, hypertension, osteoarthritis, and CKD who was sent in by his infectious disease doctor for worsening renal functions, being evaluated for dilated CBD and PD.     Common Bile duct is currently 11mm and PD 3-4 mm on US.   Liver functions are grossly unremarkable and he has a normal T marcello.   Patient will benefit from follow up with GI at the [facility] clinic on discharge.   He would benefit from CRC screen, as well as non urgent evaluation of CBD dilation (Likely from Opioid use) with MRI pancreatic protocol w/ MRCP.   Clinically appears well.    #CBD Dilation   #PD 3-4 mm  Absent GI symptoms, LFT panel reassuring, Physical exam reassuring   no fam hx of panc malignancy  - most likely secondary to long term Opioid use however, given minimal PD dilation, outpatient MRI panc protocol w/ MRCP would be reasonable  - Follow up at [facility] outpatient for non urgent work up    #Anemia/ Hep C currently treated   no objective GI-related blood loss  iron studies noted   anemia is likely multifactorial in the setting of chronic disease (CKD, HCV, HIV)  Due for CRC as last in 2017 with poor prep  -would benefit from outpatient EGD / colonoscopy    please call back PRN    Follow up at [facility] [phone] for appointment

## 2022-10-25 NOTE — PROGRESS NOTE ADULT - SUBJECTIVE AND OBJECTIVE BOX
YAZMIN VILLEGAS  67y, Male  Allergy: No Known Allergies      LOS  4d    CHIEF COMPLAINT: abnormal labs (25 Oct 2022 13:01)      INTERVAL EVENTS/HPI  - No acute events overnight  - T(F): , Max: 98.5 (10-25-22 @ 12:08)  - Denies any worsening symptoms  - Tolerating medication  - WBC Count: 2.11 (10-25-22 @ 07:49)  WBC Count: 2.31 (10-24-22 @ 06:43)     - Creatinine, Serum: 1.8 (10-25-22 @ 07:49)  Creatinine, Serum: 2.2 (10-24-22 @ 06:43)       ROS  General: Denies rigors, nightsweats  HEENT: Denies headache, rhinorrhea, sore throat, eye pain  CV: Denies CP, palpitations  PULM: Denies wheezing, hemoptysis  GI: Denies hematemesis, hematochezia, melena  : Denies discharge, hematuria  MSK: Denies arthralgias, myalgias  SKIN: Denies rash, lesions  NEURO: Denies paresthesias, weakness  PSYCH: Denies depression, anxiety    VITALS:  T(F): 98.5, Max: 98.5 (10-25-22 @ 12:08)  HR: 56  BP: 129/58  RR: 18Vital Signs Last 24 Hrs  T(C): 36.9 (25 Oct 2022 12:08), Max: 36.9 (25 Oct 2022 12:08)  T(F): 98.5 (25 Oct 2022 12:08), Max: 98.5 (25 Oct 2022 12:08)  HR: 56 (25 Oct 2022 12:08) (53 - 58)  BP: 129/58 (25 Oct 2022 12:08) (110/52 - 129/58)  BP(mean): --  RR: 18 (25 Oct 2022 12:08) (18 - 20)  SpO2: 95% (25 Oct 2022 12:08) (95% - 95%)        PHYSICAL EXAM:  Gen: NAD, resting in bed  HEENT: Normocephalic, atraumatic  Neck: supple, no lymphadenopathy  CV: Regular rate & regular rhythm  Lungs: decreased BS at bases, no fremitus  Abdomen: Soft, BS present  Ext: Warm, well perfused  Neuro: non focal, awake  Skin: no rash, no erythema  Lines: no phlebitis    FH: Non-contributory  Social Hx: Non-contributory    TESTS & MEASUREMENTS:                        7.4    2.11  )-----------( 225      ( 25 Oct 2022 07:49 )             22.6     10-25    138  |  106  |  28<H>  ----------------------------<  106<H>  5.8<H>   |  25  |  1.8<H>    Ca    8.8      25 Oct 2022 07:49  Mg     1.5     10-25    TPro  6.2  /  Alb  3.4<L>  /  TBili  <0.2  /  DBili  x   /  AST  40  /  ALT  41  /  AlkPhos  124<H>  10-25      LIVER FUNCTIONS - ( 25 Oct 2022 07:49 )  Alb: 3.4 g/dL / Pro: 6.2 g/dL / ALK PHOS: 124 U/L / ALT: 41 U/L / AST: 40 U/L / GGT: x               Culture - Urine (collected 10-21-22 @ 18:38)  Source: Clean Catch Clean Catch (Midstream)  Final Report (10-23-22 @ 11:25):    <10,000 CFU/mL Normal Urogenital Jina        Blood Gas Venous - Lactate: 1.40 mmol/L (10-22-22 @ 13:20)      INFECTIOUS DISEASES TESTING  COVID-19 PCR: NotDetec (10-21-22 @ 12:50)      INFLAMMATORY MARKERS      RADIOLOGY & ADDITIONAL TESTS:  I have personally reviewed the last available Chest xray  CXR      CT      CARDIOLOGY TESTING  12 Lead ECG:   Ventricular Rate 72 BPM    Atrial Rate 72 BPM    P-R Interval 216 ms    QRS Duration 88 ms    Q-T Interval 350 ms    QTC Calculation(Bazett) 383 ms    P Axis 84 degrees    R Axis 30 degrees    T Axis 34 degrees    Diagnosis Line Sinus rhythm with 1st degree A-V block  Voltage criteria for left ventricular hypertrophy  Early repolarization  Abnormal ECG    Confirmed by Dea Levi MD ([id number]) on 10/23/2022 5:47:17 PM (10-22-22 @ 10:46)  12 Lead ECG:   Ventricular Rate 63 BPM    Atrial Rate 63 BPM    P-R Interval 244 ms    QRS Duration 86 ms    Q-T Interval 362 ms    QTC Calculation(Bazett) 370 ms    P Axis 61 degrees    R Axis 64 degrees    T Axis 55 degrees    Diagnosis Line Sinus rhythm with 1st degree A-V block  Minimal voltage criteria for LVH, may be normal variant  Early repolarization  Borderline ECG    Confirmed by KAREN VEGA MD ([id number]) on 10/21/2022 3:42:25 PM (10-21-22 @ 14:33)      MEDICATIONS  aspirin enteric coated 81 Oral daily  atorvastatin Oral Tab/Cap - Peds 40 Oral daily  dolutegravir 50 mG/rilpivirine 25 mG (JULUCA) 1 Oral with breakfast  heparin   Injectable 5000 SubCutaneous every 12 hours  influenza  Vaccine (HIGH DOSE) 0.7 IntraMuscular once  methadone    Tablet 110 Oral daily  pregabalin 200 Oral two times a day  sodium bicarbonate 1300 Oral three times a day  sodium chloride 0.9%. 1000 IV Continuous <Continuous>  sodium zirconium cyclosilicate 10 Oral two times a day      WEIGHT  Weight (kg): 85 (10-21-22 @ 16:08)  Creatinine, Serum: 1.8 mg/dL (10-25-22 @ 07:49)      ANTIBIOTICS:  dolutegravir 50 mG/rilpivirine 25 mG (JULUCA) 1 Tablet(s) Oral with breakfast      All available historical records have been reviewed

## 2022-10-26 LAB
ALBUMIN SERPL ELPH-MCNC: 3.4 G/DL — LOW (ref 3.5–5.2)
ALP SERPL-CCNC: 135 U/L — HIGH (ref 30–115)
ALT FLD-CCNC: 52 U/L — HIGH (ref 0–41)
ANION GAP SERPL CALC-SCNC: 8 MMOL/L — SIGNIFICANT CHANGE UP (ref 7–14)
AST SERPL-CCNC: 54 U/L — HIGH (ref 0–41)
BASOPHILS # BLD AUTO: 0.04 K/UL — SIGNIFICANT CHANGE UP (ref 0–0.2)
BASOPHILS NFR BLD AUTO: 1.4 % — HIGH (ref 0–1)
BILIRUB SERPL-MCNC: <0.2 MG/DL — SIGNIFICANT CHANGE UP (ref 0.2–1.2)
BLD GP AB SCN SERPL QL: SIGNIFICANT CHANGE UP
BUN SERPL-MCNC: 26 MG/DL — HIGH (ref 10–20)
CALCIUM SERPL-MCNC: 9.1 MG/DL — SIGNIFICANT CHANGE UP (ref 8.4–10.5)
CHLORIDE SERPL-SCNC: 104 MMOL/L — SIGNIFICANT CHANGE UP (ref 98–110)
CO2 SERPL-SCNC: 27 MMOL/L — SIGNIFICANT CHANGE UP (ref 17–32)
CREAT SERPL-MCNC: 1.9 MG/DL — HIGH (ref 0.7–1.5)
EGFR: 38 ML/MIN/1.73M2 — LOW
EOSINOPHIL # BLD AUTO: 0.19 K/UL — SIGNIFICANT CHANGE UP (ref 0–0.7)
EOSINOPHIL NFR BLD AUTO: 6.9 % — SIGNIFICANT CHANGE UP (ref 0–8)
GLUCOSE SERPL-MCNC: 130 MG/DL — HIGH (ref 70–99)
HCT VFR BLD CALC: 23.9 % — LOW (ref 42–52)
HGB BLD-MCNC: 7.8 G/DL — LOW (ref 14–18)
IMM GRANULOCYTES NFR BLD AUTO: 0.7 % — HIGH (ref 0.1–0.3)
LYMPHOCYTES # BLD AUTO: 0.74 K/UL — LOW (ref 1.2–3.4)
LYMPHOCYTES # BLD AUTO: 26.8 % — SIGNIFICANT CHANGE UP (ref 20.5–51.1)
MAGNESIUM SERPL-MCNC: 1.6 MG/DL — LOW (ref 1.8–2.4)
MAGNESIUM SERPL-MCNC: 1.7 MG/DL — LOW (ref 1.8–2.4)
MCHC RBC-ENTMCNC: 30.6 PG — SIGNIFICANT CHANGE UP (ref 27–31)
MCHC RBC-ENTMCNC: 32.6 G/DL — SIGNIFICANT CHANGE UP (ref 32–37)
MCV RBC AUTO: 93.7 FL — SIGNIFICANT CHANGE UP (ref 80–94)
MONOCYTES # BLD AUTO: 0.41 K/UL — SIGNIFICANT CHANGE UP (ref 0.1–0.6)
MONOCYTES NFR BLD AUTO: 14.9 % — HIGH (ref 1.7–9.3)
NEUTROPHILS # BLD AUTO: 1.36 K/UL — LOW (ref 1.4–6.5)
NEUTROPHILS NFR BLD AUTO: 49.3 % — SIGNIFICANT CHANGE UP (ref 42.2–75.2)
NRBC # BLD: 0 /100 WBCS — SIGNIFICANT CHANGE UP (ref 0–0)
PLATELET # BLD AUTO: 246 K/UL — SIGNIFICANT CHANGE UP (ref 130–400)
POTASSIUM SERPL-MCNC: 5.2 MMOL/L — HIGH (ref 3.5–5)
POTASSIUM SERPL-SCNC: 5.2 MMOL/L — HIGH (ref 3.5–5)
PROT SERPL-MCNC: 6.3 G/DL — SIGNIFICANT CHANGE UP (ref 6–8)
RBC # BLD: 2.55 M/UL — LOW (ref 4.7–6.1)
RBC # FLD: 15 % — HIGH (ref 11.5–14.5)
SODIUM SERPL-SCNC: 139 MMOL/L — SIGNIFICANT CHANGE UP (ref 135–146)
WBC # BLD: 2.76 K/UL — LOW (ref 4.8–10.8)
WBC # FLD AUTO: 2.76 K/UL — LOW (ref 4.8–10.8)

## 2022-10-26 RX ORDER — SODIUM ZIRCONIUM CYCLOSILICATE 10 G/10G
5 POWDER, FOR SUSPENSION ORAL
Refills: 0 | Status: DISCONTINUED | OUTPATIENT
Start: 2022-10-26 | End: 2022-10-27

## 2022-10-26 RX ORDER — MAGNESIUM SULFATE 500 MG/ML
2 VIAL (ML) INJECTION ONCE
Refills: 0 | Status: COMPLETED | OUTPATIENT
Start: 2022-10-26 | End: 2022-10-26

## 2022-10-26 RX ORDER — SODIUM ZIRCONIUM CYCLOSILICATE 10 G/10G
5 POWDER, FOR SUSPENSION ORAL
Refills: 0 | Status: DISCONTINUED | OUTPATIENT
Start: 2022-10-26 | End: 2022-10-26

## 2022-10-26 RX ORDER — SODIUM ZIRCONIUM CYCLOSILICATE 10 G/10G
10 POWDER, FOR SUSPENSION ORAL
Refills: 0 | Status: DISCONTINUED | OUTPATIENT
Start: 2022-10-26 | End: 2022-10-26

## 2022-10-26 RX ADMIN — DOLUTEGRAVIR SODIUM AND RILPIVIRINE HYDROCHLORIDE 1 TABLET(S): 50; 25 TABLET, FILM COATED ORAL at 09:09

## 2022-10-26 RX ADMIN — Medication 100 MILLIGRAM(S): at 17:10

## 2022-10-26 RX ADMIN — SODIUM ZIRCONIUM CYCLOSILICATE 5 GRAM(S): 10 POWDER, FOR SUSPENSION ORAL at 17:10

## 2022-10-26 RX ADMIN — ATORVASTATIN CALCIUM 40 MILLIGRAM(S): 80 TABLET, FILM COATED ORAL at 22:15

## 2022-10-26 RX ADMIN — SODIUM ZIRCONIUM CYCLOSILICATE 10 GRAM(S): 10 POWDER, FOR SUSPENSION ORAL at 05:21

## 2022-10-26 RX ADMIN — Medication 81 MILLIGRAM(S): at 11:23

## 2022-10-26 RX ADMIN — Medication 1300 MILLIGRAM(S): at 11:26

## 2022-10-26 RX ADMIN — Medication 200 MILLIGRAM(S): at 17:10

## 2022-10-26 RX ADMIN — Medication 1300 MILLIGRAM(S): at 05:21

## 2022-10-26 RX ADMIN — METHADONE HYDROCHLORIDE 110 MILLIGRAM(S): 40 TABLET ORAL at 11:23

## 2022-10-26 RX ADMIN — Medication 25 GRAM(S): at 12:59

## 2022-10-26 RX ADMIN — Medication 200 MILLIGRAM(S): at 05:22

## 2022-10-26 RX ADMIN — Medication 1 TABLET(S): at 17:10

## 2022-10-26 RX ADMIN — HEPARIN SODIUM 5000 UNIT(S): 5000 INJECTION INTRAVENOUS; SUBCUTANEOUS at 05:22

## 2022-10-26 NOTE — PROGRESS NOTE ADULT - ASSESSMENT
66-year-old male, with PMH of substance dependence–on methadone, IVDA (admits to currently using heroin/cocaine), HIV (on HAART), [id number] (baseline Cr ~3), and hepatitis C, sent in by the infectious team for KATERIN on CKD and hyperkalemia noted on OP labs.   Vancomycin was D/Slava.      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  HTN is a risk factor along with HIV and HAART therapy on Triumeq (abacavir, dolutegravir, and lamivudine)  - Cr 5 on admission (baseline ~3)   - Renal US 10/22 - unremakrable   - continue to trend  - d/c sodium bicarb  - appreciate renal evaluation     #Hyperkalemia - resolving  - Lokelma 5 mg q 12 hours daily   - renal US WNL    #Left wrist fluctuance cellulitis -- by previous IV drug use site  - doxycycline 100 mg BID and Augmentin 875/125 mg BID for 7 days     # HIV (on HAART)  - HIV-1 viral load: <20 (5/2022 AND 8/2022)   - CD4: 246 ( 3/2022) >212 (8/2022)  - On Juluca (50-25) OD       #Leukopenia - HIV controlled, chronic    - Vitamin B12, Serum: 844 pg/mL (10.22.22 @ 05:45)  - Folate, Serum: 5.9 ng/mL (10.22.22 @ 05:45)  - HIV-1 VL undetectable in 8/17/2022  - eventual heme evaluation - can be done as outpatient     # CBD Diltation with mild pancreatic duct dilatation  - US abdomen (July 2022) New mild pancreatic duct dilatation measuring 3-4 mm.A central obstructing lesion cannot be excluded on this examination.  - Further evaluation of the pancreas with MRI and MRCP with and without  gadolinium is necessary for complete characterization. Essentially stable dilatation of the common   - US Abdomen Upper Quadrant Right (10.22.22 @ 19:28): Dilated common bile duct measuring up to 11 mm, similar in appearance  from prior examination.  - appreciate GI evaluation -- hold off on MRCP for now -- follow-up clinic -- possibly secondary to opioid use     #Hep C   - Hepatitis C RNA Qualitative Interp: Negative 3.18.21 @ 11:38)      # Normocytic Anemia likely 2/2 to CKD  - Hgb 8.8 on admission ( baseline ~9.5)   - iron studies, b12, folate  - SPEP, UPEP, IF    - keep active T&S      # CHF   - TTE (2018) EF 55-60% , GIDD  - repeat TTE 10/25 without significant change     # NEUROPATHY   - c/w pregabalin       # Low testosterone   - on testosterone gel       #Left hip pain   - on chronic opioids       # HTN - currently borderline low  BP  - will hold off amlo   - dc enalapril        # substance dependence–on methadone  # IVDA (admits to currently using heroin/cocaine)  - methadone 110mg OD  - [address] and 2nd [facility] (confirm in am)     # DIET: DASH     Dispo: plan for DC tomorrow

## 2022-10-26 NOTE — PROGRESS NOTE ADULT - ASSESSMENT
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  cr trending down stable at 2.2 / baseline   non oliguric   continue likelma 5 g po q12h on DC   renal ultrasound reviewed, no hydronephrosis, nml size kidneys   d/c sodium bicarbonate    last phos level at goal no need for binder  ID noted back on HAART  will follow

## 2022-10-26 NOTE — PROGRESS NOTE ADULT - SUBJECTIVE AND OBJECTIVE BOX
YAZMIN VILLEGAS  67y, Male  Allergy: No Known Allergies      LOS  5d    CHIEF COMPLAINT: abnormal labs (26 Oct 2022 08:32)      INTERVAL EVENTS/HPI  - No acute events overnight  - T(F): , Max: 98.5 (10-25-22 @ 22:26)  - Creatine remains stable   - has nodular fluctuance on left wrist -- present for about 1 year - warm   - WBC Count: 2.76 (10-26-22 @ 07:19)  WBC Count: 2.11 (10-25-22 @ 07:49)     - Creatinine, Serum: 1.9 (10-26-22 @ 07:19)  Creatinine, Serum: 2.3 (10-25-22 @ 22:41)       ROS  General: Denies rigors, nightsweats  HEENT: Denies headache, rhinorrhea, sore throat, eye pain  CV: Denies CP, palpitations  PULM: Denies wheezing, hemoptysis  GI: Denies hematemesis, hematochezia, melena  : Denies discharge, hematuria  MSK: Denies arthralgias, myalgias  SKIN: Denies rash, lesions  NEURO: Denies paresthesias, weakness  PSYCH: Denies depression, anxiety    VITALS:  T(F): 97.8, Max: 98.5 (10-25-22 @ 22:26)  HR: 56  BP: 125/57  RR: 18Vital Signs Last 24 Hrs  T(C): 36.6 (26 Oct 2022 05:11), Max: 36.9 (25 Oct 2022 22:26)  T(F): 97.8 (26 Oct 2022 05:11), Max: 98.5 (25 Oct 2022 22:26)  HR: 56 (26 Oct 2022 05:11) (56 - 58)  BP: 125/57 (26 Oct 2022 05:11) (108/58 - 125/57)  BP(mean): --  RR: 18 (26 Oct 2022 05:11) (18 - 18)  SpO2: --        PHYSICAL EXAM:  Gen: NAD, resting in bed  HEENT: Normocephalic, atraumatic  Neck: supple, no lymphadenopathy  CV: Regular rate & regular rhythm  Lungs: decreased BS at bases, no fremitus  Abdomen: Soft, BS present  Ext: Warm, well perfused  Neuro: non focal, awake  Skin: no rash, no erythema  Lines: no phlebitis    FH: Non-contributory  Social Hx: Non-contributory    TESTS & MEASUREMENTS:                        7.8    2.76  )-----------( 246      ( 26 Oct 2022 07:19 )             23.9     10-26    139  |  104  |  26<H>  ----------------------------<  130<H>  5.2<H>   |  27  |  1.9<H>    Ca    9.1      26 Oct 2022 07:19  Mg     1.6     10-26    TPro  6.3  /  Alb  3.4<L>  /  TBili  <0.2  /  DBili  x   /  AST  54<H>  /  ALT  52<H>  /  AlkPhos  135<H>  10-26      LIVER FUNCTIONS - ( 26 Oct 2022 07:19 )  Alb: 3.4 g/dL / Pro: 6.3 g/dL / ALK PHOS: 135 U/L / ALT: 52 U/L / AST: 54 U/L / GGT: x               Culture - Urine (collected 10-21-22 @ 18:38)  Source: Clean Catch Clean Catch (Midstream)  Final Report (10-23-22 @ 11:25):    <10,000 CFU/mL Normal Urogenital Jina        Blood Gas Venous - Lactate: 1.40 mmol/L (10-22-22 @ 13:20)      INFECTIOUS DISEASES TESTING  COVID-19 PCR: NotDetec (10-21-22 @ 12:50)      INFLAMMATORY MARKERS      RADIOLOGY & ADDITIONAL TESTS:  I have personally reviewed the last available Chest xray  CXR      CT      CARDIOLOGY TESTING  12 Lead ECG:   Ventricular Rate 62 BPM    Atrial Rate 62 BPM    P-R Interval 220 ms    QRS Duration 90 ms    Q-T Interval 376 ms    QTC Calculation(Bazett) 381 ms    P Axis 61 degrees    R Axis 38 degrees    T Axis 23 degrees    Diagnosis Line Sinus rhythm with 1st degree A-V block  Non-specific change in ST segment in  Otherwise normal ECG    Confirmed by Vikash Estrada ([id number]) on 10/25/2022 5:38:58 PM (10-25-22 @ 11:14)  12 Lead ECG:   Ventricular Rate 72 BPM    Atrial Rate 72 BPM    P-R Interval 216 ms    QRS Duration 88 ms    Q-T Interval 350 ms    QTC Calculation(Bazett) 383 ms    P Axis 84 degrees    R Axis 30 degrees    T Axis 34 degrees    Diagnosis Line Sinus rhythm with 1st degree A-V block  Voltage criteria for left ventricular hypertrophy  Early repolarization  Abnormal ECG    Confirmed by Dea Levi MD [id number]) on 10/23/2022 5:47:17 PM (10-22-22 @ 10:46)      MEDICATIONS  aspirin enteric coated 81 Oral daily  atorvastatin Oral Tab/Cap - Peds 40 Oral daily  dolutegravir 50 mG/rilpivirine 25 mG (JULUCA) 1 Oral with breakfast  heparin   Injectable 5000 SubCutaneous every 12 hours  influenza  Vaccine (HIGH DOSE) 0.7 IntraMuscular once  magnesium sulfate  IVPB 2 IV Intermittent once  methadone    Tablet 110 Oral daily  pregabalin 200 Oral two times a day  sodium bicarbonate 1300 Oral three times a day  sodium chloride 0.9%. 1000 IV Continuous <Continuous>  sodium zirconium cyclosilicate 10 Oral two times a day      WEIGHT  Weight (kg): 85 (10-21-22 @ 16:08)  Creatinine, Serum: 1.9 mg/dL (10-26-22 @ 07:19)  Creatinine, Serum: 2.3 mg/dL (10-25-22 @ 22:41)      ANTIBIOTICS:  dolutegravir 50 mG/rilpivirine 25 mG (JULUCA) 1 Tablet(s) Oral with breakfast      All available historical records have been reviewed

## 2022-10-26 NOTE — PROGRESS NOTE ADULT - ASSESSMENT
66-year-old male, with PMH of substance dependence–on methadone, IVDA (admits to currently using heroin/cocaine), HIV (on HAART), EXA2z-9 (baseline Cr ~3), and hepatitis C, sent in by the infectious team for KATERIN on CKD and hyperkalmia noted on OP labs.     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: improving  # Hyperkalemia: worsening  HTN is a risk factor along with HIV and HAART therapy on Triumeq (abacavir, dolutegravir, and lamivudine)  - Cr 5 on admission (baseline ~3) , now Cr  1.8  - K 6.8 on admission sp D50 insulin in ED  - place segovia for acc Is and Os   - IVF given LR at 75 cc per hour   - US renal---> no hydro   - Phos 3.3, PTH wnl  - CK wnl  - lokelma for K 5.8, EKG did not show peaked T-waves  - standing lokelma 5 on discharge  - no need for bactrim for pcp ppx; CD4 count sufficient    #Sore throat  -COVID test    #wheal on right arm  -start doxy and augmentin    # HIV (on HAART)  # Hepatitis C  - pt is on Triumeq (abacavir, dolutegravir, and lamivudine) and was advised to stop given his worsening kidney fx and to start Julaca instead (Dolutegravir- rilpivirine)  - HIV-1 viral load: <20 (5/2022 AND 8/2022)   - CD4: 246 ( 3/2022) >212 (8/2022)  - start on Julaca (50-25) OD after ID approval     # Normocytic Anemia likely 2/2 to CKD  - Hgb 8.8 on admission ( baseline ~9.5)   -  HBb 7.6  - iron studies normal, b12 wnl, folate wnl   - urine protein elevated   - SPEP, , IF    - keep active T&S  - keep Hb > 7    # mild transaminitis-resolved  # CBD Dilation CBD 11-16 and PD 3mm  # stable dilatation of the common bile duct  - ALT/AST 51/47 on admission  - been noted to have elevated LFTs on previous labs  - US abdomen (July 2022) New mild pancreatic duct dilatation measuring 3-4 mm.A central obstructing lesion cannot be excluded on this examination.  - GI does not recommend MRCP. Per GI eval, CBD dilation ould be secondary to long term Opioid use since pt has reassuring LFT panel, physical exam, and no GI sx  - Follow up at [facility] ([id number]) outpatient for non urgent work up    #Anemia/ Hep C currently treated   - Due for CRC as last in 2017 with poor prep- would benefit also from EGD given Hep C Hx- Eval for varices   - AFP with next set of outpatient labs    # CHF   - TTE (2018) EF 55-60% , GIDD  -TTE 10/2022 similar to previous; LVEF 55-60%  - keep euvolemic    # NEUROPATHY   - c/w pregabalin     # Low testosterone   - on testosterone gel     #Left hip pain   - on chronic opioids     # HTN - currently borderline low  BP  - will hold off amlo   - dc enalapril      # substance dependence–on methadone  # IVDA (admits to currently using heroin/cocaine)  - methadone 110mg OD    DVT ppx: heparin  GI ppx: none  Full code  Dispo: home; pt lives alone

## 2022-10-26 NOTE — PROGRESS NOTE ADULT - SUBJECTIVE AND OBJECTIVE BOX
seen and examined  24 h events noted   no distress         PAST HISTORY  --------------------------------------------------------------------------------  No significant changes to PMH, PSH, FHx, SHx, unless otherwise noted    ALLERGIES & MEDICATIONS  --------------------------------------------------------------------------------  Allergies    No Known Allergies    Intolerances      Standing Inpatient Medications  aspirin enteric coated 81 milliGRAM(s) Oral daily  atorvastatin Oral Tab/Cap - Peds 40 milliGRAM(s) Oral daily  dolutegravir 50 mG/rilpivirine 25 mG (JULUCA) 1 Tablet(s) Oral with breakfast  heparin   Injectable 5000 Unit(s) SubCutaneous every 12 hours  influenza  Vaccine (HIGH DOSE) 0.7 milliLiter(s) IntraMuscular once  methadone    Tablet 110 milliGRAM(s) Oral daily  pregabalin 200 milliGRAM(s) Oral two times a day  sodium bicarbonate 1300 milliGRAM(s) Oral three times a day  sodium chloride 0.9%. 1000 milliLiter(s) IV Continuous <Continuous>  sodium zirconium cyclosilicate 10 Gram(s) Oral two times a day  sodium zirconium cyclosilicate 5 Gram(s) Oral two times a day          VITALS/PHYSICAL EXAM  --------------------------------------------------------------------------------  T(C): 36.6 (10-26-22 @ 05:11), Max: 36.9 (10-25-22 @ 12:08)  HR: 56 (10-26-22 @ 05:11) (56 - 58)  BP: 125/57 (10-26-22 @ 05:11) (108/58 - 129/58)  RR: 18 (10-26-22 @ 05:11) (18 - 18)  SpO2: 95% (10-25-22 @ 12:08) (95% - 95%)  Wt(kg): --        10-25-22 @ 07:01  -  10-26-22 @ 07:00  --------------------------------------------------------  IN: 0 mL / OUT: 500 mL / NET: -500 mL      Physical Exam:  	Gen: NAD  	Pulm: CTA B/L  	CV: S1S2; no rub  	Abd: +distended  	LE:  no edema  	    LABS/STUDIES  --------------------------------------------------------------------------------              7.8    2.76  >-----------<  246      [10-26-22 @ 07:19]              23.9     139  |  104  |  26  ----------------------------<  130      [10-26-22 @ 07:19]  5.2   |  27  |  1.9        Ca     9.1     [10-26-22 @ 07:19]      Mg     1.6     [10-26-22 @ 07:19]    TPro  6.3  /  Alb  3.4  /  TBili  <0.2  /  DBili  x   /  AST  54  /  ALT  52  /  AlkPhos  135  [10-26-22 @ 07:19]      Creatinine Trend:  SCr 1.9 [10-26 @ 07:19]  SCr 2.3 [10-25 @ 22:41]  SCr 1.8 [10-25 @ 07:49]  SCr 2.2 [10-24 @ 06:43]  SCr 3.0 [10-23 @ 08:22]    Urinalysis - [10-21-22 @ 18:38]      Color Light Yellow / Appearance Clear / SG 1.015 / pH 6.5      Gluc Negative / Ketone Negative  / Bili Negative / Urobili <2 mg/dL       Blood Trace / Protein 30 mg/dL / Leuk Est Negative / Nitrite Negative      RBC 1 / WBC 2 / Hyaline 1 / Gran  / Sq Epi  / Non Sq Epi 2 / Bacteria Negative    Urine Protein 124      [10-22-22 @ 17:29]    Iron 79, TIBC 249, %sat 32      [10-23-22 @ 08:22]  Ferritin 261      [10-23-22 @ 08:22]  PTH -- (Ca 8.7)      [10-22-22 @ 18:06]   48  PTH -- (Ca 9.1)      [10-22-22 @ 05:45]   41  HbA1c 5.3      [01-14-20 @ 09:09]    HBsAg Nonreact      [10-22-22 @ 18:06]  HCV 11.24, Reactive      [10-22-22 @ 18:06]

## 2022-10-26 NOTE — PROGRESS NOTE ADULT - SUBJECTIVE AND OBJECTIVE BOX
[facility] day: 5      SUBJECTIVE:   Complaints:   Overnight events:     OBJECTIVE:   T(C): 36.6 (10-26-22 @ 05:11), Max: 36.9 (10-25-22 @ 22:26)  HR: 56 (10-26-22 @ 05:11) (56 - 58)  BP: 125/57 (10-26-22 @ 05:11) (108/58 - 125/57)  RR: 18 (10-26-22 @ 05:11) (18 - 18)  SpO2: --    Acc I&O    10-25-22 @ 07:01  -  10-26-22 @ 07:00  --------------------------------------------------------  IN: 0 mL / OUT: 500 mL / NET: -500 mL    PHYSICAL EXAM  General: NAD, alert, interactive  Resp: Normal respiratory effort, no wheezing, rhonchi or rates, CTAB  CV: RRR, normal s1/s2, no m/r/g  Abdomen: soft, non-distended, non-tender  Extremities: no cyanosis or edema; wheal on right arm + erythematous and +tender  Neuro: alert and oriented x3, no focal deficits    MEDICATIONS  amoxicillin  875 milliGRAM(s)/clavulanate 1 Tablet(s) Oral every 12 hours  aspirin enteric coated 81 milliGRAM(s) Oral daily  atorvastatin Oral Tab/Cap - Peds 40 milliGRAM(s) Oral daily  dolutegravir 50 mG/rilpivirine 25 mG (JULUCA) 1 Tablet(s) Oral with breakfast  doxycycline monohydrate Capsule 100 milliGRAM(s) Oral every 12 hours  heparin   Injectable 5000 Unit(s) SubCutaneous every 12 hours  influenza  Vaccine (HIGH DOSE) 0.7 milliLiter(s) IntraMuscular once  methadone    Tablet 110 milliGRAM(s) Oral daily  pregabalin 200 milliGRAM(s) Oral two times a day  sodium chloride 0.9%. 1000 milliLiter(s) IV Continuous <Continuous>  sodium zirconium cyclosilicate 5 Gram(s) Oral two times a day      LABS:                          7.8    2.76  )-----------( 246      ( 26 Oct 2022 07:19 )             23.9     10-26    139  |  104  |  26<H>  ----------------------------<  130<H>  5.2<H>   |  27  |  1.9<H>    Ca    9.1      26 Oct 2022 07:19  Mg     1.6     10-26    TPro  6.3  /  Alb  3.4<L>  /  TBili  <0.2  /  DBili  x   /  AST  54<H>  /  ALT  52<H>  /  AlkPhos  135<H>  10-26

## 2022-10-27 LAB
ALBUMIN SERPL ELPH-MCNC: 3.5 G/DL — SIGNIFICANT CHANGE UP (ref 3.5–5.2)
ALBUMIN SERPL ELPH-MCNC: 3.9 G/DL — SIGNIFICANT CHANGE UP (ref 3.5–5.2)
ALP SERPL-CCNC: 159 U/L — HIGH (ref 30–115)
ALP SERPL-CCNC: 171 U/L — HIGH (ref 30–115)
ALT FLD-CCNC: 56 U/L — HIGH (ref 0–41)
ALT FLD-CCNC: 59 U/L — HIGH (ref 0–41)
ANION GAP SERPL CALC-SCNC: 10 MMOL/L — SIGNIFICANT CHANGE UP (ref 7–14)
ANION GAP SERPL CALC-SCNC: 7 MMOL/L — SIGNIFICANT CHANGE UP (ref 7–14)
ANION GAP SERPL CALC-SCNC: 8 MMOL/L — SIGNIFICANT CHANGE UP (ref 7–14)
AST SERPL-CCNC: 58 U/L — HIGH (ref 0–41)
AST SERPL-CCNC: 60 U/L — HIGH (ref 0–41)
BASOPHILS # BLD AUTO: 0.04 K/UL — SIGNIFICANT CHANGE UP (ref 0–0.2)
BASOPHILS NFR BLD AUTO: 1.3 % — HIGH (ref 0–1)
BILIRUB SERPL-MCNC: <0.2 MG/DL — SIGNIFICANT CHANGE UP (ref 0.2–1.2)
BILIRUB SERPL-MCNC: <0.2 MG/DL — SIGNIFICANT CHANGE UP (ref 0.2–1.2)
BUN SERPL-MCNC: 26 MG/DL — HIGH (ref 10–20)
BUN SERPL-MCNC: 26 MG/DL — HIGH (ref 10–20)
BUN SERPL-MCNC: 29 MG/DL — HIGH (ref 10–20)
CALCIUM SERPL-MCNC: 9 MG/DL — SIGNIFICANT CHANGE UP (ref 8.4–10.5)
CALCIUM SERPL-MCNC: 9.2 MG/DL — SIGNIFICANT CHANGE UP (ref 8.4–10.4)
CALCIUM SERPL-MCNC: 9.6 MG/DL — SIGNIFICANT CHANGE UP (ref 8.4–10.5)
CHLORIDE SERPL-SCNC: 100 MMOL/L — SIGNIFICANT CHANGE UP (ref 98–110)
CHLORIDE SERPL-SCNC: 102 MMOL/L — SIGNIFICANT CHANGE UP (ref 98–110)
CHLORIDE SERPL-SCNC: 97 MMOL/L — LOW (ref 98–110)
CO2 SERPL-SCNC: 25 MMOL/L — SIGNIFICANT CHANGE UP (ref 17–32)
CO2 SERPL-SCNC: 27 MMOL/L — SIGNIFICANT CHANGE UP (ref 17–32)
CO2 SERPL-SCNC: 28 MMOL/L — SIGNIFICANT CHANGE UP (ref 17–32)
CREAT ?TM UR-MCNC: 59 MG/DL — SIGNIFICANT CHANGE UP
CREAT ?TM UR-MCNC: 62 MG/DL — SIGNIFICANT CHANGE UP
CREAT SERPL-MCNC: 1.8 MG/DL — HIGH (ref 0.7–1.5)
CREAT SERPL-MCNC: 1.9 MG/DL — HIGH (ref 0.7–1.5)
CREAT SERPL-MCNC: 2 MG/DL — HIGH (ref 0.7–1.5)
EGFR: 36 ML/MIN/1.73M2 — LOW
EGFR: 38 ML/MIN/1.73M2 — LOW
EGFR: 40 ML/MIN/1.73M2 — LOW
EOSINOPHIL # BLD AUTO: 0.14 K/UL — SIGNIFICANT CHANGE UP (ref 0–0.7)
EOSINOPHIL NFR BLD AUTO: 4.5 % — SIGNIFICANT CHANGE UP (ref 0–8)
GLUCOSE BLDC GLUCOMTR-MCNC: 163 MG/DL — HIGH (ref 70–99)
GLUCOSE SERPL-MCNC: 113 MG/DL — HIGH (ref 70–99)
GLUCOSE SERPL-MCNC: 150 MG/DL — HIGH (ref 70–99)
GLUCOSE SERPL-MCNC: 97 MG/DL — SIGNIFICANT CHANGE UP (ref 70–99)
HCT VFR BLD CALC: 24.6 % — LOW (ref 42–52)
HGB BLD-MCNC: 7.9 G/DL — LOW (ref 14–18)
IMM GRANULOCYTES NFR BLD AUTO: 0.6 % — HIGH (ref 0.1–0.3)
LYMPHOCYTES # BLD AUTO: 0.9 K/UL — LOW (ref 1.2–3.4)
LYMPHOCYTES # BLD AUTO: 29.1 % — SIGNIFICANT CHANGE UP (ref 20.5–51.1)
MAGNESIUM SERPL-MCNC: 1.5 MG/DL — LOW (ref 1.8–2.4)
MCHC RBC-ENTMCNC: 30.4 PG — SIGNIFICANT CHANGE UP (ref 27–31)
MCHC RBC-ENTMCNC: 32.1 G/DL — SIGNIFICANT CHANGE UP (ref 32–37)
MCV RBC AUTO: 94.6 FL — HIGH (ref 80–94)
MONOCYTES # BLD AUTO: 0.44 K/UL — SIGNIFICANT CHANGE UP (ref 0.1–0.6)
MONOCYTES NFR BLD AUTO: 14.2 % — HIGH (ref 1.7–9.3)
NEUTROPHILS # BLD AUTO: 1.55 K/UL — SIGNIFICANT CHANGE UP (ref 1.4–6.5)
NEUTROPHILS NFR BLD AUTO: 50.3 % — SIGNIFICANT CHANGE UP (ref 42.2–75.2)
NRBC # BLD: 0 /100 WBCS — SIGNIFICANT CHANGE UP (ref 0–0)
OSMOLALITY UR: 415 MOS/KG — SIGNIFICANT CHANGE UP (ref 50–1200)
PLATELET # BLD AUTO: 245 K/UL — SIGNIFICANT CHANGE UP (ref 130–400)
POTASSIUM SERPL-MCNC: 5.4 MMOL/L — HIGH (ref 3.5–5)
POTASSIUM SERPL-MCNC: 5.5 MMOL/L — HIGH (ref 3.5–5)
POTASSIUM SERPL-MCNC: 6 MMOL/L — CRITICAL HIGH (ref 3.5–5)
POTASSIUM SERPL-SCNC: 5.4 MMOL/L — HIGH (ref 3.5–5)
POTASSIUM SERPL-SCNC: 5.5 MMOL/L — HIGH (ref 3.5–5)
POTASSIUM SERPL-SCNC: 6 MMOL/L — CRITICAL HIGH (ref 3.5–5)
POTASSIUM UR-SCNC: 24 MMOL/L — SIGNIFICANT CHANGE UP
PROT ?TM UR-MCNC: 129 MG/DLG/24H — SIGNIFICANT CHANGE UP
PROT SERPL-MCNC: 6.4 G/DL — SIGNIFICANT CHANGE UP (ref 6–8)
PROT SERPL-MCNC: 7 G/DL — SIGNIFICANT CHANGE UP (ref 6–8)
PROT/CREAT UR-RTO: 2.1 RATIO — HIGH (ref 0–0.2)
RBC # BLD: 2.6 M/UL — LOW (ref 4.7–6.1)
RBC # FLD: 14.8 % — HIGH (ref 11.5–14.5)
SODIUM SERPL-SCNC: 132 MMOL/L — LOW (ref 135–146)
SODIUM SERPL-SCNC: 135 MMOL/L — SIGNIFICANT CHANGE UP (ref 135–146)
SODIUM SERPL-SCNC: 137 MMOL/L — SIGNIFICANT CHANGE UP (ref 135–146)
SODIUM UR-SCNC: 105 MMOL/L — SIGNIFICANT CHANGE UP
UUN UR-MCNC: 447 MG/DL — SIGNIFICANT CHANGE UP
WBC # BLD: 3.09 K/UL — LOW (ref 4.8–10.8)
WBC # FLD AUTO: 3.09 K/UL — LOW (ref 4.8–10.8)

## 2022-10-27 PROCEDURE — 93010 ELECTROCARDIOGRAM REPORT: CPT

## 2022-10-27 RX ORDER — SODIUM ZIRCONIUM CYCLOSILICATE 10 G/10G
5 POWDER, FOR SUSPENSION ORAL
Refills: 0 | Status: DISCONTINUED | OUTPATIENT
Start: 2022-10-27 | End: 2022-10-27

## 2022-10-27 RX ORDER — GUAIFENESIN/DEXTROMETHORPHAN 600MG-30MG
10 TABLET, EXTENDED RELEASE 12 HR ORAL EVERY 6 HOURS
Refills: 0 | Status: DISCONTINUED | OUTPATIENT
Start: 2022-10-27 | End: 2022-10-31

## 2022-10-27 RX ORDER — SODIUM ZIRCONIUM CYCLOSILICATE 10 G/10G
10 POWDER, FOR SUSPENSION ORAL EVERY 8 HOURS
Refills: 0 | Status: DISCONTINUED | OUTPATIENT
Start: 2022-10-27 | End: 2022-10-28

## 2022-10-27 RX ORDER — CALCIUM GLUCONATE 100 MG/ML
1 VIAL (ML) INTRAVENOUS ONCE
Refills: 0 | Status: COMPLETED | OUTPATIENT
Start: 2022-10-27 | End: 2022-10-27

## 2022-10-27 RX ORDER — DEXTROSE 50 % IN WATER 50 %
50 SYRINGE (ML) INTRAVENOUS ONCE
Refills: 0 | Status: COMPLETED | OUTPATIENT
Start: 2022-10-27 | End: 2022-10-27

## 2022-10-27 RX ORDER — INSULIN HUMAN 100 [IU]/ML
10 INJECTION, SOLUTION SUBCUTANEOUS ONCE
Refills: 0 | Status: COMPLETED | OUTPATIENT
Start: 2022-10-27 | End: 2022-10-27

## 2022-10-27 RX ORDER — SODIUM ZIRCONIUM CYCLOSILICATE 10 G/10G
10 POWDER, FOR SUSPENSION ORAL
Refills: 0 | Status: DISCONTINUED | OUTPATIENT
Start: 2022-10-27 | End: 2022-10-27

## 2022-10-27 RX ADMIN — SODIUM ZIRCONIUM CYCLOSILICATE 10 GRAM(S): 10 POWDER, FOR SUSPENSION ORAL at 21:37

## 2022-10-27 RX ADMIN — Medication 50 MILLILITER(S): at 11:31

## 2022-10-27 RX ADMIN — HEPARIN SODIUM 5000 UNIT(S): 5000 INJECTION INTRAVENOUS; SUBCUTANEOUS at 17:27

## 2022-10-27 RX ADMIN — SODIUM ZIRCONIUM CYCLOSILICATE 10 GRAM(S): 10 POWDER, FOR SUSPENSION ORAL at 14:50

## 2022-10-27 RX ADMIN — HEPARIN SODIUM 5000 UNIT(S): 5000 INJECTION INTRAVENOUS; SUBCUTANEOUS at 06:36

## 2022-10-27 RX ADMIN — SODIUM ZIRCONIUM CYCLOSILICATE 5 GRAM(S): 10 POWDER, FOR SUSPENSION ORAL at 04:29

## 2022-10-27 RX ADMIN — Medication 1 TABLET(S): at 17:18

## 2022-10-27 RX ADMIN — Medication 10 MILLILITER(S): at 17:27

## 2022-10-27 RX ADMIN — Medication 200 MILLIGRAM(S): at 06:35

## 2022-10-27 RX ADMIN — DOLUTEGRAVIR SODIUM AND RILPIVIRINE HYDROCHLORIDE 1 TABLET(S): 50; 25 TABLET, FILM COATED ORAL at 09:11

## 2022-10-27 RX ADMIN — Medication 100 MILLIGRAM(S): at 06:35

## 2022-10-27 RX ADMIN — Medication 200 MILLIGRAM(S): at 17:27

## 2022-10-27 RX ADMIN — INSULIN HUMAN 10 UNIT(S): 100 INJECTION, SOLUTION SUBCUTANEOUS at 11:31

## 2022-10-27 RX ADMIN — Medication 1 TABLET(S): at 06:35

## 2022-10-27 RX ADMIN — ATORVASTATIN CALCIUM 40 MILLIGRAM(S): 80 TABLET, FILM COATED ORAL at 21:37

## 2022-10-27 RX ADMIN — Medication 100 MILLIGRAM(S): at 17:18

## 2022-10-27 RX ADMIN — Medication 100 GRAM(S): at 14:49

## 2022-10-27 NOTE — PROGRESS NOTE ADULT - SUBJECTIVE AND OBJECTIVE BOX
Nephrology progress note    THIS IS AN INCOMPLETE NOTE . FULL NOTE TO FOLLOW SHORTLY    Patient is seen and examined, events over the last 24 h noted .    Allergies:  No Known Allergies    Hospital Medications:   MEDICATIONS  (STANDING):  amoxicillin  875 milliGRAM(s)/clavulanate 1 Tablet(s) Oral every 12 hours  aspirin enteric coated 81 milliGRAM(s) Oral daily  atorvastatin Oral Tab/Cap - Peds 40 milliGRAM(s) Oral daily  dolutegravir 50 mG/rilpivirine 25 mG (JULUCA) 1 Tablet(s) Oral with breakfast  doxycycline monohydrate Capsule 100 milliGRAM(s) Oral every 12 hours  heparin   Injectable 5000 Unit(s) SubCutaneous every 12 hours  influenza  Vaccine (HIGH DOSE) 0.7 milliLiter(s) IntraMuscular once  methadone    Tablet 110 milliGRAM(s) Oral daily  pregabalin 200 milliGRAM(s) Oral two times a day  sodium chloride 0.9%. 1000 milliLiter(s) (75 mL/Hr) IV Continuous <Continuous>  sodium zirconium cyclosilicate 10 Gram(s) Oral two times a day        VITALS:  T(F): 99.1 (10-27-22 @ 05:01), Max: 99.5 (10-26-22 @ 19:54)  HR: 61 (10-27-22 @ 05:01)  BP: 131/62 (10-27-22 @ 05:01)  RR: 18 (10-27-22 @ 05:01)  SpO2: --  Wt(kg): --    10-25 @ 07:01  -  10-26 @ 07:00  --------------------------------------------------------  IN: 0 mL / OUT: 500 mL / NET: -500 mL          PHYSICAL EXAM:  Constitutional: NAD  HEENT: anicteric sclera, oropharynx clear, MMM  Neck: No JVD  Respiratory: CTAB, no wheezes, rales or rhonchi  Cardiovascular: S1, S2, RRR  Gastrointestinal: BS+, soft, NT/ND  Extremities: No cyanosis or clubbing. No peripheral edema  :  No segovia.   Skin: No rashes    LABS:  10-27    137  |  102  |  26<H>  ----------------------------<  113<H>  5.5<H>   |  27  |  2.0<H>    Ca    9.2      27 Oct 2022 03:20  Mg     1.6     10-26    TPro  6.4  /  Alb  3.5  /  TBili  <0.2  /  DBili      /  AST  58<H>  /  ALT  56<H>  /  AlkPhos  159<H>  10-27                          7.8    2.76  )-----------( 246      ( 26 Oct 2022 07:19 )             23.9       Urine Studies:  Urinalysis Basic - ( 21 Oct 2022 18:38 )    Color: Light Yellow / Appearance: Clear / S.015 / pH:   Gluc:  / Ketone: Negative  / Bili: Negative / Urobili: <2 mg/dL   Blood:  / Protein: 30 mg/dL / Nitrite: Negative   Leuk Esterase: Negative / RBC: 1 /HPF / WBC 2 /HPF   Sq Epi:  / Non Sq Epi: 2 /HPF / Bacteria: Negative          Iron 79, TIBC 249, %sat 32      [10-23-22 @ 08:22]  Ferritin 261      [10-23-22 @ 08:22]  PTH -- (Ca 8.7)      [10-22-22 @ 18:06]   48  PTH -- (Ca 9.1)      [10-22-22 @ 05:45]   41  HbA1c 5.3      [20 @ 09:09]    HBsAb Nonreact      [21 @ 11:38]  HBsAg Nonreact      [10-22-22 @ 18:06]  HBcAb Reactive      [20 @ 09:09]  HCV 11.24, Reactive      [10-22-22 @ 18:06]    Syphilis Screen (Treponema Pallidum Ab) Negative      [20 @ 09:09]      RADIOLOGY & ADDITIONAL STUDIES:   Nephrology progress note  Patient is seen and examined, events over the last 24 h noted .  Lying in bed comfortable     Allergies:  No Known Allergies    Hospital Medications:   MEDICATIONS  (STANDING):    amoxicillin  875 milliGRAM(s)/clavulanate 1 Tablet(s) Oral every 12 hours  aspirin enteric coated 81 milliGRAM(s) Oral daily  atorvastatin Oral Tab/Cap - Peds 40 milliGRAM(s) Oral daily  dolutegravir 50 mG/rilpivirine 25 mG (JULUCA) 1 Tablet(s) Oral with breakfast  doxycycline monohydrate Capsule 100 milliGRAM(s) Oral every 12 hours  heparin   Injectable 5000 Unit(s) SubCutaneous every 12 hours  influenza  Vaccine (HIGH DOSE) 0.7 milliLiter(s) IntraMuscular once  methadone    Tablet 110 milliGRAM(s) Oral daily  pregabalin 200 milliGRAM(s) Oral two times a day  sodium chloride 0.9%. 1000 milliLiter(s) (75 mL/Hr) IV Continuous <Continuous>  sodium zirconium cyclosilicate 10 Gram(s) Oral two times a day        VITALS:  T(F): 99.1 (10-27-22 @ 05:01), Max: 99.5 (10-26-22 @ 19:54)  HR: 61 (10-27-22 @ 05:01)  BP: 131/62 (10-27-22 @ 05:01)  RR: 18 (10-27-22 @ 05:01)      10-25 @ 07:01  -  10-26 @ 07:00  --------------------------------------------------------  IN: 0 mL / OUT: 500 mL / NET: -500 mL          PHYSICAL EXAM:  Constitutional: NAD  Neck: No JVD  Respiratory: CTAB,   Cardiovascular: S1, S2, RRR  Gastrointestinal: BS+, soft, NT/ND  Extremities: No cyanosis or clubbing.   :  No segovia.   Skin: No rashes    LABS:  10-27    135  |  100  |  26<H>  ----------------------------<  97  6.0<HH>   |  28  |  1.9<H>    Ca    9.6      27 Oct 2022 07:54  Mg     1.5     10-27    TPro  7.0  /  Alb  3.9  /  TBili  <0.2  /  DBili  x   /  AST  60<H>  /  ALT  59<H>  /  AlkPhos  171<H>  10-27    10-27    137  |  102  |  26<H>  ----------------------------<  113<H>  5.5<H>   |  27  |  2.0<H>    Ca    9.2      27 Oct 2022 03:20  Mg     1.6     10-26    TPro  6.4  /  Alb  3.5  /  TBili  <0.2  /  DBili      /  AST  58<H>  /  ALT  56<H>  /  AlkPhos  159<H>  10-27                          7.8    2.76  )-----------( 246      ( 26 Oct 2022 07:19 )             23.9       Urine Studies:  Urinalysis Basic - ( 21 Oct 2022 18:38 )    Color: Light Yellow / Appearance: Clear / S.015 / pH:   Gluc:  / Ketone: Negative  / Bili: Negative / Urobili: <2 mg/dL   Blood:  / Protein: 30 mg/dL / Nitrite: Negative   Leuk Esterase: Negative / RBC: 1 /HPF / WBC 2 /HPF   Sq Epi:  / Non Sq Epi: 2 /HPF / Bacteria: Negative          Iron 79, TIBC 249, %sat 32      [10-23-22 @ 08:22]  Ferritin 261      [10-23-22 @ 08:22]  PTH -- (Ca 8.7)      [10-22-22 @ 18:06]   48  PTH -- (Ca 9.1)      [10-22-22 @ 05:45]   41  HbA1c 5.3      [20 @ 09:09]    HBsAb Nonreact      [21 @ 11:38]  HBsAg Nonreact      [10-22-22 @ 18:06]  HBcAb Reactive      [20 @ 09:09]  HCV 11.24, Reactive      [10-22-22 @ 18:06]    Syphilis Screen (Treponema Pallidum Ab) Negative      [20 @ 09:09]      RADIOLOGY & ADDITIONAL STUDIES:

## 2022-10-27 NOTE — PROGRESS NOTE ADULT - ASSESSMENT
66-year-old male, with PMH of substance dependence–on methadone, IVDA (admits to currently using heroin/cocaine), HIV (on HAART), [id number] (baseline Cr ~3), and hepatitis C, sent in by the infectious team for KATERIN on CKD and hyperkalemia noted on OP labs.   Vancomycin was D/Slava.      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  HTN is a risk factor along with HIV and HAART therapy on Triumeq (abacavir, dolutegravir, and lamivudine)  - Cr 5 on admission (baseline ~3)   - Renal US 10/22 - unremakrable   - continue to trend  - d/c sodium bicarb  - appreciate renal evaluation - check CK    #Hyperkalemia -   - increase to lokelema 10g q 8 hours  - trend creatinine  - renal US WNL    #Left wrist fluctuance cellulitis -- by previous IV drug use site  - doxycycline 100 mg BID and Augmentin 875/125 mg BID for 7 days     # HIV (on HAART)  - HIV-1 viral load: <20 (5/2022 AND 8/2022)   - CD4: 246 ( 3/2022) >212 (8/2022)  - On Juluca (50-25) OD       #Leukopenia - HIV controlled, chronic    - Vitamin B12, Serum: 844 pg/mL (10.22.22 @ 05:45)  - Folate, Serum: 5.9 ng/mL (10.22.22 @ 05:45)  - HIV-1 VL undetectable in 8/17/2022  - eventual heme evaluation - can be done as outpatient     # CBD Diltation with mild pancreatic duct dilatation  - US abdomen (July 2022) New mild pancreatic duct dilatation measuring 3-4 mm.A central obstructing lesion cannot be excluded on this examination.  - Further evaluation of the pancreas with MRI and MRCP with and without  gadolinium is necessary for complete characterization. Essentially stable dilatation of the common   - US Abdomen Upper Quadrant Right (10.22.22 @ 19:28): Dilated common bile duct measuring up to 11 mm, similar in appearance  from prior examination.  - appreciate GI evaluation -- hold off on MRCP for now -- follow-up clinic -- possibly secondary to opioid use     #Hep C   - Hepatitis C RNA Qualitative Interp: Negative 3.18.21 @ 11:38)      # Normocytic Anemia likely 2/2 to CKD  - Hgb 8.8 on admission ( baseline ~9.5)   - iron studies, b12, folate  - SPEP, UPEP, IF    - keep active T&S      # CHF   - TTE (2018) EF 55-60% , GIDD  - repeat TTE 10/25 without significant change     # NEUROPATHY   - c/w pregabalin       # Low testosterone   - on testosterone gel       #Left hip pain   - on chronic opioids       # HTN - currently borderline low  BP  - will hold off amlo   - dc enalapril        # substance dependence–on methadone  # IVDA (admits to currently using heroin/cocaine)  - methadone 110mg OD  - [address] and 2nd [facility] (confirm in am)     # DIET: DASH     Dispo: plan for DC tomorrow

## 2022-10-27 NOTE — PROGRESS NOTE ADULT - ASSESSMENT
66-year-old male, with PMH of substance dependence–on methadone, IVDA (admits to currently using heroin/cocaine), HIV (on HAART), [id number]-2 (baseline Cr ~3), and hepatitis C, sent in by the infectious team for KATERIN on CKD and hyperkalmia noted on OP labs.     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: improving  # Hyperkalemia: worsening  HTN is a risk factor along with HIV and HAART therapy on Triumeq (abacavir, dolutegravir, and lamivudine)  - Cr 5 on admission (baseline ~3)   - K 6.8 on admission sp D50 insulin in ED  - US renal---> no hydro   - Phos 3.3, PTH wnl, CK wnl  - no need for bactrim for pcp ppx; CD4 count sufficient  - lokelma 10 q8h, insulin/dextrose, EKG  - f/u urine K+ and creatinine  - f/u neph recs for hyperkalemia work-up    #Sore throat  -COVID test    #wheal on right arm: imppving  -started doxy and augmentin    # HIV (on HAART)  # Hepatitis C  - pt is on Triumeq (abacavir, dolutegravir, and lamivudine) and was advised to stop given his worsening kidney fx and to start Julaca instead (Dolutegravir- rilpivirine)  - HIV-1 viral load: <20 (5/2022 AND 8/2022)   - CD4: 246 ( 3/2022) >212 (8/2022)  - start on Julaca (50-25) OD after ID approval     # Normocytic Anemia likely 2/2 to CKD  - Hgb 8.8 on admission ( baseline ~9.5)   - iron studies normal, b12 wnl, folate wnl   - urine protein elevated   - SPEP, , IF    - keep active T&S  - keep Hb > 7    # mild transaminitis-resolved  # CBD Dilation CBD 11-16 and PD 3mm  # stable dilatation of the common bile duct  - ALT/AST 51/47 on admission  - been noted to have elevated LFTs on previous labs  - US abdomen (July 2022) New mild pancreatic duct dilatation measuring 3-4 mm.A central obstructing lesion cannot be excluded on this examination.  - GI does not recommend MRCP. Per GI eval, CBD dilation ould be secondary to long term Opioid use since pt has reassuring LFT panel, physical exam, and no GI sx  - Follow up at [facility] ([id number]) outpatient for non urgent work up    #Anemia/ Hep C currently treated   - Due for CRC as last in 2017 with poor prep- would benefit also from EGD given Hep C Hx- Eval for varices   - AFP with next set of outpatient labs    # CHF   - TTE (2018) EF 55-60% , GIDD  -TTE 10/2022 similar to previous; LVEF 55-60%  - keep euvolemic    # NEUROPATHY   - c/w pregabalin     # Low testosterone   - on testosterone gel     #Left hip pain   - on chronic opioids     # HTN - currently borderline low  BP  - will hold off amlodipine  - dc enalapril      # substance dependence–on methadone  # IVDA (admits to currently using heroin/cocaine)  - methadone 110mg OD    DVT ppx: heparin  GI ppx: none  Full code  Dispo: home; pt lives alone   66-year-old male, with PMH of substance dependence–on methadone, IVDA (admits to currently using heroin/cocaine), HIV (on HAART), [id number]-2 (baseline Cr ~3), and hepatitis C, sent in by the infectious team for KATERIN on CKD and hyperkalmia noted on OP labs.     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: improving  # Hyperkalemia: worsening  HTN is a risk factor along with HIV and HAART therapy on Triumeq (abacavir, dolutegravir, and lamivudine)  - Cr 5 on admission (baseline ~3)   - K 6.8 on admission sp D50 insulin in ED  - US renal---> no hydro   - Phos 3.3, PTH wnl, CK wnl  - no need for bactrim for pcp ppx; CD4 count sufficient  - lokelma 10 q8h, insulin/dextrose, EKG, Ca gluconate  - f/u urine K+ and creatinine  - f/u neph recs for hyperkalemia work-up    #Sore throat  -COVID test    #wheal on right arm: improving  -started doxy and augmentin    # HIV (on HAART)  # Hepatitis C  - pt is on Triumeq (abacavir, dolutegravir, and lamivudine) and was advised to stop given his worsening kidney fx and to start Julaca instead (Dolutegravir- rilpivirine)  - HIV-1 viral load: <20 (5/2022 AND 8/2022)   - CD4: 246 ( 3/2022) >212 (8/2022)  - start on Julaca (50-25) OD after ID approval     # Normocytic Anemia likely 2/2 to CKD  - Hgb 8.8 on admission ( baseline ~9.5)   - iron studies normal, b12 wnl, folate wnl   - urine protein elevated   - SPEP, , IF    - keep active T&S  - keep Hb > 7    # mild transaminitis-resolved  # CBD Dilation CBD 11-16 and PD 3mm  # stable dilatation of the common bile duct  - ALT/AST 51/47 on admission  - been noted to have elevated LFTs on previous labs  - US abdomen (July 2022) New mild pancreatic duct dilatation measuring 3-4 mm.A central obstructing lesion cannot be excluded on this examination.  - GI does not recommend MRCP. Per GI eval, CBD dilation ould be secondary to long term Opioid use since pt has reassuring LFT panel, physical exam, and no GI sx  - Follow up at [facility] ([id number]) outpatient for non urgent work up    #Anemia/ Hep C currently treated   - Due for CRC as last in 2017 with poor prep- would benefit also from EGD given Hep C Hx- Eval for varices   - AFP with next set of outpatient labs    # CHF   - TTE (2018) EF 55-60% , GIDD  -TTE 10/2022 similar to previous; LVEF 55-60%  - keep euvolemic    # Neuropathy  - c/w pregabalin     # Low testosterone   - on testosterone gel     #Left hip pain   - on chronic opioids     # HTN - currently borderline low  BP  - will hold off amlodipine  - dc enalapril bc elevated K+    # substance dependence–on methadone  # IVDA (admits to currently using heroin/cocaine)  - methadone 110mg OD    DVT ppx: heparin  GI ppx: none  Full code  Dispo: home; pt lives alone   66-year-old male, with PMH of substance dependence–on methadone, IVDA (admits to currently using heroin/cocaine), HIV (on HAART), [id number] (baseline Cr ~3), and hepatitis C, sent in by the infectious team for KATERIN on CKD and hyperkalmia noted on OP labs.     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: improving  # Hyperkalemia: worsening  HTN is a risk factor along with HIV and HAART therapy on Triumeq (abacavir, dolutegravir, and lamivudine)  - Cr 5 on admission (baseline ~3)   - K 6.8 on admission sp D50 insulin in ED  - US renal---> no hydro   - Phos 3.3, PTH wnl, CK wnl  - no need for bactrim for pcp ppx; CD4 count sufficient  - lokelma 10 q8h, insulin/dextrose, EKG, Ca gluconate  - f/u urine K+ and creatinine  - per neph: check urine lytes, CK       #Sore throat  -COVID test    #wheal on right arm: improving  -started doxy and augmentin    # HIV (on HAART)  # Hepatitis C  - pt is on Triumeq (abacavir, dolutegravir, and lamivudine) and was advised to stop given his worsening kidney fx and to start Julaca instead (Dolutegravir- rilpivirine)  - HIV-1 viral load: <20 (5/2022 AND 8/2022)   - CD4: 246 ( 3/2022) >212 (8/2022)  - start on Julaca (50-25) OD after ID approval     # Normocytic Anemia likely 2/2 to CKD  - Hgb 8.8 on admission ( baseline ~9.5)   - iron studies normal, b12 wnl, folate wnl   - urine protein elevated   - SPEP, , IF    - keep active T&S  - keep Hb > 7    # mild transaminitis-resolved  # CBD Dilation CBD 11-16 and PD 3mm  # stable dilatation of the common bile duct  - ALT/AST 51/47 on admission  - been noted to have elevated LFTs on previous labs  - US abdomen (July 2022) New mild pancreatic duct dilatation measuring 3-4 mm.A central obstructing lesion cannot be excluded on this examination.  - GI does not recommend MRCP. Per GI eval, CBD dilation ould be secondary to long term Opioid use since pt has reassuring LFT panel, physical exam, and no GI sx  - Follow up at [facility] ([id number]) outpatient for non urgent work up    #Anemia/ Hep C currently treated   - Due for CRC as last in 2017 with poor prep- would benefit also from EGD given Hep C Hx- Eval for varices   - AFP with next set of outpatient labs    # CHF   - TTE (2018) EF 55-60% , GIDD  -TTE 10/2022 similar to previous; LVEF 55-60%  - keep euvolemic    # Neuropathy  - c/w pregabalin     # Low testosterone   - on testosterone gel     #Left hip pain   - on chronic opioids     # HTN - currently borderline low  BP  - will hold off amlodipine  - dc enalapril bc elevated K+    # substance dependence–on methadone  # IVDA (admits to currently using heroin/cocaine)  - methadone 110mg OD    DVT ppx: heparin  GI ppx: none  Full code  Dispo: home; pt lives alone

## 2022-10-27 NOTE — CHART NOTE - NSCHARTNOTEFT_GEN_A_CORE
No labs were collected to follow up K+ at 8pm 10/26  STAT labs drawn in am on 10/27  K = 5.5 - Pt's scheduled 6am kelma to be given at 430am

## 2022-10-27 NOTE — PROGRESS NOTE ADULT - ASSESSMENT
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  cr trending down stable   non oliguric   continue lokelma increase to 10 q8h/ low K diet   check urine lytes   dolutegravir can cause rhabdo check CK   follow BMP this afternoon   renal ultrasound reviewed, no hydronephrosis, nml size kidneys   last phos level at goal no need for binder  ID noted back on HAART  will follow

## 2022-10-27 NOTE — PROGRESS NOTE ADULT - SUBJECTIVE AND OBJECTIVE BOX
[facility] day: 6    SUBJECTIVE:   Complaints: none  Overnight events: none    OBJECTIVE:   T(C): 37.3 (10-27-22 @ 05:01), Max: 37.5 (10-26-22 @ 19:54)  HR: 61 (10-27-22 @ 05:01) (61 - 68)  BP: 131/62 (10-27-22 @ 05:01) (110/59 - 131/62)  RR: 18 (10-27-22 @ 05:01) (18 - 20)  SpO2: --    PHYSICAL EXAM  General: NAD, alert, interactive  Resp: Normal respiratory effort, no wheezing, rhonchi or rates, CTAB  CV: RRR, normal s1/s2, no m/r/g  Abdomen: soft, non-distended, non-tender  Extremities: no cyanosis or edema; wheal on right arm + erythematous; less tender  Neuro: alert and oriented x3, no focal deficits    MEDICATIONS  amoxicillin  875 milliGRAM(s)/clavulanate 1 Tablet(s) Oral every 12 hours  aspirin enteric coated 81 milliGRAM(s) Oral daily  atorvastatin Oral Tab/Cap - Peds 40 milliGRAM(s) Oral daily  dextrose 50% Injectable 50 milliLiter(s) IV Push once  dolutegravir 50 mG/rilpivirine 25 mG (JULUCA) 1 Tablet(s) Oral with breakfast  doxycycline monohydrate Capsule 100 milliGRAM(s) Oral every 12 hours  heparin   Injectable 5000 Unit(s) SubCutaneous every 12 hours  influenza  Vaccine (HIGH DOSE) 0.7 milliLiter(s) IntraMuscular once  insulin regular  human recombinant 10 Unit(s) IV Push once  methadone    Tablet 110 milliGRAM(s) Oral daily  pregabalin 200 milliGRAM(s) Oral two times a day  sodium chloride 0.9%. 1000 milliLiter(s) IV Continuous <Continuous>  sodium zirconium cyclosilicate 10 Gram(s) Oral every 8 hours      LABS:                          7.9    3.09  )-----------( 245      ( 27 Oct 2022 07:54 )             24.6     10-27    135  |  100  |  26<H>  ----------------------------<  97  6.0<HH>   |  28  |  1.9<H>    Ca    9.6      27 Oct 2022 07:54  Mg     1.5     10-27    TPro  7.0  /  Alb  3.9  /  TBili  <0.2  /  DBili  x   /  AST  60<H>  /  ALT  59<H>  /  AlkPhos  171<H>  10-27

## 2022-10-27 NOTE — PROGRESS NOTE ADULT - SUBJECTIVE AND OBJECTIVE BOX
YAZMIN VILLEGAS  67y, Male  Allergy: No Known Allergies      LOS  6d    CHIEF COMPLAINT: abnormal labs (27 Oct 2022 11:09)      INTERVAL EVENTS/HPI  - No acute events overnight  - T(F): , Max: 99.5 (10-26-22 @ 19:54)  - Denies any worsening symptoms  - Tolerating medication  - WBC Count: 3.09 (10-27-22 @ 07:54)  WBC Count: 2.76 (10-26-22 @ 07:19)     - Creatinine, Serum: 1.9 (10-27-22 @ 07:54)  Creatinine, Serum: 2.0 (10-27-22 @ 03:20)       ROS  General: Denies rigors, nightsweats  HEENT: Denies headache, rhinorrhea, sore throat, eye pain  CV: Denies CP, palpitations  PULM: Denies wheezing, hemoptysis  GI: Denies hematemesis, hematochezia, melena  : Denies discharge, hematuria  MSK: Denies arthralgias, myalgias  SKIN: Denies rash, lesions  NEURO: Denies paresthesias, weakness  PSYCH: Denies depression, anxiety    VITALS:  T(F): 98.8, Max: 99.5 (10-26-22 @ 19:54)  HR: 94  BP: 128/56  RR: 17Vital Signs Last 24 Hrs  T(C): 37.1 (27 Oct 2022 13:01), Max: 37.5 (26 Oct 2022 19:54)  T(F): 98.8 (27 Oct 2022 13:01), Max: 99.5 (26 Oct 2022 19:54)  HR: 94 (27 Oct 2022 13:01) (61 - 94)  BP: 128/56 (27 Oct 2022 13:01) (125/61 - 131/62)  BP(mean): --  RR: 17 (27 Oct 2022 13:01) (17 - 18)  SpO2: --        PHYSICAL EXAM:  Gen: NAD, resting in bed  HEENT: Normocephalic, atraumatic  Neck: supple, no lymphadenopathy  CV: Regular rate & regular rhythm  Lungs: decreased BS at bases, no fremitus  Abdomen: Soft, BS present  Ext: Warm, well perfused  Neuro: non focal, awake  Skin: no rash, no erythema  Lines: no phlebitis    FH: Non-contributory  Social Hx: Non-contributory    TESTS & MEASUREMENTS:                        7.9    3.09  )-----------( 245      ( 27 Oct 2022 07:54 )             24.6     10-27    135  |  100  |  26<H>  ----------------------------<  97  6.0<HH>   |  28  |  1.9<H>    Ca    9.6      27 Oct 2022 07:54  Mg     1.5     10-27    TPro  7.0  /  Alb  3.9  /  TBili  <0.2  /  DBili  x   /  AST  60<H>  /  ALT  59<H>  /  AlkPhos  171<H>  10-27      LIVER FUNCTIONS - ( 27 Oct 2022 07:54 )  Alb: 3.9 g/dL / Pro: 7.0 g/dL / ALK PHOS: 171 U/L / ALT: 59 U/L / AST: 60 U/L / GGT: x               Culture - Urine (collected 10-21-22 @ 18:38)  Source: Clean Catch Clean Catch (Midstream)  Final Report (10-23-22 @ 11:25):    <10,000 CFU/mL Normal Urogenital Jina            INFECTIOUS DISEASES TESTING  COVID-19 PCR: NotDetec (10-21-22 @ 12:50)      INFLAMMATORY MARKERS      RADIOLOGY & ADDITIONAL TESTS:  I have personally reviewed the last available Chest xray  CXR      CT      CARDIOLOGY TESTING  12 Lead ECG:   Ventricular Rate 66 BPM    Atrial Rate 66 BPM    P-R Interval 214 ms    QRS Duration 88 ms    Q-T Interval 384 ms    QTC Calculation(Bazett) 402 ms    P Axis 63 degrees    R Axis 34 degrees    T Axis 33 degrees    Diagnosis Line Sinus rhythm with 1st degree A-V block  Otherwise normal ECG    Confirmed by Darrel Vargas ([id number]) on 10/27/2022 1:22:33 PM (10-27-22 @ 10:56)  12 Lead ECG:   Ventricular Rate 62 BPM    Atrial Rate 62 BPM    P-R Interval 220 ms    QRS Duration 90 ms    Q-T Interval 376 ms    QTC Calculation(Bazett) 381 ms    P Axis 61 degrees    R Axis 38 degrees    T Axis 23 degrees    Diagnosis Line Sinus rhythm with 1st degree A-V block  Non-specific change in ST segment in  Otherwise normal ECG    Confirmed by Vikash Estrada ([id number]) on 10/25/2022 5:38:58 PM (10-25-22 @ 11:14)      MEDICATIONS  amoxicillin  875 milliGRAM(s)/clavulanate 1 Oral every 12 hours  aspirin enteric coated 81 Oral daily  atorvastatin Oral Tab/Cap - Peds 40 Oral daily  dolutegravir 50 mG/rilpivirine 25 mG (JULUCA) 1 Oral with breakfast  doxycycline monohydrate Capsule 100 Oral every 12 hours  heparin   Injectable 5000 SubCutaneous every 12 hours  influenza  Vaccine (HIGH DOSE) 0.7 IntraMuscular once  methadone    Tablet 110 Oral daily  pregabalin 200 Oral two times a day  sodium chloride 0.9%. 1000 IV Continuous <Continuous>  sodium zirconium cyclosilicate 10 Oral every 8 hours      WEIGHT  Weight (kg): 85 (10-21-22 @ 16:08)  Creatinine, Serum: 1.9 mg/dL (10-27-22 @ 07:54)  Creatinine, Serum: 2.0 mg/dL (10-27-22 @ 03:20)      ANTIBIOTICS:  amoxicillin  875 milliGRAM(s)/clavulanate 1 Tablet(s) Oral every 12 hours  dolutegravir 50 mG/rilpivirine 25 mG (JULUCA) 1 Tablet(s) Oral with breakfast  doxycycline monohydrate Capsule 100 milliGRAM(s) Oral every 12 hours      All available historical records have been reviewed

## 2022-10-28 ENCOUNTER — NON-APPOINTMENT (OUTPATIENT)
Age: 67
End: 2022-10-28

## 2022-10-28 ENCOUNTER — OUTPATIENT (OUTPATIENT)
Dept: OUTPATIENT SERVICES | Facility: HOSPITAL | Age: 67
LOS: 1 days | Discharge: HOME | End: 2022-10-28

## 2022-10-28 LAB
ALBUMIN SERPL ELPH-MCNC: 3.7 G/DL — SIGNIFICANT CHANGE UP (ref 3.5–5.2)
ALP SERPL-CCNC: 171 U/L — HIGH (ref 30–115)
ALT FLD-CCNC: 54 U/L — HIGH (ref 0–41)
ANION GAP SERPL CALC-SCNC: 10 MMOL/L — SIGNIFICANT CHANGE UP (ref 7–14)
ANION GAP SERPL CALC-SCNC: 10 MMOL/L — SIGNIFICANT CHANGE UP (ref 7–14)
ANION GAP SERPL CALC-SCNC: 13 MMOL/L — SIGNIFICANT CHANGE UP (ref 7–14)
AST SERPL-CCNC: 54 U/L — HIGH (ref 0–41)
BASOPHILS # BLD AUTO: 0.04 K/UL — SIGNIFICANT CHANGE UP (ref 0–0.2)
BASOPHILS NFR BLD AUTO: 1.2 % — HIGH (ref 0–1)
BILIRUB SERPL-MCNC: 0.2 MG/DL — SIGNIFICANT CHANGE UP (ref 0.2–1.2)
BUN SERPL-MCNC: 29 MG/DL — HIGH (ref 10–20)
BUN SERPL-MCNC: 30 MG/DL — HIGH (ref 10–20)
BUN SERPL-MCNC: 30 MG/DL — HIGH (ref 10–20)
CALCIUM SERPL-MCNC: 9.1 MG/DL — SIGNIFICANT CHANGE UP (ref 8.4–10.5)
CALCIUM SERPL-MCNC: 9.1 MG/DL — SIGNIFICANT CHANGE UP (ref 8.4–10.5)
CALCIUM SERPL-MCNC: 9.4 MG/DL — SIGNIFICANT CHANGE UP (ref 8.4–10.5)
CHLORIDE SERPL-SCNC: 101 MMOL/L — SIGNIFICANT CHANGE UP (ref 98–110)
CHLORIDE SERPL-SCNC: 98 MMOL/L — SIGNIFICANT CHANGE UP (ref 98–110)
CHLORIDE SERPL-SCNC: 99 MMOL/L — SIGNIFICANT CHANGE UP (ref 98–110)
CK MB CFR SERPL CALC: 1.4 NG/ML — SIGNIFICANT CHANGE UP (ref 0.6–6.3)
CK SERPL-CCNC: 24 U/L — SIGNIFICANT CHANGE UP (ref 0–225)
CO2 SERPL-SCNC: 24 MMOL/L — SIGNIFICANT CHANGE UP (ref 17–32)
CO2 SERPL-SCNC: 24 MMOL/L — SIGNIFICANT CHANGE UP (ref 17–32)
CO2 SERPL-SCNC: 26 MMOL/L — SIGNIFICANT CHANGE UP (ref 17–32)
CREAT SERPL-MCNC: 1.5 MG/DL — SIGNIFICANT CHANGE UP (ref 0.7–1.5)
CREAT SERPL-MCNC: 1.6 MG/DL — HIGH (ref 0.7–1.5)
CREAT SERPL-MCNC: 1.7 MG/DL — HIGH (ref 0.7–1.5)
EGFR: 43 ML/MIN/1.73M2 — LOW
EGFR: 47 ML/MIN/1.73M2 — LOW
EGFR: 50 ML/MIN/1.73M2 — LOW
EOSINOPHIL # BLD AUTO: 0.28 K/UL — SIGNIFICANT CHANGE UP (ref 0–0.7)
EOSINOPHIL NFR BLD AUTO: 8.4 % — HIGH (ref 0–8)
GLUCOSE BLDC GLUCOMTR-MCNC: 177 MG/DL — HIGH (ref 70–99)
GLUCOSE BLDC GLUCOMTR-MCNC: 196 MG/DL — HIGH (ref 70–99)
GLUCOSE BLDC GLUCOMTR-MCNC: 239 MG/DL — HIGH (ref 70–99)
GLUCOSE SERPL-MCNC: 102 MG/DL — HIGH (ref 70–99)
GLUCOSE SERPL-MCNC: 124 MG/DL — HIGH (ref 70–99)
GLUCOSE SERPL-MCNC: 176 MG/DL — HIGH (ref 70–99)
HCT VFR BLD CALC: 25.1 % — LOW (ref 42–52)
HGB BLD-MCNC: 7.9 G/DL — LOW (ref 14–18)
IMM GRANULOCYTES NFR BLD AUTO: 1.2 % — HIGH (ref 0.1–0.3)
LYMPHOCYTES # BLD AUTO: 1.03 K/UL — LOW (ref 1.2–3.4)
LYMPHOCYTES # BLD AUTO: 30.8 % — SIGNIFICANT CHANGE UP (ref 20.5–51.1)
MAGNESIUM SERPL-MCNC: 1.2 MG/DL — LOW (ref 1.8–2.4)
MCHC RBC-ENTMCNC: 30 PG — SIGNIFICANT CHANGE UP (ref 27–31)
MCHC RBC-ENTMCNC: 31.5 G/DL — LOW (ref 32–37)
MCV RBC AUTO: 95.4 FL — HIGH (ref 80–94)
MONOCYTES # BLD AUTO: 0.57 K/UL — SIGNIFICANT CHANGE UP (ref 0.1–0.6)
MONOCYTES NFR BLD AUTO: 17.1 % — HIGH (ref 1.7–9.3)
NEUTROPHILS # BLD AUTO: 1.38 K/UL — LOW (ref 1.4–6.5)
NEUTROPHILS NFR BLD AUTO: 41.3 % — LOW (ref 42.2–75.2)
NRBC # BLD: 0 /100 WBCS — SIGNIFICANT CHANGE UP (ref 0–0)
PLATELET # BLD AUTO: 255 K/UL — SIGNIFICANT CHANGE UP (ref 130–400)
POTASSIUM SERPL-MCNC: 5 MMOL/L — SIGNIFICANT CHANGE UP (ref 3.5–5)
POTASSIUM SERPL-MCNC: 5.5 MMOL/L — HIGH (ref 3.5–5)
POTASSIUM SERPL-MCNC: 5.7 MMOL/L — HIGH (ref 3.5–5)
POTASSIUM SERPL-SCNC: 5 MMOL/L — SIGNIFICANT CHANGE UP (ref 3.5–5)
POTASSIUM SERPL-SCNC: 5.5 MMOL/L — HIGH (ref 3.5–5)
POTASSIUM SERPL-SCNC: 5.7 MMOL/L — HIGH (ref 3.5–5)
PROT SERPL-MCNC: 6.8 G/DL — SIGNIFICANT CHANGE UP (ref 6–8)
RBC # BLD: 2.63 M/UL — LOW (ref 4.7–6.1)
RBC # FLD: 14.9 % — HIGH (ref 11.5–14.5)
SARS-COV-2 RNA SPEC QL NAA+PROBE: DETECTED
SODIUM SERPL-SCNC: 132 MMOL/L — LOW (ref 135–146)
SODIUM SERPL-SCNC: 136 MMOL/L — SIGNIFICANT CHANGE UP (ref 135–146)
SODIUM SERPL-SCNC: 137 MMOL/L — SIGNIFICANT CHANGE UP (ref 135–146)
TROPONIN T SERPL-MCNC: <0.01 NG/ML — SIGNIFICANT CHANGE UP
WBC # BLD: 3.34 K/UL — LOW (ref 4.8–10.8)
WBC # FLD AUTO: 3.34 K/UL — LOW (ref 4.8–10.8)

## 2022-10-28 PROCEDURE — 93010 ELECTROCARDIOGRAM REPORT: CPT

## 2022-10-28 RX ORDER — MAGNESIUM SULFATE 500 MG/ML
2 VIAL (ML) INJECTION ONCE
Refills: 0 | Status: COMPLETED | OUTPATIENT
Start: 2022-10-28 | End: 2022-10-28

## 2022-10-28 RX ORDER — INSULIN HUMAN 100 [IU]/ML
10 INJECTION, SOLUTION SUBCUTANEOUS ONCE
Refills: 0 | Status: COMPLETED | OUTPATIENT
Start: 2022-10-28 | End: 2022-10-28

## 2022-10-28 RX ORDER — SODIUM ZIRCONIUM CYCLOSILICATE 10 G/10G
10 POWDER, FOR SUSPENSION ORAL EVERY 8 HOURS
Refills: 0 | Status: DISCONTINUED | OUTPATIENT
Start: 2022-10-28 | End: 2022-10-31

## 2022-10-28 RX ORDER — DEXTROSE 50 % IN WATER 50 %
50 SYRINGE (ML) INTRAVENOUS ONCE
Refills: 0 | Status: COMPLETED | OUTPATIENT
Start: 2022-10-28 | End: 2022-10-28

## 2022-10-28 RX ORDER — SODIUM ZIRCONIUM CYCLOSILICATE 10 G/10G
10 POWDER, FOR SUSPENSION ORAL ONCE
Refills: 0 | Status: COMPLETED | OUTPATIENT
Start: 2022-10-28 | End: 2022-10-28

## 2022-10-28 RX ADMIN — Medication 50 MILLILITER(S): at 02:09

## 2022-10-28 RX ADMIN — Medication 10 MILLILITER(S): at 21:51

## 2022-10-28 RX ADMIN — Medication 100 MILLIGRAM(S): at 05:08

## 2022-10-28 RX ADMIN — METHADONE HYDROCHLORIDE 110 MILLIGRAM(S): 40 TABLET ORAL at 12:06

## 2022-10-28 RX ADMIN — INSULIN HUMAN 10 UNIT(S): 100 INJECTION, SOLUTION SUBCUTANEOUS at 02:17

## 2022-10-28 RX ADMIN — Medication 1 TABLET(S): at 05:08

## 2022-10-28 RX ADMIN — Medication 200 MILLIGRAM(S): at 18:30

## 2022-10-28 RX ADMIN — HEPARIN SODIUM 5000 UNIT(S): 5000 INJECTION INTRAVENOUS; SUBCUTANEOUS at 05:08

## 2022-10-28 RX ADMIN — Medication 100 MILLIGRAM(S): at 18:30

## 2022-10-28 RX ADMIN — Medication 10 MILLILITER(S): at 00:50

## 2022-10-28 RX ADMIN — HEPARIN SODIUM 5000 UNIT(S): 5000 INJECTION INTRAVENOUS; SUBCUTANEOUS at 18:30

## 2022-10-28 RX ADMIN — Medication 25 GRAM(S): at 18:29

## 2022-10-28 RX ADMIN — SODIUM ZIRCONIUM CYCLOSILICATE 10 GRAM(S): 10 POWDER, FOR SUSPENSION ORAL at 23:04

## 2022-10-28 RX ADMIN — ATORVASTATIN CALCIUM 40 MILLIGRAM(S): 80 TABLET, FILM COATED ORAL at 21:46

## 2022-10-28 RX ADMIN — SODIUM ZIRCONIUM CYCLOSILICATE 10 GRAM(S): 10 POWDER, FOR SUSPENSION ORAL at 21:44

## 2022-10-28 RX ADMIN — Medication 81 MILLIGRAM(S): at 12:05

## 2022-10-28 RX ADMIN — SODIUM ZIRCONIUM CYCLOSILICATE 10 GRAM(S): 10 POWDER, FOR SUSPENSION ORAL at 13:22

## 2022-10-28 RX ADMIN — Medication 200 MILLIGRAM(S): at 05:14

## 2022-10-28 RX ADMIN — DOLUTEGRAVIR SODIUM AND RILPIVIRINE HYDROCHLORIDE 1 TABLET(S): 50; 25 TABLET, FILM COATED ORAL at 12:03

## 2022-10-28 NOTE — PROGRESS NOTE ADULT - SUBJECTIVE AND OBJECTIVE BOX
Nephrology progress note    Patient was seen and examined, events over the last 24 h noted .  Cr stable    Allergies:  No Known Allergies    Hospital Medications:   MEDICATIONS  (STANDING):  amoxicillin  875 milliGRAM(s)/clavulanate 1 Tablet(s) Oral every 12 hours  aspirin enteric coated 81 milliGRAM(s) Oral daily  atorvastatin Oral Tab/Cap - Peds 40 milliGRAM(s) Oral daily  dolutegravir 50 mG/rilpivirine 25 mG (JULUCA) 1 Tablet(s) Oral with breakfast  doxycycline monohydrate Capsule 100 milliGRAM(s) Oral every 12 hours  heparin   Injectable 5000 Unit(s) SubCutaneous every 12 hours  influenza  Vaccine (HIGH DOSE) 0.7 milliLiter(s) IntraMuscular once  methadone    Tablet 110 milliGRAM(s) Oral daily  pregabalin 200 milliGRAM(s) Oral two times a day  sodium chloride 0.9%. 1000 milliLiter(s) (75 mL/Hr) IV Continuous <Continuous>  sodium zirconium cyclosilicate 10 Gram(s) Oral every 8 hours        VITALS:  T(F): 97.4 (10-28-22 @ 05:16), Max: 99.3 (10-27-22 @ 19:22)  HR: 54 (10-28-22 @ 05:16)  BP: 117/62 (10-28-22 @ 05:16)  RR: 18 (10-28-22 @ 05:16)  SpO2: 98% (10-28-22 @ 08:00)  Wt(kg): --    10-28 @ 07:01  -  10-28 @ 11:23  --------------------------------------------------------  IN: 0 mL / OUT: 650 mL / NET: -650 mL          PHYSICAL EXAM:  Constitutional: NAD  HEENT: anicteric sclera, oropharynx clear, MMM  Neck: No JVD  Respiratory: CTAB, no wheezes, rales or rhonchi  Cardiovascular: S1, S2, RRR  Gastrointestinal: BS+, soft, NT/ND  Extremities: No cyanosis or clubbing. No peripheral edema  :  No segovia.   Skin: No rashes    LABS:  10-28    137  |  101  |  29<H>  ----------------------------<  102<H>  5.7<H>   |  26  |  1.6<H>    Ca    9.4      28 Oct 2022 07:44  Mg     1.2     10-28    TPro  6.8  /  Alb  3.7  /  TBili  0.2  /  DBili      /  AST  54<H>  /  ALT  54<H>  /  AlkPhos  171<H>  10-28                          7.9    3.34  )-----------( 255      ( 28 Oct 2022 07:44 )             25.1       Urine Studies:  Urinalysis Basic - ( 21 Oct 2022 18:38 )    Color: Light Yellow / Appearance: Clear / S.015 / pH:   Gluc:  / Ketone: Negative  / Bili: Negative / Urobili: <2 mg/dL   Blood:  / Protein: 30 mg/dL / Nitrite: Negative   Leuk Esterase: Negative / RBC: 1 /HPF / WBC 2 /HPF   Sq Epi:  / Non Sq Epi: 2 /HPF / Bacteria: Negative      Creatinine, Random Urine: 59 mg/dL (10-27 @ 17:40)  Sodium, Random Urine: 105.0 mmoL/L (10-27 @ 17:40)  Creatinine, Random Urine: 62 mg/dL (10-27 @ 17:40)  Protein/Creatinine Ratio Calculation: 2.1 Ratio (10-27 @ 17:40)  Osmolality, Random Urine: 415 mos/kg (10-27 @ 17:40)  Potassium, Random Urine: 24 mmol/L (10-27 @ 17:40)    RADIOLOGY & ADDITIONAL STUDIES:

## 2022-10-28 NOTE — PROGRESS NOTE ADULT - SUBJECTIVE AND OBJECTIVE BOX
YAZMIN VILLEGAS  67y, Male  Allergy: No Known Allergies      LOS  7d    CHIEF COMPLAINT: abnormal labs (27 Oct 2022 18:08)      INTERVAL EVENTS/HPI  - No acute events overnight  - T(F): , Max: 99.3 (10-27-22 @ 19:22)  - Denies any worsening symptoms  - Tolerating medication  - WBC Count: 3.34 (10-28-22 @ 07:44)  WBC Count: 3.09 (10-27-22 @ 07:54)     - Creatinine, Serum: 1.6 (10-28-22 @ 07:44)  Creatinine, Serum: 1.7 (10-28-22 @ 00:29)       ROS  General: Denies rigors, nightsweats  HEENT: Denies headache, rhinorrhea, sore throat, eye pain  CV: Denies CP, palpitations  PULM: Denies wheezing, hemoptysis  GI: Denies hematemesis, hematochezia, melena  : Denies discharge, hematuria  MSK: Denies arthralgias, myalgias  SKIN: Denies rash, lesions  NEURO: Denies paresthesias, weakness  PSYCH: Denies depression, anxiety    VITALS:  T(F): 97.4, Max: 99.3 (10-27-22 @ 19:22)  HR: 54  BP: 117/62  RR: 18Vital Signs Last 24 Hrs  T(C): 36.3 (28 Oct 2022 05:16), Max: 37.4 (27 Oct 2022 19:22)  T(F): 97.4 (28 Oct 2022 05:16), Max: 99.3 (27 Oct 2022 19:22)  HR: 54 (28 Oct 2022 05:16) (54 - 94)  BP: 117/62 (28 Oct 2022 05:16) (113/59 - 128/56)  BP(mean): --  RR: 18 (28 Oct 2022 05:16) (17 - 18)  SpO2: 98% (28 Oct 2022 08:00) (98% - 98%)    Parameters below as of 28 Oct 2022 08:00  Patient On (Oxygen Delivery Method): room air        PHYSICAL EXAM:  Gen: NAD, resting in bed  HEENT: Normocephalic, atraumatic  Neck: supple, no lymphadenopathy  CV: Regular rate & regular rhythm  Lungs: decreased BS at bases, no fremitus  Abdomen: Soft, BS present  Ext: Warm, well perfused  Neuro: non focal, awake  Skin: no rash, no erythema  Lines: no phlebitis    FH: Non-contributory  Social Hx: Non-contributory    TESTS & MEASUREMENTS:                        7.9    3.34  )-----------( 255      ( 28 Oct 2022 07:44 )             25.1     10-28    137  |  101  |  29<H>  ----------------------------<  102<H>  5.7<H>   |  26  |  1.6<H>    Ca    9.4      28 Oct 2022 07:44  Mg     1.2     10-28    TPro  6.8  /  Alb  3.7  /  TBili  0.2  /  DBili  x   /  AST  54<H>  /  ALT  54<H>  /  AlkPhos  171<H>  10-28      LIVER FUNCTIONS - ( 28 Oct 2022 07:44 )  Alb: 3.7 g/dL / Pro: 6.8 g/dL / ALK PHOS: 171 U/L / ALT: 54 U/L / AST: 54 U/L / GGT: x               Culture - Urine (collected 10-21-22 @ 18:38)  Source: Clean Catch Clean Catch (Midstream)  Final Report (10-23-22 @ 11:25):    <10,000 CFU/mL Normal Urogenital Jina            INFECTIOUS DISEASES TESTING  COVID-19 PCR: NotDetec (10-21-22 @ 12:50)      INFLAMMATORY MARKERS      RADIOLOGY & ADDITIONAL TESTS:  I have personally reviewed the last available Chest xray  CXR      CT      CARDIOLOGY TESTING  12 Lead ECG:   Ventricular Rate 66 BPM    Atrial Rate 66 BPM    P-R Interval 214 ms    QRS Duration 88 ms    Q-T Interval 384 ms    QTC Calculation(Bazett) 402 ms    P Axis 63 degrees    R Axis 34 degrees    T Axis 33 degrees    Diagnosis Line Sinus rhythm with 1st degree A-V block  Otherwise normal ECG    Confirmed by Darrel Vargas ([id number]) on 10/27/2022 1:22:33 PM (10-27-22 @ 10:56)  12 Lead ECG:   Ventricular Rate 62 BPM    Atrial Rate 62 BPM    P-R Interval 220 ms    QRS Duration 90 ms    Q-T Interval 376 ms    QTC Calculation(Bazett) 381 ms    P Axis 61 degrees    R Axis 38 degrees    T Axis 23 degrees    Diagnosis Line Sinus rhythm with 1st degree A-V block  Non-specific change in ST segment in  Otherwise normal ECG    Confirmed by Vikash Estrada ([id number]) on 10/25/2022 5:38:58 PM (10-25-22 @ 11:14)      MEDICATIONS  amoxicillin  875 milliGRAM(s)/clavulanate 1 Oral every 12 hours  aspirin enteric coated 81 Oral daily  atorvastatin Oral Tab/Cap - Peds 40 Oral daily  dolutegravir 50 mG/rilpivirine 25 mG (JULUCA) 1 Oral with breakfast  doxycycline monohydrate Capsule 100 Oral every 12 hours  heparin   Injectable 5000 SubCutaneous every 12 hours  influenza  Vaccine (HIGH DOSE) 0.7 IntraMuscular once  methadone    Tablet 110 Oral daily  pregabalin 200 Oral two times a day  sodium chloride 0.9%. 1000 IV Continuous <Continuous>  sodium zirconium cyclosilicate 10 Oral every 8 hours      WEIGHT  Weight (kg): 85 (10-21-22 @ 16:08)  Creatinine, Serum: 1.6 mg/dL (10-28-22 @ 07:44)  Creatinine, Serum: 1.7 mg/dL (10-28-22 @ 00:29)  Creatinine, Serum: 1.8 mg/dL (10-27-22 @ 20:09)      ANTIBIOTICS:  amoxicillin  875 milliGRAM(s)/clavulanate 1 Tablet(s) Oral every 12 hours  dolutegravir 50 mG/rilpivirine 25 mG (JULUCA) 1 Tablet(s) Oral with breakfast  doxycycline monohydrate Capsule 100 milliGRAM(s) Oral every 12 hours      All available historical records have been reviewed

## 2022-10-28 NOTE — PHARMACOTHERAPY INTERVENTION NOTE - COMMENTS
Confirmed with dr. Amada gonzalez 10g po q8h as standing for now till discussed with renal due to K is raising up.

## 2022-10-28 NOTE — CHART NOTE - NSCHARTNOTEFT_GEN_A_CORE
RN notified me that pt having chest pain. Went to bedside to assess. He is HDS. He experienced 2 sharp pains on his left chest that is non-reproducible. Denies nausea, diaphoresis, dyspnea, or any other symptoms. Exam benign. STAT EKG showed sinus bradycardia. Will f/u trop and CKMB. RN notified me that pt having chest pain. Went to bedside to assess. He is HDS. He experienced 2 sharp pains on his left chest that is non-reproducible. Denies nausea, diaphoresis, dyspnea, or any other symptoms. Exam benign. STAT EKG showed sinus bradycardia (HR 54). Will f/u trop and CKMB. RN notified me that pt having chest pain. Went to bedside to assess. He is HDS. He experienced 2 sharp pains on his left chest that is non-reproducible. Denies nausea, diaphoresis, dyspnea, or any other symptoms. Chest pain resolved when I spoke with him at bedside. Exam benign. STAT EKG showed sinus bradycardia (HR 54). Will f/u trop and CKMB. RN notified me that pt having chest pain. Went to bedside to assess. He is HDS. He experienced 2 sharp pains on his left chest that is non-reproducible. Denies nausea, diaphoresis, dyspnea, or any other symptoms. Chest pain resolved when I spoke with him at bedside. Exam benign. STAT EKG showed sinus bradycardia (HR 54).     Update: trop and CKMB negative

## 2022-10-28 NOTE — PROGRESS NOTE ADULT - ASSESSMENT
66-year-old male, with PMH of substance dependence–on methadone, IVDA (admits to currently using heroin/cocaine), HIV (on HAART), FHT8q-7 (baseline Cr ~3), and hepatitis C, sent in by the infectious team for KATERIN on CKD and hyperkalmia noted on OP labs.     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: worsening  # Hyperkalemia: worsening  HTN is a risk factor along with HIV and HAART therapy on Triumeq (abacavir, dolutegravir, and lamivudine)  - Cr 5 on admission (baseline ~3)   - K 6.8 on admission sp D50 insulin in ED  - US renal---> no hydro   - Phos 3.3, PTH wnl, CK wnl  - no need for bactrim for pcp ppx; CD4 count sufficient  - lokelma 10 q8h, insulin/dextrose, EKG, Ca gluconate  - cK and lytes wnl  -standing lokelma if possible    #Sore throat  -COVID test  -anti-cough medication     #wheal on right arm: improving  -started doxy and augmentin    # HIV (on HAART)  # Hepatitis C  - pt is on Triumeq (abacavir, dolutegravir, and lamivudine) and was advised to stop given his worsening kidney fx and to start Julaca instead (Dolutegravir- rilpivirine)  - HIV-1 viral load: <20 (5/2022 AND 8/2022)   - CD4: 246 ( 3/2022) >212 (8/2022)  - start on Julaca (50-25) OD after ID approval     # Normocytic Anemia likely 2/2 to CKD  - Hgb 8.8 on admission ( baseline ~9.5)   - iron studies normal, b12 wnl, folate wnl   - urine protein elevated   - SPEP, , IF    - keep active T&S  - keep Hb > 7    # mild transaminitis-resolved  # CBD Dilation CBD 11-16 and PD 3mm  # stable dilatation of the common bile duct  - ALT/AST 51/47 on admission  - been noted to have elevated LFTs on previous labs  - US abdomen (July 2022) New mild pancreatic duct dilatation measuring 3-4 mm.A central obstructing lesion cannot be excluded on this examination.  - GI does not recommend MRCP. Per GI eval, CBD dilation ould be secondary to long term Opioid use since pt has reassuring LFT panel, physical exam, and no GI sx  - Follow up at [facility] ([id number]) outpatient for non urgent work up    #Anemia/ Hep C currently treated   - Due for CRC as last in 2017 with poor prep- would benefit also from EGD given Hep C Hx- Eval for varices   - AFP with next set of outpatient labs    # CHF   - TTE (2018) EF 55-60% , GIDD  -TTE 10/2022 similar to previous; LVEF 55-60%  - keep euvolemic    # Neuropathy  - c/w pregabalin     # Low testosterone   - on testosterone gel     #Left hip pain   - on chronic opioids     # HTN - controlled  - will hold off amlodipine  - dc enalapril bc elevated K+    # substance dependence–on methadone  # IVDA (admits to currently using heroin/cocaine)  - methadone 110mg OD    DVT ppx: heparin  GI ppx: none  Diet: Renal restrictions; low K+   Full code  Dispo: home; pt lives alone

## 2022-10-28 NOTE — PROGRESS NOTE ADULT - ASSESSMENT
67y Male with PMH of HIV / CKD not seen by a nephrologist / HTN whom presented  with KATERIN on CKD - hyperkalemia  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/ HIV/ Hyperkalemia / low bicarb c/w Acidosis/ anemia / leukopenia    cr trending down stable   non oliguric   continue lokelma  10 q8h/ low K diet   renal ultrasound reviewed, no hydronephrosis, nml size kidneys   last phos level at goal no need for binder  ID noted back on HAART      LAST OV 2/24/21  NEXT OV 3/23/21  LAST REFILL 12/7/20

## 2022-10-28 NOTE — PROGRESS NOTE ADULT - ASSESSMENT
66-year-old male, with PMH of substance dependence–on methadone, IVDA (admits to currently using heroin/cocaine), HIV (on HAART), JOF8w-9 (baseline Cr ~3), and hepatitis C, sent in by the infectious team for KATERIN on CKD and hyperkalemia noted on OP labs.   Vancomycin was D/Slava.      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  HTN is a risk factor along with HIV and HAART therapy on Triumeq (abacavir, dolutegravir, and lamivudine)  - Cr 5 on admission (baseline ~3)   - Renal US 10/22 - unremakrable   - CK negative  - appreciate renal evaluation -     #Hyperkalemia -   - continue lokelema 10g q 8 hours  - K remains 5.7 -- repeat in Afternoon 4 PM labs  - renal US WNL    #Left wrist fluctuance cellulitis -- by previous IV drug use site  - doxycycline 100 mg BID and Augmentin 875/125 mg BID for 7 days     # HIV (on HAART)  - HIV-1 viral load: <20 (5/2022 AND 8/2022)   - CD4: 246 ( 3/2022) >212 (8/2022)  - On Juluca (50-25) OD       #Leukopenia - HIV controlled, chronic    - Vitamin B12, Serum: 844 pg/mL (10.22.22 @ 05:45)  - Folate, Serum: 5.9 ng/mL (10.22.22 @ 05:45)  - HIV-1 VL undetectable in 8/17/2022  - eventual heme evaluation - can be done as outpatient     # CBD Diltation with mild pancreatic duct dilatation  - US abdomen (July 2022) New mild pancreatic duct dilatation measuring 3-4 mm.A central obstructing lesion cannot be excluded on this examination.  - Further evaluation of the pancreas with MRI and MRCP with and without  gadolinium is necessary for complete characterization. Essentially stable dilatation of the common   - US Abdomen Upper Quadrant Right (10.22.22 @ 19:28): Dilated common bile duct measuring up to 11 mm, similar in appearance  from prior examination.  - appreciate GI evaluation -- hold off on MRCP for now -- follow-up clinic -- possibly secondary to opioid use     #Hep C   - Hepatitis C RNA Qualitative Interp: Negative 3.18.21 @ 11:38)      # Normocytic Anemia likely 2/2 to CKD  - Hgb 8.8 on admission ( baseline ~9.5)   - iron studies, b12, folate  - SPEP, UPEP, IF    - keep active T&S      # CHF   - TTE (2018) EF 55-60% , GIDD  - repeat TTE 10/25 without significant change     # NEUROPATHY   - c/w pregabalin       # Low testosterone   - on testosterone gel       #Left hip pain   - on chronic opioids       # HTN - currently borderline low  BP  - will hold off amlo   - dc enalapril        # substance dependence–on methadone  # IVDA (admits to currently using heroin/cocaine)  - methadone 110mg OD  - [address] [place] [address] (confirm in am)     # DIET: DASH     Dispo: pending stability in

## 2022-10-28 NOTE — PROGRESS NOTE ADULT - SUBJECTIVE AND OBJECTIVE BOX
[facility] day: 7      SUBJECTIVE:   Complaints: none  Overnight events: none     OBJECTIVE:   T(C): 36.3 (10-28-22 @ 05:16), Max: 37.4 (10-27-22 @ 19:22)  HR: 54 (10-28-22 @ 05:16) (54 - 94)  BP: 117/62 (10-28-22 @ 05:16) (113/59 - 128/56)  RR: 18 (10-28-22 @ 05:16) (17 - 18)  SpO2: 98% (10-28-22 @ 08:00) (98% - 98%)    Acc I&O    10-28-22 @ 07:01  -  10-28-22 @ 11:41  --------------------------------------------------------  IN: 0 mL / OUT: 650 mL / NET: -650 mL      PHYSICAL EXAM  General: NAD, alert, interactive  Resp: Normal respiratory effort, no wheezing, rhonchi or rates, CTAB  CV: RRR, normal s1/s2, no m/r/g  Abdomen: soft, non-distended, non-tender  Extremities: no cyanosis or edema; wheal on right arm + erythematous; less tender  Neuro: alert and oriented x3, no focal deficits    MEDICATIONS  amoxicillin  875 milliGRAM(s)/clavulanate 1 Tablet(s) Oral every 12 hours  aspirin enteric coated 81 milliGRAM(s) Oral daily  atorvastatin Oral Tab/Cap - Peds 40 milliGRAM(s) Oral daily  dolutegravir 50 mG/rilpivirine 25 mG (JULUCA) 1 Tablet(s) Oral with breakfast  doxycycline monohydrate Capsule 100 milliGRAM(s) Oral every 12 hours  guaifenesin/dextromethorphan Oral Liquid 10 milliLiter(s) Oral every 6 hours PRN  heparin   Injectable 5000 Unit(s) SubCutaneous every 12 hours  influenza  Vaccine (HIGH DOSE) 0.7 milliLiter(s) IntraMuscular once  methadone    Tablet 110 milliGRAM(s) Oral daily  pregabalin 200 milliGRAM(s) Oral two times a day  sodium chloride 0.9%. 1000 milliLiter(s) IV Continuous <Continuous>  sodium zirconium cyclosilicate 10 Gram(s) Oral every 8 hours      LABS:    CBC Full  -  ( 28 Oct 2022 07:44 )  WBC Count : 3.34 K/uL  RBC Count : 2.63 M/uL  Hemoglobin : 7.9 g/dL  Hematocrit : 25.1 %  Platelet Count - Automated : 255 K/uL  Mean Cell Volume : 95.4 fL  Mean Cell Hemoglobin : 30.0 pg  Mean Cell Hemoglobin Concentration : 31.5 g/dL  Auto Neutrophil # : 1.38 K/uL  Auto Lymphocyte # : 1.03 K/uL  Auto Monocyte # : 0.57 K/uL  Auto Eosinophil # : 0.28 K/uL  Auto Basophil # : 0.04 K/uL  Auto Neutrophil % : 41.3 %  Auto Lymphocyte % : 30.8 %  Auto Monocyte % : 17.1 %  Auto Eosinophil % : 8.4 %  Auto Basophil % : 1.2 %    10-28    137  |  101  |  29<H>  ----------------------------<  102<H>  5.7<H>   |  26  |  1.6<H>    Ca    9.4      28 Oct 2022 07:44  Mg     1.2     10-28    TPro  6.8  /  Alb  3.7  /  TBili  0.2  /  DBili  x   /  AST  54<H>  /  ALT  54<H>  /  AlkPhos  171<H>  10-28

## 2022-10-29 LAB
ALBUMIN SERPL ELPH-MCNC: 3.7 G/DL — SIGNIFICANT CHANGE UP (ref 3.5–5.2)
ALBUMIN SERPL ELPH-MCNC: 4 G/DL — SIGNIFICANT CHANGE UP (ref 3.5–5.2)
ALP SERPL-CCNC: 175 U/L — HIGH (ref 30–115)
ALP SERPL-CCNC: 188 U/L — HIGH (ref 30–115)
ALT FLD-CCNC: 55 U/L — HIGH (ref 0–41)
ALT FLD-CCNC: 57 U/L — HIGH (ref 0–41)
ANION GAP SERPL CALC-SCNC: 8 MMOL/L — SIGNIFICANT CHANGE UP (ref 7–14)
AST SERPL-CCNC: 54 U/L — HIGH (ref 0–41)
AST SERPL-CCNC: 56 U/L — HIGH (ref 0–41)
BASOPHILS # BLD AUTO: 0.04 K/UL — SIGNIFICANT CHANGE UP (ref 0–0.2)
BASOPHILS NFR BLD AUTO: 1.5 % — HIGH (ref 0–1)
BILIRUB DIRECT SERPL-MCNC: <0.2 MG/DL — SIGNIFICANT CHANGE UP (ref 0–0.3)
BILIRUB INDIRECT FLD-MCNC: SIGNIFICANT CHANGE UP MG/DL (ref 0.2–1.2)
BILIRUB SERPL-MCNC: <0.2 MG/DL — SIGNIFICANT CHANGE UP (ref 0.2–1.2)
BILIRUB SERPL-MCNC: <0.2 MG/DL — SIGNIFICANT CHANGE UP (ref 0.2–1.2)
BLD GP AB SCN SERPL QL: SIGNIFICANT CHANGE UP
BUN SERPL-MCNC: 31 MG/DL — HIGH (ref 10–20)
CALCIUM SERPL-MCNC: 9.4 MG/DL — SIGNIFICANT CHANGE UP (ref 8.4–10.5)
CHLORIDE SERPL-SCNC: 96 MMOL/L — LOW (ref 98–110)
CO2 SERPL-SCNC: 28 MMOL/L — SIGNIFICANT CHANGE UP (ref 17–32)
CREAT SERPL-MCNC: 1.7 MG/DL — HIGH (ref 0.7–1.5)
CREAT SERPL-MCNC: 1.7 MG/DL — HIGH (ref 0.7–1.5)
CRP SERPL-MCNC: 3.8 MG/L — SIGNIFICANT CHANGE UP
D DIMER BLD IA.RAPID-MCNC: 443 NG/ML DDU — HIGH (ref 0–230)
EGFR: 43 ML/MIN/1.73M2 — LOW
EGFR: 43 ML/MIN/1.73M2 — LOW
EOSINOPHIL # BLD AUTO: 0.28 K/UL — SIGNIFICANT CHANGE UP (ref 0–0.7)
EOSINOPHIL NFR BLD AUTO: 10.3 % — HIGH (ref 0–8)
GLUCOSE SERPL-MCNC: 156 MG/DL — HIGH (ref 70–99)
HCT VFR BLD CALC: 26.3 % — LOW (ref 42–52)
HGB BLD-MCNC: 8.5 G/DL — LOW (ref 14–18)
IMM GRANULOCYTES NFR BLD AUTO: 1.1 % — HIGH (ref 0.1–0.3)
INR BLD: 0.94 RATIO — SIGNIFICANT CHANGE UP (ref 0.65–1.3)
LYMPHOCYTES # BLD AUTO: 0.83 K/UL — LOW (ref 1.2–3.4)
LYMPHOCYTES # BLD AUTO: 30.6 % — SIGNIFICANT CHANGE UP (ref 20.5–51.1)
MAGNESIUM SERPL-MCNC: 1.5 MG/DL — LOW (ref 1.8–2.4)
MCHC RBC-ENTMCNC: 30.8 PG — SIGNIFICANT CHANGE UP (ref 27–31)
MCHC RBC-ENTMCNC: 32.3 G/DL — SIGNIFICANT CHANGE UP (ref 32–37)
MCV RBC AUTO: 95.3 FL — HIGH (ref 80–94)
MONOCYTES # BLD AUTO: 0.59 K/UL — SIGNIFICANT CHANGE UP (ref 0.1–0.6)
MONOCYTES NFR BLD AUTO: 21.8 % — HIGH (ref 1.7–9.3)
NEUTROPHILS # BLD AUTO: 0.94 K/UL — LOW (ref 1.4–6.5)
NEUTROPHILS NFR BLD AUTO: 34.7 % — LOW (ref 42.2–75.2)
NRBC # BLD: 0 /100 WBCS — SIGNIFICANT CHANGE UP (ref 0–0)
PLATELET # BLD AUTO: 266 K/UL — SIGNIFICANT CHANGE UP (ref 130–400)
POTASSIUM SERPL-MCNC: 5.1 MMOL/L — HIGH (ref 3.5–5)
POTASSIUM SERPL-SCNC: 5.1 MMOL/L — HIGH (ref 3.5–5)
PROT SERPL-MCNC: 7 G/DL — SIGNIFICANT CHANGE UP (ref 6–8)
PROT SERPL-MCNC: 7.5 G/DL — SIGNIFICANT CHANGE UP (ref 6–8)
PROTHROM AB SERPL-ACNC: 10.7 SEC — SIGNIFICANT CHANGE UP (ref 9.95–12.87)
RBC # BLD: 2.76 M/UL — LOW (ref 4.7–6.1)
RBC # FLD: 14.9 % — HIGH (ref 11.5–14.5)
SODIUM SERPL-SCNC: 132 MMOL/L — LOW (ref 135–146)
WBC # BLD: 2.71 K/UL — LOW (ref 4.8–10.8)
WBC # FLD AUTO: 2.71 K/UL — LOW (ref 4.8–10.8)

## 2022-10-29 PROCEDURE — 71045 X-RAY EXAM CHEST 1 VIEW: CPT | Mod: 26

## 2022-10-29 RX ORDER — REMDESIVIR 5 MG/ML
INJECTION INTRAVENOUS
Refills: 0 | Status: COMPLETED | OUTPATIENT
Start: 2022-10-29 | End: 2022-10-31

## 2022-10-29 RX ORDER — CHOLECALCIFEROL (VITAMIN D3) 125 MCG
2000 CAPSULE ORAL DAILY
Refills: 0 | Status: DISCONTINUED | OUTPATIENT
Start: 2022-10-29 | End: 2022-10-31

## 2022-10-29 RX ORDER — REMDESIVIR 5 MG/ML
100 INJECTION INTRAVENOUS EVERY 24 HOURS
Refills: 0 | Status: COMPLETED | OUTPATIENT
Start: 2022-10-30 | End: 2022-10-31

## 2022-10-29 RX ORDER — ZINC SULFATE TAB 220 MG (50 MG ZINC EQUIVALENT) 220 (50 ZN) MG
220 TAB ORAL DAILY
Refills: 0 | Status: DISCONTINUED | OUTPATIENT
Start: 2022-10-29 | End: 2022-10-31

## 2022-10-29 RX ORDER — REMDESIVIR 5 MG/ML
200 INJECTION INTRAVENOUS EVERY 24 HOURS
Refills: 0 | Status: COMPLETED | OUTPATIENT
Start: 2022-10-29 | End: 2022-10-29

## 2022-10-29 RX ADMIN — Medication 200 MILLIGRAM(S): at 17:00

## 2022-10-29 RX ADMIN — ZINC SULFATE TAB 220 MG (50 MG ZINC EQUIVALENT) 220 MILLIGRAM(S): 220 (50 ZN) TAB at 11:20

## 2022-10-29 RX ADMIN — Medication 100 MILLIGRAM(S): at 05:10

## 2022-10-29 RX ADMIN — METHADONE HYDROCHLORIDE 110 MILLIGRAM(S): 40 TABLET ORAL at 11:24

## 2022-10-29 RX ADMIN — DOLUTEGRAVIR SODIUM AND RILPIVIRINE HYDROCHLORIDE 1 TABLET(S): 50; 25 TABLET, FILM COATED ORAL at 07:56

## 2022-10-29 RX ADMIN — REMDESIVIR 500 MILLIGRAM(S): 5 INJECTION INTRAVENOUS at 17:00

## 2022-10-29 RX ADMIN — SODIUM ZIRCONIUM CYCLOSILICATE 10 GRAM(S): 10 POWDER, FOR SUSPENSION ORAL at 13:27

## 2022-10-29 RX ADMIN — Medication 10 MILLILITER(S): at 21:18

## 2022-10-29 RX ADMIN — Medication 1 TABLET(S): at 05:09

## 2022-10-29 RX ADMIN — Medication 100 MILLIGRAM(S): at 17:00

## 2022-10-29 RX ADMIN — HEPARIN SODIUM 5000 UNIT(S): 5000 INJECTION INTRAVENOUS; SUBCUTANEOUS at 05:11

## 2022-10-29 RX ADMIN — HEPARIN SODIUM 5000 UNIT(S): 5000 INJECTION INTRAVENOUS; SUBCUTANEOUS at 17:00

## 2022-10-29 RX ADMIN — SODIUM ZIRCONIUM CYCLOSILICATE 10 GRAM(S): 10 POWDER, FOR SUSPENSION ORAL at 21:18

## 2022-10-29 RX ADMIN — Medication 200 MILLIGRAM(S): at 06:46

## 2022-10-29 RX ADMIN — Medication 2000 UNIT(S): at 11:20

## 2022-10-29 RX ADMIN — SODIUM ZIRCONIUM CYCLOSILICATE 10 GRAM(S): 10 POWDER, FOR SUSPENSION ORAL at 05:11

## 2022-10-29 RX ADMIN — ATORVASTATIN CALCIUM 40 MILLIGRAM(S): 80 TABLET, FILM COATED ORAL at 21:19

## 2022-10-29 RX ADMIN — Medication 81 MILLIGRAM(S): at 11:20

## 2022-10-29 RX ADMIN — Medication 1 TABLET(S): at 17:00

## 2022-10-29 NOTE — PROGRESS NOTE ADULT - SUBJECTIVE AND OBJECTIVE BOX
Nephrology progress note    THIS IS AN INCOMPLETE NOTE . FULL NOTE TO FOLLOW SHORTLY    Patient is seen and examined, events over the last 24 h noted .    Allergies:  No Known Allergies    Hospital Medications:   MEDICATIONS  (STANDING):  amoxicillin  875 milliGRAM(s)/clavulanate 1 Tablet(s) Oral every 12 hours  aspirin enteric coated 81 milliGRAM(s) Oral daily  atorvastatin Oral Tab/Cap - Peds 40 milliGRAM(s) Oral daily  dolutegravir 50 mG/rilpivirine 25 mG (JULUCA) 1 Tablet(s) Oral with breakfast  doxycycline monohydrate Capsule 100 milliGRAM(s) Oral every 12 hours  heparin   Injectable 5000 Unit(s) SubCutaneous every 12 hours  influenza  Vaccine (HIGH DOSE) 0.7 milliLiter(s) IntraMuscular once  methadone    Tablet 110 milliGRAM(s) Oral daily  pregabalin 200 milliGRAM(s) Oral two times a day  sodium chloride 0.9%. 1000 milliLiter(s) (75 mL/Hr) IV Continuous <Continuous>  sodium zirconium cyclosilicate 10 Gram(s) Oral every 8 hours        VITALS:  T(F): 96.8 (10-29-22 @ 05:09), Max: 98.5 (10-28-22 @ 12:43)  HR: 52 (10-29-22 @ 05:09)  BP: 111/76 (10-29-22 @ 05:09)  RR: 16 (10-29-22 @ 05:09)  SpO2: 99% (10-28-22 @ 13:20)  Wt(kg): --    10-28 @ 07:01  -  10-29 @ 07:00  --------------------------------------------------------  IN: 0 mL / OUT: 650 mL / NET: -650 mL          PHYSICAL EXAM:  Constitutional: NAD  HEENT: anicteric sclera, oropharynx clear, MMM  Neck: No JVD  Respiratory: CTAB, no wheezes, rales or rhonchi  Cardiovascular: S1, S2, RRR  Gastrointestinal: BS+, soft, NT/ND  Extremities: No cyanosis or clubbing. No peripheral edema  :  No segovia.   Skin: No rashes    LABS:  10-28    132<L>  |  98  |  30<H>  ----------------------------<  124<H>  5.5<H>   |  24  |  1.5    Ca    9.1      28 Oct 2022 18:14  Mg     1.2     10-28    TPro  6.8  /  Alb  3.7  /  TBili  0.2  /  DBili      /  AST  54<H>  /  ALT  54<H>  /  AlkPhos  171<H>  10-28                          7.9    3.34  )-----------( 255      ( 28 Oct 2022 07:44 )             25.1       Urine Studies:    Creatinine, Random Urine: 59 mg/dL (10-27 @ 17:40)  Sodium, Random Urine: 105.0 mmoL/L (10-27 @ 17:40)  Creatinine, Random Urine: 62 mg/dL (10-27 @ 17:40)  Protein/Creatinine Ratio Calculation: 2.1 Ratio (10-27 @ 17:40)  Osmolality, Random Urine: 415 mos/kg (10-27 @ 17:40)  Potassium, Random Urine: 24 mmol/L (10-27 @ 17:40)      Iron 79, TIBC 249, %sat 32      [10-23-22 @ 08:22]  Ferritin 261      [10-23-22 @ 08:22]  PTH -- (Ca 8.7)      [10-22-22 @ 18:06]   48  PTH -- (Ca 9.1)      [10-22-22 @ 05:45]   41  HbA1c 5.3      [01-14-20 @ 09:09]    HBsAb Nonreact      [03-18-21 @ 11:38]  HBsAg Nonreact      [10-22-22 @ 18:06]  HBcAb Reactive      [01-14-20 @ 09:09]  HCV 11.24, Reactive      [10-22-22 @ 18:06]    Syphilis Screen (Treponema Pallidum Ab) Negative      [01-14-20 @ 09:09]      RADIOLOGY & ADDITIONAL STUDIES:   Nephrology progress note  Patient is seen and examined, events over the last 24 h noted .  Lying in bed     Allergies:  No Known Allergies    Hospital Medications:   MEDICATIONS  (STANDING):  amoxicillin  875 milliGRAM(s)/clavulanate 1 Tablet(s) Oral every 12 hours  aspirin enteric coated 81 milliGRAM(s) Oral daily  atorvastatin Oral Tab/Cap - Peds 40 milliGRAM(s) Oral daily  dolutegravir 50 mG/rilpivirine 25 mG (JULUCA) 1 Tablet(s) Oral with breakfast  doxycycline monohydrate Capsule 100 milliGRAM(s) Oral every 12 hours  heparin   Injectable 5000 Unit(s) SubCutaneous every 12 hours  influenza  Vaccine (HIGH DOSE) 0.7 milliLiter(s) IntraMuscular once  methadone    Tablet 110 milliGRAM(s) Oral daily  pregabalin 200 milliGRAM(s) Oral two times a day  sodium chloride 0.9%. 1000 milliLiter(s) (75 mL/Hr) IV Continuous <Continuous>  sodium zirconium cyclosilicate 10 Gram(s) Oral every 8 hours        VITALS:  T(F): 96.8 (10-29-22 @ 05:09), Max: 98.5 (10-28-22 @ 12:43)  HR: 52 (10-29-22 @ 05:09)  BP: 111/76 (10-29-22 @ 05:09)  RR: 16 (10-29-22 @ 05:09)  SpO2: 99% (10-28-22 @ 13:20)  Wt(kg): --    10-28 @ 07:01  -  10-29 @ 07:00  --------------------------------------------------------  IN: 0 mL / OUT: 650 mL / NET: -650 mL          PHYSICAL EXAM:  Constitutional: NAD  Neck: No JVD  Respiratory: CTAB  Cardiovascular: S1, S2, RRR  Gastrointestinal: BS+, soft, NT/ND  Extremities: No cyanosis or clubbing. No peripheral edema  :  No segovia.   Skin: No rashes    LABS:  10-29    132<L>  |  96<L>  |  31<H>  ----------------------------<  156<H>  5.1<H>   |  28  |  1.7<H>    Ca    9.4      29 Oct 2022 06:11  Mg     1.5     10-29    TPro  7.0  /  Alb  3.7  /  TBili  <0.2  /  DBili  x   /  AST  54<H>  /  ALT  55<H>  /  AlkPhos  175<H>  10-29    10-28    132<L>  |  98  |  30<H>  ----------------------------<  124<H>  5.5<H>   |  24  |  1.5    Ca    9.1      28 Oct 2022 18:14  Mg     1.2     10-28    TPro  6.8  /  Alb  3.7  /  TBili  0.2  /  DBili      /  AST  54<H>  /  ALT  54<H>  /  AlkPhos  171<H>  10-28                          7.9    3.34  )-----------( 255      ( 28 Oct 2022 07:44 )             25.1       Urine Studies:    Creatinine, Random Urine: 59 mg/dL (10-27 @ 17:40)  Sodium, Random Urine: 105.0 mmoL/L (10-27 @ 17:40)  Creatinine, Random Urine: 62 mg/dL (10-27 @ 17:40)  Protein/Creatinine Ratio Calculation: 2.1 Ratio (10-27 @ 17:40)  Osmolality, Random Urine: 415 mos/kg (10-27 @ 17:40)  Potassium, Random Urine: 24 mmol/L (10-27 @ 17:40)      Iron 79, TIBC 249, %sat 32      [10-23-22 @ 08:22]  Ferritin 261      [10-23-22 @ 08:22]  PTH -- (Ca 8.7)      [10-22-22 @ 18:06]   48  PTH -- (Ca 9.1)      [10-22-22 @ 05:45]   41  HbA1c 5.3      [01-14-20 @ 09:09]    HBsAb Nonreact      [03-18-21 @ 11:38]  HBsAg Nonreact      [10-22-22 @ 18:06]  HBcAb Reactive      [01-14-20 @ 09:09]  HCV 11.24, Reactive      [10-22-22 @ 18:06]    Syphilis Screen (Treponema Pallidum Ab) Negative      [01-14-20 @ 09:09]      RADIOLOGY & ADDITIONAL STUDIES:

## 2022-10-29 NOTE — PROGRESS NOTE ADULT - SUBJECTIVE AND OBJECTIVE BOX
YAZMIN VILLEGAS  67y, Male    All available historical data reviewed    OVERNIGHT EVENTS:  feels well and has no new complaints  No fevers     ROS:  General: Denies rigors, nightsweats  HEENT: Denies headache, rhinorrhea, sore throat, eye pain  CV: Denies CP, palpitations  PULM: Denies wheezing, hemoptysis  GI: Denies hematemesis, hematochezia, melena  : Denies discharge, hematuria  MSK: Denies arthralgias, myalgias  SKIN: Denies rash, lesions  NEURO: Denies paresthesias, weakness  PSYCH: Denies depression, anxiety    VITALS:  T(F): 98.4, Max: 98.4 (10-29-22 @ 12:47)  HR: 72  BP: 131/82  RR: 18Vital Signs Last 24 Hrs  T(C): 36.9 (29 Oct 2022 12:47), Max: 36.9 (29 Oct 2022 12:47)  T(F): 98.4 (29 Oct 2022 12:47), Max: 98.4 (29 Oct 2022 12:47)  HR: 72 (29 Oct 2022 12:47) (52 - 72)  BP: 131/82 (29 Oct 2022 12:47) (111/76 - 131/82)  BP(mean): --  RR: 18 (29 Oct 2022 12:47) (16 - 18)  SpO2: --        TESTS & MEASUREMENTS:                        8.5    2.71  )-----------( 266      ( 29 Oct 2022 06:11 )             26.3     10-29    x   |  x   |  x   ----------------------------<  x   x    |  x   |  1.7<H>    Ca    9.4      29 Oct 2022 06:11  Mg     1.5     10-29    TPro  7.5  /  Alb  4.0  /  TBili  <0.2  /  DBili  <0.2  /  AST  56<H>  /  ALT  57<H>  /  AlkPhos  188<H>  10-29    LIVER FUNCTIONS - ( 29 Oct 2022 11:00 )  Alb: 4.0 g/dL / Pro: 7.5 g/dL / ALK PHOS: 188 U/L / ALT: 57 U/L / AST: 56 U/L / GGT: x                   RADIOLOGY & ADDITIONAL TESTS:  Personal review of radiological diagnostics performed  Echo and EKG results noted when applicable.     MEDICATIONS:  amoxicillin  875 milliGRAM(s)/clavulanate 1 Tablet(s) Oral every 12 hours  aspirin enteric coated 81 milliGRAM(s) Oral daily  atorvastatin Oral Tab/Cap - Peds 40 milliGRAM(s) Oral daily  cholecalciferol 2000 Unit(s) Oral daily  dolutegravir 50 mG/rilpivirine 25 mG (JULUCA) 1 Tablet(s) Oral with breakfast  doxycycline monohydrate Capsule 100 milliGRAM(s) Oral every 12 hours  guaifenesin/dextromethorphan Oral Liquid 10 milliLiter(s) Oral every 6 hours PRN  heparin   Injectable 5000 Unit(s) SubCutaneous every 12 hours  influenza  Vaccine (HIGH DOSE) 0.7 milliLiter(s) IntraMuscular once  pregabalin 200 milliGRAM(s) Oral two times a day  remdesivir  IVPB   IV Intermittent   remdesivir  IVPB 200 milliGRAM(s) IV Intermittent every 24 hours  sodium chloride 0.9%. 1000 milliLiter(s) IV Continuous <Continuous>  sodium zirconium cyclosilicate 10 Gram(s) Oral every 8 hours  zinc sulfate 220 milliGRAM(s) Oral daily      ANTIBIOTICS:  amoxicillin  875 milliGRAM(s)/clavulanate 1 Tablet(s) Oral every 12 hours  dolutegravir 50 mG/rilpivirine 25 mG (JULUCA) 1 Tablet(s) Oral with breakfast  doxycycline monohydrate Capsule 100 milliGRAM(s) Oral every 12 hours  remdesivir  IVPB   IV Intermittent   remdesivir  IVPB 200 milliGRAM(s) IV Intermittent every 24 hours

## 2022-10-29 NOTE — PROGRESS NOTE ADULT - ASSESSMENT
67y Male with PMH of HIV / CKD not seen by a nephrologist / HTN whom presented  with KATERIN on CKD - hyperkalemia  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/ HIV/ Hyperkalemia / low bicarb c/w Acidosis/ anemia / leukopenia    cr trending down stable   non oliguric   continue lokelma  10 q8h/ low K diet   renal ultrasound reviewed, no hydronephrosis, nml size kidneys   last phos level at goal no need for binder  ID noted back on HAART    OP RENAL FOLLOW UP

## 2022-10-29 NOTE — PROGRESS NOTE ADULT - SUBJECTIVE AND OBJECTIVE BOX
[employer] day: 8    SUBJECTIVE:   Complaints: sore throat and cough; afebrile  Overnight events: pt tested positive for covid    OBJECTIVE:   T(C): 36 (10-29-22 @ 05:09), Max: 36.9 (10-28-22 @ 12:43)  HR: 52 (10-29-22 @ 05:09) (52 - 62)  BP: 111/76 (10-29-22 @ 05:09) (111/76 - 124/59)  RR: 16 (10-29-22 @ 05:09) (16 - 19)  SpO2: 99% (10-28-22 @ 13:20) (99% - 99%)    Acc I&O    10-28-22 @ 07:01  -  10-29-22 @ 07:00  --------------------------------------------------------  IN: 0 mL / OUT: 650 mL / NET: -650 mL    PHYSICAL EXAM  General: NAD, alert, interactive  Resp: Normal respiratory effort, no wheezing, rhonchi or rates, CTAB  CV: RRR, normal s1/s2, no m/r/g  Abdomen: soft, non-distended, non-tender  Extremities: no cyanosis or edema; wheal on right arm + erythematous; less tender  Neuro: alert and oriented x3, no focal deficits    MEDICATIONS  amoxicillin  875 milliGRAM(s)/clavulanate 1 Tablet(s) Oral every 12 hours  aspirin enteric coated 81 milliGRAM(s) Oral daily  atorvastatin Oral Tab/Cap - Peds 40 milliGRAM(s) Oral daily  cholecalciferol 2000 Unit(s) Oral daily  dolutegravir 50 mG/rilpivirine 25 mG (JULUCA) 1 Tablet(s) Oral with breakfast  doxycycline monohydrate Capsule 100 milliGRAM(s) Oral every 12 hours  guaifenesin/dextromethorphan Oral Liquid 10 milliLiter(s) Oral every 6 hours PRN  heparin   Injectable 5000 Unit(s) SubCutaneous every 12 hours  influenza  Vaccine (HIGH DOSE) 0.7 milliLiter(s) IntraMuscular once  pregabalin 200 milliGRAM(s) Oral two times a day  remdesivir  IVPB   IV Intermittent   remdesivir  IVPB 200 milliGRAM(s) IV Intermittent every 24 hours  sodium chloride 0.9%. 1000 milliLiter(s) IV Continuous <Continuous>  sodium zirconium cyclosilicate 10 Gram(s) Oral every 8 hours  zinc sulfate 220 milliGRAM(s) Oral daily      LABS:    CBC Full  -  ( 29 Oct 2022 06:11 )  WBC Count : 2.71 K/uL  RBC Count : 2.76 M/uL  Hemoglobin : 8.5 g/dL  Hematocrit : 26.3 %  Platelet Count - Automated : 266 K/uL  Mean Cell Volume : 95.3 fL  Mean Cell Hemoglobin : 30.8 pg  Mean Cell Hemoglobin Concentration : 32.3 g/dL  Auto Neutrophil # : 0.94 K/uL  Auto Lymphocyte # : 0.83 K/uL  Auto Monocyte # : 0.59 K/uL  Auto Eosinophil # : 0.28 K/uL  Auto Basophil # : 0.04 K/uL  Auto Neutrophil % : 34.7 %  Auto Lymphocyte % : 30.6 %  Auto Monocyte % : 21.8 %  Auto Eosinophil % : 10.3 %  Auto Basophil % : 1.5 %    10-29    132<L>  |  96<L>  |  31<H>  ----------------------------<  156<H>  5.1<H>   |  28  |  1.7<H>    Ca    9.4      29 Oct 2022 06:11  Mg     1.5     10-29    TPro  7.0  /  Alb  3.7  /  TBili  <0.2  /  DBili  x   /  AST  54<H>  /  ALT  55<H>  /  AlkPhos  175<H>  10-29

## 2022-10-29 NOTE — PROGRESS NOTE ADULT - ASSESSMENT
66-year-old male, with PMH of substance dependence–on methadone, IVDA (admits to currently using heroin/cocaine), HIV (on HAART), JPQ6n-4 (baseline Cr ~3), and hepatitis C, sent in by the infectious team for KATERIN on CKD and hyperkalmia noted on OP labs.     #COVID+  - pt symptomatic with sore throat and productive cough  -pt on RA, monitor pulse ox  - remdesmevir 3 days  - f/u CXR, inflammatory markers    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: worsening  # Hyperkalemia: worsening  HTN is a risk factor along with HIV and HAART therapy on Triumeq (abacavir, dolutegravir, and lamivudine)  - Cr 5 on admission (baseline ~3)   - K 6.8 on admission sp D50 insulin in ED  - US renal---> no hydro   - Phos 3.3, PTH wnl, CK wnl  - no need for bactrim for pcp ppx; CD4 count sufficient  - cK and lytes wnl  -standing lokelma       #wheal on right arm: improving  -started doxy and augmentin    # HIV (on HAART)  # Hepatitis C  - pt is on Triumeq (abacavir, dolutegravir, and lamivudine) and was advised to stop given his worsening kidney fx and to start Julaca instead (Dolutegravir- rilpivirine)  - HIV-1 viral load: <20 (5/2022 AND 8/2022)   - CD4: 246 ( 3/2022) >212 (8/2022)  - start on Julaca (50-25) OD after ID approval     # Normocytic Anemia likely 2/2 to CKD  - Hgb 8.8 on admission ( baseline ~9.5)   - iron studies normal, b12 wnl, folate wnl   - urine protein elevated   - SPEP, , IF    - keep active T&S  - keep Hb > 7    # mild transaminitis-resolved  # CBD Dilation CBD 11-16 and PD 3mm  # stable dilatation of the common bile duct  - ALT/AST 51/47 on admission  - been noted to have elevated LFTs on previous labs  - US abdomen (July 2022) New mild pancreatic duct dilatation measuring 3-4 mm.A central obstructing lesion cannot be excluded on this examination.  - GI does not recommend MRCP. Per GI eval, CBD dilation ould be secondary to long term Opioid use since pt has reassuring LFT panel, physical exam, and no GI sx  - Follow up [facility] ([id number]) outpatient for non urgent work up    #Anemia/ Hep C currently treated   - Due for CRC as last in 2017 with poor prep- would benefit also from EGD given Hep C Hx- Eval for varices   - AFP with next set of outpatient labs    # CHF   - TTE (2018) EF 55-60% , GIDD  -TTE 10/2022 similar to previous; LVEF 55-60%  - keep euvolemic    # Neuropathy  - c/w pregabalin     # Low testosterone   - on testosterone gel     #Left hip pain   - on chronic opioids     # HTN - controlled  - will hold off amlodipine  - dc enalapril bc elevated K+    # substance dependence–on methadone  # IVDA (admits to currently using heroin/cocaine)  - methadone 110mg OD    DVT ppx: heparin  GI ppx: none  Diet: Renal restrictions; low K+   Full code  Dispo: home; pt lives alone   66-year-old male, with PMH of substance dependence–on methadone, IVDA (admits to currently using heroin/cocaine), HIV (on HAART), TRD3r-9 (baseline Cr ~3), and hepatitis C, sent in by the infectious team for KATERIN on CKD and hyperkalmia noted on OP labs.     #COVID+  - pt symptomatic with sore throat and productive cough  -pt on RA, monitor pulse ox  - remdesmevir 3 days  - f/u CXR, inflammatory markers    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: worsening  # Hyperkalemia: worsening  HTN is a risk factor along with HIV and HAART therapy on Triumeq (abacavir, dolutegravir, and lamivudine)  - Cr 5 on admission (baseline ~3)   - K 6.8 on admission sp D50 insulin in ED  - US renal---> no hydro   - Phos 3.3, PTH wnl, CK wnl  - no need for bactrim for pcp ppx; CD4 count sufficient  - cK and lytes wnl  -standing lokelma   -pt will need OP renal f/u      #wheal on right arm: improving  -started doxy and augmentin    # HIV (on HAART)  # Hepatitis C  - pt is on Triumeq (abacavir, dolutegravir, and lamivudine) and was advised to stop given his worsening kidney fx and to start Julaca instead (Dolutegravir- rilpivirine)  - HIV-1 viral load: <20 (5/2022 AND 8/2022)   - CD4: 246 ( 3/2022) >212 (8/2022)  - start on Julaca (50-25) OD after ID approval     # Normocytic Anemia likely 2/2 to CKD  - Hgb 8.8 on admission ( baseline ~9.5)   - iron studies normal, b12 wnl, folate wnl   - urine protein elevated   - SPEP, , IF    - keep active T&S  - keep Hb > 7    # mild transaminitis-resolved  # CBD Dilation CBD 11-16 and PD 3mm  # stable dilatation of the common bile duct  - ALT/AST 51/47 on admission  - been noted to have elevated LFTs on previous labs  - US abdomen (July 2022) New mild pancreatic duct dilatation measuring 3-4 mm.A central obstructing lesion cannot be excluded on this examination.  - GI does not recommend MRCP. Per GI eval, CBD dilation ould be secondary to long term Opioid use since pt has reassuring LFT panel, physical exam, and no GI sx  - Follow up at [facility] ([id number]) outpatient for non urgent work up    #Anemia/ Hep C currently treated   - Due for CRC as last in 2017 with poor prep- would benefit also from EGD given Hep C Hx- Eval for varices   - AFP with next set of outpatient labs    # CHF   - TTE (2018) EF 55-60% , GIDD  -TTE 10/2022 similar to previous; LVEF 55-60%  - keep euvolemic    # Neuropathy  - c/w pregabalin     # Low testosterone   - on testosterone gel     #Left hip pain   - on chronic opioids     # HTN - controlled  - will hold off amlodipine  - dc enalapril bc elevated K+    # substance dependence–on methadone  # IVDA (admits to currently using heroin/cocaine)  - methadone 110mg OD    DVT ppx: heparin  GI ppx: none  Diet: Renal restrictions; low K+   Full code  Dispo: home; pt lives alone   66-year-old male, with PMH of substance dependence–on methadone, IVDA (admits to currently using heroin/cocaine), HIV (on HAART), LCJ4v-4 (baseline Cr ~3), and hepatitis C, sent in by the infectious team for KATERIN on CKD and hyperkalmia noted on OP labs.     #COVID+  - pt symptomatic with sore throat and productive cough  -pt on RA, monitor pulse ox  - remdesmevir 3 days  - f/u CXR, inflammatory markers    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: worsening  # Hyperkalemia: worsening  HTN is a risk factor along with HIV and HAART therapy on Triumeq (abacavir, dolutegravir, and lamivudine)  - Cr 5 on admission (baseline ~3)   - K 6.8 on admission sp D50 insulin in ED  - US renal---> no hydro   - Phos 3.3, PTH wnl, CK wnl  - no need for bactrim for pcp ppx; CD4 count sufficient  - cK and lytes wnl  -standing lokelma   -pt will need OP renal f/u  - If K good, can DC on kayexylate 30 mg daily    #wheal on right arm: improving  -started doxy and augmentin    # HIV (on HAART)  # Hepatitis C  - pt is on Triumeq (abacavir, dolutegravir, and lamivudine) and was advised to stop given his worsening kidney fx and to start Julaca instead (Dolutegravir- rilpivirine)  - HIV-1 viral load: <20 (5/2022 AND 8/2022)   - CD4: 246 ( 3/2022) >212 (8/2022)  - start on Julaca (50-25) OD after ID approval     # Normocytic Anemia likely 2/2 to CKD  - Hgb 8.8 on admission ( baseline ~9.5)   - iron studies normal, b12 wnl, folate wnl   - urine protein elevated   - SPEP, , IF    - keep active T&S  - keep Hb > 7    # mild transaminitis-resolved  # CBD Dilation CBD 11-16 and PD 3mm  # stable dilatation of the common bile duct  - ALT/AST 51/47 on admission  - been noted to have elevated LFTs on previous labs  - US abdomen (July 2022) New mild pancreatic duct dilatation measuring 3-4 mm.A central obstructing lesion cannot be excluded on this examination.  - GI does not recommend MRCP. Per GI eval, CBD dilation ould be secondary to long term Opioid use since pt has reassuring LFT panel, physical exam, and no GI sx  - Follow up at [facility] ([id number]) outpatient for non urgent work up    #Anemia/ Hep C currently treated   - Due for CRC as last in 2017 with poor prep- would benefit also from EGD given Hep C Hx- Eval for varices   - AFP with next set of outpatient labs    # CHF   - TTE (2018) EF 55-60% , GIDD  -TTE 10/2022 similar to previous; LVEF 55-60%  - keep euvolemic    # Neuropathy  - c/w pregabalin     # Low testosterone   - on testosterone gel     #Left hip pain   - on chronic opioids     # HTN - controlled  - will hold off amlodipine  - dc enalapril bc elevated K+    # substance dependence–on methadone  # IVDA (admits to currently using heroin/cocaine)  - methadone 110mg OD    DVT ppx: heparin  GI ppx: none  Diet: Renal restrictions; low K+   Full code  Dispo: home; pt lives alone

## 2022-10-29 NOTE — PROGRESS NOTE ADULT - ASSESSMENT
· Assessment	  66-year-old male, with PMH of substance dependence–on methadone, IVDA (admits to currently using heroin/cocaine), HIV (on HAART), [id number] (baseline Cr ~3), and hepatitis C, sent in by the infectious team for KATERIN on CKD and hyperkalemia noted on OP labs.   Vancomycin was D/Slava.      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  HTN is a risk factor along with HIV and HAART therapy on Triumeq (abacavir, dolutegravir, and lamivudine)  - Cr 5 on admission (baseline ~3)   - Renal US 10/22 - unremakrable   - continue to trend  - d/c sodium bicarb  - appreciate renal evaluation - check CK    #Hyperkalemia -   - increase to lokelema 10g q 8 hours  - trend creatinine  - renal US WNL    #Left wrist fluctuance cellulitis -- by previous IV drug use site  - doxycycline 100 mg BID and Augmentin 875/125 mg BID for 7 days     # HIV (on HAART)  - HIV-1 viral load: <20 (5/2022 AND 8/2022)   - CD4: 246 ( 3/2022) >212 (8/2022)  - On Juluca (50-25) OD       #Leukopenia - HIV controlled, chronic    - Vitamin B12, Serum: 844 pg/mL (10.22.22 @ 05:45)  - Folate, Serum: 5.9 ng/mL (10.22.22 @ 05:45)  - HIV-1 VL undetectable in 8/17/2022  - eventual heme evaluation - can be done as outpatient     # CBD Diltation with mild pancreatic duct dilatation  - US abdomen (July 2022) New mild pancreatic duct dilatation measuring 3-4 mm.A central obstructing lesion cannot be excluded on this examination.  - Further evaluation of the pancreas with MRI and MRCP with and without  gadolinium is necessary for complete characterization. Essentially stable dilatation of the common   - US Abdomen Upper Quadrant Right (10.22.22 @ 19:28): Dilated common bile duct measuring up to 11 mm, similar in appearance  from prior examination.  - appreciate GI evaluation -- hold off on MRCP for now -- follow-up clinic -- possibly secondary to opioid use     #Hep C   - Hepatitis C RNA Qualitative Interp: Negative 3.18.21 @ 11:38)      # Normocytic Anemia likely 2/2 to CKD  - Hgb 8.8 on admission ( baseline ~9.5)   - iron studies, b12, folate  - SPEP, UPEP, IF    - keep active T&S      # CHF   - TTE (2018) EF 55-60% , GIDD  - repeat TTE 10/25 without significant change     # NEUROPATHY   - c/w pregabalin       # Low testosterone   - on testosterone gel       #Left hip pain   - on chronic opioids       # HTN - currently borderline low  BP  - will hold off amlo   - dc enalapril        # substance dependence–on methadone  # IVDA (admits to currently using heroin/cocaine)  - methadone 110mg OD  - [address] [place] [address] (confirm in am)     # DIET: DASH   d/c home

## 2022-10-30 LAB
ALBUMIN SERPL ELPH-MCNC: 3.8 G/DL — SIGNIFICANT CHANGE UP (ref 3.5–5.2)
ALBUMIN SERPL ELPH-MCNC: 3.8 G/DL — SIGNIFICANT CHANGE UP (ref 3.5–5.2)
ALP SERPL-CCNC: 184 U/L — HIGH (ref 30–115)
ALP SERPL-CCNC: 186 U/L — HIGH (ref 30–115)
ALT FLD-CCNC: 58 U/L — HIGH (ref 0–41)
ALT FLD-CCNC: 59 U/L — HIGH (ref 0–41)
ANION GAP SERPL CALC-SCNC: 8 MMOL/L — SIGNIFICANT CHANGE UP (ref 7–14)
AST SERPL-CCNC: 59 U/L — HIGH (ref 0–41)
AST SERPL-CCNC: 60 U/L — HIGH (ref 0–41)
BASOPHILS # BLD AUTO: 0.03 K/UL — SIGNIFICANT CHANGE UP (ref 0–0.2)
BASOPHILS NFR BLD AUTO: 1.1 % — HIGH (ref 0–1)
BILIRUB DIRECT SERPL-MCNC: <0.2 MG/DL — SIGNIFICANT CHANGE UP (ref 0–0.3)
BILIRUB INDIRECT FLD-MCNC: SIGNIFICANT CHANGE UP MG/DL (ref 0.2–1.2)
BILIRUB SERPL-MCNC: <0.2 MG/DL — SIGNIFICANT CHANGE UP (ref 0.2–1.2)
BILIRUB SERPL-MCNC: <0.2 MG/DL — SIGNIFICANT CHANGE UP (ref 0.2–1.2)
BUN SERPL-MCNC: 29 MG/DL — HIGH (ref 10–20)
CALCIUM SERPL-MCNC: 9.4 MG/DL — SIGNIFICANT CHANGE UP (ref 8.4–10.5)
CHLORIDE SERPL-SCNC: 102 MMOL/L — SIGNIFICANT CHANGE UP (ref 98–110)
CO2 SERPL-SCNC: 29 MMOL/L — SIGNIFICANT CHANGE UP (ref 17–32)
CREAT SERPL-MCNC: 1.6 MG/DL — HIGH (ref 0.7–1.5)
EGFR: 47 ML/MIN/1.73M2 — LOW
EOSINOPHIL # BLD AUTO: 0.25 K/UL — SIGNIFICANT CHANGE UP (ref 0–0.7)
EOSINOPHIL NFR BLD AUTO: 9.5 % — HIGH (ref 0–8)
FERRITIN SERPL-MCNC: 317 NG/ML — SIGNIFICANT CHANGE UP (ref 30–400)
GLUCOSE SERPL-MCNC: 112 MG/DL — HIGH (ref 70–99)
HCT VFR BLD CALC: 25.9 % — LOW (ref 42–52)
HGB BLD-MCNC: 8.3 G/DL — LOW (ref 14–18)
IMM GRANULOCYTES NFR BLD AUTO: 1.1 % — HIGH (ref 0.1–0.3)
INR BLD: 0.99 RATIO — SIGNIFICANT CHANGE UP (ref 0.65–1.3)
LYMPHOCYTES # BLD AUTO: 0.81 K/UL — LOW (ref 1.2–3.4)
LYMPHOCYTES # BLD AUTO: 30.8 % — SIGNIFICANT CHANGE UP (ref 20.5–51.1)
MAGNESIUM SERPL-MCNC: 1.3 MG/DL — LOW (ref 1.8–2.4)
MCHC RBC-ENTMCNC: 30.7 PG — SIGNIFICANT CHANGE UP (ref 27–31)
MCHC RBC-ENTMCNC: 32 G/DL — SIGNIFICANT CHANGE UP (ref 32–37)
MCV RBC AUTO: 95.9 FL — HIGH (ref 80–94)
MONOCYTES # BLD AUTO: 0.42 K/UL — SIGNIFICANT CHANGE UP (ref 0.1–0.6)
MONOCYTES NFR BLD AUTO: 16 % — HIGH (ref 1.7–9.3)
NEUTROPHILS # BLD AUTO: 1.09 K/UL — LOW (ref 1.4–6.5)
NEUTROPHILS NFR BLD AUTO: 41.5 % — LOW (ref 42.2–75.2)
NRBC # BLD: 0 /100 WBCS — SIGNIFICANT CHANGE UP (ref 0–0)
PLATELET # BLD AUTO: 254 K/UL — SIGNIFICANT CHANGE UP (ref 130–400)
POTASSIUM SERPL-MCNC: 5.5 MMOL/L — HIGH (ref 3.5–5)
POTASSIUM SERPL-SCNC: 5.5 MMOL/L — HIGH (ref 3.5–5)
PROT SERPL-MCNC: 6.9 G/DL — SIGNIFICANT CHANGE UP (ref 6–8)
PROT SERPL-MCNC: 7 G/DL — SIGNIFICANT CHANGE UP (ref 6–8)
PROTHROM AB SERPL-ACNC: 11.3 SEC — SIGNIFICANT CHANGE UP (ref 9.95–12.87)
RBC # BLD: 2.7 M/UL — LOW (ref 4.7–6.1)
RBC # FLD: 15.1 % — HIGH (ref 11.5–14.5)
SODIUM SERPL-SCNC: 139 MMOL/L — SIGNIFICANT CHANGE UP (ref 135–146)
WBC # BLD: 2.63 K/UL — LOW (ref 4.8–10.8)
WBC # FLD AUTO: 2.63 K/UL — LOW (ref 4.8–10.8)

## 2022-10-30 RX ORDER — METHADONE HYDROCHLORIDE 40 MG/1
110 TABLET ORAL DAILY
Refills: 0 | Status: DISCONTINUED | OUTPATIENT
Start: 2022-10-30 | End: 2022-10-31

## 2022-10-30 RX ADMIN — SODIUM ZIRCONIUM CYCLOSILICATE 10 GRAM(S): 10 POWDER, FOR SUSPENSION ORAL at 05:12

## 2022-10-30 RX ADMIN — ATORVASTATIN CALCIUM 40 MILLIGRAM(S): 80 TABLET, FILM COATED ORAL at 21:37

## 2022-10-30 RX ADMIN — METHADONE HYDROCHLORIDE 110 MILLIGRAM(S): 40 TABLET ORAL at 12:56

## 2022-10-30 RX ADMIN — Medication 1 TABLET(S): at 17:09

## 2022-10-30 RX ADMIN — REMDESIVIR 500 MILLIGRAM(S): 5 INJECTION INTRAVENOUS at 16:22

## 2022-10-30 RX ADMIN — SODIUM ZIRCONIUM CYCLOSILICATE 10 GRAM(S): 10 POWDER, FOR SUSPENSION ORAL at 15:36

## 2022-10-30 RX ADMIN — Medication 2000 UNIT(S): at 11:13

## 2022-10-30 RX ADMIN — DOLUTEGRAVIR SODIUM AND RILPIVIRINE HYDROCHLORIDE 1 TABLET(S): 50; 25 TABLET, FILM COATED ORAL at 09:11

## 2022-10-30 RX ADMIN — HEPARIN SODIUM 5000 UNIT(S): 5000 INJECTION INTRAVENOUS; SUBCUTANEOUS at 17:09

## 2022-10-30 RX ADMIN — ZINC SULFATE TAB 220 MG (50 MG ZINC EQUIVALENT) 220 MILLIGRAM(S): 220 (50 ZN) TAB at 11:12

## 2022-10-30 RX ADMIN — Medication 100 MILLIGRAM(S): at 05:12

## 2022-10-30 RX ADMIN — SODIUM ZIRCONIUM CYCLOSILICATE 10 GRAM(S): 10 POWDER, FOR SUSPENSION ORAL at 21:38

## 2022-10-30 RX ADMIN — Medication 81 MILLIGRAM(S): at 11:12

## 2022-10-30 RX ADMIN — Medication 1 TABLET(S): at 05:13

## 2022-10-30 RX ADMIN — HEPARIN SODIUM 5000 UNIT(S): 5000 INJECTION INTRAVENOUS; SUBCUTANEOUS at 05:13

## 2022-10-30 RX ADMIN — Medication 10 MILLILITER(S): at 23:14

## 2022-10-30 RX ADMIN — Medication 100 MILLIGRAM(S): at 17:09

## 2022-10-30 NOTE — PROGRESS NOTE ADULT - ASSESSMENT
· Assessment	  66-year-old male, with PMH of substance dependence–on methadone, IVDA (admits to currently using heroin/cocaine), HIV (on HAART), ZSW3s-5 (baseline Cr ~3), and hepatitis C, sent in by the infectious team for KATERIN on CKD and hyperkalemia noted on OP labs.   Vancomycin was D/Slava.    #COVID with a positive test and asymptomatic  On RA  CXR no GGO   Pt is in the early viral replicative phase based on the timeline/onset of symptoms.   _Bebtelovimab infusion prior to going home    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  HTN is a risk factor along with HIV and HAART therapy on Triumeq (abacavir, dolutegravir, and lamivudine)  - Cr 5 on admission (baseline ~3)   - Renal US 10/22 - unremakrable   - continue to trend  - d/c sodium bicarb  - appreciate renal evaluation - check CK    #Hyperkalemia -   - increase to lokelema 10g q 8 hours  - trend creatinine  - renal US WNL    #Left wrist fluctuance cellulitis -- by previous IV drug use site  - doxycycline 100 mg BID and Augmentin 875/125 mg BID for 7 days     # HIV (on HAART)  - HIV-1 viral load: <20 (5/2022 AND 8/2022)   - CD4: 246 ( 3/2022) >212 (8/2022)  - On Juluca (50-25) OD       #Leukopenia - HIV controlled, chronic    - Vitamin B12, Serum: 844 pg/mL (10.22.22 @ 05:45)  - Folate, Serum: 5.9 ng/mL (10.22.22 @ 05:45)  - HIV-1 VL undetectable in 8/17/2022  - eventual heme evaluation - can be done as outpatient     # CBD Diltation with mild pancreatic duct dilatation  - US abdomen (July 2022) New mild pancreatic duct dilatation measuring 3-4 mm.A central obstructing lesion cannot be excluded on this examination.  - Further evaluation of the pancreas with MRI and MRCP with and without  gadolinium is necessary for complete characterization. Essentially stable dilatation of the common   - US Abdomen Upper Quadrant Right (10.22.22 @ 19:28): Dilated common bile duct measuring up to 11 mm, similar in appearance  from prior examination.  - appreciate GI evaluation -- hold off on MRCP for now -- follow-up clinic -- possibly secondary to opioid use     #Hep C   - Hepatitis C RNA Qualitative Interp: Negative 3.18.21 @ 11:38)      # Normocytic Anemia likely 2/2 to CKD  - Hgb 8.8 on admission ( baseline ~9.5)   - iron studies, b12, folate  - SPEP, UPEP, IF    - keep active T&S      # CHF   - TTE (2018) EF 55-60% , GIDD  - repeat TTE 10/25 without significant change     # NEUROPATHY   - c/w pregabalin       # Low testosterone   - on testosterone gel       #Left hip pain   - on chronic opioids       # HTN - currently borderline low  BP  - will hold off amlo   - dc enalapril        # substance dependence–on methadone  # IVDA (admits to currently using heroin/cocaine)  - methadone 110mg OD  - [address] [facility] (confirm in am)     # DIET: DASH   d/c home after giving Bebtelovimab infusion

## 2022-10-30 NOTE — PROGRESS NOTE ADULT - SUBJECTIVE AND OBJECTIVE BOX
VILLEGASYAZMIN MILLER  67y, Male    All available historical data reviewed    OVERNIGHT EVENTS:  feels well and has no new complaints  No fevers     ROS:  General: Denies rigors, nightsweats  HEENT: Denies headache, rhinorrhea, sore throat, eye pain  CV: Denies CP, palpitations  PULM: Denies wheezing, hemoptysis  GI: Denies hematemesis, hematochezia, melena  : Denies discharge, hematuria  MSK: Denies arthralgias, myalgias  SKIN: Denies rash, lesions  NEURO: Denies paresthesias, weakness  PSYCH: Denies depression, anxiety    VITALS:  T(F): 97, Max: 98.9 (10-29-22 @ 21:22)  HR: 54  BP: 104/51  RR: 16Vital Signs Last 24 Hrs  T(C): 36.1 (30 Oct 2022 05:15), Max: 37.2 (29 Oct 2022 21:22)  T(F): 97 (30 Oct 2022 05:15), Max: 98.9 (29 Oct 2022 21:22)  HR: 54 (30 Oct 2022 05:15) (54 - 77)  BP: 104/51 (30 Oct 2022 05:15) (104/51 - 131/82)  BP(mean): --  RR: 16 (30 Oct 2022 05:15) (16 - 18)  SpO2: --        TESTS & MEASUREMENTS:                        8.5    2.71  )-----------( 266      ( 29 Oct 2022 06:11 )             26.3     10-29    x   |  x   |  x   ----------------------------<  x   x    |  x   |  1.7<H>    Ca    9.4      29 Oct 2022 06:11  Mg     1.5     10-29    TPro  7.5  /  Alb  4.0  /  TBili  <0.2  /  DBili  <0.2  /  AST  56<H>  /  ALT  57<H>  /  AlkPhos  188<H>  10-29    LIVER FUNCTIONS - ( 29 Oct 2022 11:00 )  Alb: 4.0 g/dL / Pro: 7.5 g/dL / ALK PHOS: 188 U/L / ALT: 57 U/L / AST: 56 U/L / GGT: x                   RADIOLOGY & ADDITIONAL TESTS:  Personal review of radiological diagnostics performed  Echo and EKG results noted when applicable.     MEDICATIONS:  amoxicillin  875 milliGRAM(s)/clavulanate 1 Tablet(s) Oral every 12 hours  aspirin enteric coated 81 milliGRAM(s) Oral daily  atorvastatin Oral Tab/Cap - Peds 40 milliGRAM(s) Oral daily  cholecalciferol 2000 Unit(s) Oral daily  dolutegravir 50 mG/rilpivirine 25 mG (JULUCA) 1 Tablet(s) Oral with breakfast  doxycycline monohydrate Capsule 100 milliGRAM(s) Oral every 12 hours  guaifenesin/dextromethorphan Oral Liquid 10 milliLiter(s) Oral every 6 hours PRN  heparin   Injectable 5000 Unit(s) SubCutaneous every 12 hours  influenza  Vaccine (HIGH DOSE) 0.7 milliLiter(s) IntraMuscular once  remdesivir  IVPB   IV Intermittent   remdesivir  IVPB 100 milliGRAM(s) IV Intermittent every 24 hours  sodium chloride 0.9%. 1000 milliLiter(s) IV Continuous <Continuous>  sodium zirconium cyclosilicate 10 Gram(s) Oral every 8 hours  zinc sulfate 220 milliGRAM(s) Oral daily      ANTIBIOTICS:  amoxicillin  875 milliGRAM(s)/clavulanate 1 Tablet(s) Oral every 12 hours  dolutegravir 50 mG/rilpivirine 25 mG (JULUCA) 1 Tablet(s) Oral with breakfast  doxycycline monohydrate Capsule 100 milliGRAM(s) Oral every 12 hours  remdesivir  IVPB   IV Intermittent   remdesivir  IVPB 100 milliGRAM(s) IV Intermittent every 24 hours

## 2022-10-31 ENCOUNTER — TRANSCRIPTION ENCOUNTER (OUTPATIENT)
Age: 67
End: 2022-10-31

## 2022-10-31 VITALS
SYSTOLIC BLOOD PRESSURE: 113 MMHG | DIASTOLIC BLOOD PRESSURE: 58 MMHG | HEART RATE: 53 BPM | RESPIRATION RATE: 18 BRPM | TEMPERATURE: 98 F

## 2022-10-31 LAB
% GAMMA, URINE: 3.2 % — SIGNIFICANT CHANGE UP
ALBUMIN 24H MFR UR ELPH: 4.3 % — SIGNIFICANT CHANGE UP
ALBUMIN SERPL ELPH-MCNC: 3.9 G/DL — SIGNIFICANT CHANGE UP (ref 3.5–5.2)
ALBUMIN SERPL ELPH-MCNC: 3.9 G/DL — SIGNIFICANT CHANGE UP (ref 3.5–5.2)
ALP SERPL-CCNC: 185 U/L — HIGH (ref 30–115)
ALP SERPL-CCNC: 189 U/L — HIGH (ref 30–115)
ALPHA1 GLOB 24H MFR UR ELPH: 14.1 % — SIGNIFICANT CHANGE UP
ALPHA2 GLOB 24H MFR UR ELPH: 8 % — SIGNIFICANT CHANGE UP
ALT FLD-CCNC: 63 U/L — HIGH (ref 0–41)
ALT FLD-CCNC: 65 U/L — HIGH (ref 0–41)
ANION GAP SERPL CALC-SCNC: 10 MMOL/L — SIGNIFICANT CHANGE UP (ref 7–14)
AST SERPL-CCNC: 64 U/L — HIGH (ref 0–41)
AST SERPL-CCNC: 65 U/L — HIGH (ref 0–41)
B-GLOBULIN 24H MFR UR ELPH: 70.4 % — SIGNIFICANT CHANGE UP
BASOPHILS # BLD AUTO: 0.03 K/UL — SIGNIFICANT CHANGE UP (ref 0–0.2)
BASOPHILS NFR BLD AUTO: 1 % — SIGNIFICANT CHANGE UP (ref 0–1)
BILIRUB DIRECT SERPL-MCNC: <0.2 MG/DL — SIGNIFICANT CHANGE UP (ref 0–0.3)
BILIRUB INDIRECT FLD-MCNC: SIGNIFICANT CHANGE UP MG/DL (ref 0.2–1.2)
BILIRUB SERPL-MCNC: 0.2 MG/DL — SIGNIFICANT CHANGE UP (ref 0.2–1.2)
BILIRUB SERPL-MCNC: <0.2 MG/DL — SIGNIFICANT CHANGE UP (ref 0.2–1.2)
BUN SERPL-MCNC: 30 MG/DL — HIGH (ref 10–20)
CALCIUM SERPL-MCNC: 9.8 MG/DL — SIGNIFICANT CHANGE UP (ref 8.4–10.5)
CHLORIDE SERPL-SCNC: 98 MMOL/L — SIGNIFICANT CHANGE UP (ref 98–110)
CO2 SERPL-SCNC: 28 MMOL/L — SIGNIFICANT CHANGE UP (ref 17–32)
CREAT SERPL-MCNC: 1.5 MG/DL — SIGNIFICANT CHANGE UP (ref 0.7–1.5)
EGFR: 50 ML/MIN/1.73M2 — LOW
EOSINOPHIL # BLD AUTO: 0.13 K/UL — SIGNIFICANT CHANGE UP (ref 0–0.7)
EOSINOPHIL NFR BLD AUTO: 4.4 % — SIGNIFICANT CHANGE UP (ref 0–8)
GLUCOSE SERPL-MCNC: 117 MG/DL — HIGH (ref 70–99)
HCT VFR BLD CALC: 25.9 % — LOW (ref 42–52)
HGB BLD-MCNC: 8.4 G/DL — LOW (ref 14–18)
IMM GRANULOCYTES NFR BLD AUTO: 0.7 % — HIGH (ref 0.1–0.3)
INR BLD: 0.98 RATIO — SIGNIFICANT CHANGE UP (ref 0.65–1.3)
INTERPRETATION 24H UR IFE-IMP: SIGNIFICANT CHANGE UP
LYMPHOCYTES # BLD AUTO: 0.92 K/UL — LOW (ref 1.2–3.4)
LYMPHOCYTES # BLD AUTO: 31.3 % — SIGNIFICANT CHANGE UP (ref 20.5–51.1)
M PROTEIN 24H UR ELPH-MRATE: PRESENT
MAGNESIUM SERPL-MCNC: 1.3 MG/DL — LOW (ref 1.8–2.4)
MCHC RBC-ENTMCNC: 30.8 PG — SIGNIFICANT CHANGE UP (ref 27–31)
MCHC RBC-ENTMCNC: 32.4 G/DL — SIGNIFICANT CHANGE UP (ref 32–37)
MCV RBC AUTO: 94.9 FL — HIGH (ref 80–94)
MONOCYTES # BLD AUTO: 0.43 K/UL — SIGNIFICANT CHANGE UP (ref 0.1–0.6)
MONOCYTES NFR BLD AUTO: 14.6 % — HIGH (ref 1.7–9.3)
NEUTROPHILS # BLD AUTO: 1.41 K/UL — SIGNIFICANT CHANGE UP (ref 1.4–6.5)
NEUTROPHILS NFR BLD AUTO: 48 % — SIGNIFICANT CHANGE UP (ref 42.2–75.2)
NRBC # BLD: 0 /100 WBCS — SIGNIFICANT CHANGE UP (ref 0–0)
PLATELET # BLD AUTO: 266 K/UL — SIGNIFICANT CHANGE UP (ref 130–400)
POTASSIUM SERPL-MCNC: 5.6 MMOL/L — HIGH (ref 3.5–5)
POTASSIUM SERPL-SCNC: 5.6 MMOL/L — HIGH (ref 3.5–5)
PROT ?TM UR-MCNC: 124 MG/DL — HIGH (ref 0–12)
PROT PATTERN 24H UR ELPH-IMP: SIGNIFICANT CHANGE UP
PROT SERPL-MCNC: 7.2 G/DL — SIGNIFICANT CHANGE UP (ref 6–8)
PROT SERPL-MCNC: 7.4 G/DL — SIGNIFICANT CHANGE UP (ref 6–8)
PROTHROM AB SERPL-ACNC: 11.2 SEC — SIGNIFICANT CHANGE UP (ref 9.95–12.87)
RBC # BLD: 2.73 M/UL — LOW (ref 4.7–6.1)
RBC # FLD: 15 % — HIGH (ref 11.5–14.5)
SODIUM SERPL-SCNC: 136 MMOL/L — SIGNIFICANT CHANGE UP (ref 135–146)
TOTAL VOLUME - 24 HOUR: SIGNIFICANT CHANGE UP ML
URINE CREATININE CALCULATION: SIGNIFICANT CHANGE UP G/24 H (ref 1–2)
WBC # BLD: 2.94 K/UL — LOW (ref 4.8–10.8)
WBC # FLD AUTO: 2.94 K/UL — LOW (ref 4.8–10.8)

## 2022-10-31 RX ORDER — ABACAVIR SULFATE, DOLUTEGRAVIR SODIUM, LAMIVUDINE 60-5-30 MG
1 KIT ORAL
Qty: 0 | Refills: 0 | DISCHARGE

## 2022-10-31 RX ORDER — MAGNESIUM SULFATE 500 MG/ML
VIAL (ML) INJECTION
Refills: 0 | Status: DISCONTINUED | OUTPATIENT
Start: 2022-10-31 | End: 2022-10-31

## 2022-10-31 RX ORDER — DOLUTEGRAVIR SODIUM AND RILPIVIRINE HYDROCHLORIDE 50; 25 MG/1; MG/1
1 TABLET, FILM COATED ORAL
Qty: 30 | Refills: 0
Start: 2022-10-31 | End: 2022-11-29

## 2022-10-31 RX ORDER — MAGNESIUM SULFATE 500 MG/ML
2 VIAL (ML) INJECTION ONCE
Refills: 0 | Status: DISCONTINUED | OUTPATIENT
Start: 2022-10-31 | End: 2022-10-31

## 2022-10-31 RX ORDER — MAGNESIUM SULFATE 500 MG/ML
2 VIAL (ML) INJECTION ONCE
Refills: 0 | Status: COMPLETED | OUTPATIENT
Start: 2022-10-31 | End: 2022-10-31

## 2022-10-31 RX ORDER — SODIUM POLYSTYRENE SULFONATE 4.1 MEQ/G
30 POWDER, FOR SUSPENSION ORAL
Qty: 900 | Refills: 0
Start: 2022-10-31 | End: 2022-11-29

## 2022-10-31 RX ADMIN — Medication 25 GRAM(S): at 12:28

## 2022-10-31 RX ADMIN — SODIUM ZIRCONIUM CYCLOSILICATE 10 GRAM(S): 10 POWDER, FOR SUSPENSION ORAL at 05:04

## 2022-10-31 RX ADMIN — SODIUM ZIRCONIUM CYCLOSILICATE 10 GRAM(S): 10 POWDER, FOR SUSPENSION ORAL at 13:59

## 2022-10-31 RX ADMIN — DOLUTEGRAVIR SODIUM AND RILPIVIRINE HYDROCHLORIDE 1 TABLET(S): 50; 25 TABLET, FILM COATED ORAL at 12:46

## 2022-10-31 RX ADMIN — ZINC SULFATE TAB 220 MG (50 MG ZINC EQUIVALENT) 220 MILLIGRAM(S): 220 (50 ZN) TAB at 12:25

## 2022-10-31 RX ADMIN — METHADONE HYDROCHLORIDE 110 MILLIGRAM(S): 40 TABLET ORAL at 12:24

## 2022-10-31 RX ADMIN — Medication 81 MILLIGRAM(S): at 12:25

## 2022-10-31 RX ADMIN — Medication 2000 UNIT(S): at 12:25

## 2022-10-31 RX ADMIN — Medication 100 MILLIGRAM(S): at 05:03

## 2022-10-31 RX ADMIN — Medication 1 TABLET(S): at 17:08

## 2022-10-31 RX ADMIN — Medication 100 MILLIGRAM(S): at 17:08

## 2022-10-31 RX ADMIN — Medication 1 TABLET(S): at 05:03

## 2022-10-31 RX ADMIN — HEPARIN SODIUM 5000 UNIT(S): 5000 INJECTION INTRAVENOUS; SUBCUTANEOUS at 05:03

## 2022-10-31 NOTE — PROGRESS NOTE ADULT - PROVIDER SPECIALTY LIST ADULT
Infectious Disease
Internal Medicine
Internal Medicine
Nephrology
Infectious Disease
Internal Medicine
Nephrology
Infectious Disease
Internal Medicine
Infectious Disease

## 2022-10-31 NOTE — DISCHARGE NOTE PROVIDER - NSDCCPCAREPLAN_GEN_ALL_CORE_FT
PRINCIPAL DISCHARGE DIAGNOSIS  Diagnosis: Sepsis with acute renal failure  Assessment and Plan of Treatment: You had cellulitis from injection. You were treated with antibiotics. You also had high potassium. You were treated with potassium binders.      SECONDARY DISCHARGE DIAGNOSES  Diagnosis: Hyperkalemia  Assessment and Plan of Treatment:     Diagnosis: At risk for thrombophlebitis  Assessment and Plan of Treatment:     Diagnosis: Opioid dependence  Assessment and Plan of Treatment:     Diagnosis: IV drug user  Assessment and Plan of Treatment:      Fall with Harm Risk

## 2022-10-31 NOTE — PROGRESS NOTE ADULT - ASSESSMENT
· Assessment	  66-year-old male, with PMH of substance dependence–on methadone, IVDA (admits to currently using heroin/cocaine), HIV (on HAART), IQR2c-9 (baseline Cr ~3), and hepatitis C, sent in by the infectious team for KATERIN on CKD and hyperkalemia noted on OP labs.   Vancomycin was D/Slava.    #COVID with a positive test and asymptomatic  On RA  CXR no GGO   Pt is in the early viral replicative phase based on the timeline/onset of symptoms.    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  HTN is a risk factor along with HIV and HAART therapy on Triumeq (abacavir, dolutegravir, and lamivudine)  - Cr 5 on admission (baseline ~3)   - Renal US 10/22 - unremakrable   - continue to trend  - appreciate renal evaluation     #Hyperkalemia -   - will send with kayexylate 30g daily  - renal outpatient follow-up   - renal US WNL    #Left wrist fluctuance cellulitis -- by previous IV drug use site  - doxycycline 100 mg BID and Augmentin 875/125 mg BID for 7 days ) end date 11/1    # HIV (on HAART)  - HIV-1 viral load: <20 (5/2022 AND 8/2022)   - CD4: 246 ( 3/2022) >212 (8/2022)  - On Juluca (50-25) OD       #Leukopenia - HIV controlled, chronic    - Vitamin B12, Serum: 844 pg/mL (10.22.22 @ 05:45)  - Folate, Serum: 5.9 ng/mL (10.22.22 @ 05:45)  - HIV-1 VL undetectable in 8/17/2022  - eventual heme evaluation - can be done as outpatient     # CBD Diltation with mild pancreatic duct dilatation  - US abdomen (July 2022) New mild pancreatic duct dilatation measuring 3-4 mm.A central obstructing lesion cannot be excluded on this examination.  - Further evaluation of the pancreas with MRI and MRCP with and without  gadolinium is necessary for complete characterization. Essentially stable dilatation of the common   - US Abdomen Upper Quadrant Right (10.22.22 @ 19:28): Dilated common bile duct measuring up to 11 mm, similar in appearance  from prior examination.  - appreciate GI evaluation -- hold off on MRCP for now -- follow-up clinic -- possibly secondary to opioid use     #Hep C   - Hepatitis C RNA Qualitative Interp: Negative 3.18.21 @ 11:38)      # Normocytic Anemia likely 2/2 to CKD  - Hgb 8.8 on admission ( baseline ~9.5)   - iron studies, b12, folate  - SPEP, UPEP, IF    - keep active T&S      # CHF   - TTE (2018) EF 55-60% , GIDD  - repeat TTE 10/25 without significant change     # NEUROPATHY   - c/w pregabalin       # Low testosterone   - on testosterone gel       #Left hip pain   - on chronic opioids       # HTN - currently borderline low  BP  - will hold off amlo   - dc enalapril        # substance dependence–on methadone  # IVDA (admits to currently using heroin/cocaine)  - methadone 110mg OD  - 122st and 2nd [facility] (confirm in am)     # DIET: DASH   plan for discharge home

## 2022-10-31 NOTE — PROGRESS NOTE ADULT - SUBJECTIVE AND OBJECTIVE BOX
YAZMIN VILLEGAS  67y, Male  Allergy: No Known Allergies      LOS  10d    CHIEF COMPLAINT: abnormal labs (31 Oct 2022 08:58)      INTERVAL EVENTS/HPI  - No acute events overnight  - T(F): , Max: 98.5 (10-30-22 @ 20:43)  - Denies any worsening symptoms  - Tolerating medication  - WBC Count: 2.94 (10-31-22 @ 07:52)  WBC Count: 2.63 (10-30-22 @ 07:56)     - Creatinine, Serum: 1.5 (10-31-22 @ 07:52)  Creatinine, Serum: 1.6 (10-30-22 @ 07:56)       ROS  General: Denies rigors, nightsweats  HEENT: Denies headache, rhinorrhea, sore throat, eye pain  CV: Denies CP, palpitations  PULM: Denies wheezing, hemoptysis  GI: Denies hematemesis, hematochezia, melena  : Denies discharge, hematuria  MSK: Denies arthralgias, myalgias  SKIN: Denies rash, lesions  NEURO: Denies paresthesias, weakness  PSYCH: Denies depression, anxiety    VITALS:  T(F): 97.1, Max: 98.5 (10-30-22 @ 20:43)  HR: 51  BP: 110/57  RR: 18Vital Signs Last 24 Hrs  T(C): 36.2 (31 Oct 2022 05:50), Max: 36.9 (30 Oct 2022 20:43)  T(F): 97.1 (31 Oct 2022 05:50), Max: 98.5 (30 Oct 2022 20:43)  HR: 51 (31 Oct 2022 05:50) (51 - 59)  BP: 110/57 (31 Oct 2022 05:50) (110/57 - 118/65)  BP(mean): --  RR: 18 (31 Oct 2022 05:50) (18 - 18)  SpO2: --        PHYSICAL EXAM:  Gen: NAD, resting in bed  HEENT: Normocephalic, atraumatic  Neck: supple, no lymphadenopathy  CV: Regular rate & regular rhythm  Lungs: decreased BS at bases, no fremitus  Abdomen: Soft, BS present  Ext: Warm, well perfused  Neuro: non focal, awake  Skin: no rash, no erythema  Lines: no phlebitis    FH: Non-contributory  Social Hx: Non-contributory    TESTS & MEASUREMENTS:                        8.4    2.94  )-----------( 266      ( 31 Oct 2022 07:52 )             25.9     10-31    136  |  98  |  30<H>  ----------------------------<  117<H>  5.6<H>   |  28  |  1.5    Ca    9.8      31 Oct 2022 07:52  Mg     1.3     10-31    TPro  7.4  /  Alb  3.9  /  TBili  0.2  /  DBili  x   /  AST  64<H>  /  ALT  65<H>  /  AlkPhos  185<H>  10-31      LIVER FUNCTIONS - ( 31 Oct 2022 07:52 )  Alb: 3.9 g/dL / Pro: 7.4 g/dL / ALK PHOS: 185 U/L / ALT: 65 U/L / AST: 64 U/L / GGT: x               Culture - Urine (collected 10-21-22 @ 18:38)  Source: Clean Catch Clean Catch (Midstream)  Final Report (10-23-22 @ 11:25):    <10,000 CFU/mL Normal Urogenital Jina            INFECTIOUS DISEASES TESTING  COVID-19 PCR: Detected (10-28-22 @ 12:50)  COVID-19 PCR: NotDetec (10-21-22 @ 12:50)      INFLAMMATORY MARKERS  C-Reactive Protein, Serum: 3.8 mg/L (10-29-22 @ 11:00)      RADIOLOGY & ADDITIONAL TESTS:  I have personally reviewed the last available Chest xray  CXR  Xray Chest 1 View- PORTABLE-Urgent:   ACC: [id number] EXAM:  XR CHEST PORTABLE URGENT 1V                          PROCEDURE DATE:  10/29/2022          INTERPRETATION:  CLINICAL HISTORY / REASON FOR EXAM: Shortness of breath.    COMPARISON: Chest radiograph from October 21, 2022.    TECHNIQUE/POSITIONING: Satisfactory. Single image, AP portable chest   radiograph.    FINDINGS:    SUPPORT DEVICES: None.    CARDIAC/MEDIASTINUM/HILUM: Unchanged cardiac silhouette.    LUNG PARENCHYMA/PLEURA: No focal consolidation or pleural effusion. No   pneumothorax.    SKELETON/SOFT TISSUES: Unchanged.      IMPRESSION:    No radiographic evidence of acute cardiopulmonary disease.    --- End of Report ---            DENIZ LOVING MD; Attending Radiologist  This document has been electronically signed. Oct 29 2022 12:54PM (10-29-22 @ 09:23)      CT      CARDIOLOGY TESTING  12 Lead ECG:   Ventricular Rate 54 BPM    Atrial Rate 54 BPM    P-R Interval 212 ms    QRS Duration 88 ms    Q-T Interval 414 ms    QTC Calculation(Bazett) 392 ms    P Axis 43 degrees    R Axis 39 degrees    T Axis -13 degrees    Diagnosis Line Sinus bradycardia with 1st degree A-V block  Minimal voltage criteria for LVH, may be normal variant  T wave abnormality, consider inferior ischemia  Abnormal ECG    Confirmed by Darrel Vargas ([id number]) on 10/28/2022 5:00:13 PM (10-28-22 @ 14:10)  12 Lead ECG:   Ventricular Rate 66 BPM    Atrial Rate 66 BPM    P-R Interval 214 ms    QRS Duration 88 ms    Q-T Interval 384 ms    QTC Calculation(Bazett) 402 ms    P Axis 63 degrees    R Axis 34 degrees    T Axis 33 degrees    Diagnosis Line Sinus rhythm with 1st degree A-V block  Otherwise normal ECG    Confirmed by Darrel Vargas ([id number]) on 10/27/2022 1:22:33 PM (10-27-22 @ 10:56)      MEDICATIONS  amoxicillin  875 milliGRAM(s)/clavulanate 1 Oral every 12 hours  aspirin enteric coated 81 Oral daily  atorvastatin Oral Tab/Cap - Peds 40 Oral daily  cholecalciferol 2000 Oral daily  dolutegravir 50 mG/rilpivirine 25 mG (JULUCA) 1 Oral with breakfast  doxycycline monohydrate Capsule 100 Oral every 12 hours  heparin   Injectable 5000 SubCutaneous every 12 hours  influenza  Vaccine (HIGH DOSE) 0.7 IntraMuscular once  magnesium sulfate  IVPB     magnesium sulfate  IVPB 2 IV Intermittent once  magnesium sulfate  IVPB 2 IV Intermittent once  methadone    Tablet 110 Oral daily  remdesivir  IVPB  IV Intermittent   remdesivir  IVPB 100 IV Intermittent every 24 hours  sodium chloride 0.9%. 1000 IV Continuous <Continuous>  sodium zirconium cyclosilicate 10 Oral every 8 hours  zinc sulfate 220 Oral daily      WEIGHT  Weight (kg): 85 (10-21-22 @ 16:08)  Creatinine, Serum: 1.5 mg/dL (10-31-22 @ 07:52)      ANTIBIOTICS:  amoxicillin  875 milliGRAM(s)/clavulanate 1 Tablet(s) Oral every 12 hours  dolutegravir 50 mG/rilpivirine 25 mG (JULUCA) 1 Tablet(s) Oral with breakfast  doxycycline monohydrate Capsule 100 milliGRAM(s) Oral every 12 hours  remdesivir  IVPB   IV Intermittent   remdesivir  IVPB 100 milliGRAM(s) IV Intermittent every 24 hours      All available historical records have been reviewed

## 2022-10-31 NOTE — DISCHARGE NOTE PROVIDER - NSDCFUSCHEDAPPT_GEN_ALL_CORE_FT
Luciano Mendieta  [facility]  INFDISEASE  [address]  Scheduled Appointment: 11/03/2022    [facility]  NEPHRO [address]  Scheduled Appointment: 01/17/2023

## 2022-10-31 NOTE — PROGRESS NOTE ADULT - SUBJECTIVE AND OBJECTIVE BOX
Patient is a 67y old  Male who presents with a chief complaint of abnormal labs (30 Oct 2022 06:21)      INTERVAL HPI/OVERNIGHT EVENTS:  None    T(C): 36.2 (10-31-22 @ 05:50), Max: 36.9 (10-30-22 @ 20:43)  HR: 51 (10-31-22 @ 05:50) (51 - 59)  BP: 110/57 (10-31-22 @ 05:50) (110/57 - 118/65)  RR: 18 (10-31-22 @ 05:50) (18 - 18)  SpO2: --  Wt(kg): --Vital Signs Last 24 Hrs  T(C): 36.2 (31 Oct 2022 05:50), Max: 36.9 (30 Oct 2022 20:43)  T(F): 97.1 (31 Oct 2022 05:50), Max: 98.5 (30 Oct 2022 20:43)  HR: 51 (31 Oct 2022 05:50) (51 - 59)  BP: 110/57 (31 Oct 2022 05:50) (110/57 - 118/65)  BP(mean): --  RR: 18 (31 Oct 2022 05:50) (18 - 18)  SpO2: --        PHYSICAL EXAM:  GENERAL: NAD  HEAD:  Atraumatic, Normocephalic  EYES: EOMI, PERRLA, conjunctiva and sclera clear  ENMT: No tonsillar erythema, exudates, or enlargement; Moist mucous membranes, Good dentition, No lesions  NECK: Supple, No JVD, Normal thyroid  NERVOUS SYSTEM:  Alert & Oriented X3  CHEST/LUNG: No rales, rhonchi, wheezing, or rubs  HEART: Regular rate and rhythm; No murmurs, rubs, or gallops  ABDOMEN: Soft, Nontender, Nondistended; Bowel sounds present  EXTREMITIES:  2+ Peripheral Pulses, No clubbing, cyanosis, or edema  LYMPH: No lymphadenopathy noted  SKIN: Cellulitis left wrist      amoxicillin  875 milliGRAM(s)/clavulanate 1 Tablet(s) Oral every 12 hours  aspirin enteric coated 81 milliGRAM(s) Oral daily  atorvastatin Oral Tab/Cap - Peds 40 milliGRAM(s) Oral daily  cholecalciferol 2000 Unit(s) Oral daily  dolutegravir 50 mG/rilpivirine 25 mG (JULUCA) 1 Tablet(s) Oral with breakfast  doxycycline monohydrate Capsule 100 milliGRAM(s) Oral every 12 hours  guaifenesin/dextromethorphan Oral Liquid 10 milliLiter(s) Oral every 6 hours PRN  heparin   Injectable 5000 Unit(s) SubCutaneous every 12 hours  influenza  Vaccine (HIGH DOSE) 0.7 milliLiter(s) IntraMuscular once  magnesium sulfate  IVPB      magnesium sulfate  IVPB 2 Gram(s) IV Intermittent once  magnesium sulfate  IVPB 2 Gram(s) IV Intermittent once  methadone    Tablet 110 milliGRAM(s) Oral daily  remdesivir  IVPB   IV Intermittent   remdesivir  IVPB 100 milliGRAM(s) IV Intermittent every 24 hours  sodium chloride 0.9%. 1000 milliLiter(s) IV Continuous <Continuous>  sodium zirconium cyclosilicate 10 Gram(s) Oral every 8 hours  zinc sulfate 220 milliGRAM(s) Oral daily

## 2022-10-31 NOTE — DISCHARGE NOTE PROVIDER - CARE PROVIDER_API CALL
Robbin Campbell)  Infectious Disease; Internal Medicine  [address] [place] [address]  Phone: [phone]  Fax: [phone]  Follow Up Time: 2 weeks

## 2022-10-31 NOTE — PROGRESS NOTE ADULT - ASSESSMENT
66-year-old male, with PMH of substance dependence–on methadone, IVDA (admits to currently using heroin/cocaine), HIV (on HAART), MLT7f-7 (baseline Cr ~3), and hepatitis C, sent in by the infectious team for KATERIN on CKD and hyperkalmia noted on OP labs.     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: worsening  # Hyperkalemia: worsening  HTN is a risk factor along with HIV and HAART therapy on Triumeq (abacavir, dolutegravir, and lamivudine)  - Cr 5 on admission (baseline ~3)   - K 6.8 on admission sp D50 insulin in ED  - US renal---> no hydro   - Phos 3.3, PTH wnl, CK wnl  - no need for bactrim for pcp ppx; CD4 count sufficient  - cK and lytes wnl  -standing lokelma   -pt will need OP renal f/u      #wheal on right arm: improving  -started doxy and augmentin    # HIV (on HAART)  # Hepatitis C  - pt is on Triumeq (abacavir, dolutegravir, and lamivudine) and was advised to stop given his worsening kidney fx and to start Julaca instead (Dolutegravir- rilpivirine)  - HIV-1 viral load: <20 (5/2022 AND 8/2022)   - CD4: 246 ( 3/2022) >212 (8/2022)  - start on Julaca (50-25) OD after ID approval     # Normocytic Anemia likely 2/2 to CKD  - Hgb 8.8 on admission ( baseline ~9.5)   - iron studies normal, b12 wnl, folate wnl   - urine protein elevated   - SPEP, , IF    - keep active T&S  - keep Hb > 7    # mild transaminitis-resolved  # CBD Dilation CBD 11-16 and PD 3mm  # stable dilatation of the common bile duct  - ALT/AST 51/47 on admission  - been noted to have elevated LFTs on previous labs  - US abdomen (July 2022) New mild pancreatic duct dilatation measuring 3-4 mm.A central obstructing lesion cannot be excluded on this examination.  - GI does not recommend MRCP. Per GI eval, CBD dilation ould be secondary to long term Opioid use since pt has reassuring LFT panel, physical exam, and no GI sx  - Follow up at [facility] ([phone]) outpatient for non urgent work up    #Anemia/ Hep C currently treated   - Due for CRC as last in 2017 with poor prep- would benefit also from EGD given Hep C Hx- Eval for varices   - AFP with next set of outpatient labs    # CHF   - TTE (2018) EF 55-60% , GIDD  -TTE 10/2022 similar to previous; LVEF 55-60%  - keep euvolemic    # Neuropathy  - c/w pregabalin     # Low testosterone   - on testosterone gel     #Left hip pain   - on chronic opioids     # HTN - controlled  - will hold off amlodipine  - dc enalapril bc elevated K+    # substance dependence–on methadone  # IVDA (admits to currently using heroin/cocaine)  - methadone 110mg OD    DVT ppx: heparin  GI ppx: none  Diet: Renal restrictions; low K+   Full code  Dispo: home; pt lives alone

## 2022-10-31 NOTE — DISCHARGE NOTE PROVIDER - HOSPITAL COURSE
66-year-old male, with PMH of substance dependence–on methadone, IVDA (admits to currently using heroin/cocaine), HIV (on HAART), WFO2a-1 (baseline Cr ~3), and hepatitis C, sent in by the infectious team for abnormal labs done as OP. Pt was noted to have elevated K and Cr on his most recent labs done as OP. Pt is also reporting n/v for 3 days. No fever/chills, CP/SOB, c/d, urinary symptoms.      ED course:   VS: stable  Labs: Hgb 8.8/ K 6.8/ Cr 5     Patient was treated for hyperkalemia and cellulitis on L wrist from drug injection  Patient was given augmentin and doxycycline.

## 2022-10-31 NOTE — DISCHARGE NOTE NURSING/CASE MANAGEMENT/SOCIAL WORK - PATIENT PORTAL LINK FT
You can access the FollowMyHealth Patient Portal offered by [facility] by registering at the following website: [email or web address]. By joining [facility]’s FollowMyHealth portal, you will also be able to view your health information using other applications (apps) compatible with our system.

## 2022-10-31 NOTE — DISCHARGE NOTE PROVIDER - NSDCMRMEDTOKEN_GEN_ALL_CORE_FT
amLODIPine 10 mg oral tablet: 1 tab(s) orally once a day  amoxicillin-clavulanate 875 mg-125 mg oral tablet: 1 tab(s) orally every 12 hours  Aspir-Low 81 mg oral delayed release tablet: 1 tab(s) orally once a day  atorvastatin 40 mg oral tablet: 1 tab(s) orally once a day  Bactrim  mg-160 mg oral tablet: 1 tab(s) orally OD  three times a week   chlorthalidone 25 mg oral tablet: 1 tab(s) orally once a day  dolutegravir-rilpivirine 50 mg-25 mg oral tablet: 1 tab(s) orally once a day (before a meal)   doxycycline monohydrate 50 mg oral capsule: 2 cap(s) orally every 12 hours  methadone 40 mg oral tablet: 110 milligram(s) orally once a day  oxyCODONE 30 mg oral tablet, extended release: 30 milligram(s) orally 4 times a day  pregabalin 200 mg oral capsule: 1 cap(s) orally 2 times a day   amLODIPine 10 mg oral tablet: 1 tab(s) orally once a day  amoxicillin-clavulanate 875 mg-125 mg oral tablet: 1 tab(s) orally every 12 hours  Aspir-Low 81 mg oral delayed release tablet: 1 tab(s) orally once a day  atorvastatin 40 mg oral tablet: 1 tab(s) orally once a day  Bactrim  mg-160 mg oral tablet: 1 tab(s) orally OD  three times a week   chlorthalidone 25 mg oral tablet: 1 tab(s) orally once a day  dolutegravir-rilpivirine 50 mg-25 mg oral tablet: 1 tab(s) orally once a day (before a meal)   doxycycline monohydrate 50 mg oral capsule: 2 cap(s) orally every 12 hours  Kalexate oral and rectal powder: 30 gram(s) orally once a day   methadone 40 mg oral tablet: 110 milligram(s) orally once a day  oxyCODONE 30 mg oral tablet, extended release: 30 milligram(s) orally 4 times a day  pregabalin 200 mg oral capsule: 1 cap(s) orally 2 times a day

## 2022-10-31 NOTE — DISCHARGE NOTE NURSING/CASE MANAGEMENT/SOCIAL WORK - NSDCPEFALRISK_GEN_ALL_CORE
For information on Fall & Injury Prevention, visit: [email or web address] OR  [email or web address] OR  https://www.cdc.gov/steadi/patient.html

## 2022-11-01 ENCOUNTER — OUTPATIENT (OUTPATIENT)
Dept: OUTPATIENT SERVICES | Facility: HOSPITAL | Age: 67
LOS: 1 days | Discharge: HOME | End: 2022-11-01

## 2022-11-02 ENCOUNTER — NON-APPOINTMENT (OUTPATIENT)
Age: 67
End: 2022-11-02

## 2022-11-02 LAB
% ALBUMIN: 50 % — SIGNIFICANT CHANGE UP
% ALPHA 1: 7.1 % — SIGNIFICANT CHANGE UP
% ALPHA 2: 12.6 % — SIGNIFICANT CHANGE UP
% BETA: 9.6 % — SIGNIFICANT CHANGE UP
% GAMMA: 20.7 % — SIGNIFICANT CHANGE UP
% M SPIKE: SIGNIFICANT CHANGE UP
ALBUMIN SERPL ELPH-MCNC: 3.4 G/DL — LOW (ref 3.6–5.5)
ALBUMIN/GLOB SERPL ELPH: 1 RATIO — SIGNIFICANT CHANGE UP
ALPHA1 GLOB SERPL ELPH-MCNC: 0.5 G/DL — HIGH (ref 0.1–0.4)
ALPHA2 GLOB SERPL ELPH-MCNC: 0.9 G/DL — SIGNIFICANT CHANGE UP (ref 0.5–1)
B-GLOBULIN SERPL ELPH-MCNC: 0.7 G/DL — SIGNIFICANT CHANGE UP (ref 0.5–1)
GAMMA GLOBULIN: 1.4 G/DL — SIGNIFICANT CHANGE UP (ref 0.6–1.6)
INTERPRETATION SERPL IFE-IMP: SIGNIFICANT CHANGE UP
M-SPIKE: SIGNIFICANT CHANGE UP (ref 0–0)
PROT PATTERN SERPL ELPH-IMP: SIGNIFICANT CHANGE UP

## 2022-11-03 ENCOUNTER — APPOINTMENT (OUTPATIENT)
Dept: INFECTIOUS DISEASE | Facility: CLINIC | Age: 67
End: 2022-11-03

## 2022-11-03 LAB
ALBUMIN SERPL ELPH-MCNC: 4.1 G/DL
ALP BLD-CCNC: 136 U/L
ALT SERPL-CCNC: 49 U/L
ANION GAP SERPL CALC-SCNC: 13 MMOL/L
AST SERPL-CCNC: 51 U/L
BILIRUB SERPL-MCNC: <0.2 MG/DL
BUN SERPL-MCNC: 73 MG/DL
CALCIUM SERPL-MCNC: 9.3 MG/DL
CHLORIDE SERPL-SCNC: 103 MMOL/L
CO2 SERPL-SCNC: 17 MMOL/L
CREAT SERPL-MCNC: 4.9 MG/DL
EGFR: 12 ML/MIN/1.73M2
GLUCOSE SERPL-MCNC: 121 MG/DL
POTASSIUM SERPL-SCNC: 6.5 MMOL/L
PROT SERPL-MCNC: 7.4 G/DL
SODIUM SERPL-SCNC: 133 MMOL/L

## 2022-11-04 DIAGNOSIS — B20 HUMAN IMMUNODEFICIENCY VIRUS [HIV] DISEASE: ICD-10-CM

## 2022-11-08 ENCOUNTER — NON-APPOINTMENT (OUTPATIENT)
Age: 67
End: 2022-11-08

## 2022-11-08 ENCOUNTER — OUTPATIENT (OUTPATIENT)
Dept: OUTPATIENT SERVICES | Facility: HOSPITAL | Age: 67
LOS: 1 days | Discharge: HOME | End: 2022-11-08

## 2022-11-10 ENCOUNTER — RX RENEWAL (OUTPATIENT)
Age: 67
End: 2022-11-10

## 2022-11-10 DIAGNOSIS — B20 HUMAN IMMUNODEFICIENCY VIRUS [HIV] DISEASE: ICD-10-CM

## 2022-11-16 NOTE — CDI QUERY NOTE - NSCDIOTHERTXTBX_GEN_ALL_CORE_HH
CLINICAL INDICATORS    10/22 Consult Note Adult-Infectious Disease Attending: …Assessment: 66-year-old male, with PMH of substance dependence–on methadone, IVDA (admits to currently using heroin/cocaine), HIV (on HAART), DXC4d-1 (baseline Cr ~3), and hepatitis C, sent in by the infectious team for KATERIN and hyperkalemia noted on outpatient labs. 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 #Left wrist fluctuance cellulitis -- by previous IV drug use site. - Doxycycline 100 mg BID oral and Augmentin 875/125 mg BID oral for 7 days. #Leukopenia - HIV controlled, chronic…    10/31 Progress Note Adult-Infectious Disease Attending: … #COVID with a positive test and asymptomatic, Pt is in the early viral replicative phase based on the timeline/onset of symptoms. 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…    10/31 Discharge Note Provider: Reason for admission: Abnormal labs… Principal diagnosis:  Sepsis with acute renal failure…    10/21-10/31 Nursing flow sheet: Temperature 96.8-99.6, Heart rate 55-94, Respiratory rate 16-20…    Based on your professional judgement and the clinical indicators, please clarify if sepsis can be further specified as:    •   Sepsis was evaluated and ruled out at the time of discharge  •   Sepsis with atypical presentation in the setting of HIV disease resolved at the time of discharge   •   Other (please specify):  •   Clinically unable to determine      Thank you,  Jyoti Nicolas, RN, BSN, CCDS, [facility]  [phone]

## 2022-11-17 ENCOUNTER — OUTPATIENT (OUTPATIENT)
Dept: OUTPATIENT SERVICES | Facility: HOSPITAL | Age: 67
LOS: 1 days | Discharge: HOME | End: 2022-11-17

## 2022-11-17 ENCOUNTER — LABORATORY RESULT (OUTPATIENT)
Age: 67
End: 2022-11-17

## 2022-11-17 ENCOUNTER — APPOINTMENT (OUTPATIENT)
Dept: INFECTIOUS DISEASE | Facility: CLINIC | Age: 67
End: 2022-11-17

## 2022-11-17 ENCOUNTER — NON-APPOINTMENT (OUTPATIENT)
Age: 67
End: 2022-11-17

## 2022-11-17 VITALS
SYSTOLIC BLOOD PRESSURE: 140 MMHG | HEART RATE: 68 BPM | RESPIRATION RATE: 14 BRPM | WEIGHT: 187 LBS | HEIGHT: 72.5 IN | DIASTOLIC BLOOD PRESSURE: 75 MMHG | TEMPERATURE: 97.9 F | BODY MASS INDEX: 25.05 KG/M2 | OXYGEN SATURATION: 100 %

## 2022-11-17 DIAGNOSIS — B20 HUMAN IMMUNODEFICIENCY VIRUS [HIV] DISEASE: ICD-10-CM

## 2022-11-17 PROCEDURE — 99214 OFFICE O/P EST MOD 30 MIN: CPT

## 2022-11-17 RX ORDER — SULFAMETHOXAZOLE AND TRIMETHOPRIM 800; 160 MG/1; MG/1
800-160 TABLET ORAL
Qty: 12 | Refills: 5 | Status: COMPLETED | COMMUNITY
Start: 2021-04-29 | End: 2022-11-17

## 2022-11-17 RX ORDER — FLUTICASONE PROPIONATE 50 UG/1
50 SPRAY, METERED NASAL
Qty: 16 | Refills: 0 | Status: COMPLETED | COMMUNITY
Start: 2020-06-02 | End: 2022-11-17

## 2022-11-17 RX ORDER — SYRINGE WITH NEEDLE, 1 ML 25GX5/8"
25G X 1" SYRINGE, EMPTY DISPOSABLE MISCELLANEOUS
Qty: 4 | Refills: 0 | Status: COMPLETED | COMMUNITY
Start: 2020-08-25 | End: 2022-11-17

## 2022-11-17 NOTE — HISTORY OF PRESENT ILLNESS
[FreeTextEntry1] : 66 yo M here for follow-up\par \par Recently discharged from [facility] 10/2022 for KATERIN and hyperkalemia \par \par He has been off enalapril. \par He has been off all BP medications including amlodipine as BP was normotensive during hospitalization. \par He is taking Kayexylate 30 g daily.\par \par Denies any nausea, vomiting, abdominal pain. \par \par

## 2022-11-17 NOTE — ASSESSMENT
[FreeTextEntry1] : #HIV\par - previously on Juluca, switched to triumeq due to lower CrCl\par - will repeat VL and CD4 \par \par #CKD \par - SPEP without M Dar\par - UPEP with bence sutton protein \par - Nephro follow-up in 1/2022\par \par #Hyperkalemia\par - follow-up BMP\par - continue Kayexylate 30 mg daily \par \par #HTN \par - restart amlodipine 5 mg daily\par \par #Leukopenia \par - if present, will refer to heme-onc\par -  SPEP without M Dar\par - UPEP with bence sutton protein \par - B12/Folate normal \par \par #Pancreatic Duct dilitation\par - Abd US 7/27/2022: New mild pancreatic duct dilatation measuring 3-4 mm. A central obstructing lesion cannot be excluded on this examination. Further evaluation of the pancreas with MRI and MRCP with and without gadolinium is necessary for complete characterization. Essentially stable dilatation of the common bile duct of unclear etiology\par - seen by GI in hospital -- will give referral for follow-up \par \par #Left Hip Pain \par - on chronic opioids - Oxycodone 30 mg QID\par -ISTOP reviewed : [id number]\par - repeat UDS \par - prescription sent \par \par #Low Testosterone \par - on testosterone gell \par \par #Neuropathy\par - Pregabpin 200 mg twice a day \par \par #Opioid dependence\par - Methadone 110 mg daily \par - [facility]st and 2nd [facility]. \par

## 2022-11-17 NOTE — HISTORY OF PRESENT ILLNESS
[FreeTextEntry1] : 68 yo M here for follow-up\par \par Recently discharged from [facility] 10/2022 for KATERIN and hyperkalemia \par \par He has been off enalapril. \par He has been off all BP medications including amlodipine as BP was normotensive during hospitalization. \par He is taking Kayexylate 30 g daily.\par \par Denies any nausea, vomiting, abdominal pain. \par \par

## 2022-11-17 NOTE — ASSESSMENT
[FreeTextEntry1] : #HIV\par - previously on Juluca, switched to triumeq due to lower CrCl\par - will repeat VL and CD4 \par \par #CKD \par - SPEP without M Dar\par - UPEP with bence sutton protein \par - Nephro follow-up in 1/2022\par \par #Hyperkalemia\par - follow-up BMP\par - continue Kayexylate 30 mg daily \par \par #HTN \par - restart amlodipine 5 mg daily\par \par #Leukopenia \par - if present, will refer to heme-onc\par -  SPEP without M Dar\par - UPEP with bence sutton protein \par - B12/Folate normal \par \par #Pancreatic Duct dilitation\par - Abd US 7/27/2022: New mild pancreatic duct dilatation measuring 3-4 mm. A central obstructing lesion cannot be excluded on this examination. Further evaluation of the pancreas with MRI and MRCP with and without gadolinium is necessary for complete characterization. Essentially stable dilatation of the common bile duct of unclear etiology\par - seen by GI in hospital -- will give referral for follow-up \par \par #Left Hip Pain \par - on chronic opioids - Oxycodone 30 mg QID\par -ISTOP reviewed : [id number]\par - repeat UDS \par - prescription sent \par \par #Low Testosterone \par - on testosterone gell \par \par #Neuropathy\par - Pregabpin 200 mg twice a day \par \par #Opioid dependence\par - Methadone 110 mg daily \par - [address]st and 2nd [facility]. \par

## 2022-11-18 ENCOUNTER — OUTPATIENT (OUTPATIENT)
Dept: OUTPATIENT SERVICES | Facility: HOSPITAL | Age: 67
LOS: 1 days | Discharge: HOME | End: 2022-11-18

## 2022-11-18 ENCOUNTER — NON-APPOINTMENT (OUTPATIENT)
Age: 67
End: 2022-11-18

## 2022-11-18 DIAGNOSIS — B20 HUMAN IMMUNODEFICIENCY VIRUS [HIV] DISEASE: ICD-10-CM

## 2022-11-18 RX ORDER — OXYCODONE HYDROCHLORIDE 30 MG/1
30 TABLET ORAL
Qty: 120 | Refills: 0 | Status: COMPLETED | COMMUNITY
Start: 2021-05-04 | End: 2022-11-18

## 2022-11-18 RX ORDER — ACETAMINOPHEN 325 MG/1
325 TABLET, FILM COATED ORAL EVERY 6 HOURS
Qty: 240 | Refills: 0 | Status: COMPLETED | COMMUNITY
Start: 2022-08-22 | End: 2022-11-18

## 2022-11-21 ENCOUNTER — NON-APPOINTMENT (OUTPATIENT)
Age: 67
End: 2022-11-21

## 2022-11-22 DIAGNOSIS — B20 HUMAN IMMUNODEFICIENCY VIRUS [HIV] DISEASE: ICD-10-CM

## 2022-11-23 LAB
ALBUMIN SERPL ELPH-MCNC: 4.4 G/DL
ALP BLD-CCNC: 183 U/L
ALT SERPL-CCNC: 54 U/L
ANION GAP SERPL CALC-SCNC: 12 MMOL/L
AST SERPL-CCNC: 71 U/L
BASOPHILS # BLD AUTO: 0.04 K/UL
BASOPHILS NFR BLD AUTO: 1.6 %
BILIRUB SERPL-MCNC: <0.2 MG/DL
BUN SERPL-MCNC: 41 MG/DL
CALCIUM SERPL-MCNC: 9.7 MG/DL
CD16+CD56+ CELLS # BLD: 114 /UL
CD16+CD56+ CELLS NFR BLD: 13 %
CD19 CELLS NFR BLD: 19 /UL
CD3 CELLS # BLD: 641 /UL
CD3 CELLS NFR BLD: 81 %
CD3+CD4+ CELLS # BLD: 161 /UL
CD3+CD4+ CELLS NFR BLD: 22 %
CD3+CD4+ CELLS/CD3+CD8+ CLL SPEC: 0.41 RATIO
CD3+CD8+ CELLS # SPEC: 395 /UL
CD3+CD8+ CELLS NFR BLD: 54 %
CELLS.CD3-CD19+/CELLS IN BLOOD: 2 %
CHLORIDE SERPL-SCNC: 104 MMOL/L
CO2 SERPL-SCNC: 21 MMOL/L
CREAT SERPL-MCNC: 1.7 MG/DL
EGFR: 43 ML/MIN/1.73M2
EOSINOPHIL # BLD AUTO: 0.13 K/UL
EOSINOPHIL NFR BLD AUTO: 5.3 %
GLUCOSE SERPL-MCNC: 73 MG/DL
HCT VFR BLD CALC: 26.7 %
HGB BLD-MCNC: 8.6 G/DL
HIV1 RNA # SERPL NAA+PROBE: 30 COPIES/ML
IMM GRANULOCYTES NFR BLD AUTO: 0.4 %
LYMPHOCYTES # BLD AUTO: 0.8 K/UL
LYMPHOCYTES NFR BLD AUTO: 32.8 %
MAN DIFF?: NORMAL
MCHC RBC-ENTMCNC: 30.5 PG
MCHC RBC-ENTMCNC: 32.2 G/DL
MCV RBC AUTO: 94.7 FL
MONOCYTES # BLD AUTO: 0.35 K/UL
MONOCYTES NFR BLD AUTO: 14.3 %
NEUTROPHILS # BLD AUTO: 1.11 K/UL
NEUTROPHILS NFR BLD AUTO: 45.6 %
PLATELET # BLD AUTO: 209 K/UL
POTASSIUM SERPL-SCNC: 5.6 MMOL/L
PROT SERPL-MCNC: 7.6 G/DL
RBC # BLD: 2.82 M/UL
RBC # FLD: 15 %
SIUH URINE DRUG SCREEN 1: NORMAL
SODIUM SERPL-SCNC: 137 MMOL/L
VIRAL LOAD INTERP: DETECTED COPIES/ML
VIRAL LOAD LOG: 1.48 LOGCOPIES/ML
WBC # FLD AUTO: 2.44 K/UL

## 2022-11-30 ENCOUNTER — OUTPATIENT (OUTPATIENT)
Dept: OUTPATIENT SERVICES | Facility: HOSPITAL | Age: 67
LOS: 1 days | Discharge: HOME | End: 2022-11-30

## 2022-11-30 ENCOUNTER — NON-APPOINTMENT (OUTPATIENT)
Age: 67
End: 2022-11-30

## 2022-12-01 ENCOUNTER — NON-APPOINTMENT (OUTPATIENT)
Age: 67
End: 2022-12-01

## 2022-12-01 NOTE — CHART NOTE - NSCHARTNOTEFT_GEN_A_CORE
CDI Query Note:    Sepsis was evaluated and ruled out at the time of discharge     Please call or message on [employer] Teams if with any questions.  Spectra [phone]

## 2022-12-05 ENCOUNTER — NON-APPOINTMENT (OUTPATIENT)
Age: 67
End: 2022-12-05

## 2022-12-05 ENCOUNTER — OUTPATIENT (OUTPATIENT)
Dept: OUTPATIENT SERVICES | Facility: HOSPITAL | Age: 67
LOS: 1 days | Discharge: HOME | End: 2022-12-05

## 2022-12-07 ENCOUNTER — RX RENEWAL (OUTPATIENT)
Age: 67
End: 2022-12-07

## 2022-12-09 ENCOUNTER — NON-APPOINTMENT (OUTPATIENT)
Age: 67
End: 2022-12-09

## 2022-12-09 ENCOUNTER — OUTPATIENT (OUTPATIENT)
Dept: OUTPATIENT SERVICES | Facility: HOSPITAL | Age: 67
LOS: 1 days | Discharge: HOME | End: 2022-12-09

## 2022-12-14 ENCOUNTER — NON-APPOINTMENT (OUTPATIENT)
Age: 67
End: 2022-12-14

## 2022-12-14 ENCOUNTER — OUTPATIENT (OUTPATIENT)
Dept: OUTPATIENT SERVICES | Facility: HOSPITAL | Age: 67
LOS: 1 days | Discharge: HOME | End: 2022-12-14

## 2022-12-15 ENCOUNTER — NON-APPOINTMENT (OUTPATIENT)
Age: 67
End: 2022-12-15

## 2022-12-15 ENCOUNTER — OUTPATIENT (OUTPATIENT)
Dept: OUTPATIENT SERVICES | Facility: HOSPITAL | Age: 67
LOS: 1 days | Discharge: HOME | End: 2022-12-15

## 2022-12-15 ENCOUNTER — APPOINTMENT (OUTPATIENT)
Dept: INFECTIOUS DISEASE | Facility: CLINIC | Age: 67
End: 2022-12-15

## 2022-12-15 VITALS
WEIGHT: 192 LBS | RESPIRATION RATE: 16 BRPM | BODY MASS INDEX: 26.01 KG/M2 | OXYGEN SATURATION: 100 % | HEIGHT: 72 IN | HEART RATE: 85 BPM | TEMPERATURE: 97.8 F | SYSTOLIC BLOOD PRESSURE: 114 MMHG | DIASTOLIC BLOOD PRESSURE: 58 MMHG

## 2022-12-15 DIAGNOSIS — D72.819 DECREASED WHITE BLOOD CELL COUNT, UNSPECIFIED: ICD-10-CM

## 2022-12-15 DIAGNOSIS — B20 HUMAN IMMUNODEFICIENCY VIRUS [HIV] DISEASE: ICD-10-CM

## 2022-12-15 DIAGNOSIS — N18.9 CHRONIC KIDNEY DISEASE, UNSPECIFIED: ICD-10-CM

## 2022-12-15 DIAGNOSIS — M19.90 UNSPECIFIED OSTEOARTHRITIS, UNSPECIFIED SITE: ICD-10-CM

## 2022-12-15 PROCEDURE — 99214 OFFICE O/P EST MOD 30 MIN: CPT

## 2022-12-15 NOTE — HISTORY OF PRESENT ILLNESS
[FreeTextEntry1] : 66 yo M here for follow-up\par \par Recently discharged from [facility] 10/2022 for KATERIN and hyperkalemi\par \par He has no complaints. \par He is currently on amlodipine 10 mg daily and chlothalidone 25 mg daily. \par BP controlled here. \par Receiving Bactrim from [facility] pharmacy, but not taking. \par Still taking Kayexylate 30 g daily \par \par

## 2022-12-15 NOTE — ASSESSMENT
[FreeTextEntry1] : #HIV\par - continue triumeq\par - will repeat VL and CD4 \par \par #CKD \par - SPEP without M Dar, weak IgG Kappa and weak IgG lambda bands \par - UPEP with bence sutton protein \par - Nephro follow-up in 1/2023\par \par #Leukopenia \par - will refer to heme\par - 10/2022 B12/Folate normal \par - 10/2022 normal iron studies \par \par #Hyperkalemia\par - follow-up BMP\par - continue Kayexylate 30 mg daily \par \par #Pancreatic Duct dilitation\par - Abd US 7/27/2022: New mild pancreatic duct dilatation measuring 3-4 mm. A central obstructing lesion cannot be excluded on this examination. Further evaluation of the pancreas with MRI and MRCP with and without gadolinium is necessary for complete characterization. Essentially stable dilatation of the common bile duct of unclear etiology\par - seen by GI in hospital -- will give referral for follow-up \par \par #Left Hip Pain \par - on chronic opioids\par \par #HTN \par - amlodipin 10 mg daily \par - chlorthalidone 25 mg daily \par \par #Low Testosterone \par - on testosterone gell \par \par #Neuropathy\par - Pregabpin 200 mg twice a day \par \par #Opioid dependence\par - Methadone 110 mg daily \par - [address] [place] [address]

## 2022-12-20 NOTE — DISCHARGE NOTE NURSING/CASE MANAGEMENT/SOCIAL WORK - NSDCVIVACCINE_GEN_ALL_CORE_FT
No Vaccines Administered. Alar Island Pedicle Flap Text: The defect edges were debeveled with a #15 scalpel blade.  Given the location of the defect, shape of the defect and the proximity to the alar rim an island pedicle advancement flap was deemed most appropriate.  Using a sterile surgical marker, an appropriate advancement flap was drawn incorporating the defect, outlining the appropriate donor tissue and placing the expected incisions within the nasal ala running parallel to the alar rim. The area thus outlined was incised with a #15 scalpel blade.  The skin margins were undermined minimally to an appropriate distance in all directions around the primary defect and laterally outward around the island pedicle utilizing iris scissors.  There was minimal undermining beneath the pedicle flap.

## 2022-12-27 DIAGNOSIS — B20 HUMAN IMMUNODEFICIENCY VIRUS [HIV] DISEASE: ICD-10-CM

## 2022-12-28 DIAGNOSIS — B20 HUMAN IMMUNODEFICIENCY VIRUS [HIV] DISEASE: ICD-10-CM

## 2022-12-28 LAB
ALBUMIN SERPL ELPH-MCNC: 4.4 G/DL
ALP BLD-CCNC: 407 U/L
ALT SERPL-CCNC: 60 U/L
ANION GAP SERPL CALC-SCNC: 10 MMOL/L
AST SERPL-CCNC: 70 U/L
BASOPHILS # BLD AUTO: 0.06 K/UL
BASOPHILS NFR BLD AUTO: 1.5 %
BILIRUB SERPL-MCNC: 0.2 MG/DL
BUN SERPL-MCNC: 42 MG/DL
CALCIUM SERPL-MCNC: 10.2 MG/DL
CHLORIDE SERPL-SCNC: 104 MMOL/L
CO2 SERPL-SCNC: 23 MMOL/L
CREAT SERPL-MCNC: 2 MG/DL
EGFR: 36 ML/MIN/1.73M2
EOSINOPHIL # BLD AUTO: 0.13 K/UL
EOSINOPHIL NFR BLD AUTO: 3.2 %
GLUCOSE SERPL-MCNC: 94 MG/DL
HCT VFR BLD CALC: 30.6 %
HGB BLD-MCNC: 10.1 G/DL
IMM GRANULOCYTES NFR BLD AUTO: 0.2 %
LYMPHOCYTES # BLD AUTO: 0.67 K/UL
LYMPHOCYTES NFR BLD AUTO: 16.7 %
MAN DIFF?: NORMAL
MCHC RBC-ENTMCNC: 31 PG
MCHC RBC-ENTMCNC: 33 G/DL
MCV RBC AUTO: 93.9 FL
MONOCYTES # BLD AUTO: 0.45 K/UL
MONOCYTES NFR BLD AUTO: 11.2 %
NEUTROPHILS # BLD AUTO: 2.69 K/UL
NEUTROPHILS NFR BLD AUTO: 67.2 %
PLATELET # BLD AUTO: 294 K/UL
POTASSIUM SERPL-SCNC: 5 MMOL/L
PROT SERPL-MCNC: 7.9 G/DL
RBC # BLD: 3.26 M/UL
RBC # FLD: 15.2 %
SODIUM SERPL-SCNC: 137 MMOL/L
WBC # FLD AUTO: 4.01 K/UL

## 2022-12-29 ENCOUNTER — LABORATORY RESULT (OUTPATIENT)
Age: 67
End: 2022-12-29

## 2022-12-30 LAB
CD16+CD56+ CELLS # BLD: 145 /UL
CD16+CD56+ CELLS NFR BLD: 19 %
CD19 CELLS NFR BLD: 19 /UL
CD3 CELLS # BLD: 601 /UL
CD3 CELLS NFR BLD: 78 %
CD3+CD4+ CELLS # BLD: 188 /UL
CD3+CD4+ CELLS NFR BLD: 24 %
CD3+CD4+ CELLS/CD3+CD8+ CLL SPEC: 0.49 RATIO
CD3+CD8+ CELLS # SPEC: 382 /UL
CD3+CD8+ CELLS NFR BLD: 49 %
CELLS.CD3-CD19+/CELLS IN BLOOD: 2 %
HIV1 RNA # SERPL NAA+PROBE: 48 COPIES/ML
SIUH URINE DRUG SCREEN 1: NORMAL
VIRAL LOAD INTERP: DETECTED COPIES/ML
VIRAL LOAD LOG: 1.68 LOGCOPIES/ML

## 2023-01-17 ENCOUNTER — APPOINTMENT (OUTPATIENT)
Dept: NEPHROLOGY | Facility: CLINIC | Age: 68
End: 2023-01-17

## 2023-02-09 ENCOUNTER — OUTPATIENT (OUTPATIENT)
Dept: OUTPATIENT SERVICES | Facility: HOSPITAL | Age: 68
LOS: 1 days | End: 2023-02-09
Payer: COMMERCIAL

## 2023-02-09 ENCOUNTER — NON-APPOINTMENT (OUTPATIENT)
Age: 68
End: 2023-02-09

## 2023-02-09 DIAGNOSIS — B20 HUMAN IMMUNODEFICIENCY VIRUS [HIV] DISEASE: ICD-10-CM

## 2023-02-09 PROCEDURE — G9012: CPT

## 2023-03-03 ENCOUNTER — OUTPATIENT (OUTPATIENT)
Dept: OUTPATIENT SERVICES | Facility: HOSPITAL | Age: 68
LOS: 1 days | End: 2023-03-03
Payer: COMMERCIAL

## 2023-03-03 ENCOUNTER — NON-APPOINTMENT (OUTPATIENT)
Age: 68
End: 2023-03-03

## 2023-03-03 PROCEDURE — G9012: CPT

## 2023-03-06 DIAGNOSIS — B20 HUMAN IMMUNODEFICIENCY VIRUS [HIV] DISEASE: ICD-10-CM

## 2023-03-08 ENCOUNTER — NON-APPOINTMENT (OUTPATIENT)
Age: 68
End: 2023-03-08

## 2023-03-08 ENCOUNTER — OUTPATIENT (OUTPATIENT)
Dept: OUTPATIENT SERVICES | Facility: HOSPITAL | Age: 68
LOS: 1 days | End: 2023-03-08
Payer: COMMERCIAL

## 2023-03-08 DIAGNOSIS — B20 HUMAN IMMUNODEFICIENCY VIRUS [HIV] DISEASE: ICD-10-CM

## 2023-03-08 PROCEDURE — G9012: CPT

## 2023-03-16 ENCOUNTER — APPOINTMENT (OUTPATIENT)
Dept: INFECTIOUS DISEASE | Facility: CLINIC | Age: 68
End: 2023-03-16

## 2023-03-16 ENCOUNTER — APPOINTMENT (OUTPATIENT)
Dept: INFECTIOUS DISEASE | Facility: CLINIC | Age: 68
End: 2023-03-16
Payer: COMMERCIAL

## 2023-03-16 NOTE — HISTORY OF PRESENT ILLNESS
[FreeTextEntry1] : 68 yo M here for follow-up\par \par Recently discharged from [facility] 10/2022 for KATERIN and hyperkalemi\par \par He has no complaints. \par He is currently on amlodipine 10 mg daily and chlothalidone 25 mg daily. \par BP controlled here. \par Receiving Bactrim from [facility] pharmacy, but not taking. \par Still taking Kayexylate 30 g daily \par

## 2023-03-16 NOTE — ASSESSMENT
[FreeTextEntry1] : \par #HIV/AIDS\par - CD4 188 12/27/2022\par - continue triumeq\par - will repeat VL and CD4 and annual labs \par \par #CKD \par - SPEP without M Dar, weak IgG Kappa and weak IgG lambda bands \par - UPEP - Proteinutia - Bence Zhang Concentration abcent \par - Nephro follow-up in 1/2023 -- did not make appointment -- needs follow-up \par - repeat SPEP/UPEP\par \par #Leukopenia \par - will refer to heme\par - 10/2022 B12/Folate normal \par - 10/2022 normal iron studies \par \par #Fentranyl positive \par \par #Hyperkalemia\par - follow-up BMP\par - continue Kayexylate 30 mg daily \par \par #Pancreatic Duct dilitation\par - Abd US 7/27/2022: New mild pancreatic duct dilatation measuring 3-4 mm. A central obstructing lesion cannot be excluded on this examination. Further evaluation of the pancreas with MRI and MRCP with and without gadolinium is necessary for complete characterization. Essentially stable dilatation of the common bile duct of unclear etiology\par - seen by GI in hospital -- will give referral for follow-up \par \par #Left Hip Pain \par - on chronic opioids\par \par #HTN \par - amlodipin 10 mg daily \par - chlorthalidone 25 mg daily \par \par #Low Testosterone \par - on testosterone gell \par \par #Neuropathy\par - Pregabpin 200 mg twice a day \par \par #Opioid dependence\par - Methadone 110 mg daily \par - [address] [facility]. \par

## 2023-03-23 ENCOUNTER — APPOINTMENT (OUTPATIENT)
Dept: INFECTIOUS DISEASE | Facility: CLINIC | Age: 68
End: 2023-03-23
Payer: COMMERCIAL

## 2023-03-23 ENCOUNTER — OUTPATIENT (OUTPATIENT)
Dept: OUTPATIENT SERVICES | Facility: HOSPITAL | Age: 68
LOS: 1 days | End: 2023-03-23
Payer: COMMERCIAL

## 2023-03-23 ENCOUNTER — LABORATORY RESULT (OUTPATIENT)
Age: 68
End: 2023-03-23

## 2023-03-23 VITALS
OXYGEN SATURATION: 96 % | RESPIRATION RATE: 14 BRPM | SYSTOLIC BLOOD PRESSURE: 175 MMHG | BODY MASS INDEX: 25.33 KG/M2 | HEIGHT: 72 IN | HEART RATE: 73 BPM | WEIGHT: 187 LBS | DIASTOLIC BLOOD PRESSURE: 69 MMHG | TEMPERATURE: 98.7 F

## 2023-03-23 DIAGNOSIS — N18.9 CHRONIC KIDNEY DISEASE, UNSPECIFIED: ICD-10-CM

## 2023-03-23 DIAGNOSIS — B20 HUMAN IMMUNODEFICIENCY VIRUS [HIV] DISEASE: ICD-10-CM

## 2023-03-23 DIAGNOSIS — K86.89 OTHER SPECIFIED DISEASES OF PANCREAS: ICD-10-CM

## 2023-03-23 DIAGNOSIS — D72.819 DECREASED WHITE BLOOD CELL COUNT, UNSPECIFIED: ICD-10-CM

## 2023-03-23 PROCEDURE — 99214 OFFICE O/P EST MOD 30 MIN: CPT

## 2023-03-23 NOTE — PHYSICAL EXAM
[General Appearance - Alert] : alert [Sclera] : the sclera and conjunctiva were normal [Outer Ear] : the ears and nose were normal in appearance [PERRL With Normal Accommodation] : pupils were equal in size, round, reactive to light [Both Tympanic Membranes Were Examined] : both tympanic membranes were normal [Respiration, Rhythm And Depth] : normal respiratory rhythm and effort [Auscultation Breath Sounds / Voice Sounds] : lungs were clear to auscultation bilaterally [Abdomen Soft] : soft [Abdomen Tenderness] : non-tender [Musculoskeletal - Swelling] : no joint swelling [] : no rash

## 2023-03-23 NOTE — HISTORY OF PRESENT ILLNESS
[FreeTextEntry1] : 66 yo M here for HIV follow-up \par \par Has not made appointments for renal yet. Has follow-up in May. \par \par He has no acute complaints. \par \par Denies any abdominal pain, nausea, vomiting, dysuria, hematuria. \par Reports compliance with medications. \par Has lost some weight since last year about 5 lbs - but says this is intentional \par \par

## 2023-03-23 NOTE — ASSESSMENT
[FreeTextEntry1] : #HIV\par - continue triumeq\par - will repeat VL and CD4, annual labs \par \par #CKD \par - SPEP without M Dar, weak IgG Kappa and weak IgG lambda bands \par - UPEP - Protein[facility] - Bence Zhang Concentration absent \par - Nephro follow-up in 1/2023\par \par #Pancreatic Duct dilitation\par - Abd US 7/27/2022: New mild pancreatic duct dilatation measuring 3-4 mm. A central obstructing lesion cannot be excluded on this examination. Further evaluation of the pancreas with MRI and MRCP with and without gadolinium is necessary for complete characterization. Essentially stable dilatation of the common bile duct of unclear etiology\par - Abd Us 10/2022 at [facility] -- stable changes \par - seen by GI in hospital -- will give referral for follow-up \par \par #Leukopenia \par - will repeat CBC and if persistent, will refer to heme\par - 10/2022 B12/Folate normal \par - 10/2022 normal iron studies \par \par #Hyperkalemia\par - follow-up BMP\par - continue Kayexylate 30 mg daily \par \par #Hypertension - repeat BP check 135/78 \par - continue Amlodipin 10 mg daily \par - continue chlothalidone 25 mg daily \par \par \par #Left Hip Pain \par - on chronic opioids \par - recommended taking motrin only as needed to avoid kidney/HTN issues \par \par #HTN \par - amlodipin 10 mg daily \par - chlorthalidone 25 mg daily \par \par #Low Testosterone \par - on testosterone gell \par \par #Neuropathy\par - Pregabpin 200 mg twice a day \par \par #Opioid dependence\par - Methadone 110 mg daily \par - [address] [facility]. \par

## 2023-03-29 LAB
ALBUMIN MFR SERPL ELPH: 50.8 %
ALBUMIN SERPL ELPH-MCNC: 4.5 G/DL
ALBUMIN SERPL-MCNC: 4 G/DL
ALBUMIN/GLOB SERPL: 1 RATIO
ALP BLD-CCNC: 578 U/L
ALPHA1 GLOB MFR SERPL ELPH: 5.9 %
ALPHA1 GLOB SERPL ELPH-MCNC: 0.5 G/DL
ALPHA2 GLOB MFR SERPL ELPH: 12.1 %
ALPHA2 GLOB SERPL ELPH-MCNC: 1 G/DL
ALT SERPL-CCNC: 41 U/L
ANION GAP SERPL CALC-SCNC: 12 MMOL/L
APPEARANCE: ABNORMAL
AST SERPL-CCNC: 63 U/L
B-GLOBULIN MFR SERPL ELPH: 11.5 %
B-GLOBULIN SERPL ELPH-MCNC: 0.9 G/DL
BACTERIA: ABNORMAL
BASOPHILS # BLD AUTO: 0.05 K/UL
BASOPHILS NFR BLD AUTO: 1.7 %
BILIRUB SERPL-MCNC: 0.3 MG/DL
BILIRUBIN URINE: NEGATIVE
BLOOD URINE: NEGATIVE
BUN SERPL-MCNC: 25 MG/DL
C TRACH RRNA SPEC QL NAA+PROBE: NOT DETECTED
CALCIUM SERPL-MCNC: 10.7 MG/DL
CD16+CD56+ CELLS # BLD: 165 /UL
CD16+CD56+ CELLS NFR BLD: 14 %
CD19 CELLS NFR BLD: 15 /UL
CD3 CELLS # BLD: 960 /UL
CD3 CELLS NFR BLD: 84 %
CD3+CD4+ CELLS # BLD: 222 /UL
CD3+CD4+ CELLS NFR BLD: 19 %
CD3+CD4+ CELLS/CD3+CD8+ CLL SPEC: 0.32 RATIO
CD3+CD8+ CELLS # SPEC: 697 /UL
CD3+CD8+ CELLS NFR BLD: 61 %
CELLS.CD3-CD19+/CELLS IN BLOOD: 1 %
CHLORIDE SERPL-SCNC: 96 MMOL/L
CHOLEST SERPL-MCNC: 176 MG/DL
CO2 SERPL-SCNC: 29 MMOL/L
COLOR: YELLOW
CREAT SERPL-MCNC: 2 MG/DL
CREAT SPEC-SCNC: 129 MG/DL
CREAT/PROT UR: 0.1 RATIO
EGFR: 36 ML/MIN/1.73M2
EOSINOPHIL # BLD AUTO: 0.21 K/UL
EOSINOPHIL NFR BLD AUTO: 7 %
ESTIMATED AVERAGE GLUCOSE: 105 MG/DL
GAMMA GLOB FLD ELPH-MCNC: 1.6 G/DL
GAMMA GLOB MFR SERPL ELPH: 19.7 %
GLUCOSE QUALITATIVE U: NEGATIVE
GLUCOSE SERPL-MCNC: 100 MG/DL
HBA1C MFR BLD HPLC: 5.3 %
HCT VFR BLD CALC: 34 %
HDLC SERPL-MCNC: 55 MG/DL
HGB BLD-MCNC: 10.8 G/DL
HIV1 RNA # SERPL NAA+PROBE: NOT DETECTED COPIES/ML
HYALINE CASTS: 0 /LPF
IMM GRANULOCYTES NFR BLD AUTO: 0.3 %
INTERPRETATION SERPL IEP-IMP: NORMAL
KETONES URINE: NEGATIVE
LDLC SERPL CALC-MCNC: 90 MG/DL
LEUKOCYTE ESTERASE URINE: NEGATIVE
LYMPHOCYTES # BLD AUTO: 1.06 K/UL
LYMPHOCYTES NFR BLD AUTO: 35.1 %
M PROTEIN MFR SERPL ELPH: NORMAL
M TB IFN-G BLD-IMP: NEGATIVE
MAN DIFF?: NORMAL
MCHC RBC-ENTMCNC: 29.3 PG
MCHC RBC-ENTMCNC: 31.8 G/DL
MCV RBC AUTO: 92.4 FL
MICROSCOPIC-UA: NORMAL
MONOCLON BAND OBS SERPL: NORMAL
MONOCYTES # BLD AUTO: 0.47 K/UL
MONOCYTES NFR BLD AUTO: 15.6 %
N GONORRHOEA RRNA SPEC QL NAA+PROBE: NOT DETECTED
NEUTROPHILS # BLD AUTO: 1.22 K/UL
NEUTROPHILS NFR BLD AUTO: 40.3 %
NITRITE URINE: NEGATIVE
NONHDLC SERPL-MCNC: 121 MG/DL
PH URINE: 7.5
PLATELET # BLD AUTO: 289 K/UL
POTASSIUM SERPL-SCNC: 4.2 MMOL/L
PROT SERPL-MCNC: 7.8 G/DL
PROT SERPL-MCNC: 7.9 G/DL
PROT SERPL-MCNC: 7.9 G/DL
PROT UR-MCNC: 13 MG/DLG/24H
PROTEIN URINE: NORMAL
QUANTIFERON TB PLUS MITOGEN MINUS NIL: 2.22 IU/ML
QUANTIFERON TB PLUS NIL: 0.05 IU/ML
QUANTIFERON TB PLUS TB1 MINUS NIL: 0.01 IU/ML
QUANTIFERON TB PLUS TB2 MINUS NIL: 0.01 IU/ML
RBC # BLD: 3.68 M/UL
RBC # FLD: 13.1 %
RED BLOOD CELLS URINE: 1 /HPF
SODIUM SERPL-SCNC: 137 MMOL/L
SOURCE AMPLIFICATION: NORMAL
SPECIFIC GRAVITY URINE: 1.02
SQUAMOUS EPITHELIAL CELLS: 0 /HPF
T PALLIDUM AB SER QL IA: NEGATIVE
TRIGL SERPL-MCNC: 153 MG/DL
UROBILINOGEN URINE: NORMAL
VIRAL LOAD INTERP: NOT DETECTED COPIES/ML
VIRAL LOAD LOG: NOT DETECTED LOGCOPIES/ML
WBC # FLD AUTO: 3.02 K/UL
WHITE BLOOD CELLS URINE: 0 /HPF

## 2023-04-06 ENCOUNTER — NON-APPOINTMENT (OUTPATIENT)
Age: 68
End: 2023-04-06

## 2023-04-06 ENCOUNTER — OUTPATIENT (OUTPATIENT)
Dept: OUTPATIENT SERVICES | Facility: HOSPITAL | Age: 68
LOS: 1 days | End: 2023-04-06
Payer: COMMERCIAL

## 2023-04-06 PROCEDURE — G9012: CPT

## 2023-04-07 ENCOUNTER — NON-APPOINTMENT (OUTPATIENT)
Age: 68
End: 2023-04-07

## 2023-04-07 ENCOUNTER — OUTPATIENT (OUTPATIENT)
Dept: OUTPATIENT SERVICES | Facility: HOSPITAL | Age: 68
LOS: 1 days | End: 2023-04-07
Payer: COMMERCIAL

## 2023-04-07 DIAGNOSIS — B20 HUMAN IMMUNODEFICIENCY VIRUS [HIV] DISEASE: ICD-10-CM

## 2023-04-07 PROCEDURE — G9012: CPT

## 2023-04-17 ENCOUNTER — OUTPATIENT (OUTPATIENT)
Dept: OUTPATIENT SERVICES | Facility: HOSPITAL | Age: 68
LOS: 1 days | End: 2023-04-17
Payer: COMMERCIAL

## 2023-04-17 ENCOUNTER — NON-APPOINTMENT (OUTPATIENT)
Age: 68
End: 2023-04-17

## 2023-04-17 DIAGNOSIS — B20 HUMAN IMMUNODEFICIENCY VIRUS [HIV] DISEASE: ICD-10-CM

## 2023-04-17 PROCEDURE — G9012: CPT

## 2023-04-19 ENCOUNTER — RX RENEWAL (OUTPATIENT)
Age: 68
End: 2023-04-19

## 2023-04-26 ENCOUNTER — OUTPATIENT (OUTPATIENT)
Dept: OUTPATIENT SERVICES | Facility: HOSPITAL | Age: 68
LOS: 1 days | End: 2023-04-26
Payer: COMMERCIAL

## 2023-04-26 ENCOUNTER — NON-APPOINTMENT (OUTPATIENT)
Age: 68
End: 2023-04-26

## 2023-04-26 DIAGNOSIS — B20 HUMAN IMMUNODEFICIENCY VIRUS [HIV] DISEASE: ICD-10-CM

## 2023-04-26 PROCEDURE — G9012: CPT

## 2023-05-02 ENCOUNTER — NON-APPOINTMENT (OUTPATIENT)
Age: 68
End: 2023-05-02

## 2023-05-02 ENCOUNTER — APPOINTMENT (OUTPATIENT)
Dept: NEPHROLOGY | Facility: CLINIC | Age: 68
End: 2023-05-02

## 2023-05-02 ENCOUNTER — OUTPATIENT (OUTPATIENT)
Dept: OUTPATIENT SERVICES | Facility: HOSPITAL | Age: 68
LOS: 1 days | End: 2023-05-02
Payer: COMMERCIAL

## 2023-05-02 PROCEDURE — G9012: CPT

## 2023-05-03 DIAGNOSIS — B20 HUMAN IMMUNODEFICIENCY VIRUS [HIV] DISEASE: ICD-10-CM

## 2023-05-04 ENCOUNTER — APPOINTMENT (OUTPATIENT)
Dept: INFECTIOUS DISEASE | Facility: CLINIC | Age: 68
End: 2023-05-04
Payer: COMMERCIAL

## 2023-05-04 ENCOUNTER — OUTPATIENT (OUTPATIENT)
Dept: OUTPATIENT SERVICES | Facility: HOSPITAL | Age: 68
LOS: 1 days | End: 2023-05-04
Payer: COMMERCIAL

## 2023-05-04 VITALS
SYSTOLIC BLOOD PRESSURE: 132 MMHG | DIASTOLIC BLOOD PRESSURE: 58 MMHG | RESPIRATION RATE: 16 BRPM | TEMPERATURE: 98.6 F | OXYGEN SATURATION: 99 % | HEART RATE: 84 BPM | WEIGHT: 190 LBS | HEIGHT: 72 IN | BODY MASS INDEX: 25.73 KG/M2

## 2023-05-04 DIAGNOSIS — B20 HUMAN IMMUNODEFICIENCY VIRUS [HIV] DISEASE: ICD-10-CM

## 2023-05-04 DIAGNOSIS — M70.20 OLECRANON BURSITIS, UNSPECIFIED ELBOW: ICD-10-CM

## 2023-05-04 PROCEDURE — 99214 OFFICE O/P EST MOD 30 MIN: CPT

## 2023-05-04 NOTE — PHYSICAL EXAM
[General Appearance - Alert] : alert [Sclera] : the sclera and conjunctiva were normal [PERRL With Normal Accommodation] : pupils were equal in size, round, reactive to light [Outer Ear] : the ears and nose were normal in appearance [Both Tympanic Membranes Were Examined] : both tympanic membranes were normal [Respiration, Rhythm And Depth] : normal respiratory rhythm and effort [Auscultation Breath Sounds / Voice Sounds] : lungs were clear to auscultation bilaterally [Abdomen Soft] : soft [Abdomen Tenderness] : non-tender [Musculoskeletal - Swelling] : no joint swelling [] : no rash [FreeTextEntry1] : Left elbow with fluid filled structure, non-tender, not warm

## 2023-05-04 NOTE — ASSESSMENT
[FreeTextEntry1] : #HIV\par - continue triumeq\par - will repeat VL and CD4, annual labs \par \par #Suspected Left Olecranon Bursa \par - Rheum referral to see if we can drainage \par \par #Pancreatic Duct dilitation\par - Abd US 7/27/2022: New mild pancreatic duct dilatation measuring 3-4 mm. A central obstructing lesion cannot be excluded on this examination. Further evaluation of the pancreas with MRI and MRCP with and without gadolinium is necessary for complete characterization. Essentially stable dilatation of the common bile duct of unclear etiology\par - Abd Us 10/2022 at [facility] -- stable changes \par - seen by GI in hospital -- referral given \par \par #Leukopenia \par - SPEP without M Dar, weak IgG Kappa and weak IgG lambda bands \par - UPEP - Proteinut[facility] - Bence Zhang Concentration absent \par - 10/2022 B12/Folate normal \par - 10/2022 normal iron studies \par - heme referral given \par \par #CKD \par - SPEP without M Dar, weak IgG Kappa and weak IgG lambda bands \par - UPEP - Proteinutia - Bence Zhang Concentration absent \par - Nephro follow-up in 6/2023 \par \par #Hypertension - \par - continue Amlodipin 10 mg daily \par - continue chlothalidone 25 mg daily \par \par \par #Left Hip Pain \par - on chronic opioids \par - recommended taking motrin only as needed to avoid kidney/HTN issues \par \par #HTN \par - amlodipin 10 mg daily \par - chlorthalidone 25 mg daily \par \par #Low Testosterone \par - on testosterone gell \par \par #Neuropathy\par - Pregabpin 200 mg twice a day \par \par #Opioid dependence\par - Methadone 110 mg daily \par - [address] [facility]. \par

## 2023-05-04 NOTE — HISTORY OF PRESENT ILLNESS
[FreeTextEntry1] : 68 yo M here for HIV follow-up \par \par Has noted swelling of left elbow. \par Has cyst by olencranon -- non-tender, not warm. \par No fevers or pain by elbow. No pain with movement \par \par Reviewed labs -- including elevated alk phos and prevoius imaging showing mild pancreatic duct dilatation. \par Has not made appointments for renal yet. Has follow-up in May. \par Reviewed CBC and leukopenia \par \par He has no acute complaints otherwise\par \par

## 2023-05-05 DIAGNOSIS — B20 HUMAN IMMUNODEFICIENCY VIRUS [HIV] DISEASE: ICD-10-CM

## 2023-05-05 DIAGNOSIS — M70.20 OLECRANON BURSITIS, UNSPECIFIED ELBOW: ICD-10-CM

## 2023-05-08 ENCOUNTER — OUTPATIENT (OUTPATIENT)
Dept: OUTPATIENT SERVICES | Facility: HOSPITAL | Age: 68
LOS: 1 days | End: 2023-05-08
Payer: COMMERCIAL

## 2023-05-08 ENCOUNTER — NON-APPOINTMENT (OUTPATIENT)
Age: 68
End: 2023-05-08

## 2023-05-08 DIAGNOSIS — B20 HUMAN IMMUNODEFICIENCY VIRUS [HIV] DISEASE: ICD-10-CM

## 2023-05-08 PROCEDURE — G9012: CPT

## 2023-05-17 ENCOUNTER — RX RENEWAL (OUTPATIENT)
Age: 68
End: 2023-05-17

## 2023-05-25 ENCOUNTER — NON-APPOINTMENT (OUTPATIENT)
Age: 68
End: 2023-05-25

## 2023-05-25 ENCOUNTER — OUTPATIENT (OUTPATIENT)
Dept: OUTPATIENT SERVICES | Facility: HOSPITAL | Age: 68
LOS: 1 days | End: 2023-05-25
Payer: COMMERCIAL

## 2023-05-25 DIAGNOSIS — B20 HUMAN IMMUNODEFICIENCY VIRUS [HIV] DISEASE: ICD-10-CM

## 2023-05-25 PROCEDURE — G9012: CPT

## 2023-06-07 ENCOUNTER — NON-APPOINTMENT (OUTPATIENT)
Age: 68
End: 2023-06-07

## 2023-06-07 ENCOUNTER — OUTPATIENT (OUTPATIENT)
Dept: OUTPATIENT SERVICES | Facility: HOSPITAL | Age: 68
LOS: 1 days | End: 2023-06-07
Payer: COMMERCIAL

## 2023-06-07 DIAGNOSIS — B20 HUMAN IMMUNODEFICIENCY VIRUS [HIV] DISEASE: ICD-10-CM

## 2023-06-07 PROCEDURE — G9012: CPT

## 2023-06-12 ENCOUNTER — NON-APPOINTMENT (OUTPATIENT)
Age: 68
End: 2023-06-12

## 2023-06-12 ENCOUNTER — OUTPATIENT (OUTPATIENT)
Dept: OUTPATIENT SERVICES | Facility: HOSPITAL | Age: 68
LOS: 1 days | End: 2023-06-12
Payer: COMMERCIAL

## 2023-06-12 DIAGNOSIS — B20 HUMAN IMMUNODEFICIENCY VIRUS [HIV] DISEASE: ICD-10-CM

## 2023-06-12 PROCEDURE — G9012: CPT

## 2023-06-13 ENCOUNTER — APPOINTMENT (OUTPATIENT)
Dept: NEPHROLOGY | Facility: CLINIC | Age: 68
End: 2023-06-13
Payer: COMMERCIAL

## 2023-06-13 ENCOUNTER — OUTPATIENT (OUTPATIENT)
Dept: OUTPATIENT SERVICES | Facility: HOSPITAL | Age: 68
LOS: 1 days | End: 2023-06-13
Payer: COMMERCIAL

## 2023-06-13 VITALS
DIASTOLIC BLOOD PRESSURE: 67 MMHG | HEART RATE: 67 BPM | BODY MASS INDEX: 24.11 KG/M2 | TEMPERATURE: 96.7 F | WEIGHT: 178 LBS | HEIGHT: 72 IN | SYSTOLIC BLOOD PRESSURE: 99 MMHG | OXYGEN SATURATION: 98 %

## 2023-06-13 DIAGNOSIS — N18.32 CHRONIC KIDNEY DISEASE, STAGE 3B: ICD-10-CM

## 2023-06-13 DIAGNOSIS — Z00.00 ENCOUNTER FOR GENERAL ADULT MEDICAL EXAMINATION W/OUT ABNORMAL FINDINGS: ICD-10-CM

## 2023-06-13 DIAGNOSIS — Z00.00 ENCOUNTER FOR GENERAL ADULT MEDICAL EXAMINATION WITHOUT ABNORMAL FINDINGS: ICD-10-CM

## 2023-06-13 PROCEDURE — 99214 OFFICE O/P EST MOD 30 MIN: CPT | Mod: GC

## 2023-06-13 PROCEDURE — 99214 OFFICE O/P EST MOD 30 MIN: CPT

## 2023-06-13 RX ORDER — DOCUSATE SODIUM 100 MG/1
100 CAPSULE, LIQUID FILLED ORAL
Qty: 90 | Refills: 5 | Status: COMPLETED | COMMUNITY
Start: 2021-07-06 | End: 2023-06-13

## 2023-06-13 RX ORDER — LACTULOSE 10 G/15ML
20 SOLUTION ORAL
Qty: 1 | Refills: 5 | Status: COMPLETED | COMMUNITY
Start: 2022-08-17 | End: 2023-06-13

## 2023-06-13 RX ORDER — ACETAMINOPHEN 500 MG/1
500 TABLET, COATED ORAL
Qty: 60 | Refills: 0 | Status: COMPLETED | COMMUNITY
Start: 2023-03-23 | End: 2023-06-13

## 2023-06-13 RX ORDER — IBUPROFEN 600 MG/1
600 TABLET, FILM COATED ORAL DAILY
Qty: 30 | Refills: 0 | Status: COMPLETED | COMMUNITY
Start: 2023-03-23 | End: 2023-06-13

## 2023-06-13 NOTE — PHYSICAL EXAM
[General Appearance - Alert] : alert [General Appearance - In No Acute Distress] : in no acute distress [General Appearance - Well Nourished] : well nourished [Auscultation Breath Sounds / Voice Sounds] : lungs were clear to auscultation bilaterally [Respiration, Rhythm And Depth] : normal respiratory rhythm and effort [Heart Sounds] : normal S1 and S2 [Murmurs] : no murmurs [Edema] : there was no peripheral edema [Abdomen Soft] : soft [Abdomen Tenderness] : non-tender [No CVA Tenderness] : no ~M costovertebral angle tenderness [Musculoskeletal - Swelling] : no joint swelling seen [Skin Turgor] : normal skin turgor [] : no rash [Oriented To Time, Place, And Person] : oriented to person, place, and time [Impaired Insight] : insight and judgment were intact [Affect] : the affect was normal

## 2023-06-13 NOTE — ASSESSMENT
[FreeTextEntry1] : \par CKD 3B, GFR 36/Cr 2.0 \par - c/w PO Kayexalate for chronic hyperkalemia \par - follow up in 3 months in nephrology clinic \par - check repeat CMP, UA, and Pr/Cr ratio\par - given hypercalcemia, CKD, elevated Alk Phos, and abnormal immunofixation, recommended Hem/Onc referral\par \par Hypertension \par - BP 99/67, reports intermittent lightheadedness upon standing\par - reduce amlodipine from 10 mg qd to 5 mg qd, patient expressed understanding, new RX sent to pharmacy \par - c/w chlorthalidone\par \par Elevated Alk Phos with Hypercalcemia\par - ca 10.7, Alk Phos >500\par - stop Vit D supplements\par - check PTH \par - repeat CMP \par \par RTC 3 months

## 2023-06-13 NOTE — HISTORY OF PRESENT ILLNESS
[FreeTextEntry1] : Patient is a 67 year old male with PMHx of HIV on HARRT therapy well controlled, HTN, and CKD with hyperkalemia, presents to the nephrology clinic for evaluation. The patient has long standing CKD with hyperkalemia on Kayexalate, Cr 2.0. He was referred to nephrology due to CKD stage 3b with hyperkalemia and hypercalcemia. On review of labs, elevated alk phos, elevated Ca, anemia, leukopenia, weak band on immunofixation. BP 99/60, reports intermittent lightheadedness, on amlodipine 10 mg and chlorthalidone 25 mg. The patient

## 2023-06-13 NOTE — END OF VISIT
[] : Resident [FreeTextEntry3] : Patient seen and examined with renal resident\par # CKD 3b / hypercalcemia with Vit D intake asked to stop\par # hyperkalemia on kayexalate continue\par will need hem onc follow up \par Alk phos is from bone origin c/w osteoclastic activity \par \par RTC in 3 months

## 2023-06-16 DIAGNOSIS — E83.52 HYPERCALCEMIA: ICD-10-CM

## 2023-06-16 DIAGNOSIS — Z00.00 ENCOUNTER FOR GENERAL ADULT MEDICAL EXAMINATION WITHOUT ABNORMAL FINDINGS: ICD-10-CM

## 2023-06-16 DIAGNOSIS — N18.32 CHRONIC KIDNEY DISEASE, STAGE 3B: ICD-10-CM

## 2023-06-25 ENCOUNTER — RX RENEWAL (OUTPATIENT)
Age: 68
End: 2023-06-25

## 2023-06-29 ENCOUNTER — OUTPATIENT (OUTPATIENT)
Dept: OUTPATIENT SERVICES | Facility: HOSPITAL | Age: 68
LOS: 1 days | End: 2023-06-29
Payer: COMMERCIAL

## 2023-06-29 ENCOUNTER — NON-APPOINTMENT (OUTPATIENT)
Age: 68
End: 2023-06-29

## 2023-06-29 PROCEDURE — G9012: CPT

## 2023-06-30 DIAGNOSIS — B20 HUMAN IMMUNODEFICIENCY VIRUS [HIV] DISEASE: ICD-10-CM

## 2023-07-06 ENCOUNTER — APPOINTMENT (OUTPATIENT)
Dept: INFECTIOUS DISEASE | Facility: CLINIC | Age: 68
End: 2023-07-06
Payer: COMMERCIAL

## 2023-07-06 ENCOUNTER — NON-APPOINTMENT (OUTPATIENT)
Age: 68
End: 2023-07-06

## 2023-07-06 ENCOUNTER — OUTPATIENT (OUTPATIENT)
Dept: OUTPATIENT SERVICES | Facility: HOSPITAL | Age: 68
LOS: 1 days | End: 2023-07-06
Payer: COMMERCIAL

## 2023-07-06 DIAGNOSIS — B20 HUMAN IMMUNODEFICIENCY VIRUS [HIV] DISEASE: ICD-10-CM

## 2023-07-06 PROCEDURE — G9012: CPT

## 2023-07-20 ENCOUNTER — LABORATORY RESULT (OUTPATIENT)
Age: 68
End: 2023-07-20

## 2023-07-20 ENCOUNTER — APPOINTMENT (OUTPATIENT)
Dept: INFECTIOUS DISEASE | Facility: CLINIC | Age: 68
End: 2023-07-20
Payer: COMMERCIAL

## 2023-07-20 ENCOUNTER — OUTPATIENT (OUTPATIENT)
Dept: OUTPATIENT SERVICES | Facility: HOSPITAL | Age: 68
LOS: 1 days | End: 2023-07-20
Payer: COMMERCIAL

## 2023-07-20 VITALS
OXYGEN SATURATION: 100 % | WEIGHT: 178 LBS | DIASTOLIC BLOOD PRESSURE: 61 MMHG | RESPIRATION RATE: 14 BRPM | HEART RATE: 55 BPM | HEIGHT: 72 IN | TEMPERATURE: 99 F | BODY MASS INDEX: 24.11 KG/M2 | SYSTOLIC BLOOD PRESSURE: 125 MMHG

## 2023-07-20 DIAGNOSIS — E83.52 HYPERCALCEMIA: ICD-10-CM

## 2023-07-20 DIAGNOSIS — M25.69 STIFFNESS OF OTHER SPECIFIED JOINT, NOT ELSEWHERE CLASSIFIED: ICD-10-CM

## 2023-07-20 DIAGNOSIS — B20 HUMAN IMMUNODEFICIENCY VIRUS [HIV] DISEASE: ICD-10-CM

## 2023-07-20 DIAGNOSIS — M19.90 UNSPECIFIED OSTEOARTHRITIS, UNSPECIFIED SITE: ICD-10-CM

## 2023-07-20 DIAGNOSIS — E78.5 HYPERLIPIDEMIA, UNSPECIFIED: ICD-10-CM

## 2023-07-20 PROCEDURE — 99214 OFFICE O/P EST MOD 30 MIN: CPT

## 2023-07-20 PROCEDURE — CPLAN: CPT

## 2023-07-20 NOTE — HISTORY OF PRESENT ILLNESS
[FreeTextEntry1] : 66 yo M here for HIV follow-up \par \par Reviewed labs -- including elevated alk phos and prevoius imaging showing mild pancreatic duct dilatation. \par Has not made appointments for hepatology and heme yet and urged him to do so\par He is walking with a crutch now because walking cane is not adjustable. \par Denies any worsening back pain or back stiffness. \par Takes chronic opioiods for left hip; Denies any worsening pain. \par He has no acute complaints otherwise\par

## 2023-07-20 NOTE — REVIEW OF SYSTEMS
[Fever] : no fever [Chills] : no chills [Body Aches] : no body aches [Eye Pain] : no eye pain [Red Eyes] : eyes not red [Earache] : no earache [Chest Pain] : no chest pain [Palpitations] : no palpitations [Leg Claudication] : no intermittent leg claudication [Shortness Of Breath] : no shortness of breath [Wheezing] : no wheezing [Dysuria] : no dysuria [Joint Pain] : joint pain [Joint Swelling] : no joint swelling [Joint Stiffness] : no joint stiffness [Skin Lesions] : no skin lesions [Confused] : no confusion [Convulsions] : no convulsions [Anxiety] : no anxiety [Negative] : Heme/Lymph

## 2023-07-20 NOTE — ASSESSMENT
[FreeTextEntry1] : \par #HIV\par - continue triumeq\par - check annual labs\par \par #Stiff Back \par - Lumbar X ray \par - check RF and PAULA \par \par #Pancreatic Duct dilitation\par - Abd US 7/27/2022: New mild pancreatic duct dilatation measuring 3-4 mm. A central obstructing lesion cannot be excluded on this examination. Further evaluation of the pancreas with MRI and MRCP with and without gadolinium is necessary for complete characterization. Essentially stable dilatation of the common bile duct of unclear etiology\par - Abd Us 10/2022 at [facility] -- stable changes \par - seen by GI in hospital -- referral given and discussed to make follow-up\par \par #Leukopenia/hypercalcemia \par - SPEP without M Dar, weak IgG Kappa and weak IgG lambda bands \par - UPEP - Protein[facility] - Bence Zhang Concentration absent \par - 10/2022 B12/Folate normal \par - 10/2022 normal iron studies \par - heme referral given \par \par #CKD \par - SPEP without M Dar, weak IgG Kappa and weak IgG lambda bands \par - UPEP - Proteinutia - Bence Zhang Concentration absent \par - appreciate renal follow-up \par \par #Hypertension - \par - continue Amlodipin 10 mg daily \par - continue chlothalidone 25 mg daily \par \par \par #Left Hip Pain \par - on chronic opioids \par - recommended taking motrin only as needed to avoid kidney/HTN issues \par \par #HTN \par - amlodipin 10 mg daily \par - chlorthalidone 25 mg daily \par \par #Low Testosterone \par - on testosterone gell \par \par #Neuropathy\par - Pregabpin 200 mg twice a day \par \par #Opioid dependence\par - Methadone 110 mg daily \par - [address] [facility]. \par \par 35 minutes was spend on medical discussion re: medication compliance. On antiretroviral treatment\par \par

## 2023-07-21 ENCOUNTER — NON-APPOINTMENT (OUTPATIENT)
Age: 68
End: 2023-07-21

## 2023-07-21 DIAGNOSIS — B20 HUMAN IMMUNODEFICIENCY VIRUS [HIV] DISEASE: ICD-10-CM

## 2023-07-27 LAB
25(OH)D3 SERPL-MCNC: 71 NG/ML
ALBUMIN SERPL ELPH-MCNC: 4.3 G/DL
ALP BLD-CCNC: 209 U/L
ALT SERPL-CCNC: 40 U/L
AMORPHOUS PHOSPHATE CRYSTALS: PRESENT
ANA SER IF-ACNC: NEGATIVE
ANION GAP SERPL CALC-SCNC: 10 MMOL/L
APPEARANCE: ABNORMAL
AST SERPL-CCNC: 52 U/L
BACTERIA: ABNORMAL /HPF
BILIRUB SERPL-MCNC: 0.3 MG/DL
BILIRUBIN URINE: NEGATIVE
BLOOD URINE: NEGATIVE
BUN SERPL-MCNC: 38 MG/DL
C TRACH RRNA SPEC QL NAA+PROBE: NOT DETECTED
CALCIUM SERPL-MCNC: 10.2 MG/DL
CAST: 0 /LPF
CD16+CD56+ CELLS # BLD: 279 /UL
CD16+CD56+ CELLS NFR BLD: 23 %
CD19 CELLS NFR BLD: 16 /UL
CD3 CELLS # BLD: 889 /UL
CD3 CELLS NFR BLD: 75 %
CD3+CD4+ CELLS # BLD: 260 /UL
CD3+CD4+ CELLS NFR BLD: 22 %
CD3+CD4+ CELLS/CD3+CD8+ CLL SPEC: 0.44 RATIO
CD3+CD8+ CELLS # SPEC: 585 /UL
CD3+CD8+ CELLS NFR BLD: 49 %
CELLS.CD3-CD19+/CELLS IN BLOOD: 1 %
CHLORIDE SERPL-SCNC: 103 MMOL/L
CHOLEST SERPL-MCNC: 178 MG/DL
CO2 SERPL-SCNC: 27 MMOL/L
COLOR: YELLOW
CREAT SERPL-MCNC: 1.6 MG/DL
EGFR: 47 ML/MIN/1.73M2
EPITHELIAL CELLS: 4 /HPF
ESTIMATED AVERAGE GLUCOSE: 111 MG/DL
GLUCOSE QUALITATIVE U: NEGATIVE MG/DL
GLUCOSE SERPL-MCNC: 81 MG/DL
HBA1C MFR BLD HPLC: 5.5 %
HBV CORE IGG+IGM SER QL: REACTIVE
HBV SURFACE AB SERPL IA-ACNC: 7.7 MIU/ML
HBV SURFACE AG SER QL: NONREACTIVE
HCV AB SER QL: REACTIVE
HCV S/CO RATIO: 12.61 S/CO
HDLC SERPL-MCNC: 46 MG/DL
HEPATITIS A IGG ANTIBODY: REACTIVE
HIV1 RNA # SERPL NAA+PROBE: NOT DETECTED COPIES/ML
KETONES URINE: NEGATIVE MG/DL
LDLC SERPL CALC-MCNC: 101 MG/DL
LEUKOCYTE ESTERASE URINE: ABNORMAL
M TB IFN-G BLD-IMP: NEGATIVE
MICROSCOPIC-UA: NORMAL
N GONORRHOEA RRNA SPEC QL NAA+PROBE: NOT DETECTED
NITRITE URINE: NEGATIVE
NONHDLC SERPL-MCNC: 132 MG/DL
PH URINE: >=9
POTASSIUM SERPL-SCNC: 4.9 MMOL/L
PROT SERPL-MCNC: 7.3 G/DL
PROTEIN URINE: 100 MG/DL
PSA SERPL-MCNC: 0.78 NG/ML
QUANTIFERON TB PLUS MITOGEN MINUS NIL: 1.87 IU/ML
QUANTIFERON TB PLUS NIL: 0.17 IU/ML
QUANTIFERON TB PLUS TB1 MINUS NIL: 0.05 IU/ML
QUANTIFERON TB PLUS TB2 MINUS NIL: 0.08 IU/ML
RED BLOOD CELLS URINE: 2 /HPF
REVIEW: NORMAL
RHEUMATOID FACT SER QL: <10 IU/ML
SIUH URINE DRUG SCREEN 1: NORMAL
SODIUM SERPL-SCNC: 140 MMOL/L
SOURCE AMPLIFICATION: NORMAL
SPECIFIC GRAVITY URINE: 1.03
T PALLIDUM AB SER QL IA: NEGATIVE
TRIGL SERPL-MCNC: 156 MG/DL
TRIPLE PHOSPHATE CRYSTALS: PRESENT
UROBILINOGEN URINE: 0.2 MG/DL
VIRAL LOAD INTERP: NOT DETECTED COPIES/ML
VIRAL LOAD LOG: NOT DETECTED LOGCOPIES/ML
WHITE BLOOD CELLS URINE: 4 /HPF

## 2023-08-13 ENCOUNTER — NON-APPOINTMENT (OUTPATIENT)
Age: 68
End: 2023-08-13

## 2023-08-14 ENCOUNTER — OUTPATIENT (OUTPATIENT)
Dept: OUTPATIENT SERVICES | Facility: HOSPITAL | Age: 68
LOS: 1 days | End: 2023-08-14
Payer: COMMERCIAL

## 2023-08-14 DIAGNOSIS — B20 HUMAN IMMUNODEFICIENCY VIRUS [HIV] DISEASE: ICD-10-CM

## 2023-08-14 PROCEDURE — G9012: CPT

## 2023-08-15 ENCOUNTER — RX RENEWAL (OUTPATIENT)
Age: 68
End: 2023-08-15

## 2023-08-17 ENCOUNTER — OUTPATIENT (OUTPATIENT)
Dept: OUTPATIENT SERVICES | Facility: HOSPITAL | Age: 68
LOS: 1 days | End: 2023-08-17
Payer: COMMERCIAL

## 2023-08-17 PROCEDURE — CPLAN: CPT

## 2023-08-18 DIAGNOSIS — B20 HUMAN IMMUNODEFICIENCY VIRUS [HIV] DISEASE: ICD-10-CM

## 2023-08-21 NOTE — ED PROVIDER NOTE - NS ED MD TWO NIGHTS YN
Significant Event Note    Time of event: 4:33 PM on 8/21/23    Description of event:  Notified by nursing that patient now has fever to 102.6.  She is more tachycardic and has underlying atrial fibrillation.  Blood pressure is hypertensive.  She is not currently receiving antimicrobial treatment.    Patient's spouse also had additional questions.    New fever raises concern for new infection.  As discussed at family meeting this afternoon, there has been increasing suspicion for persistent respiratory failure due to metastatic cancer including lymphangitic carcinomatosis involving the lung.    Plan:  Ordered blood cultures from arterial line, PICC line, and a peripheral blood culture  Asked nursing to exchange current indwelling Ponce catheter and then collect clean urine sample for UA and UCx  Ordered sputum culture from endotracheal tube  Ordered WBC and procalcitonin  Start meropenem empirically while awaiting culture results, considered IV vancomycin but deferred for now due to previous negative MRSA testing and risk of vancomycin nephrotoxicity that could exacerbate acute renal failure    Antipyretic therapy appears indicated because fevers exacerbating rapid atrial fibrillation.  However, she has significant acute hepatocellular injury so acetaminophen is contraindicated.  She also has significant acute renal failure which is a contraindication for use of NSAIDs.  After considering potential benefits and risks, ordered 1 dose IV Toradol 15 mg.  Recommended cooling measures to nursing staff.    As discussed in family meeting early this afternoon, her spouse confirms that family preferences for CODE STATUS are now DNR which appears to be aligned with the patient's best interest.    Discussed with: bedside nurse and patient's spouse    Additional critical care time for this event: 15 minutes    Dat Peters MD     No

## 2023-09-03 ENCOUNTER — RX RENEWAL (OUTPATIENT)
Age: 68
End: 2023-09-03

## 2023-09-07 ENCOUNTER — NON-APPOINTMENT (OUTPATIENT)
Age: 68
End: 2023-09-07

## 2023-09-07 ENCOUNTER — OUTPATIENT (OUTPATIENT)
Dept: OUTPATIENT SERVICES | Facility: HOSPITAL | Age: 68
LOS: 1 days | End: 2023-09-07
Payer: COMMERCIAL

## 2023-09-07 PROCEDURE — G9012: CPT

## 2023-09-08 DIAGNOSIS — B20 HUMAN IMMUNODEFICIENCY VIRUS [HIV] DISEASE: ICD-10-CM

## 2023-09-12 ENCOUNTER — APPOINTMENT (OUTPATIENT)
Dept: NEPHROLOGY | Facility: CLINIC | Age: 68
End: 2023-09-12

## 2023-09-18 ENCOUNTER — RX RENEWAL (OUTPATIENT)
Age: 68
End: 2023-09-18

## 2023-09-20 ENCOUNTER — OUTPATIENT (OUTPATIENT)
Dept: OUTPATIENT SERVICES | Facility: HOSPITAL | Age: 68
LOS: 1 days | End: 2023-09-20
Payer: COMMERCIAL

## 2023-09-20 PROCEDURE — G9012: CPT

## 2023-09-21 ENCOUNTER — NON-APPOINTMENT (OUTPATIENT)
Age: 68
End: 2023-09-21

## 2023-09-22 DIAGNOSIS — B20 HUMAN IMMUNODEFICIENCY VIRUS [HIV] DISEASE: ICD-10-CM

## 2023-10-04 ENCOUNTER — NON-APPOINTMENT (OUTPATIENT)
Age: 68
End: 2023-10-04

## 2023-10-04 ENCOUNTER — OUTPATIENT (OUTPATIENT)
Dept: OUTPATIENT SERVICES | Facility: HOSPITAL | Age: 68
LOS: 1 days | End: 2023-10-04
Payer: COMMERCIAL

## 2023-10-04 DIAGNOSIS — B20 HUMAN IMMUNODEFICIENCY VIRUS [HIV] DISEASE: ICD-10-CM

## 2023-10-04 PROCEDURE — G9012: CPT

## 2023-10-05 ENCOUNTER — OUTPATIENT (OUTPATIENT)
Dept: OUTPATIENT SERVICES | Facility: HOSPITAL | Age: 68
LOS: 1 days | End: 2023-10-05
Payer: COMMERCIAL

## 2023-10-05 ENCOUNTER — NON-APPOINTMENT (OUTPATIENT)
Age: 68
End: 2023-10-05

## 2023-10-05 PROCEDURE — G9012: CPT

## 2023-10-06 ENCOUNTER — NON-APPOINTMENT (OUTPATIENT)
Age: 68
End: 2023-10-06

## 2023-10-06 ENCOUNTER — OUTPATIENT (OUTPATIENT)
Dept: OUTPATIENT SERVICES | Facility: HOSPITAL | Age: 68
LOS: 1 days | End: 2023-10-06
Payer: COMMERCIAL

## 2023-10-06 DIAGNOSIS — B20 HUMAN IMMUNODEFICIENCY VIRUS [HIV] DISEASE: ICD-10-CM

## 2023-10-06 PROCEDURE — G9012: CPT

## 2023-10-18 ENCOUNTER — NON-APPOINTMENT (OUTPATIENT)
Age: 68
End: 2023-10-18

## 2023-10-19 ENCOUNTER — OUTPATIENT (OUTPATIENT)
Dept: OUTPATIENT SERVICES | Facility: HOSPITAL | Age: 68
LOS: 1 days | End: 2023-10-19
Payer: COMMERCIAL

## 2023-10-19 ENCOUNTER — NON-APPOINTMENT (OUTPATIENT)
Age: 68
End: 2023-10-19

## 2023-10-19 ENCOUNTER — APPOINTMENT (OUTPATIENT)
Dept: INFECTIOUS DISEASE | Facility: CLINIC | Age: 68
End: 2023-10-19
Payer: COMMERCIAL

## 2023-10-19 VITALS
SYSTOLIC BLOOD PRESSURE: 111 MMHG | HEART RATE: 74 BPM | WEIGHT: 184 LBS | TEMPERATURE: 99 F | BODY MASS INDEX: 24.92 KG/M2 | DIASTOLIC BLOOD PRESSURE: 66 MMHG | HEIGHT: 72 IN | RESPIRATION RATE: 14 BRPM | OXYGEN SATURATION: 99 %

## 2023-10-19 DIAGNOSIS — B20 HUMAN IMMUNODEFICIENCY VIRUS [HIV] DISEASE: ICD-10-CM

## 2023-10-19 DIAGNOSIS — E55.9 VITAMIN D DEFICIENCY, UNSPECIFIED: ICD-10-CM

## 2023-10-19 DIAGNOSIS — Z23 ENCOUNTER FOR IMMUNIZATION: ICD-10-CM

## 2023-10-19 DIAGNOSIS — M25.562 PAIN IN LEFT KNEE: ICD-10-CM

## 2023-10-19 PROCEDURE — 99214 OFFICE O/P EST MOD 30 MIN: CPT | Mod: 25

## 2023-10-19 PROCEDURE — G9012: CPT

## 2023-10-19 PROCEDURE — 90746 HEPB VACCINE 3 DOSE ADULT IM: CPT

## 2023-10-19 PROCEDURE — 99214 OFFICE O/P EST MOD 30 MIN: CPT

## 2023-10-19 PROCEDURE — 90834 PSYTX W PT 45 MINUTES: CPT

## 2023-10-19 PROCEDURE — 90471 IMMUNIZATION ADMIN: CPT

## 2023-10-24 ENCOUNTER — NON-APPOINTMENT (OUTPATIENT)
Age: 68
End: 2023-10-24

## 2023-10-24 ENCOUNTER — OUTPATIENT (OUTPATIENT)
Dept: OUTPATIENT SERVICES | Facility: HOSPITAL | Age: 68
LOS: 1 days | End: 2023-10-24
Payer: COMMERCIAL

## 2023-10-24 PROCEDURE — G9012: CPT

## 2023-10-25 DIAGNOSIS — B20 HUMAN IMMUNODEFICIENCY VIRUS [HIV] DISEASE: ICD-10-CM

## 2023-11-08 LAB
25(OH)D3 SERPL-MCNC: 57 NG/ML
ALBUMIN SERPL ELPH-MCNC: 4.2 G/DL
ALP BLD-CCNC: 138 U/L
ALT SERPL-CCNC: 34 U/L
ANION GAP SERPL CALC-SCNC: 13 MMOL/L
AST SERPL-CCNC: 50 U/L
BASOPHILS # BLD AUTO: 0.03 K/UL
BASOPHILS NFR BLD AUTO: 0.6 %
BILIRUB SERPL-MCNC: 0.3 MG/DL
BUN SERPL-MCNC: 35 MG/DL
CALCIUM SERPL-MCNC: 10 MG/DL
CHLORIDE SERPL-SCNC: 100 MMOL/L
CO2 SERPL-SCNC: 27 MMOL/L
CREAT SERPL-MCNC: 1.1 MG/DL
EGFR: 73 ML/MIN/1.73M2
EOSINOPHIL # BLD AUTO: 0.13 K/UL
EOSINOPHIL NFR BLD AUTO: 2.4 %
GLUCOSE SERPL-MCNC: 125 MG/DL
HCT VFR BLD CALC: 32.2 %
HGB BLD-MCNC: 10.3 G/DL
HIV1 RNA # SERPL NAA+PROBE: 44 COPIES/ML
IMM GRANULOCYTES NFR BLD AUTO: 0.4 %
LYMPHOCYTES # BLD AUTO: 0.83 K/UL
LYMPHOCYTES NFR BLD AUTO: 15.5 %
MAN DIFF?: NORMAL
MCHC RBC-ENTMCNC: 30.6 PG
MCHC RBC-ENTMCNC: 32 G/DL
MCV RBC AUTO: 95.5 FL
MONOCYTES # BLD AUTO: 0.59 K/UL
MONOCYTES NFR BLD AUTO: 11 %
NEUTROPHILS # BLD AUTO: 3.74 K/UL
NEUTROPHILS NFR BLD AUTO: 70.1 %
PLATELET # BLD AUTO: 273 K/UL
POTASSIUM SERPL-SCNC: 5 MMOL/L
PROT SERPL-MCNC: 7.3 G/DL
RBC # BLD: 3.37 M/UL
RBC # FLD: 15.1 %
SODIUM SERPL-SCNC: 140 MMOL/L
VIRAL LOAD INTERP: DETECTED
VIRAL LOAD LOG: 1.64 LOGCOPIES/ML
WBC # FLD AUTO: 5.34 K/UL

## 2023-11-15 ENCOUNTER — NON-APPOINTMENT (OUTPATIENT)
Age: 68
End: 2023-11-15

## 2023-11-16 ENCOUNTER — APPOINTMENT (OUTPATIENT)
Dept: INFECTIOUS DISEASE | Facility: CLINIC | Age: 68
End: 2023-11-16

## 2023-11-16 ENCOUNTER — OUTPATIENT (OUTPATIENT)
Dept: OUTPATIENT SERVICES | Facility: HOSPITAL | Age: 68
LOS: 1 days | End: 2023-11-16
Payer: COMMERCIAL

## 2023-11-16 VITALS
HEART RATE: 88 BPM | HEIGHT: 72 IN | DIASTOLIC BLOOD PRESSURE: 59 MMHG | BODY MASS INDEX: 25.73 KG/M2 | WEIGHT: 190 LBS | OXYGEN SATURATION: 97 % | RESPIRATION RATE: 14 BRPM | SYSTOLIC BLOOD PRESSURE: 116 MMHG | TEMPERATURE: 98.4 F

## 2023-11-16 DIAGNOSIS — B20 HUMAN IMMUNODEFICIENCY VIRUS [HIV] DISEASE: ICD-10-CM

## 2023-11-16 DIAGNOSIS — Z23 ENCOUNTER FOR IMMUNIZATION: ICD-10-CM

## 2023-11-16 PROCEDURE — G9012: CPT

## 2023-11-16 PROCEDURE — 90471 IMMUNIZATION ADMIN: CPT | Mod: 25

## 2023-11-17 ENCOUNTER — NON-APPOINTMENT (OUTPATIENT)
Age: 68
End: 2023-11-17

## 2023-11-17 ENCOUNTER — OUTPATIENT (OUTPATIENT)
Dept: OUTPATIENT SERVICES | Facility: HOSPITAL | Age: 68
LOS: 1 days | End: 2023-11-17
Payer: COMMERCIAL

## 2023-11-17 ENCOUNTER — APPOINTMENT (OUTPATIENT)
Dept: INFECTIOUS DISEASE | Facility: CLINIC | Age: 68
End: 2023-11-17
Payer: COMMERCIAL

## 2023-11-17 DIAGNOSIS — B20 HUMAN IMMUNODEFICIENCY VIRUS [HIV] DISEASE: ICD-10-CM

## 2023-11-17 PROCEDURE — ZZZZZ: CPT

## 2023-11-17 PROCEDURE — 99213 OFFICE O/P EST LOW 20 MIN: CPT

## 2023-11-17 PROCEDURE — G9012: CPT

## 2023-11-26 ENCOUNTER — RX RENEWAL (OUTPATIENT)
Age: 68
End: 2023-11-26

## 2023-11-29 ENCOUNTER — OUTPATIENT (OUTPATIENT)
Dept: OUTPATIENT SERVICES | Facility: HOSPITAL | Age: 68
LOS: 1 days | End: 2023-11-29
Payer: COMMERCIAL

## 2023-11-29 DIAGNOSIS — B20 HUMAN IMMUNODEFICIENCY VIRUS [HIV] DISEASE: ICD-10-CM

## 2023-11-29 PROCEDURE — G9012: CPT

## 2023-11-30 ENCOUNTER — APPOINTMENT (OUTPATIENT)
Dept: INFECTIOUS DISEASE | Facility: CLINIC | Age: 68
End: 2023-11-30

## 2023-11-30 ENCOUNTER — NON-APPOINTMENT (OUTPATIENT)
Age: 68
End: 2023-11-30

## 2023-11-30 ENCOUNTER — OUTPATIENT (OUTPATIENT)
Dept: OUTPATIENT SERVICES | Facility: HOSPITAL | Age: 68
LOS: 1 days | End: 2023-11-30
Payer: COMMERCIAL

## 2023-11-30 PROCEDURE — G9012: CPT

## 2023-12-01 ENCOUNTER — NON-APPOINTMENT (OUTPATIENT)
Age: 68
End: 2023-12-01

## 2023-12-01 ENCOUNTER — OUTPATIENT (OUTPATIENT)
Dept: OUTPATIENT SERVICES | Facility: HOSPITAL | Age: 68
LOS: 1 days | End: 2023-12-01
Payer: COMMERCIAL

## 2023-12-01 DIAGNOSIS — B20 HUMAN IMMUNODEFICIENCY VIRUS [HIV] DISEASE: ICD-10-CM

## 2023-12-01 PROCEDURE — G9012: CPT

## 2023-12-05 LAB
CD16+CD56+ CELLS # BLD: 289 /UL
CD16+CD56+ CELLS NFR BLD: 27 %
CD19 CELLS NFR BLD: 24 /UL
CD3 CELLS # BLD: 749 /UL
CD3 CELLS NFR BLD: 69 %
CD3+CD4+ CELLS # BLD: 201 /UL
CD3+CD4+ CELLS NFR BLD: 19 %
CD3+CD4+ CELLS/CD3+CD8+ CLL SPEC: 0.39 RATIO
CD3+CD8+ CELLS # SPEC: 516 /UL
CD3+CD8+ CELLS NFR BLD: 48 %
CELLS.CD3-CD19+/CELLS IN BLOOD: 2 %

## 2023-12-07 ENCOUNTER — OUTPATIENT (OUTPATIENT)
Dept: OUTPATIENT SERVICES | Facility: HOSPITAL | Age: 68
LOS: 1 days | End: 2023-12-07
Payer: COMMERCIAL

## 2023-12-07 ENCOUNTER — NON-APPOINTMENT (OUTPATIENT)
Age: 68
End: 2023-12-07

## 2023-12-07 DIAGNOSIS — M19.90 UNSPECIFIED OSTEOARTHRITIS, UNSPECIFIED SITE: ICD-10-CM

## 2023-12-07 PROCEDURE — 73030 X-RAY EXAM OF SHOULDER: CPT | Mod: LT

## 2023-12-07 PROCEDURE — 73502 X-RAY EXAM HIP UNI 2-3 VIEWS: CPT | Mod: 26,RT

## 2023-12-07 PROCEDURE — 73502 X-RAY EXAM HIP UNI 2-3 VIEWS: CPT | Mod: RT

## 2023-12-07 PROCEDURE — 73030 X-RAY EXAM OF SHOULDER: CPT | Mod: 26,LT,76

## 2023-12-07 PROCEDURE — G9012: CPT

## 2023-12-08 DIAGNOSIS — M19.90 UNSPECIFIED OSTEOARTHRITIS, UNSPECIFIED SITE: ICD-10-CM

## 2023-12-11 DIAGNOSIS — B20 HUMAN IMMUNODEFICIENCY VIRUS [HIV] DISEASE: ICD-10-CM

## 2023-12-14 ENCOUNTER — NON-APPOINTMENT (OUTPATIENT)
Age: 68
End: 2023-12-14

## 2023-12-14 ENCOUNTER — APPOINTMENT (OUTPATIENT)
Dept: INFECTIOUS DISEASE | Facility: CLINIC | Age: 68
End: 2023-12-14

## 2023-12-14 ENCOUNTER — OUTPATIENT (OUTPATIENT)
Dept: OUTPATIENT SERVICES | Facility: HOSPITAL | Age: 68
LOS: 1 days | End: 2023-12-14
Payer: COMMERCIAL

## 2023-12-14 PROCEDURE — G9012: CPT

## 2023-12-15 ENCOUNTER — EMERGENCY (EMERGENCY)
Facility: HOSPITAL | Age: 68
LOS: 0 days | Discharge: HOME | End: 2023-12-15
Attending: EMERGENCY MEDICINE
Payer: MEDICARE

## 2023-12-15 ENCOUNTER — OUTPATIENT (OUTPATIENT)
Dept: OUTPATIENT SERVICES | Facility: HOSPITAL | Age: 68
LOS: 1 days | End: 2023-12-15
Payer: MEDICARE

## 2023-12-15 VITALS
TEMPERATURE: 98 F | HEART RATE: 89 BPM | DIASTOLIC BLOOD PRESSURE: 60 MMHG | RESPIRATION RATE: 18 BRPM | WEIGHT: 197.09 LBS | SYSTOLIC BLOOD PRESSURE: 114 MMHG | OXYGEN SATURATION: 96 %

## 2023-12-15 DIAGNOSIS — Z21 ASYMPTOMATIC HUMAN IMMUNODEFICIENCY VIRUS [HIV] INFECTION STATUS: ICD-10-CM

## 2023-12-15 DIAGNOSIS — B20 HUMAN IMMUNODEFICIENCY VIRUS [HIV] DISEASE: ICD-10-CM
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
## 2023-12-15 DIAGNOSIS — R07.81 PLEURODYNIA: ICD-10-CM

## 2023-12-15 DIAGNOSIS — S22.41XA MULTIPLE FRACTURES OF RIBS, RIGHT SIDE, INITIAL ENCOUNTER FOR CLOSED FRACTURE: ICD-10-CM
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
## 2023-12-15 PROCEDURE — 99283 EMERGENCY DEPT VISIT LOW MDM: CPT | Mod: 25

## 2023-12-15 PROCEDURE — 71101 X-RAY EXAM UNILAT RIBS/CHEST: CPT | Mod: 26,RT

## 2023-12-15 PROCEDURE — 71101 X-RAY EXAM UNILAT RIBS/CHEST: CPT | Mod: RT

## 2023-12-15 PROCEDURE — G9012: CPT

## 2023-12-15 PROCEDURE — 99284 EMERGENCY DEPT VISIT MOD MDM: CPT

## 2023-12-15 RX ORDER — IBUPROFEN 200 MG
600 TABLET ORAL ONCE
Refills: 0 | Status: COMPLETED | OUTPATIENT
Start: 2023-12-15 | End: 2023-12-15

## 2023-12-15 RX ADMIN — Medication 600 MILLIGRAM(S): at 14:27

## 2023-12-15 NOTE — ED PROVIDER NOTE - NSFOLLOWUPCLINICS_GEN_ALL_ED_FT
[facility]  Trauma Surgery  [address] [place] [address]  Phone: [phone]  Fax:   Follow Up Time: 1-3 Days     [facility]  Trauma Surgery  [address] [place] [address]  Phone: [phone]  Fax:   Follow Up Time: 1-3 Days

## 2023-12-15 NOTE — ED ADULT NURSE NOTE - NSFALLRISKINTERV_ED_ALL_ED
Communicate fall risk and risk factors to all staff, patient, and family/Provide visual cue: yellow wristband, yellow gown, etc/Reinforce activity limits and safety measures with patient and family/Call bell, personal items and telephone in reach/Instruct patient to call for assistance before getting out of bed/chair/stretcher/Non-slip footwear applied when patient is off stretcher/[place] to call system/Physically safe environment - no spills, clutter or unnecessary equipment/Purposeful Proactive Rounding/Room/bathroom lighting operational, light cord in reach Communicate fall risk and risk factors to all staff, patient, and family/Provide visual cue: yellow wristband, yellow gown, etc/Reinforce activity limits and safety measures with patient and family/Call bell, personal items and telephone in reach/Instruct patient to call for assistance before getting out of bed/chair/stretcher/Non-slip footwear applied when patient is off stretcher/[place] to call system/Physically safe environment - no spills, clutter or unnecessary equipment/Purposeful Proactive Rounding/Room/bathroom lighting operational, light cord in reach

## 2023-12-15 NOTE — ED PROVIDER NOTE - OBJECTIVE STATEMENT
Pt is a 68 y.o Male with PMHx of HIV who presents to the ED with chief complaint of right-sided rib pain. Pt states he was in the bathroom early this morning, was not using his walking cane for assistancce and subsequently lost his balance landing on his right side of his body onto the bathroom tub. Denies any pre-syncopal event, head injury, neck pain, LOC, or alcohol use before or at the time of incident. Pt denies any SOB, CP abdominal pain, n/v. Denies any AC use. Denies any other complaints.

## 2023-12-15 NOTE — ED ADULT TRIAGE NOTE - CHIEF COMPLAINT QUOTE
"I was tired and I dozed off this morning and fell against the tub and in having right rib pain " - loc - anticotristan. pt on methadone program , went this morning after the fall

## 2023-12-15 NOTE — ED PROVIDER NOTE - ATTENDING CONTRIBUTION TO CARE
68-year-old male, past medical history of HIV, comes in complaining of right lower rib pain which started after he tripped and fell, hitting his ribs against the side of the tub.  No LOC, head trauma, neck pain.  No shortness of breath/cough.  No abdominal pain.  No nausea/vomiting/diarrhea.  No difficulty ambulating.  Patient is on methadone.  On exam, pt in NAD, AAOx3, head NC/AT, CN II-XII intact, lungs CTA B/L, CV S1S2 regular, chest (+) soreness over right lower ribs, (-) crepitus, abdomen soft/NT/ND/(+)BS, ext (-) edema, motor 5/5x4, sensation intact.  X-ray done–2 broken ribs on the right noted.  Patient reports minimal pain.  No shortness of breath or pain taking deep breaths.  Will DC with incentive spirometer.  Advised on Tylenol/Motrin for pain.  Patient is also on methadone which should help his pain.  Will DC with trauma clinic follow-up.  Strict return precautions provided.

## 2023-12-15 NOTE — ED PROVIDER NOTE - PHYSICAL EXAMINATION
CONSTITUTIONAL: NAD  SKIN: Warm dry  HEAD: NCAT  EYES: NL inspection  ENT: MMM  NECK: Supple; non tender.  CARD: RRR, + chest wall ttp over lower- right anterior-lateral ribs. no crepitus noted no obvious james deformities noted.   RESP: CTAB  ABD: S/NT no R/G  BACK: nontender  EXT: full ROM, strength 5/5 x4 ,no pedal edema  NEURO: Grossly unremarkable  PSYCH: Cooperative, appropriate.

## 2023-12-15 NOTE — ED PROVIDER NOTE - PATIENT PORTAL LINK FT
You can access the FollowMyHealth Patient Portal offered by [facility] by registering at the following website: [email or web address]. By joining [facility]’s FollowMyHealth portal, you will also be able to view your health information using other applications (apps) compatible with our system. You can access the FollowMyHealth Patient Portal offered by [facility] by registering at the following website: [email or web address]. By joining [employer]’s FollowMyHealth portal, you will also be able to view your health information using other applications (apps) compatible with our system.

## 2023-12-19 ENCOUNTER — OUTPATIENT (OUTPATIENT)
Dept: OUTPATIENT SERVICES | Facility: HOSPITAL | Age: 68
LOS: 1 days | End: 2023-12-19
Payer: MEDICARE

## 2023-12-19 ENCOUNTER — NON-APPOINTMENT (OUTPATIENT)
Age: 68
End: 2023-12-19

## 2023-12-19 DIAGNOSIS — B20 HUMAN IMMUNODEFICIENCY VIRUS [HIV] DISEASE: ICD-10-CM

## 2023-12-19 PROCEDURE — G9012: CPT

## 2023-12-27 ENCOUNTER — NON-APPOINTMENT (OUTPATIENT)
Age: 68
End: 2023-12-27

## 2023-12-28 ENCOUNTER — LABORATORY RESULT (OUTPATIENT)
Age: 68
End: 2023-12-28

## 2023-12-28 ENCOUNTER — OUTPATIENT (OUTPATIENT)
Dept: OUTPATIENT SERVICES | Facility: HOSPITAL | Age: 68
LOS: 1 days | End: 2023-12-28
Payer: MEDICARE

## 2023-12-28 ENCOUNTER — APPOINTMENT (OUTPATIENT)
Dept: INFECTIOUS DISEASE | Facility: CLINIC | Age: 68
End: 2023-12-28
Payer: MEDICARE

## 2023-12-28 VITALS
OXYGEN SATURATION: 99 % | HEART RATE: 87 BPM | RESPIRATION RATE: 14 BRPM | WEIGHT: 190 LBS | SYSTOLIC BLOOD PRESSURE: 115 MMHG | TEMPERATURE: 98 F | DIASTOLIC BLOOD PRESSURE: 71 MMHG | HEIGHT: 72 IN | BODY MASS INDEX: 25.73 KG/M2

## 2023-12-28 DIAGNOSIS — M16.12 UNILATERAL PRIMARY OSTEOARTHRITIS, LEFT HIP: ICD-10-CM

## 2023-12-28 DIAGNOSIS — B20 HUMAN IMMUNODEFICIENCY VIRUS [HIV] DISEASE: ICD-10-CM

## 2023-12-28 DIAGNOSIS — F11.20 OPIOID DEPENDENCE, UNCOMPLICATED: ICD-10-CM

## 2023-12-28 PROCEDURE — 99214 OFFICE O/P EST MOD 30 MIN: CPT

## 2023-12-28 NOTE — PHYSICAL EXAM
[General Appearance - Alert] : alert [General Appearance - In No Acute Distress] : in no acute distress [Sclera] : the sclera and conjunctiva were normal [Outer Ear] : the ears and nose were normal in appearance [Both Tympanic Membranes Were Examined] : both tympanic membranes were normal [Neck Appearance] : the appearance of the neck was normal [] : no respiratory distress [Respiration, Rhythm And Depth] : normal respiratory rhythm and effort [Heart Rate And Rhythm] : heart rate was normal and rhythm regular [Full Pulse] : the pedal pulses are present [Bowel Sounds] : normal bowel sounds [Abdomen Soft] : soft [FreeTextEntry1] : Left shoulder unable to raise above horizontal

## 2023-12-28 NOTE — ASSESSMENT
[FreeTextEntry1] : #HIV - continue triumeq - check annual labs  #Right Hip Pain severe osteoarthritis  - Ortho evaluation - pain management - on Lyrica, Oxycodone 30 mg q 6 hours chronically -- also on methadone   #Left Shoulder Osteoarthritis  - ortho evaluation   #Pancreatic Duct dilitation - Abd US 7/27/2022: New mild pancreatic duct dilatation measuring 3-4 mm. A central obstructing lesion cannot be excluded on this examination. Further evaluation of the pancreas with MRI and MRCP with and without gadolinium is necessary for complete characterization. Essentially stable dilatation of the common bile duct of unclear etiology - Abd Us 10/2022 at [facility] -- stable changes - seen by GI in hospital -- referral given and discussed to make follow-up again   #Leukopenia/hypercalcemia - SPEP without M Dar, weak IgG Kappa and weak IgG lambda bands - UPEP - Proteinut[facility] - Bence Zhang Concentration absent - 10/2022 B12/Folate normal - 10/2022 normal iron studies - heme referral given - discussed with him to make appointment again   #CKD- stable   #Hypertension - - continue Amlodipin 10 mg daily - continue chothalidone 25 mg daily  #HTN - amlodipin 10 mg daily - chlorthalidone 25 mg daily  #Low Testosterone - on testosterone gell - repeat level at next lab draw   #Neuropathy - Pregabpin 200 mg twice a day    #Opioid dependence - Methadone 110 mg daily - [address] [place] [address].    35 minutes was spend on medical discussion re: medication compliance. On antiretroviral treatment   .

## 2023-12-28 NOTE — HISTORY OF PRESENT ILLNESS
[FreeTextEntry1] : 69 yo M here for HIV follow-up  Reviewed bilateral hip x ray - severe OA of right hip  Has not gone for left knee X ray  Has not made appointments for hepatology and heme yet and urged him to do so Takes chronic opioiods for left hip; Denies any worsening pain.  ISTOP reviewed #: [id number]

## 2023-12-29 ENCOUNTER — NON-APPOINTMENT (OUTPATIENT)
Age: 68
End: 2023-12-29

## 2024-01-07 ENCOUNTER — RX RENEWAL (OUTPATIENT)
Age: 69
End: 2024-01-07

## 2024-01-07 RX ORDER — ABACAVIR SULFATE, DOLUTEGRAVIR SODIUM, LAMIVUDINE 600; 50; 300 MG/1; MG/1; MG/1
600-50-300 TABLET, FILM COATED ORAL
Qty: 30 | Refills: 5 | Status: ACTIVE | COMMUNITY
Start: 2020-06-01 | End: 1900-01-01

## 2024-01-09 ENCOUNTER — RESULT CHARGE (OUTPATIENT)
Age: 69
End: 2024-01-09

## 2024-01-09 ENCOUNTER — APPOINTMENT (OUTPATIENT)
Dept: ORTHOPEDIC SURGERY | Facility: CLINIC | Age: 69
End: 2024-01-09
Payer: MEDICARE

## 2024-01-09 PROCEDURE — 73502 X-RAY EXAM HIP UNI 2-3 VIEWS: CPT

## 2024-01-09 PROCEDURE — 99203 OFFICE O/P NEW LOW 30 MIN: CPT

## 2024-01-13 LAB — SIUH URINE DRUG SCREEN 1: NORMAL

## 2024-01-18 ENCOUNTER — NON-APPOINTMENT (OUTPATIENT)
Age: 69
End: 2024-01-18

## 2024-01-18 ENCOUNTER — APPOINTMENT (OUTPATIENT)
Dept: INFECTIOUS DISEASE | Facility: CLINIC | Age: 69
End: 2024-01-18

## 2024-01-22 ENCOUNTER — NON-APPOINTMENT (OUTPATIENT)
Age: 69
End: 2024-01-22

## 2024-01-24 ENCOUNTER — RX RENEWAL (OUTPATIENT)
Age: 69
End: 2024-01-24

## 2024-01-25 ENCOUNTER — NON-APPOINTMENT (OUTPATIENT)
Age: 69
End: 2024-01-25

## 2024-01-26 ENCOUNTER — OUTPATIENT (OUTPATIENT)
Dept: OUTPATIENT SERVICES | Facility: HOSPITAL | Age: 69
LOS: 1 days | End: 2024-01-26
Payer: MEDICARE

## 2024-01-26 ENCOUNTER — RX RENEWAL (OUTPATIENT)
Age: 69
End: 2024-01-26

## 2024-01-26 ENCOUNTER — APPOINTMENT (OUTPATIENT)
Dept: INFECTIOUS DISEASE | Facility: CLINIC | Age: 69
End: 2024-01-26
Payer: MEDICARE

## 2024-01-26 DIAGNOSIS — B20 HUMAN IMMUNODEFICIENCY VIRUS [HIV] DISEASE: ICD-10-CM

## 2024-01-26 PROCEDURE — 99442: CPT

## 2024-01-26 PROCEDURE — 99213 OFFICE O/P EST LOW 20 MIN: CPT

## 2024-01-26 NOTE — REASON FOR VISIT
[Follow-Up: _____] : a [unfilled] follow-up visit [Home] : at home, [unfilled] , at the time of the visit. [Medical Office: ([facility])___] : at the medical office located in  [Verbal consent obtained from patient] : the patient, [unfilled] [FreeTextEntry1] : med complaince

## 2024-01-26 NOTE — ASSESSMENT
[FreeTextEntry1] : #HIV - continue triumeq - check annual labs  #Left Hip Pain severe osteoarthritis  - seen by Ortho 1/2024  - holding off on surgery -- taking Glucosamine/Chondroitin  - was given PT script  - on chronic opioid   #Pancreatic Duct dilitation - Abd US 7/27/2022: New mild pancreatic duct dilatation measuring 3-4 mm. A central obstructing lesion cannot be excluded on this examination. Further evaluation of the pancreas with MRI and MRCP with and without gadolinium is necessary for complete characterization. Essentially stable dilatation of the common bile duct of unclear etiology - Abd Us 10/2022 at [facility] -- stable changes - seen by GI in hospital -- referral given and discussed to make follow-up - has GI appointment 5/17/2024  #Leukopenia/hypercalcemia - intermittent findings on labs -- most recent 10/2023 has been normal  - SPEP without M Dar, weak IgG Kappa and weak IgG lambda bands - UPEP - [facility] - Bence Zhang Concentration absent - 10/2022 B12/Folate normal - 10/2022 normal iron studies - heme referral given - has not made appointment -- in meantime, will monitor   #CKD - appreciate renal follow-up  #Hypertension - - continue Amlodipin 10 mg daily - continue chothalidone 25 mg daily   #HTN - amlodipin 10 mg daily - chlorthalidone 25 mg daily  #Low Testosterone - on testosterone gell - will recheck testosterone levels   #Neuropathy - Pregabpin 200 mg twice a day  #Opioid dependence - Methadone 110 mg daily - [address] [place] [address].    .

## 2024-01-26 NOTE — HISTORY OF PRESENT ILLNESS
[FreeTextEntry1] : 69 yo M here for HIV follow-up and med complaince.   Reviewed orthopedic notes -- offered hip replacement, but would like to try conservative measures first including exercise, PT and glucosamine/Chondroitin.  No acute complaints.  Has made appointment for hepatology in 5/2024.   ISTOP reviewed #[id number]

## 2024-01-26 NOTE — REVIEW OF SYSTEMS
[As Noted in HPI] : as noted in HPI [Negative] : Heme/Lymph [Fever] : no fever [Chills] : no chills [Eye Pain] : no eye pain [Red Eyes] : eyes not red [Loss Of Hearing] : no hearing loss [Chest Pain] : no chest pain [Palpitations] : no palpitations [Shortness Of Breath] : no shortness of breath [Wheezing] : no wheezing [Abdominal Pain] : no abdominal pain [Vomiting] : no vomiting [Dysuria] : no dysuria [Penile Discharge] : no penile discharge [Skin Lesions] : no skin lesions [Confused] : no confusion [Convulsions] : no convulsions

## 2024-02-09 ENCOUNTER — NON-APPOINTMENT (OUTPATIENT)
Age: 69
End: 2024-02-09

## 2024-02-14 ENCOUNTER — NON-APPOINTMENT (OUTPATIENT)
Age: 69
End: 2024-02-14

## 2024-02-21 ENCOUNTER — APPOINTMENT (OUTPATIENT)
Dept: HEMATOLOGY ONCOLOGY | Facility: CLINIC | Age: 69
End: 2024-02-21
Payer: MEDICARE

## 2024-02-21 ENCOUNTER — LABORATORY RESULT (OUTPATIENT)
Age: 69
End: 2024-02-21

## 2024-02-21 ENCOUNTER — OUTPATIENT (OUTPATIENT)
Dept: OUTPATIENT SERVICES | Facility: HOSPITAL | Age: 69
LOS: 1 days | End: 2024-02-21
Payer: MEDICARE

## 2024-02-21 VITALS
TEMPERATURE: 97.6 F | HEIGHT: 72 IN | HEART RATE: 85 BPM | SYSTOLIC BLOOD PRESSURE: 157 MMHG | DIASTOLIC BLOOD PRESSURE: 91 MMHG | WEIGHT: 188 LBS | BODY MASS INDEX: 25.47 KG/M2

## 2024-02-21 DIAGNOSIS — D72.819 DECREASED WHITE BLOOD CELL COUNT, UNSPECIFIED: ICD-10-CM

## 2024-02-21 DIAGNOSIS — D64.9 ANEMIA, UNSPECIFIED: ICD-10-CM

## 2024-02-21 PROCEDURE — 85045 AUTOMATED RETICULOCYTE COUNT: CPT

## 2024-02-21 PROCEDURE — 84155 ASSAY OF PROTEIN SERUM: CPT

## 2024-02-21 PROCEDURE — 83010 ASSAY OF HAPTOGLOBIN QUANT: CPT

## 2024-02-21 PROCEDURE — 86334 IMMUNOFIX E-PHORESIS SERUM: CPT

## 2024-02-21 PROCEDURE — 83521 IG LIGHT CHAINS FREE EACH: CPT

## 2024-02-21 PROCEDURE — 99205 OFFICE O/P NEW HI 60 MIN: CPT

## 2024-02-21 PROCEDURE — 85652 RBC SED RATE AUTOMATED: CPT

## 2024-02-21 PROCEDURE — 83615 LACTATE (LD) (LDH) ENZYME: CPT

## 2024-02-21 PROCEDURE — G2211 COMPLEX E/M VISIT ADD ON: CPT

## 2024-02-21 PROCEDURE — 84165 PROTEIN E-PHORESIS SERUM: CPT

## 2024-02-21 PROCEDURE — 82746 ASSAY OF FOLIC ACID SERUM: CPT

## 2024-02-21 PROCEDURE — 85027 COMPLETE CBC AUTOMATED: CPT

## 2024-02-21 PROCEDURE — 80053 COMPREHEN METABOLIC PANEL: CPT

## 2024-02-21 PROCEDURE — 86431 RHEUMATOID FACTOR QUANT: CPT

## 2024-02-21 PROCEDURE — 82607 VITAMIN B-12: CPT

## 2024-02-22 ENCOUNTER — APPOINTMENT (OUTPATIENT)
Dept: INFECTIOUS DISEASE | Facility: CLINIC | Age: 69
End: 2024-02-22
Payer: MEDICARE

## 2024-02-22 ENCOUNTER — OUTPATIENT (OUTPATIENT)
Dept: OUTPATIENT SERVICES | Facility: HOSPITAL | Age: 69
LOS: 1 days | End: 2024-02-22
Payer: MEDICARE

## 2024-02-22 DIAGNOSIS — E23.0 HYPOPITUITARISM: ICD-10-CM

## 2024-02-22 DIAGNOSIS — B20 HUMAN IMMUNODEFICIENCY VIRUS [HIV] DISEASE: ICD-10-CM

## 2024-02-22 DIAGNOSIS — D72.819 DECREASED WHITE BLOOD CELL COUNT, UNSPECIFIED: ICD-10-CM

## 2024-02-22 DIAGNOSIS — E53.8 DEFICIENCY OF OTHER SPECIFIED B GROUP VITAMINS: ICD-10-CM

## 2024-02-22 LAB
ALBUMIN SERPL ELPH-MCNC: 4.3 G/DL
ALP BLD-CCNC: 150 U/L
ALT SERPL-CCNC: 29 U/L
ANION GAP SERPL CALC-SCNC: 13 MMOL/L
AST SERPL-CCNC: 60 U/L
BASOPHILS # BLD AUTO: 0.07 K/UL
BASOPHILS NFR BLD AUTO: 1.9 %
BILIRUB SERPL-MCNC: 0.2 MG/DL
BUN SERPL-MCNC: 29 MG/DL
CALCIUM SERPL-MCNC: 10.3 MG/DL
CHLORIDE SERPL-SCNC: 100 MMOL/L
CO2 SERPL-SCNC: 23 MMOL/L
CREAT SERPL-MCNC: 1.2 MG/DL
DEPRECATED KAPPA LC FREE/LAMBDA SER: 68.83 RATIO
EGFR: 65 ML/MIN/1.73M2
EOSINOPHIL # BLD AUTO: 0.14 K/UL
EOSINOPHIL NFR BLD AUTO: 3.8 %
ERYTHROCYTE [SEDIMENTATION RATE] IN BLOOD BY WESTERGREN METHOD: 74 MM/HR
FOLATE SERPL-MCNC: 3.8 NG/ML
GLUCOSE SERPL-MCNC: 66 MG/DL
HAPTOGLOB SERPL-MCNC: 135 MG/DL
HCT VFR BLD CALC: 33.8 %
HGB BLD-MCNC: 10.9 G/DL
IMM GRANULOCYTES NFR BLD AUTO: 0.3 %
KAPPA LC CSF-MCNC: 2.74 MG/DL
KAPPA LC SERPL-MCNC: 188.6 MG/DL
LDH SERPL-CCNC: 251 U/L
LYMPHOCYTES # BLD AUTO: 1.42 K/UL
LYMPHOCYTES NFR BLD AUTO: 38.7 %
MAN DIFF?: NORMAL
MCHC RBC-ENTMCNC: 28.9 PG
MCHC RBC-ENTMCNC: 32.2 G/DL
MCV RBC AUTO: 89.7 FL
MONOCYTES # BLD AUTO: 0.45 K/UL
MONOCYTES NFR BLD AUTO: 12.3 %
NEUTROPHILS # BLD AUTO: 1.58 K/UL
NEUTROPHILS NFR BLD AUTO: 43 %
PLATELET # BLD AUTO: 265 K/UL
PMV BLD AUTO: 0 /100 WBCS
POTASSIUM SERPL-SCNC: 4.1 MMOL/L
PROT SERPL-MCNC: 8.8 G/DL
RBC # BLD: 3.77 M/UL
RBC # FLD: 14.2 %
RHEUMATOID FACT SER QL: 21 IU/ML
SODIUM SERPL-SCNC: 136 MMOL/L
VIT B12 SERPL-MCNC: 869 PG/ML
WBC # FLD AUTO: 3.67 K/UL

## 2024-02-22 PROCEDURE — 99442: CPT

## 2024-02-22 PROCEDURE — 99213 OFFICE O/P EST LOW 20 MIN: CPT

## 2024-02-22 NOTE — REVIEW OF SYSTEMS
[Chills] : no chills [Fever] : no fever [Red Eyes] : eyes not red [Eye Pain] : no eye pain [Loss Of Hearing] : no hearing loss [Earache] : no earache [Chest Pain] : no chest pain [Palpitations] : no palpitations [Wheezing] : no wheezing [Abdominal Pain] : no abdominal pain [Shortness Of Breath] : no shortness of breath [Dysuria] : no dysuria [Vomiting] : no vomiting [Skin Lesions] : no skin lesions [Joint Pain] : no joint pain [Confused] : no confusion [Convulsions] : no convulsions [Negative] : Psychiatric

## 2024-02-22 NOTE — HISTORY OF PRESENT ILLNESS
[FreeTextEntry1] : 69 yo M here for HIV follow-up and med complaince.   No acute complaints.  Recently seen by heme-onc.  Still ongoing conservative mangement for knee pain/hip pain - not worsening.   ISTOP reviewed #: [id number]

## 2024-02-22 NOTE — ASSESSMENT
[FreeTextEntry1] : #HIV - continue triumeq - reviewed labs -- will call to see if HIV VL also completed   #Left Hip Pain severe osteoarthritis  - seen by Ortho 1/2024  - holding off on surgery -- taking Glucosamine/Chondroitin  - was given PT script  - on chronic opioid as above  #Pancreatic Duct dilitation - Abd US 7/27/2022: New mild pancreatic duct dilatation measuring 3-4 mm. A central obstructing lesion cannot be excluded on this examination. Further evaluation of the pancreas with MRI and MRCP with and without gadolinium is necessary for complete characterization. Essentially stable dilatation of the common bile duct of unclear etiology - Abd Us 10/2022 at [facility] -- stable changes - seen by GI in hospital -- referral given and discussed to make follow-up - has GI appointment 5/17/2024  #Leukopenia/hypercalcemia - intermittent findings on labs -- most recent 10/2023 has been normal  - SPEP without M Dar, weak IgG Kappa and weak IgG lambda bands - UPEP - [facility] - Bence Zhang Concentration absent - 10/2022 B12/Folate normal - 10/2022 normal iron studies - appreciate heme follow-up   #CKD - appreciate renal follow-up  #Hypertension - - continue Amlodipin 10 mg daily - continue chothalidone 25 mg daily   #HTN - amlodipin 10 mg daily - chlorthalidone 25 mg daily  #Low Testosterone - on testosterone gell - will recheck testosterone levels   #Neuropathy - Pregabpin 200 mg twice a day  #Opioid dependence - Methadone 110 mg daily - [address] [place] [address].    .

## 2024-02-23 NOTE — HISTORY OF PRESENT ILLNESS
[de-identified] : This is a 68 yr old male with PMHx of HIV on HARRT therapy with detectable Viral load, HTN, and CKD with hyperkalemia,  for evaluation of anemia.   The patient has long standing CKD with hyperkalemia on Kayexalate, Cr 2.0. He was referred to nephrology due to CKD stage 3b with hyperkalemia and hypercalcemia. On review of labs, elevated alk phos, elevated Ca, anemia, leukopenia, weak band on immunofixation.   H/o hepatitis C treated, Drug abuse ( cocaine ) and ETOH use. Drink moderately 3days/week, States he has Dced cocaine. Has chronic arthritis with limitations on ambulation.  Previois labs have been reviewed.  Denies GI bleeding

## 2024-02-27 LAB
ALBUMIN MFR SERPL ELPH: 45.6 %
ALBUMIN SERPL-MCNC: 4 G/DL
ALBUMIN/GLOB SERPL: 0.9 RATIO
ALPHA1 GLOB MFR SERPL ELPH: 5.3 %
ALPHA1 GLOB SERPL ELPH-MCNC: 0.5 G/DL
ALPHA2 GLOB MFR SERPL ELPH: 9.8 %
ALPHA2 GLOB SERPL ELPH-MCNC: 0.9 G/DL
B-GLOBULIN MFR SERPL ELPH: 11.6 %
B-GLOBULIN SERPL ELPH-MCNC: 1 G/DL
GAMMA GLOB FLD ELPH-MCNC: 2.4 G/DL
GAMMA GLOB MFR SERPL ELPH: 27.7 %
INTERPRETATION SERPL IEP-IMP: NORMAL
M PROTEIN MFR SERPL ELPH: NORMAL
M PROTEIN SPEC IFE-MCNC: NORMAL
MONOCLON BAND OBS SERPL: NORMAL
PROT SERPL-MCNC: 8.7 G/DL
PROT SERPL-MCNC: 8.7 G/DL

## 2024-03-13 ENCOUNTER — OUTPATIENT (OUTPATIENT)
Dept: OUTPATIENT SERVICES | Facility: HOSPITAL | Age: 69
LOS: 1 days | End: 2024-03-13
Payer: MEDICARE

## 2024-03-13 ENCOUNTER — APPOINTMENT (OUTPATIENT)
Dept: INTERNAL MEDICINE | Facility: CLINIC | Age: 69
End: 2024-03-13
Payer: MEDICARE

## 2024-03-13 VITALS
TEMPERATURE: 98.4 F | DIASTOLIC BLOOD PRESSURE: 75 MMHG | SYSTOLIC BLOOD PRESSURE: 143 MMHG | HEART RATE: 71 BPM | OXYGEN SATURATION: 98 % | BODY MASS INDEX: 25.73 KG/M2 | HEIGHT: 72 IN | WEIGHT: 190 LBS

## 2024-03-13 DIAGNOSIS — I10 ESSENTIAL (PRIMARY) HYPERTENSION: ICD-10-CM

## 2024-03-13 DIAGNOSIS — R73.03 PREDIABETES.: ICD-10-CM

## 2024-03-13 DIAGNOSIS — B19.20 UNSPECIFIED VIRAL HEPATITIS C W/OUT HEPATIC COMA: ICD-10-CM

## 2024-03-13 DIAGNOSIS — N18.9 CHRONIC KIDNEY DISEASE, UNSPECIFIED: ICD-10-CM

## 2024-03-13 DIAGNOSIS — M25.562 PAIN IN LEFT KNEE: ICD-10-CM

## 2024-03-13 DIAGNOSIS — Z00.00 ENCOUNTER FOR GENERAL ADULT MEDICAL EXAMINATION WITHOUT ABNORMAL FINDINGS: ICD-10-CM

## 2024-03-13 DIAGNOSIS — D72.819 DECREASED WHITE BLOOD CELL COUNT, UNSPECIFIED: ICD-10-CM

## 2024-03-13 DIAGNOSIS — G62.9 POLYNEUROPATHY, UNSPECIFIED: ICD-10-CM

## 2024-03-13 DIAGNOSIS — E78.5 HYPERLIPIDEMIA, UNSPECIFIED: ICD-10-CM

## 2024-03-13 PROCEDURE — 99203 OFFICE O/P NEW LOW 30 MIN: CPT

## 2024-03-13 RX ORDER — SYRINGE WITH NEEDLE, 1 ML 25GX5/8"
26G X 5/8" SYRINGE, EMPTY DISPOSABLE MISCELLANEOUS
Qty: 25 | Refills: 0 | Status: ACTIVE | COMMUNITY
Start: 2021-02-09 | End: 1900-01-01

## 2024-03-13 RX ORDER — ASPIRIN 81 MG/1
81 TABLET, COATED ORAL
Qty: 90 | Refills: 2 | Status: ACTIVE | COMMUNITY
Start: 2020-06-01 | End: 1900-01-01

## 2024-03-13 RX ORDER — CHLORTHALIDONE 25 MG/1
25 TABLET ORAL DAILY
Qty: 90 | Refills: 2 | Status: ACTIVE | COMMUNITY
Start: 2022-09-16 | End: 1900-01-01

## 2024-03-13 RX ORDER — ERGOCALCIFEROL 1.25 MG/1
1.25 MG CAPSULE, LIQUID FILLED ORAL
Qty: 4 | Refills: 5 | Status: ACTIVE | COMMUNITY
Start: 2020-06-01 | End: 1900-01-01

## 2024-03-13 RX ORDER — FOLIC ACID 1 MG/1
1 TABLET ORAL DAILY
Qty: 90 | Refills: 2 | Status: ACTIVE | COMMUNITY
Start: 2024-02-22 | End: 1900-01-01

## 2024-03-13 RX ORDER — SODIUM POLYSTYRENE SULFONATE 15 G/60ML
15 SUSPENSION ORAL; RECTAL
Qty: 2 | Refills: 5 | Status: ACTIVE | COMMUNITY
Start: 2022-11-18 | End: 1900-01-01

## 2024-03-13 RX ORDER — RIFAXIMIN 550 MG/1
550 TABLET ORAL
Qty: 60 | Refills: 0 | Status: DISCONTINUED | COMMUNITY
Start: 2020-06-01 | End: 2024-03-13

## 2024-03-13 RX ORDER — AMLODIPINE BESYLATE 5 MG/1
5 TABLET ORAL DAILY
Qty: 90 | Refills: 2 | Status: ACTIVE | COMMUNITY
Start: 2020-06-01 | End: 1900-01-01

## 2024-03-13 RX ORDER — ATORVASTATIN CALCIUM 40 MG/1
40 TABLET, FILM COATED ORAL
Qty: 90 | Refills: 1 | Status: ACTIVE | COMMUNITY
Start: 2021-05-11 | End: 1900-01-01

## 2024-03-13 NOTE — PLAN
[FreeTextEntry1] : #Essential Hypertension -elevated this visit, did not take AM meds -c/w amlodipine, chlorthalidone  #Left Hip Pain severe osteoarthritis - seen by Ortho 1/2024 - holding off on surgery -- taking Glucosamine/Chondroitin - was given PT script - on chronic opioid as above  #Pancreatic Duct dilitation - Abd US 7/27/2022: New mild pancreatic duct dilatation measuring 3-4 mm - seen by GI in hospital -- referral given and discussed to make follow-up - has GI appointment 5/17/2024  #Leukopenia/hypercalcemia - SPEP without M Dar, weak IgG Kappa and weak IgG lambda bands - UPEP - [facility] - Bence Zhang Concentration absent - 10/2022 B12/Folate normal - 10/2022 normal iron studies - appreciate heme follow-up -f/u rheum eval  #CKD 3b -stable, follow up referral sent for continued evaluation  #Low Testosterone - on testosterone gell - f/u with ID  #Neuropathy - Pregabpin 200 mg twice a day  #Opioid dependence -f/u with methadone clinic - [address] [place] [address].  #HIV -f/u with ID  RTC 6mo or PRN

## 2024-03-13 NOTE — PHYSICAL EXAM
[Normal] : no acute distress, well nourished, well developed and well-appearing [No Respiratory Distress] : no respiratory distress  [No Accessory Muscle Use] : no accessory muscle use [Normal Rate] : normal rate  [Regular Rhythm] : with a regular rhythm [Normal S1, S2] : normal S1 and S2 [No Focal Deficits] : no focal deficits [Coordination Grossly Intact] : coordination grossly intact [de-identified] : fine crackles LLL, otherwise good air entry and clear auscultation [de-identified] : gait instability, multiple knuckle joint swelling, limited ROM bilateral hands. Unstable gait requiring crutch to walk

## 2024-03-13 NOTE — HISTORY OF PRESENT ILLNESS
[FreeTextEntry1] : establish care [de-identified] : 68yoM hx HIV, hep C, arthritis, CKD, htn, hld, pre-DM, smoker and hx cocaine, opioid use on methadone here to establish care. Pt comes from [facility] HIV clinic for a priary physician. Pt has had no hospitalizations or ED visits since last ID visit. Pt is pending GI eval for pancreatic ductal dilation without transaminitis. Pt has seen heme/onc with rec for w/u of leukopenia with hypercalcemia. heme/onc rec for rheum eval, will give referral today as well as rec f/u with nephrology.

## 2024-03-13 NOTE — REVIEW OF SYSTEMS
[Joint Pain] : joint pain [Joint Stiffness] : joint stiffness [Back Pain] : back pain [Negative] : Respiratory [Muscle Pain] : no muscle pain [Muscle Weakness] : no muscle weakness [Itching] : no itching [Skin Rash] : no skin rash [Headache] : no headache [Dizziness] : no dizziness [Memory Loss] : no memory loss

## 2024-03-19 ENCOUNTER — APPOINTMENT (OUTPATIENT)
Dept: HEMATOLOGY ONCOLOGY | Facility: CLINIC | Age: 69
End: 2024-03-19

## 2024-03-20 DIAGNOSIS — G62.9 POLYNEUROPATHY, UNSPECIFIED: ICD-10-CM

## 2024-03-20 DIAGNOSIS — B19.20 UNSPECIFIED VIRAL HEPATITIS C WITHOUT HEPATIC COMA: ICD-10-CM

## 2024-03-20 DIAGNOSIS — E78.5 HYPERLIPIDEMIA, UNSPECIFIED: ICD-10-CM

## 2024-03-20 DIAGNOSIS — M25.562 PAIN IN LEFT KNEE: ICD-10-CM

## 2024-03-20 DIAGNOSIS — N18.9 CHRONIC KIDNEY DISEASE, UNSPECIFIED: ICD-10-CM

## 2024-03-20 DIAGNOSIS — D72.819 DECREASED WHITE BLOOD CELL COUNT, UNSPECIFIED: ICD-10-CM

## 2024-03-20 DIAGNOSIS — I10 ESSENTIAL (PRIMARY) HYPERTENSION: ICD-10-CM

## 2024-03-20 DIAGNOSIS — M16.12 UNILATERAL PRIMARY OSTEOARTHRITIS, LEFT HIP: ICD-10-CM

## 2024-03-20 DIAGNOSIS — R73.03 PREDIABETES: ICD-10-CM

## 2024-03-28 ENCOUNTER — APPOINTMENT (OUTPATIENT)
Dept: INFECTIOUS DISEASE | Facility: CLINIC | Age: 69
End: 2024-03-28

## 2024-03-30 ENCOUNTER — RX RENEWAL (OUTPATIENT)
Age: 69
End: 2024-03-30

## 2024-04-10 ENCOUNTER — RX RENEWAL (OUTPATIENT)
Age: 69
End: 2024-04-10

## 2024-04-12 ENCOUNTER — NON-APPOINTMENT (OUTPATIENT)
Age: 69
End: 2024-04-12

## 2024-04-15 ENCOUNTER — NON-APPOINTMENT (OUTPATIENT)
Age: 69
End: 2024-04-15

## 2024-04-17 ENCOUNTER — NON-APPOINTMENT (OUTPATIENT)
Age: 69
End: 2024-04-17

## 2024-04-18 ENCOUNTER — OUTPATIENT (OUTPATIENT)
Dept: OUTPATIENT SERVICES | Facility: HOSPITAL | Age: 69
LOS: 1 days | End: 2024-04-18
Payer: MEDICARE

## 2024-04-18 ENCOUNTER — APPOINTMENT (OUTPATIENT)
Dept: INFECTIOUS DISEASE | Facility: CLINIC | Age: 69
End: 2024-04-18
Payer: MEDICARE

## 2024-04-18 VITALS
RESPIRATION RATE: 15 BRPM | BODY MASS INDEX: 25.73 KG/M2 | WEIGHT: 190 LBS | SYSTOLIC BLOOD PRESSURE: 135 MMHG | HEART RATE: 86 BPM | TEMPERATURE: 98.5 F | DIASTOLIC BLOOD PRESSURE: 79 MMHG | HEIGHT: 72 IN | OXYGEN SATURATION: 98 %

## 2024-04-18 DIAGNOSIS — B20 HUMAN IMMUNODEFICIENCY VIRUS [HIV] DISEASE: ICD-10-CM

## 2024-04-18 DIAGNOSIS — M16.12 UNILATERAL PRIMARY OSTEOARTHRITIS, LEFT HIP: ICD-10-CM

## 2024-04-18 DIAGNOSIS — F11.20 OPIOID DEPENDENCE, UNCOMPLICATED: ICD-10-CM

## 2024-04-18 PROCEDURE — 99214 OFFICE O/P EST MOD 30 MIN: CPT

## 2024-04-18 PROCEDURE — G2211 COMPLEX E/M VISIT ADD ON: CPT

## 2024-04-18 RX ORDER — TESTOSTERONE CYPIONATE 200 MG/ML
200 VIAL (ML) INTRAMUSCULAR
Qty: 1 | Refills: 0 | Status: ACTIVE | COMMUNITY
Start: 2021-05-04 | End: 1900-01-01

## 2024-04-18 RX ORDER — OXYCODONE HYDROCHLORIDE 30 MG/1
30 TABLET ORAL 4 TIMES DAILY
Qty: 120 | Refills: 0 | Status: ACTIVE | COMMUNITY
Start: 2022-11-17 | End: 1900-01-01

## 2024-04-18 NOTE — ASSESSMENT
[FreeTextEntry1] : #HIV - continue triumeq - reviewed labs -- will call to see if HIV VL also completed  #Left Hip Pain severe osteoarthritis - seen by Ortho 1/2024 - holding off on surgery -- taking Glucosamine/Chondroitin - was given PT script - on chronic opioid as above  #Pancreatic Duct dilitation - Abd US 7/27/2022: New mild pancreatic duct dilatation measuring 3-4 mm. A central obstructing lesion cannot be excluded on this examination. Further evaluation of the pancreas with MRI and MRCP with and without gadolinium is necessary for complete characterization. Essentially stable dilatation of the common bile duct of unclear etiology - Abd Us 10/2022 at [facility] -- stable changes - seen by GI in hospital -- referral given and discussed to make follow-up - has GI appointment 5/17/2024  #Leukopenia/hypercalcemia - intermittent findings on labs -- most recent 10/2023 has been normal - SPEP without M Dar, weak IgG Kappa and weak IgG lambda bands - UPEP - [facility] - Bence Zhang Concentration absent - 10/2022 B12/Folate normal - 10/2022 normal iron studies - appreciate heme follow-up  #CKD - appreciate renal follow-up  #Hypertension - - continue Amlodipin 10 mg daily - continue chothalidone 25 mg daily   #HTN - amlodipin 10 mg daily - chlorthalidone 25 mg daily  #Low Testosterone - on testosterone gell - will recheck testosterone levels  #Neuropathy - Pregabpin 200 mg twice a day  #Opioid dependence - Methadone 110 mg daily - [address] [place] [address].   .

## 2024-04-18 NOTE — HISTORY OF PRESENT ILLNESS
[FreeTextEntry1] : 69 yo M here for HIV follow-up and med complaince.   Reviewed Heme-onc notes from 2/21/2024 -- anemia likely multifactorial, abnormal SPEP/FLC reflective of polyclonal gammopathy   Recommended for rheum evaluation as well.  No acute complaints.  Recently seen by heme-onc.  Still ongoing conservative mangement for knee pain/hip pain - not worsening.   ISTOP reviewed #: [id number]

## 2024-04-19 ENCOUNTER — APPOINTMENT (OUTPATIENT)
Dept: ORTHOPEDIC SURGERY | Facility: CLINIC | Age: 69
End: 2024-04-19
Payer: MEDICARE

## 2024-04-19 DIAGNOSIS — M19.90 UNSPECIFIED OSTEOARTHRITIS, UNSPECIFIED SITE: ICD-10-CM

## 2024-04-19 PROCEDURE — 99213 OFFICE O/P EST LOW 20 MIN: CPT

## 2024-04-22 ENCOUNTER — NON-APPOINTMENT (OUTPATIENT)
Age: 69
End: 2024-04-22

## 2024-04-24 ENCOUNTER — NON-APPOINTMENT (OUTPATIENT)
Age: 69
End: 2024-04-24

## 2024-04-26 ENCOUNTER — NON-APPOINTMENT (OUTPATIENT)
Age: 69
End: 2024-04-26

## 2024-04-27 LAB
ALBUMIN SERPL ELPH-MCNC: 3.7 G/DL
ALP BLD-CCNC: 215 U/L
ALT SERPL-CCNC: 65 U/L
ANION GAP SERPL CALC-SCNC: 16 MMOL/L
AST SERPL-CCNC: 100 U/L
BASOPHILS # BLD AUTO: 0.06 K/UL
BASOPHILS NFR BLD AUTO: 1.3 %
BILIRUB SERPL-MCNC: <0.2 MG/DL
BUN SERPL-MCNC: 53 MG/DL
CALCIUM SERPL-MCNC: 9.7 MG/DL
CD16+CD56+ CELLS # BLD: 234 /UL
CD16+CD56+ CELLS NFR BLD: 18 %
CD19 CELLS NFR BLD: 28 /UL
CD3 CELLS # BLD: 1052 /UL
CD3 CELLS NFR BLD: 79 %
CD3+CD4+ CELLS # BLD: 316 /UL
CD3+CD4+ CELLS NFR BLD: 24 %
CD3+CD4+ CELLS/CD3+CD8+ CLL SPEC: 0.45 RATIO
CD3+CD8+ CELLS # SPEC: 707 /UL
CD3+CD8+ CELLS NFR BLD: 53 %
CELLS.CD3-CD19+/CELLS IN BLOOD: 2 %
CHLORIDE SERPL-SCNC: 100 MMOL/L
CO2 SERPL-SCNC: 20 MMOL/L
CREAT SERPL-MCNC: 2.8 MG/DL
EGFR: 24 ML/MIN/1.73M2
EOSINOPHIL # BLD AUTO: 0.11 K/UL
EOSINOPHIL NFR BLD AUTO: 2.5 %
GLUCOSE SERPL-MCNC: 87 MG/DL
HCT VFR BLD CALC: 29.3 %
HGB BLD-MCNC: 9.6 G/DL
HIV1 RNA # SERPL NAA+PROBE: 7488 COPIES/ML
IMM GRANULOCYTES NFR BLD AUTO: 0.4 %
LYMPHOCYTES # BLD AUTO: 1.19 K/UL
LYMPHOCYTES NFR BLD AUTO: 26.7 %
MAN DIFF?: NORMAL
MCHC RBC-ENTMCNC: 28 PG
MCHC RBC-ENTMCNC: 32.8 G/DL
MCV RBC AUTO: 85.4 FL
MONOCYTES # BLD AUTO: 0.53 K/UL
MONOCYTES NFR BLD AUTO: 11.9 %
NEUTROPHILS # BLD AUTO: 2.54 K/UL
NEUTROPHILS NFR BLD AUTO: 57.2 %
PLATELET # BLD AUTO: 387 K/UL
PMV BLD AUTO: 0 /100 WBCS
POTASSIUM SERPL-SCNC: 4.8 MMOL/L
PROT SERPL-MCNC: 7.6 G/DL
RBC # BLD: 3.43 M/UL
RBC # FLD: 14.6 %
SODIUM SERPL-SCNC: 136 MMOL/L
VIRAL LOAD INTERP: DETECTED
VIRAL LOAD LOG: 3.87 LOGCOPIES/ML
WBC # FLD AUTO: 4.45 K/UL

## 2024-04-29 ENCOUNTER — NON-APPOINTMENT (OUTPATIENT)
Age: 69
End: 2024-04-29

## 2024-04-30 ENCOUNTER — NON-APPOINTMENT (OUTPATIENT)
Age: 69
End: 2024-04-30

## 2024-05-02 ENCOUNTER — NON-APPOINTMENT (OUTPATIENT)
Age: 69
End: 2024-05-02

## 2024-05-06 ENCOUNTER — NON-APPOINTMENT (OUTPATIENT)
Age: 69
End: 2024-05-06

## 2024-05-16 ENCOUNTER — APPOINTMENT (OUTPATIENT)
Dept: INFECTIOUS DISEASE | Facility: CLINIC | Age: 69
End: 2024-05-16

## 2024-05-17 ENCOUNTER — NON-APPOINTMENT (OUTPATIENT)
Age: 69
End: 2024-05-17

## 2024-05-17 ENCOUNTER — APPOINTMENT (OUTPATIENT)
Dept: GASTROENTEROLOGY | Facility: CLINIC | Age: 69
End: 2024-05-17

## 2024-06-06 ENCOUNTER — RX RENEWAL (OUTPATIENT)
Age: 69
End: 2024-06-06

## 2024-06-06 RX ORDER — TESTOSTERONE CYPIONATE 200 MG/ML
200 INJECTION, SOLUTION INTRAMUSCULAR
Qty: 2 | Refills: 0 | Status: ACTIVE | COMMUNITY
Start: 2020-11-17 | End: 1900-01-01

## 2024-06-13 RX ORDER — NALOXONE HYDROCHLORIDE 4 MG/.1ML
4 SPRAY NASAL
Qty: 1 | Refills: 1 | Status: ACTIVE | COMMUNITY
Start: 2022-06-22

## 2024-06-20 ENCOUNTER — NON-APPOINTMENT (OUTPATIENT)
Age: 69
End: 2024-06-20

## 2024-07-09 ENCOUNTER — NON-APPOINTMENT (OUTPATIENT)
Age: 69
End: 2024-07-09

## 2024-07-16 ENCOUNTER — NON-APPOINTMENT (OUTPATIENT)
Age: 69
End: 2024-07-16

## 2024-07-18 ENCOUNTER — RESULT REVIEW (OUTPATIENT)
Age: 69
End: 2024-07-18

## 2024-07-20 ENCOUNTER — RESULT REVIEW (OUTPATIENT)
Age: 69
End: 2024-07-20

## 2024-07-22 ENCOUNTER — TRANSCRIPTION ENCOUNTER (OUTPATIENT)
Age: 69
End: 2024-07-22

## 2024-07-25 ENCOUNTER — TRANSCRIPTION ENCOUNTER (OUTPATIENT)
Age: 69
End: 2024-07-25

## 2024-07-26 ENCOUNTER — NON-APPOINTMENT (OUTPATIENT)
Age: 69
End: 2024-07-26

## 2024-07-29 PROBLEM — E78.5 HYPERLIPIDEMIA, UNSPECIFIED: Chronic | Status: ACTIVE | Noted: 2024-07-14

## 2024-07-29 PROBLEM — N18.9 CHRONIC KIDNEY DISEASE, UNSPECIFIED: Chronic | Status: ACTIVE | Noted: 2024-07-14

## 2024-07-29 PROBLEM — B19.20 UNSPECIFIED VIRAL HEPATITIS C WITHOUT HEPATIC COMA: Chronic | Status: ACTIVE | Noted: 2024-07-14

## 2024-07-30 ENCOUNTER — APPOINTMENT (OUTPATIENT)
Dept: NEPHROLOGY | Facility: CLINIC | Age: 69
End: 2024-07-30

## 2024-08-07 ENCOUNTER — APPOINTMENT (OUTPATIENT)
Dept: INTERNAL MEDICINE | Facility: CLINIC | Age: 69
End: 2024-08-07

## 2024-08-08 ENCOUNTER — NON-APPOINTMENT (OUTPATIENT)
Age: 69
End: 2024-08-08

## 2024-08-08 ENCOUNTER — OUTPATIENT (OUTPATIENT)
Dept: OUTPATIENT SERVICES | Facility: HOSPITAL | Age: 69
LOS: 1 days | End: 2024-08-08
Payer: MEDICARE

## 2024-08-08 ENCOUNTER — APPOINTMENT (OUTPATIENT)
Dept: INFECTIOUS DISEASE | Facility: CLINIC | Age: 69
End: 2024-08-08

## 2024-08-08 DIAGNOSIS — B20 HUMAN IMMUNODEFICIENCY VIRUS [HIV] DISEASE: ICD-10-CM

## 2024-08-08 PROBLEM — Z21 HIV (HUMAN IMMUNODEFICIENCY VIRUS INFECTION): Status: ACTIVE | Noted: 2017-03-03

## 2024-08-08 PROCEDURE — 99214 OFFICE O/P EST MOD 30 MIN: CPT

## 2024-08-08 NOTE — HISTORY OF PRESENT ILLNESS
[FreeTextEntry1] : 69 yo M here for HIV follow-up and follow up after hospital discharge for bacteremia 2/2 infected sacral ulcer s/p debridement and 3 weeks Abx course with no evidence of endocarditis on TTE.   Reviewed Heme-onc notes from 2/21/2024 -- anemia likely multifactorial, abnormal SPEP/FLC reflective of polyclonal gammopathy Recommended for rheum evaluation as well. He was seen by ortho 1/2024 for severe left hip arthritis, but wanted to pursue conservative management. Patient is now at nursing home for rehab following hospital discharge 3 weeks ago.

## 2024-08-08 NOTE — PHYSICAL EXAM
[General Appearance - In No Acute Distress] : in no acute distress [General Appearance - Well Nourished] : well nourished [] : no respiratory distress [Respiration, Rhythm And Depth] : normal respiratory rhythm and effort [Auscultation Breath Sounds / Voice Sounds] : lungs were clear to auscultation bilaterally [Heart Sounds] : normal S1 and S2 [Heart Rate And Rhythm] : heart rate was normal and rhythm regular [Abdomen Soft] : soft [Abdomen Tenderness] : non-tender [FreeTextEntry1] : +Hector catheter. [Musculoskeletal - Swelling] : no joint swelling [No Focal Deficits] : no focal deficits [Affect] : the affect was normal

## 2024-08-08 NOTE — ASSESSMENT
[FreeTextEntry1] : #HIV - continue Triumeq - Viral load from July 2024 -ve - CD4 count 171 - Will repeat CD4 count and viral load today - Will hold off Vaccination at this visit given most recent CD4 count is less than 200.  #Multibacterial septicemia 2/2 infected sacral ulcer in July 2024 - completed 3 weeks course of ABX (last day August 4) - TTE -ve for IE - no fever or chills at this time  - wound care by NH- Discussed with the nursing staff from [place]. Following Burn/Wound care team from [facility]. Using Daykins dressings. No purulence reported. Wound healing well.   #Stage IV sacral ulcer s/p debridement - Wound evaluated by wound specialist weekly at the NH  #Transaminitis in July 2024 as inpatient- chronic;  #H/O hep C, History of Hep B. Liver enzymes stable.  can continue statin for hyperlipidemia RUQ sono July2024: CBD 11mm - same as 2022 (probably due to opiates) Check AFP at least q yearly.  Check RUQ US yearly for HCC screening  #Left Hip Pain severe osteoarthritis - Following with Ortho - Need to follow up with ortho, for the joint pains. Still complaining of that.   #Pancreatic Duct dilatation - Abd US 7/27/2022: New mild pancreatic duct dilatation measuring 3-4 mm. A central obstructing lesion cannot be excluded on this examination. Further evaluation of the pancreas with MRI and MRCP with and without gadolinium is necessary for complete characterization. Essentially stable dilatation of the common bile duct of unclear etiology - Abd Us 10/2022 at [facility] -- stable changes - CT 7/2024- Did not reveal pancreatic duct problem.  #Leukopenia/hypercalcemia - intermittent findings on labs -- most recent 10/2023 has been normal - SPEP without M Dar, weak IgG Kappa and weak IgG lambda bands - UPEP - [facility] - Bence Zhang Concentration absent - 10/2022 B12/Folate normal - 10/2022 normal iron studies - Heme-onc notes from 2/21/2024 -- anemia likely multifactorial, abnormal SPEP/FLC reflective of polyclonal gammopathy  #CKD II - Had KATERIN on CKD as inpatient  - resolved prior to discharge  #Hypertension - - continue Amlodipine 10 mg daily - continue chlorthalidone 25 mg daily  #Low Testosterone - on testosterone gel  #Neuropathy - Pregabalin 200 mg twice a day  #Opioid dependence - Methadone 110 mg daily - [address]  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 Consider RSV vaccine.

## 2024-08-08 NOTE — PHYSICAL EXAM
[General Appearance - In No Acute Distress] : in no acute distress [General Appearance - Well Nourished] : well nourished [] : no respiratory distress [Respiration, Rhythm And Depth] : normal respiratory rhythm and effort [Auscultation Breath Sounds / Voice Sounds] : lungs were clear to auscultation bilaterally [Heart Rate And Rhythm] : heart rate was normal and rhythm regular [Heart Sounds] : normal S1 and S2 [Abdomen Tenderness] : non-tender [Abdomen Soft] : soft [FreeTextEntry1] : +Hector catheter. [Musculoskeletal - Swelling] : no joint swelling [No Focal Deficits] : no focal deficits [Affect] : the affect was normal

## 2024-08-08 NOTE — ASSESSMENT
[FreeTextEntry1] : #HIV - continue Triumeq - Viral load from July 2024 -ve - CD4 count 171 - Will repeat CD4 count and viral load today - Will hold off Vaccination at this visit given most recent CD4 count is less than 200.  #Multibacterial septicemia 2/2 infected sacral ulcer in July 2024 - completed 3 weeks course of ABX (last day August 4) - TTE -ve for IE - no fever or chills at this time  - wound care by NH- Discussed with the nursing staff from [facility]. Following Burn/Wound care team from [facility]. Using Daykins dressings. No purulence reported. Wound healing well.   #Stage IV sacral ulcer s/p debridement - Wound evaluated by wound specialist weekly at the NH  #Transaminitis in July 2024 as inpatient- chronic;  #H/O hep C, History of Hep B. Liver enzymes stable.  can continue statin for hyperlipidemia RUQ sono July2024: CBD 11mm - same as 2022 (probably due to opiates) Check AFP at least q yearly.  Check RUQ US yearly for HCC screening  #Left Hip Pain severe osteoarthritis - Following with Ortho - Need to follow up with ortho, for the joint pains. Still complaining of that.   #Pancreatic Duct dilatation - Abd US 7/27/2022: New mild pancreatic duct dilatation measuring 3-4 mm. A central obstructing lesion cannot be excluded on this examination. Further evaluation of the pancreas with MRI and MRCP with and without gadolinium is necessary for complete characterization. Essentially stable dilatation of the common bile duct of unclear etiology - Abd Us 10/2022 at [facility] -- stable changes - CT 7/2024- Did not reveal pancreatic duct problem.  #Leukopenia/hypercalcemia - intermittent findings on labs -- most recent 10/2023 has been normal - SPEP without M Dar, weak IgG Kappa and weak IgG lambda bands - UPEP - [facility] - Bence Zhang Concentration absent - 10/2022 B12/Folate normal - 10/2022 normal iron studies - Heme-onc notes from 2/21/2024 -- anemia likely multifactorial, abnormal SPEP/FLC reflective of polyclonal gammopathy  #CKD II - Had KATERIN on CKD as inpatient  - resolved prior to discharge  #Hypertension - - continue Amlodipine 10 mg daily - continue chlorthalidone 25 mg daily  #Low Testosterone - on testosterone gel  #Neuropathy - Pregabalin 200 mg twice a day  #Opioid dependence - Methadone 110 mg daily - [address]  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 Consider RSV vaccine.

## 2024-08-08 NOTE — REVIEW OF SYSTEMS
[Joint Pain] : joint pain [Fever] : no fever [Chills] : no chills [Chest Pain] : no chest pain [Palpitations] : no palpitations [Lower Ext Edema] : no extremity edema [Shortness Of Breath] : no shortness of breath [Wheezing] : no wheezing [Abdominal Pain] : no abdominal pain [Vomiting] : no vomiting [Constipation] : no constipation [Diarrhea] : no diarrhea [Dysuria] : no dysuria [FreeTextEntry9] : Sacral

## 2024-08-15 ENCOUNTER — APPOINTMENT (OUTPATIENT)
Dept: BURN CARE | Facility: CLINIC | Age: 69
End: 2024-08-15
Payer: MEDICARE

## 2024-08-15 VITALS
HEIGHT: 78 IN | SYSTOLIC BLOOD PRESSURE: 94 MMHG | BODY MASS INDEX: 17.12 KG/M2 | HEART RATE: 83 BPM | DIASTOLIC BLOOD PRESSURE: 58 MMHG | WEIGHT: 148 LBS | OXYGEN SATURATION: 90 %

## 2024-08-15 PROCEDURE — 99213 OFFICE O/P EST LOW 20 MIN: CPT

## 2024-08-17 NOTE — PHYSICAL EXAM
[de-identified] :  Subjective:   - Summary: The patient is here for a follow-up visit regarding a sacral pressure sore.   - Chief Complaint (CC): Sacral pressure sore, status post debridement.   - History of Present Illness: Yosvany, a 68-year-old male, presented for a follow-up visit regarding a sacral pressure sore. He is wheelchair-bound and bedridden, which has contributed to the development of the pressure sore. The patient has a history of back stiffness, carpal tunnel syndrome, chronic kidney disease, hepatitis C, and hypertension. During the initial examination, the sacral pressure sore measured approximately 20 by 20 centimeters and was granulating well. The previous treatment plan involved debridement and ongoing wound care.   - Past Medical History:     - Back stiffness     - Carpal tunnel syndrome     - Chronic kidney disease     - Hepatitis C     - Hypertension   - Past Surgical History: Debridement of sacral pressure sore.   - Family History:   - Social History:   - Review of Systems:   - General:   - Neurological:   - Musculoskeletal:   - Cardiovascular:   - Respiratory:   - Gastrointestinal:   - Genitourinary:   - Integumentary:   - Psychiatric:   - Medications:   - Allergies:   Objective:   - Diagnostic Results:   - Vital Signs:   - Physical Examination (PE): The sacral pressure sore measured approximately 20 by 20 centimeters and was granulating well.   Assessment and Plan:   - Sacral Pressure Sore: The patient has a sacral pressure sore that is currently granulating well after undergoing debridement. The pressure sore is likely due to the patient's immobility and prolonged periods of being wheelchair-bound and bedridden.     - Therapeutic Interventions: Continue with current wound care regimen, including soap and water cleansing, dressing changes, and pressure offloading.      - Diagnostic Tests: No additional tests are needed at this time.      - Referrals: Consider referral to a wound care specialist or physical therapist for further evaluation and management if the pressure sore does not show significant improvement.      - Patient Education: Educate the patient and caregivers on proper wound care techniques, pressure relief strategies, and the importance of regular repositioning to prevent further pressure sores.      - Follow-Up: Schedule a follow-up appointment in 6 weeks to reassess the progress of the pressure sore and adjust the treatment plan as needed.

## 2024-09-26 ENCOUNTER — APPOINTMENT (OUTPATIENT)
Dept: BURN CARE | Facility: CLINIC | Age: 69
End: 2024-09-26

## 2024-10-24 ENCOUNTER — APPOINTMENT (OUTPATIENT)
Dept: BURN CARE | Facility: CLINIC | Age: 69
End: 2024-10-24

## 2024-11-14 ENCOUNTER — NON-APPOINTMENT (OUTPATIENT)
Age: 69
End: 2024-11-14

## 2024-11-14 ENCOUNTER — APPOINTMENT (OUTPATIENT)
Dept: INFECTIOUS DISEASE | Facility: CLINIC | Age: 69
End: 2024-11-14

## 2024-11-14 ENCOUNTER — OUTPATIENT (OUTPATIENT)
Dept: OUTPATIENT SERVICES | Facility: HOSPITAL | Age: 69
LOS: 1 days | End: 2024-11-14
Payer: MEDICARE

## 2024-11-14 VITALS
BODY MASS INDEX: 22.08 KG/M2 | RESPIRATION RATE: 15 BRPM | DIASTOLIC BLOOD PRESSURE: 77 MMHG | SYSTOLIC BLOOD PRESSURE: 133 MMHG | WEIGHT: 163 LBS | HEART RATE: 61 BPM | HEIGHT: 72 IN | TEMPERATURE: 98 F | OXYGEN SATURATION: 100 %

## 2024-11-14 DIAGNOSIS — B20 HUMAN IMMUNODEFICIENCY VIRUS [HIV] DISEASE: ICD-10-CM

## 2024-11-14 DIAGNOSIS — B19.20 UNSPECIFIED VIRAL HEPATITIS C W/OUT HEPATIC COMA: ICD-10-CM

## 2024-11-14 DIAGNOSIS — Z21 ASYMPTOMATIC HUMAN IMMUNODEFICIENCY VIRUS [HIV] INFECTION STATUS: ICD-10-CM

## 2024-11-14 DIAGNOSIS — N18.32 CHRONIC KIDNEY DISEASE, STAGE 3B: ICD-10-CM

## 2024-11-14 PROCEDURE — 99214 OFFICE O/P EST MOD 30 MIN: CPT

## 2024-11-15 ENCOUNTER — APPOINTMENT (OUTPATIENT)
Dept: PULMONOLOGY | Facility: CLINIC | Age: 69
End: 2024-11-15

## 2024-11-22 ENCOUNTER — APPOINTMENT (OUTPATIENT)
Dept: PULMONOLOGY | Facility: CLINIC | Age: 69
End: 2024-11-22
Payer: MEDICARE

## 2024-11-22 ENCOUNTER — NON-APPOINTMENT (OUTPATIENT)
Age: 69
End: 2024-11-22

## 2024-11-22 ENCOUNTER — OUTPATIENT (OUTPATIENT)
Dept: OUTPATIENT SERVICES | Facility: HOSPITAL | Age: 69
LOS: 1 days | End: 2024-11-22
Payer: MEDICARE

## 2024-11-22 VITALS
DIASTOLIC BLOOD PRESSURE: 73 MMHG | OXYGEN SATURATION: 97 % | TEMPERATURE: 97 F | HEART RATE: 61 BPM | WEIGHT: 163 LBS | SYSTOLIC BLOOD PRESSURE: 118 MMHG | HEIGHT: 72 IN | BODY MASS INDEX: 22.08 KG/M2

## 2024-11-22 DIAGNOSIS — Z00.00 ENCOUNTER FOR GENERAL ADULT MEDICAL EXAMINATION WITHOUT ABNORMAL FINDINGS: ICD-10-CM

## 2024-11-22 DIAGNOSIS — Z12.2 ENCOUNTER FOR SCREENING FOR MALIGNANT NEOPLASM OF RESPIRATORY ORGANS: ICD-10-CM

## 2024-11-22 PROCEDURE — 99203 OFFICE O/P NEW LOW 30 MIN: CPT

## 2024-12-03 NOTE — ED PROVIDER NOTE - NEURO NEGATIVE STATEMENT, MLM
December 13, 2024       Jerome Bucio, DO  [address]  [facility] [address]  Via In Basket      Patient: Yareli Gonzales   YOB: 1954   Date of Visit: 12/3/2024       Dear Dr. Bucio:    I saw your patient, Patricia Gonzales, for an evaluation. Below are my notes for this visit with her.    If you have questions, please do not hesitate to call me.      Sincerely,        Cordell Tineo MD        CC: No Recipients  Cordell Tineo MD  12/13/2024  1:28 PM  Signed  Chief Complaint   Patient presents with   • Surgical Followup     PO ROBOTIC REPAIR OF VENTRAL INCISIONAL INCARCERATED HERNIA WITH MESH 11/07/2024 2nd post-op       Since the last visit the patient denies fevers, chills, nausea or vomiting.  She has however had ongoing issues with constipation.  She is passing flatus but has not had a bowel movement many days.    Past Medical History:   Diagnosis Date   • Colon polyps    • DDD (degenerative disc disease), cervical    • DDD (degenerative disc disease), lumbar    • Diabetes mellitus  (CMD)     BS<250   • Diverticulitis of sigmoid colon     bowl resection- 2006   • Diverticulitis of sigmoid colon    • GERD (gastroesophageal reflux disease)    • History of blood transfusion 08/25/1980    DENIES ANY REACTION   • History of esophagogastroduodenoscopy (EGD) 12/29/2022   • Hypothyroidism    • Incarcerated ventral hernia 10/08/2024   • Infiltrating ductal carcinoma of left female breast (CMD) 2015    Lumpectomy + radiation   • Irritable bowel syndrome with both constipation and diarrhea    • Neck problem     CERVICAL SPINE SURGERY X2   • Osteopenia of multiple sites    • Palpitations    • PONV (postoperative nausea and vomiting)    • Prediabetes    • Sleep apnea     borderline sleep apnea, NO NEED FOR CPAP     Past Surgical History:   Procedure Laterality Date   • Appendectomy,w othr proc  06/07/2006   • Bilateral oophorectomy  04/15/2007   • Breast lumpectomy Left 08/24/2015    Wire  localization lumpectomy   • Cervical discectomy      WITH FUSION-5-6   • Cervical spine surgery     •  delivery only     • Colectomy  2006    Sigmoid colon resection   • Colonoscopy  2013    with snare polypectomy    • Colonoscopy     • Colonoscopy w/ biopsies  2022   • Esophagogastroduodenoscopy  2018    EDG with Biopsy and EUS   • Laminectomy,lumbar      L3-S1   • Lap repar hernia incarcerated vent umb spig  2024    Bard Ventralight ST mesh   • Laparoscopic cholecystectomy  2018   • Open access colonoscopy  2022   • Ovarian cyst removal Left 04/15/2007    With lysis of adhesions   • [place] lymph node biopsy Left 2015    Left axillary sentinel lymph node biopsy   • Spine surgery     • Total abdom hysterectomy     • Ventral hernia repair  2018     ALLERGIES:   Allergen Reactions   • Dermabond RASH   • Latex Other (See Comments)     Unknown   • No Name Available Other (See Comments)     Tegaderm Misc- Unknown   • Skin Adhesives RASH     Blistering rash   • Tegaderm Alginate Ag Other (See Comments)     Bilsters     Review of Systems   Constitutional: Negative.    Respiratory: Negative.     Cardiovascular: Negative.    Gastrointestinal:  Positive for constipation. Negative for nausea and vomiting.         Objective    Visit Vitals  /71   Pulse 96   Temp 97.6 °F (36.4 °C) (Temporal)   Resp 17   Ht 5' 4\" (1.626 m)   Wt 100.7 kg (222 lb)   SpO2 99%   BMI 38.11 kg/m²       Gen - NAD  CV - RRR  Pulm - Non-labored  Abd - Soft, non-distended, non-tender, incision(s) CDI        Assessment/Plan  S/P laparoscopic hernia repair  Doing well status post robotic ventral hernia repair.  Is having some issues with ongoing chronic constipation.   -Magnesium citrate 300 mL x 1 then recommend starting MiraLAX 1-2 times a day   -Follow-up in 2 to 3 weeks      Electronically signed by: Cordell Tineo MD  2024   no loss of consciousness, no gait abnormality, no headache, no sensory deficits, and no weakness.

## 2024-12-25 PROBLEM — F10.90 ALCOHOL USE: Status: ACTIVE | Noted: 2017-03-03

## 2025-01-30 ENCOUNTER — NON-APPOINTMENT (OUTPATIENT)
Age: 70
End: 2025-01-30

## 2025-02-04 ENCOUNTER — NON-APPOINTMENT (OUTPATIENT)
Age: 70
End: 2025-02-04

## 2025-02-05 ENCOUNTER — OUTPATIENT (OUTPATIENT)
Dept: OUTPATIENT SERVICES | Facility: HOSPITAL | Age: 70
LOS: 1 days | End: 2025-02-05
Payer: MEDICARE

## 2025-02-05 PROCEDURE — 99396 PREV VISIT EST AGE 40-64: CPT

## 2025-02-06 ENCOUNTER — APPOINTMENT (OUTPATIENT)
Dept: INTERNAL MEDICINE | Facility: CLINIC | Age: 70
End: 2025-02-06
Payer: MEDICARE

## 2025-02-06 ENCOUNTER — OUTPATIENT (OUTPATIENT)
Dept: OUTPATIENT SERVICES | Facility: HOSPITAL | Age: 70
LOS: 1 days | End: 2025-02-06
Payer: MEDICARE

## 2025-02-06 VITALS
SYSTOLIC BLOOD PRESSURE: 129 MMHG | BODY MASS INDEX: 28.17 KG/M2 | WEIGHT: 208 LBS | HEIGHT: 72 IN | TEMPERATURE: 99 F | DIASTOLIC BLOOD PRESSURE: 77 MMHG | OXYGEN SATURATION: 96 % | HEART RATE: 57 BPM

## 2025-02-06 DIAGNOSIS — I10 ESSENTIAL (PRIMARY) HYPERTENSION: ICD-10-CM

## 2025-02-06 DIAGNOSIS — Z00.00 ENCOUNTER FOR GENERAL ADULT MEDICAL EXAMINATION WITHOUT ABNORMAL FINDINGS: ICD-10-CM

## 2025-02-06 DIAGNOSIS — M16.12 UNILATERAL PRIMARY OSTEOARTHRITIS, LEFT HIP: ICD-10-CM

## 2025-02-06 DIAGNOSIS — E78.5 HYPERLIPIDEMIA, UNSPECIFIED: ICD-10-CM

## 2025-02-06 PROCEDURE — G2211 COMPLEX E/M VISIT ADD ON: CPT

## 2025-02-06 PROCEDURE — 99214 OFFICE O/P EST MOD 30 MIN: CPT

## 2025-02-06 RX ORDER — LIDOCAINE 40 MG/G
4 PATCH TOPICAL
Qty: 7 | Refills: 1 | Status: ACTIVE | COMMUNITY
Start: 2025-02-06 | End: 1900-01-01

## 2025-02-07 DIAGNOSIS — I10 ESSENTIAL (PRIMARY) HYPERTENSION: ICD-10-CM

## 2025-02-07 DIAGNOSIS — M16.12 UNILATERAL PRIMARY OSTEOARTHRITIS, LEFT HIP: ICD-10-CM

## 2025-02-07 DIAGNOSIS — E78.5 HYPERLIPIDEMIA, UNSPECIFIED: ICD-10-CM

## 2025-02-12 ENCOUNTER — APPOINTMENT (OUTPATIENT)
Dept: PAIN MANAGEMENT | Facility: CLINIC | Age: 70
End: 2025-02-12

## 2025-02-17 DIAGNOSIS — Z21 ASYMPTOMATIC HUMAN IMMUNODEFICIENCY VIRUS [HIV] INFECTION STATUS: ICD-10-CM

## 2025-03-05 ENCOUNTER — APPOINTMENT (OUTPATIENT)
Dept: PAIN MANAGEMENT | Facility: CLINIC | Age: 70
End: 2025-03-05

## 2025-03-12 ENCOUNTER — OUTPATIENT (OUTPATIENT)
Dept: OUTPATIENT SERVICES | Facility: HOSPITAL | Age: 70
LOS: 1 days | End: 2025-03-12
Payer: MEDICARE

## 2025-03-12 ENCOUNTER — APPOINTMENT (OUTPATIENT)
Dept: PAIN MANAGEMENT | Facility: CLINIC | Age: 70
End: 2025-03-12
Payer: MEDICARE

## 2025-03-12 VITALS
HEART RATE: 96 BPM | WEIGHT: 208 LBS | TEMPERATURE: 97.3 F | OXYGEN SATURATION: 96 % | DIASTOLIC BLOOD PRESSURE: 92 MMHG | BODY MASS INDEX: 28.17 KG/M2 | HEIGHT: 72 IN | SYSTOLIC BLOOD PRESSURE: 148 MMHG

## 2025-03-12 DIAGNOSIS — Z00.00 ENCOUNTER FOR GENERAL ADULT MEDICAL EXAMINATION WITHOUT ABNORMAL FINDINGS: ICD-10-CM

## 2025-03-12 DIAGNOSIS — M19.90 UNSPECIFIED OSTEOARTHRITIS, UNSPECIFIED SITE: ICD-10-CM

## 2025-03-12 PROCEDURE — 99204 OFFICE O/P NEW MOD 45 MIN: CPT

## 2025-03-12 RX ORDER — TIZANIDINE 2 MG/1
2 TABLET ORAL
Qty: 30 | Refills: 0 | Status: ACTIVE | COMMUNITY
Start: 2025-03-12 | End: 1900-01-01

## 2025-03-25 DIAGNOSIS — M19.90 UNSPECIFIED OSTEOARTHRITIS, UNSPECIFIED SITE: ICD-10-CM

## 2025-03-27 ENCOUNTER — NON-APPOINTMENT (OUTPATIENT)
Age: 70
End: 2025-03-27

## 2025-04-14 ENCOUNTER — APPOINTMENT (OUTPATIENT)
Dept: INTERNAL MEDICINE | Facility: CLINIC | Age: 70
End: 2025-04-14

## 2025-04-16 NOTE — ED ADULT NURSE NOTE - NS ED NURSE LEVEL OF CONSCIOUSNESS SPEECH
Pulmonology Follow Up Visit    CONSULTING PHYSICIAN:  Tio Gonzalez MD ,     REASON FOR VISIT:   Chief Complaint   Patient presents with    Sleep Apnea     INSPIRE F/U       DATE OF VISIT: 4/16/2025    HISTORY OF PRESENT ILLNESS: 70 y.o. year old female  who is being seen in the pulmonary/sleep for a follow-up.  Reports that with the current inspire [hypoglossal nerve stimulation] settings she has been sleeping better with snoring less.  Many nights she is reading in the bed for 45 minutes and then dozes off.  She does not turn the device on always as then she has to continue to pause it to delay the start.  This is led to many nights when she has not used it.  She has also gained some weight.  Has been sleeping on her back more as she has spinal stenosis and neck issues.  Still motivated to continue using the inspire [hypoglossal nerve stimulation] therapy.    Previously: Patient is liking the new changes made to the inspire [hypoglossal nerve stimulation] therapy.  Reports that her  has reported that she is snoring less and less restless while sleeping.  She is waking up more refreshed.  Denies any tongue soreness, neck soreness.  Has been able to go up on the stimulation strength by a few levels.  Weight remains stable. Continue to use her inspire [hypoglossal nerve stimulation] therapy regularly.  Recently was admitted to the hospital due to acute gangrenous appendicitis.  Had postop complication of abdominal bleeding.  Eventually recovered and was discharged back home.  During the hospitalization could not use the inspire [hypoglossal nerve stimulation] therapy.  Now starting to use it again.  Does find the settings to be a little bit too intense.  Repeat sleep study on inspire [hypoglossal nerve stimulation] therapy showed inadequate control of sleep apnea.  Weight remains stable.  Patient has found a new job and will be starting it on December 9, 2024.   Patient had inspire [hypoglossal 
Speaking Coherently

## 2025-04-17 ENCOUNTER — NON-APPOINTMENT (OUTPATIENT)
Age: 70
End: 2025-04-17

## 2025-04-24 ENCOUNTER — APPOINTMENT (OUTPATIENT)
Dept: INFECTIOUS DISEASE | Facility: CLINIC | Age: 70
End: 2025-04-24

## 2025-04-30 ENCOUNTER — NON-APPOINTMENT (OUTPATIENT)
Age: 70
End: 2025-04-30

## 2025-05-07 ENCOUNTER — NON-APPOINTMENT (OUTPATIENT)
Age: 70
End: 2025-05-07

## 2025-05-09 ENCOUNTER — NON-APPOINTMENT (OUTPATIENT)
Age: 70
End: 2025-05-09

## 2025-08-11 ENCOUNTER — RX RENEWAL (OUTPATIENT)
Age: 70
End: 2025-08-11

## 2025-09-02 ENCOUNTER — RX RENEWAL (OUTPATIENT)
Age: 70
End: 2025-09-02